# Patient Record
Sex: MALE | Race: WHITE | Employment: OTHER | URBAN - NONMETROPOLITAN AREA
[De-identification: names, ages, dates, MRNs, and addresses within clinical notes are randomized per-mention and may not be internally consistent; named-entity substitution may affect disease eponyms.]

---

## 2018-03-08 ENCOUNTER — HOSPITAL ENCOUNTER (OUTPATIENT)
Age: 63
Setting detail: SPECIMEN
Discharge: HOME | End: 2018-03-08
Payer: MEDICARE

## 2018-03-08 LAB
ABSOLUTE EOS #: 0.21 K/UL (ref 0–0.44)
ABSOLUTE IMMATURE GRANULOCYTE: <0.03 K/UL (ref 0–0.3)
ABSOLUTE LYMPH #: 1.99 K/UL (ref 1.1–3.7)
ABSOLUTE MONO #: 0.35 K/UL (ref 0.1–1.2)
ALBUMIN SERPL-MCNC: 4.2 G/DL (ref 3.5–5.2)
ALBUMIN/GLOBULIN RATIO: 1.6 (ref 1–2.5)
ALP BLD-CCNC: 132 U/L (ref 40–129)
ALT SERPL-CCNC: 12 U/L (ref 5–41)
ANION GAP SERPL CALCULATED.3IONS-SCNC: 12 MMOL/L (ref 9–17)
AST SERPL-CCNC: 14 U/L
BASOPHILS # BLD: 1 % (ref 0–2)
BASOPHILS ABSOLUTE: 0.04 K/UL (ref 0–0.2)
BILIRUB SERPL-MCNC: 0.21 MG/DL (ref 0.3–1.2)
BUN BLDV-MCNC: 10 MG/DL (ref 8–23)
BUN/CREAT BLD: 11 (ref 9–20)
CALCIUM SERPL-MCNC: 9.1 MG/DL (ref 8.6–10.4)
CHLORIDE BLD-SCNC: 102 MMOL/L (ref 98–107)
CHOLESTEROL/HDL RATIO: 4
CHOLESTEROL: 157 MG/DL
CO2: 29 MMOL/L (ref 20–31)
CREAT SERPL-MCNC: 0.87 MG/DL (ref 0.7–1.2)
DIFFERENTIAL TYPE: ABNORMAL
EOSINOPHILS RELATIVE PERCENT: 4 % (ref 1–4)
FOLATE: 16.6 NG/ML
GFR AFRICAN AMERICAN: >60 ML/MIN
GFR NON-AFRICAN AMERICAN: >60 ML/MIN
GFR SERPL CREATININE-BSD FRML MDRD: ABNORMAL ML/MIN/{1.73_M2}
GFR SERPL CREATININE-BSD FRML MDRD: ABNORMAL ML/MIN/{1.73_M2}
GLUCOSE BLD-MCNC: 82 MG/DL (ref 70–99)
HCT VFR BLD CALC: 40.2 % (ref 40.7–50.3)
HDLC SERPL-MCNC: 39 MG/DL
HEMOGLOBIN: 12.8 G/DL (ref 13–17)
IMMATURE GRANULOCYTES: 0 %
LDL CHOLESTEROL: 99 MG/DL (ref 0–130)
LYMPHOCYTES # BLD: 36 % (ref 24–43)
MCH RBC QN AUTO: 31 PG (ref 25.2–33.5)
MCHC RBC AUTO-ENTMCNC: 31.8 G/DL (ref 28.4–34.8)
MCV RBC AUTO: 97.3 FL (ref 82.6–102.9)
MONOCYTES # BLD: 6 % (ref 3–12)
NRBC AUTOMATED: 0 PER 100 WBC
PDW BLD-RTO: 12.1 % (ref 11.8–14.4)
PLATELET # BLD: 250 K/UL (ref 138–453)
PLATELET ESTIMATE: ABNORMAL
PMV BLD AUTO: 10.1 FL (ref 8.1–13.5)
POTASSIUM SERPL-SCNC: 4.2 MMOL/L (ref 3.7–5.3)
PROSTATE SPECIFIC ANTIGEN: 0.43 UG/L
RBC # BLD: 4.13 M/UL (ref 4.21–5.77)
RBC # BLD: ABNORMAL 10*6/UL
SEG NEUTROPHILS: 53 % (ref 36–65)
SEGMENTED NEUTROPHILS ABSOLUTE COUNT: 2.92 K/UL (ref 1.5–8.1)
SODIUM BLD-SCNC: 143 MMOL/L (ref 135–144)
TOTAL PROTEIN: 6.9 G/DL (ref 6.4–8.3)
TRIGL SERPL-MCNC: 97 MG/DL
VITAMIN B-12: 334 PG/ML (ref 211–946)
VLDLC SERPL CALC-MCNC: ABNORMAL MG/DL (ref 1–30)
WBC # BLD: 5.5 K/UL (ref 3.5–11.3)
WBC # BLD: ABNORMAL 10*3/UL

## 2018-03-08 PROCEDURE — 82746 ASSAY OF FOLIC ACID SERUM: CPT

## 2018-03-08 PROCEDURE — 82652 VIT D 1 25-DIHYDROXY: CPT

## 2018-03-08 PROCEDURE — 80053 COMPREHEN METABOLIC PANEL: CPT

## 2018-03-08 PROCEDURE — 84153 ASSAY OF PSA TOTAL: CPT

## 2018-03-08 PROCEDURE — P9603 ONE-WAY ALLOW PRORATED MILES: HCPCS

## 2018-03-08 PROCEDURE — 85025 COMPLETE CBC W/AUTO DIFF WBC: CPT

## 2018-03-08 PROCEDURE — 80061 LIPID PANEL: CPT

## 2018-03-08 PROCEDURE — 82607 VITAMIN B-12: CPT

## 2018-03-08 PROCEDURE — 36415 COLL VENOUS BLD VENIPUNCTURE: CPT

## 2018-03-10 LAB — VITAMIN D 1,25-DIHYDROXY: 40.6 PG/ML (ref 19.9–79.3)

## 2018-03-29 ENCOUNTER — HOSPITAL ENCOUNTER (OUTPATIENT)
Age: 63
Setting detail: SPECIMEN
Discharge: HOME | End: 2018-03-29
Payer: MEDICARE

## 2018-03-29 LAB
IRON SATURATION: 35 % (ref 20–55)
IRON: 56 UG/DL (ref 59–158)
TOTAL IRON BINDING CAPACITY: 160 UG/DL (ref 250–450)
UNSATURATED IRON BINDING CAPACITY: 104.4 UG/DL (ref 112–347)

## 2018-03-29 PROCEDURE — 36415 COLL VENOUS BLD VENIPUNCTURE: CPT

## 2018-03-29 PROCEDURE — 83540 ASSAY OF IRON: CPT

## 2018-03-29 PROCEDURE — 83550 IRON BINDING TEST: CPT

## 2018-03-29 PROCEDURE — P9603 ONE-WAY ALLOW PRORATED MILES: HCPCS

## 2018-11-01 ENCOUNTER — HOSPITAL ENCOUNTER (OUTPATIENT)
Age: 63
Setting detail: SPECIMEN
Discharge: HOME | End: 2018-11-01
Payer: MEDICARE

## 2018-11-01 LAB
-: ABNORMAL
AMORPHOUS: ABNORMAL
BACTERIA: ABNORMAL
BILIRUBIN URINE: NEGATIVE
CASTS UA: ABNORMAL /LPF
COLOR: YELLOW
COMMENT UA: ABNORMAL
CRYSTALS, UA: ABNORMAL /HPF
EPITHELIAL CELLS UA: ABNORMAL /HPF (ref 0–5)
GLUCOSE URINE: NEGATIVE
KETONES, URINE: NEGATIVE
LEUKOCYTE ESTERASE, URINE: ABNORMAL
MUCUS: ABNORMAL
NITRITE, URINE: NEGATIVE
OTHER OBSERVATIONS UA: ABNORMAL
PH UA: 8 (ref 5–9)
PROTEIN UA: ABNORMAL
RBC UA: ABNORMAL /HPF (ref 0–2)
RENAL EPITHELIAL, UA: ABNORMAL /HPF
SPECIFIC GRAVITY UA: 1.01 (ref 1.01–1.02)
TRICHOMONAS: ABNORMAL
TURBIDITY: ABNORMAL
URINE HGB: ABNORMAL
UROBILINOGEN, URINE: NORMAL
WBC UA: ABNORMAL /HPF (ref 0–5)
YEAST: ABNORMAL

## 2018-11-01 PROCEDURE — 87086 URINE CULTURE/COLONY COUNT: CPT

## 2018-11-01 PROCEDURE — 86403 PARTICLE AGGLUT ANTBDY SCRN: CPT

## 2018-11-01 PROCEDURE — 81001 URINALYSIS AUTO W/SCOPE: CPT

## 2018-11-03 LAB
CULTURE: ABNORMAL
Lab: ABNORMAL
SPECIMEN DESCRIPTION: ABNORMAL
STATUS: ABNORMAL

## 2018-12-12 ENCOUNTER — HOSPITAL ENCOUNTER (OUTPATIENT)
Age: 63
Setting detail: SPECIMEN
Discharge: HOME | End: 2018-12-12
Payer: MEDICARE

## 2018-12-12 PROCEDURE — 81003 URINALYSIS AUTO W/O SCOPE: CPT

## 2018-12-13 LAB
BILIRUBIN URINE: NEGATIVE
COLOR: YELLOW
COMMENT UA: NORMAL
GLUCOSE URINE: NEGATIVE
KETONES, URINE: NEGATIVE
LEUKOCYTE ESTERASE, URINE: NEGATIVE
NITRITE, URINE: NEGATIVE
PH UA: 7 (ref 5–9)
PROTEIN UA: NEGATIVE
SPECIFIC GRAVITY UA: 1.01 (ref 1.01–1.02)
TURBIDITY: CLEAR
URINE HGB: NEGATIVE
UROBILINOGEN, URINE: NORMAL

## 2019-06-12 ENCOUNTER — HOSPITAL ENCOUNTER (OUTPATIENT)
Age: 64
Setting detail: SPECIMEN
Discharge: HOME | End: 2019-06-12
Payer: MEDICARE

## 2019-06-12 LAB
ABSOLUTE EOS #: 0.38 K/UL (ref 0–0.44)
ABSOLUTE IMMATURE GRANULOCYTE: <0.03 K/UL (ref 0–0.3)
ABSOLUTE LYMPH #: 1.83 K/UL (ref 1.1–3.7)
ABSOLUTE MONO #: 0.45 K/UL (ref 0.1–1.2)
ALBUMIN SERPL-MCNC: 3.9 G/DL (ref 3.5–5.2)
ALBUMIN/GLOBULIN RATIO: 1.3 (ref 1–2.5)
ALP BLD-CCNC: 122 U/L (ref 40–129)
ALT SERPL-CCNC: 11 U/L (ref 5–41)
ANION GAP SERPL CALCULATED.3IONS-SCNC: 9 MMOL/L (ref 9–17)
AST SERPL-CCNC: 12 U/L
BASOPHILS # BLD: 1 % (ref 0–2)
BASOPHILS ABSOLUTE: 0.05 K/UL (ref 0–0.2)
BILIRUB SERPL-MCNC: 0.18 MG/DL (ref 0.3–1.2)
BUN BLDV-MCNC: 19 MG/DL (ref 8–23)
BUN/CREAT BLD: 16 (ref 9–20)
CALCIUM SERPL-MCNC: 9.5 MG/DL (ref 8.6–10.4)
CHLORIDE BLD-SCNC: 105 MMOL/L (ref 98–107)
CHOLESTEROL/HDL RATIO: 3.4
CHOLESTEROL: 149 MG/DL
CO2: 29 MMOL/L (ref 20–31)
CREAT SERPL-MCNC: 1.22 MG/DL (ref 0.7–1.2)
DIFFERENTIAL TYPE: ABNORMAL
EOSINOPHILS RELATIVE PERCENT: 7 % (ref 1–4)
GFR AFRICAN AMERICAN: >60 ML/MIN
GFR NON-AFRICAN AMERICAN: >60 ML/MIN
GFR SERPL CREATININE-BSD FRML MDRD: ABNORMAL ML/MIN/{1.73_M2}
GFR SERPL CREATININE-BSD FRML MDRD: ABNORMAL ML/MIN/{1.73_M2}
GLUCOSE BLD-MCNC: 86 MG/DL (ref 70–99)
HCT VFR BLD CALC: 37.5 % (ref 40.7–50.3)
HDLC SERPL-MCNC: 44 MG/DL
HEMOGLOBIN: 12 G/DL (ref 13–17)
IMMATURE GRANULOCYTES: 0 %
LDL CHOLESTEROL: 89 MG/DL (ref 0–130)
LYMPHOCYTES # BLD: 32 % (ref 24–43)
MCH RBC QN AUTO: 32.1 PG (ref 25.2–33.5)
MCHC RBC AUTO-ENTMCNC: 32 G/DL (ref 28.4–34.8)
MCV RBC AUTO: 100.3 FL (ref 82.6–102.9)
MONOCYTES # BLD: 8 % (ref 3–12)
NRBC AUTOMATED: 0 PER 100 WBC
PDW BLD-RTO: 11.9 % (ref 11.8–14.4)
PLATELET # BLD: 218 K/UL (ref 138–453)
PLATELET ESTIMATE: ABNORMAL
PMV BLD AUTO: 9.4 FL (ref 8.1–13.5)
POTASSIUM SERPL-SCNC: 4.4 MMOL/L (ref 3.7–5.3)
PROSTATE SPECIFIC ANTIGEN: 0.61 UG/L
RBC # BLD: 3.74 M/UL (ref 4.21–5.77)
RBC # BLD: ABNORMAL 10*6/UL
SEG NEUTROPHILS: 52 % (ref 36–65)
SEGMENTED NEUTROPHILS ABSOLUTE COUNT: 3.08 K/UL (ref 1.5–8.1)
SODIUM BLD-SCNC: 143 MMOL/L (ref 135–144)
TOTAL PROTEIN: 7 G/DL (ref 6.4–8.3)
TRIGL SERPL-MCNC: 79 MG/DL
VLDLC SERPL CALC-MCNC: NORMAL MG/DL (ref 1–30)
WBC # BLD: 5.8 K/UL (ref 3.5–11.3)
WBC # BLD: ABNORMAL 10*3/UL

## 2019-06-12 PROCEDURE — P9603 ONE-WAY ALLOW PRORATED MILES: HCPCS

## 2019-06-12 PROCEDURE — G0103 PSA SCREENING: HCPCS

## 2019-06-12 PROCEDURE — 82306 VITAMIN D 25 HYDROXY: CPT

## 2019-06-12 PROCEDURE — 85025 COMPLETE CBC W/AUTO DIFF WBC: CPT

## 2019-06-12 PROCEDURE — 80061 LIPID PANEL: CPT

## 2019-06-12 PROCEDURE — 36415 COLL VENOUS BLD VENIPUNCTURE: CPT

## 2019-06-12 PROCEDURE — 80053 COMPREHEN METABOLIC PANEL: CPT

## 2019-06-13 LAB — VITAMIN D 25-HYDROXY: 25.8 NG/ML (ref 30–100)

## 2019-08-29 ENCOUNTER — HOSPITAL ENCOUNTER (OUTPATIENT)
Age: 64
Discharge: HOME | End: 2019-08-29
Payer: MEDICARE

## 2019-08-29 LAB
KEPPRA: 42 UG/ML
LAMOTRIGINE LEVEL: 14.2 UG/ML (ref 3–15)
PHENOBARBITAL DATE LAST DOSE: NORMAL
PHENOBARBITAL DOSE AMOUNT: NORMAL
PHENOBARBITAL TIME LAST DOSE: NORMAL
PHENOBARBITAL: 20.6 UG/ML (ref 15–40)
VITAMIN D 25-HYDROXY: 28.6 NG/ML (ref 30–100)

## 2019-08-29 PROCEDURE — 80184 ASSAY OF PHENOBARBITAL: CPT

## 2019-08-29 PROCEDURE — G0480 DRUG TEST DEF 1-7 CLASSES: HCPCS

## 2019-08-29 PROCEDURE — 80177 DRUG SCRN QUAN LEVETIRACETAM: CPT

## 2019-08-29 PROCEDURE — 36415 COLL VENOUS BLD VENIPUNCTURE: CPT

## 2019-08-29 PROCEDURE — 82306 VITAMIN D 25 HYDROXY: CPT

## 2019-08-29 PROCEDURE — 80188 ASSAY OF PRIMIDONE: CPT

## 2019-08-29 PROCEDURE — 80175 DRUG SCREEN QUAN LAMOTRIGINE: CPT

## 2019-08-31 LAB
LACOSAMIDE: 4.8 UG/ML (ref 5–10)
PHENOBARBITAL: 21.7 UG/ML (ref 15–40)
PRIMIDONE LEVEL: 13.4 UG/ML (ref 5–12)

## 2019-10-03 ENCOUNTER — HOSPITAL ENCOUNTER (OUTPATIENT)
Age: 64
Setting detail: SPECIMEN
Discharge: HOME | End: 2019-10-03
Payer: MEDICARE

## 2019-10-03 LAB
-: ABNORMAL
AMORPHOUS: ABNORMAL
BACTERIA: ABNORMAL
BILIRUBIN URINE: NEGATIVE
CASTS UA: ABNORMAL /LPF
COLOR: YELLOW
COMMENT UA: ABNORMAL
CRYSTALS, UA: ABNORMAL /HPF
EPITHELIAL CELLS UA: ABNORMAL /HPF (ref 0–5)
GLUCOSE URINE: NEGATIVE
KETONES, URINE: NEGATIVE
LEUKOCYTE ESTERASE, URINE: ABNORMAL
MUCUS: ABNORMAL
NITRITE, URINE: NEGATIVE
OTHER OBSERVATIONS UA: ABNORMAL
PH UA: 6 (ref 5–9)
PROTEIN UA: NEGATIVE
RBC UA: ABNORMAL /HPF (ref 0–2)
RENAL EPITHELIAL, UA: ABNORMAL /HPF
SPECIFIC GRAVITY UA: 1.01 (ref 1.01–1.02)
TRICHOMONAS: ABNORMAL
TURBIDITY: ABNORMAL
URINE HGB: ABNORMAL
UROBILINOGEN, URINE: NORMAL
WBC UA: ABNORMAL /HPF (ref 0–5)
YEAST: ABNORMAL

## 2019-10-03 PROCEDURE — 87186 SC STD MICRODIL/AGAR DIL: CPT

## 2019-10-03 PROCEDURE — 87077 CULTURE AEROBIC IDENTIFY: CPT

## 2019-10-03 PROCEDURE — 81001 URINALYSIS AUTO W/SCOPE: CPT

## 2019-10-03 PROCEDURE — 87086 URINE CULTURE/COLONY COUNT: CPT

## 2019-10-06 LAB
CULTURE: ABNORMAL
Lab: ABNORMAL
SPECIMEN DESCRIPTION: ABNORMAL

## 2020-10-28 ENCOUNTER — HOSPITAL ENCOUNTER (OUTPATIENT)
Age: 65
Setting detail: SPECIMEN
Discharge: HOME | End: 2020-10-28
Payer: MEDICARE

## 2020-10-28 LAB
ABSOLUTE EOS #: 0.52 K/UL (ref 0–0.44)
ABSOLUTE IMMATURE GRANULOCYTE: <0.03 K/UL (ref 0–0.3)
ABSOLUTE LYMPH #: 2.08 K/UL (ref 1.1–3.7)
ABSOLUTE MONO #: 0.55 K/UL (ref 0.1–1.2)
ALBUMIN SERPL-MCNC: 4.3 G/DL (ref 3.5–5.2)
ALBUMIN/GLOBULIN RATIO: 1.3 (ref 1–2.5)
ALP BLD-CCNC: 119 U/L (ref 40–129)
ALT SERPL-CCNC: 11 U/L (ref 5–41)
ANION GAP SERPL CALCULATED.3IONS-SCNC: 8 MMOL/L (ref 9–17)
AST SERPL-CCNC: 11 U/L
BASOPHILS # BLD: 1 % (ref 0–2)
BASOPHILS ABSOLUTE: 0.06 K/UL (ref 0–0.2)
BILIRUB SERPL-MCNC: 0.26 MG/DL (ref 0.3–1.2)
BILIRUBIN DIRECT: <0.08 MG/DL
BILIRUBIN, INDIRECT: ABNORMAL MG/DL (ref 0–1)
BUN BLDV-MCNC: 17 MG/DL (ref 8–23)
BUN/CREAT BLD: 17 (ref 9–20)
CALCIUM SERPL-MCNC: 9.9 MG/DL (ref 8.6–10.4)
CHLORIDE BLD-SCNC: 101 MMOL/L (ref 98–107)
CO2: 30 MMOL/L (ref 20–31)
CREAT SERPL-MCNC: 1 MG/DL (ref 0.7–1.2)
DIFFERENTIAL TYPE: ABNORMAL
EOSINOPHILS RELATIVE PERCENT: 6 % (ref 1–4)
GFR AFRICAN AMERICAN: >60 ML/MIN
GFR NON-AFRICAN AMERICAN: >60 ML/MIN
GFR SERPL CREATININE-BSD FRML MDRD: ABNORMAL ML/MIN/{1.73_M2}
GFR SERPL CREATININE-BSD FRML MDRD: ABNORMAL ML/MIN/{1.73_M2}
GLOBULIN: ABNORMAL G/DL (ref 1.5–3.8)
GLUCOSE BLD-MCNC: 88 MG/DL (ref 70–99)
HCT VFR BLD CALC: 41 % (ref 40.7–50.3)
HEMOGLOBIN: 12.7 G/DL (ref 13–17)
IMMATURE GRANULOCYTES: 0 %
KEPPRA: 72 UG/ML
LAMOTRIGINE LEVEL: 15.9 UG/ML (ref 3–15)
LYMPHOCYTES # BLD: 25 % (ref 24–43)
MCH RBC QN AUTO: 31.6 PG (ref 25.2–33.5)
MCHC RBC AUTO-ENTMCNC: 31 G/DL (ref 28.4–34.8)
MCV RBC AUTO: 102 FL (ref 82.6–102.9)
MONOCYTES # BLD: 7 % (ref 3–12)
NRBC AUTOMATED: 0 PER 100 WBC
PDW BLD-RTO: 12.7 % (ref 11.8–14.4)
PLATELET # BLD: 230 K/UL (ref 138–453)
PLATELET ESTIMATE: ABNORMAL
PMV BLD AUTO: 9 FL (ref 8.1–13.5)
POTASSIUM SERPL-SCNC: 4.4 MMOL/L (ref 3.7–5.3)
RBC # BLD: 4.02 M/UL (ref 4.21–5.77)
RBC # BLD: ABNORMAL 10*6/UL
SEG NEUTROPHILS: 61 % (ref 36–65)
SEGMENTED NEUTROPHILS ABSOLUTE COUNT: 5.16 K/UL (ref 1.5–8.1)
SODIUM BLD-SCNC: 139 MMOL/L (ref 135–144)
TOTAL PROTEIN: 7.6 G/DL (ref 6.4–8.3)
WBC # BLD: 8.4 K/UL (ref 3.5–11.3)
WBC # BLD: ABNORMAL 10*3/UL

## 2020-10-28 PROCEDURE — P9603 ONE-WAY ALLOW PRORATED MILES: HCPCS

## 2020-10-28 PROCEDURE — 80048 BASIC METABOLIC PNL TOTAL CA: CPT

## 2020-10-28 PROCEDURE — 85025 COMPLETE CBC W/AUTO DIFF WBC: CPT

## 2020-10-28 PROCEDURE — 80235 DRUG ASSAY LACOSAMIDE: CPT

## 2020-10-28 PROCEDURE — 80175 DRUG SCREEN QUAN LAMOTRIGINE: CPT

## 2020-10-28 PROCEDURE — 80177 DRUG SCRN QUAN LEVETIRACETAM: CPT

## 2020-10-28 PROCEDURE — 36415 COLL VENOUS BLD VENIPUNCTURE: CPT

## 2020-10-28 PROCEDURE — 80076 HEPATIC FUNCTION PANEL: CPT

## 2020-10-30 LAB — LACOSAMIDE: 6.5 UG/ML (ref 5–10)

## 2021-06-16 ENCOUNTER — HOSPITAL ENCOUNTER (OUTPATIENT)
Age: 66
Setting detail: SPECIMEN
Discharge: HOME | End: 2021-06-16
Payer: MEDICARE

## 2021-06-16 LAB
ABSOLUTE EOS #: 0.23 K/UL (ref 0–0.44)
ABSOLUTE IMMATURE GRANULOCYTE: 0.03 K/UL (ref 0–0.3)
ABSOLUTE LYMPH #: 1.63 K/UL (ref 1.1–3.7)
ABSOLUTE MONO #: 0.52 K/UL (ref 0.1–1.2)
ALBUMIN SERPL-MCNC: 3.9 G/DL (ref 3.5–5.2)
ALBUMIN/GLOBULIN RATIO: 1.2 (ref 1–2.5)
ALP BLD-CCNC: 141 U/L (ref 40–129)
ALT SERPL-CCNC: 18 U/L (ref 5–41)
ANION GAP SERPL CALCULATED.3IONS-SCNC: 7 MMOL/L (ref 9–17)
AST SERPL-CCNC: 16 U/L
BASOPHILS # BLD: 1 % (ref 0–2)
BASOPHILS ABSOLUTE: 0.05 K/UL (ref 0–0.2)
BILIRUB SERPL-MCNC: 0.24 MG/DL (ref 0.3–1.2)
BILIRUBIN DIRECT: <0.08 MG/DL
BILIRUBIN, INDIRECT: ABNORMAL MG/DL (ref 0–1)
BUN BLDV-MCNC: 21 MG/DL (ref 8–23)
BUN/CREAT BLD: 18 (ref 9–20)
CALCIUM SERPL-MCNC: 9.5 MG/DL (ref 8.6–10.4)
CHLORIDE BLD-SCNC: 103 MMOL/L (ref 98–107)
CHOLESTEROL/HDL RATIO: 2.8
CHOLESTEROL: 155 MG/DL
CO2: 28 MMOL/L (ref 20–31)
CREAT SERPL-MCNC: 1.17 MG/DL (ref 0.7–1.2)
DIFFERENTIAL TYPE: ABNORMAL
EOSINOPHILS RELATIVE PERCENT: 3 % (ref 1–4)
GFR AFRICAN AMERICAN: >60 ML/MIN
GFR NON-AFRICAN AMERICAN: >60 ML/MIN
GFR SERPL CREATININE-BSD FRML MDRD: ABNORMAL ML/MIN/{1.73_M2}
GFR SERPL CREATININE-BSD FRML MDRD: ABNORMAL ML/MIN/{1.73_M2}
GLOBULIN: ABNORMAL G/DL (ref 1.5–3.8)
GLUCOSE BLD-MCNC: 80 MG/DL (ref 70–99)
HCT VFR BLD CALC: 40.7 % (ref 40.7–50.3)
HDLC SERPL-MCNC: 55 MG/DL
HEMOGLOBIN: 12.9 G/DL (ref 13–17)
IMMATURE GRANULOCYTES: 0 %
LDL CHOLESTEROL: 91 MG/DL (ref 0–130)
LYMPHOCYTES # BLD: 21 % (ref 24–43)
MCH RBC QN AUTO: 32 PG (ref 25.2–33.5)
MCHC RBC AUTO-ENTMCNC: 31.7 G/DL (ref 28.4–34.8)
MCV RBC AUTO: 101 FL (ref 82.6–102.9)
MONOCYTES # BLD: 7 % (ref 3–12)
NRBC AUTOMATED: 0 PER 100 WBC
PDW BLD-RTO: 12.4 % (ref 11.8–14.4)
PLATELET # BLD: 225 K/UL (ref 138–453)
PLATELET ESTIMATE: ABNORMAL
PMV BLD AUTO: 9.1 FL (ref 8.1–13.5)
POTASSIUM SERPL-SCNC: 4.9 MMOL/L (ref 3.7–5.3)
PROSTATE SPECIFIC ANTIGEN: 0.48 UG/L
RBC # BLD: 4.03 M/UL (ref 4.21–5.77)
RBC # BLD: ABNORMAL 10*6/UL
SEG NEUTROPHILS: 68 % (ref 36–65)
SEGMENTED NEUTROPHILS ABSOLUTE COUNT: 5.33 K/UL (ref 1.5–8.1)
SODIUM BLD-SCNC: 138 MMOL/L (ref 135–144)
TOTAL PROTEIN: 7.2 G/DL (ref 6.4–8.3)
TRIGL SERPL-MCNC: 46 MG/DL
VLDLC SERPL CALC-MCNC: NORMAL MG/DL (ref 1–30)
WBC # BLD: 7.8 K/UL (ref 3.5–11.3)
WBC # BLD: ABNORMAL 10*3/UL

## 2021-06-16 PROCEDURE — 80061 LIPID PANEL: CPT

## 2021-06-16 PROCEDURE — P9603 ONE-WAY ALLOW PRORATED MILES: HCPCS

## 2021-06-16 PROCEDURE — G0103 PSA SCREENING: HCPCS

## 2021-06-16 PROCEDURE — 85025 COMPLETE CBC W/AUTO DIFF WBC: CPT

## 2021-06-16 PROCEDURE — 80076 HEPATIC FUNCTION PANEL: CPT

## 2021-06-16 PROCEDURE — 36415 COLL VENOUS BLD VENIPUNCTURE: CPT

## 2021-06-16 PROCEDURE — 80048 BASIC METABOLIC PNL TOTAL CA: CPT

## 2022-01-07 ENCOUNTER — HOSPITAL ENCOUNTER (OUTPATIENT)
Age: 67
Setting detail: SPECIMEN
Discharge: HOME | End: 2022-01-07

## 2022-01-12 ENCOUNTER — HOSPITAL ENCOUNTER (OUTPATIENT)
Age: 67
Setting detail: SPECIMEN
Discharge: HOME | End: 2022-01-12

## 2022-01-26 ENCOUNTER — HOSPITAL ENCOUNTER (OUTPATIENT)
Age: 67
Setting detail: SPECIMEN
Discharge: HOME | End: 2022-01-26

## 2022-02-09 ENCOUNTER — HOSPITAL ENCOUNTER (OUTPATIENT)
Age: 67
Setting detail: SPECIMEN
Discharge: HOME | End: 2022-02-09
Payer: MEDICARE

## 2022-02-09 LAB
ABSOLUTE EOS #: 0.26 K/UL (ref 0–0.44)
ABSOLUTE IMMATURE GRANULOCYTE: <0.03 K/UL (ref 0–0.3)
ABSOLUTE LYMPH #: 1.87 K/UL (ref 1.1–3.7)
ABSOLUTE MONO #: 0.48 K/UL (ref 0.1–1.2)
ALBUMIN SERPL-MCNC: 4.4 G/DL (ref 3.5–5.2)
ALBUMIN/GLOBULIN RATIO: 1.6 (ref 1–2.5)
ALP BLD-CCNC: 147 U/L (ref 40–129)
ALT SERPL-CCNC: 16 U/L (ref 5–41)
ANION GAP SERPL CALCULATED.3IONS-SCNC: 10 MMOL/L (ref 9–17)
AST SERPL-CCNC: 15 U/L
BASOPHILS # BLD: 1 % (ref 0–2)
BASOPHILS ABSOLUTE: 0.04 K/UL (ref 0–0.2)
BILIRUB SERPL-MCNC: 0.32 MG/DL (ref 0.3–1.2)
BILIRUBIN DIRECT: <0.08 MG/DL
BILIRUBIN, INDIRECT: ABNORMAL MG/DL (ref 0–1)
BUN BLDV-MCNC: 17 MG/DL (ref 8–23)
BUN/CREAT BLD: 15 (ref 9–20)
CALCIUM SERPL-MCNC: 9.7 MG/DL (ref 8.6–10.4)
CHLORIDE BLD-SCNC: 103 MMOL/L (ref 98–107)
CHOLESTEROL/HDL RATIO: 3.1
CHOLESTEROL: 155 MG/DL
CO2: 27 MMOL/L (ref 20–31)
CREAT SERPL-MCNC: 1.11 MG/DL (ref 0.7–1.2)
DIFFERENTIAL TYPE: ABNORMAL
EOSINOPHILS RELATIVE PERCENT: 4 % (ref 1–4)
GFR AFRICAN AMERICAN: >60 ML/MIN
GFR NON-AFRICAN AMERICAN: >60 ML/MIN
GFR SERPL CREATININE-BSD FRML MDRD: NORMAL ML/MIN/{1.73_M2}
GFR SERPL CREATININE-BSD FRML MDRD: NORMAL ML/MIN/{1.73_M2}
GLOBULIN: ABNORMAL G/DL (ref 1.5–3.8)
GLUCOSE BLD-MCNC: 82 MG/DL (ref 70–99)
HCT VFR BLD CALC: 40.6 % (ref 40.7–50.3)
HDLC SERPL-MCNC: 50 MG/DL
HEMOGLOBIN: 12.8 G/DL (ref 13–17)
IMMATURE GRANULOCYTES: 0 %
KEPPRA: 40 UG/ML
LAMOTRIGINE LEVEL: 13.4 UG/ML (ref 3–15)
LDL CHOLESTEROL: 94 MG/DL (ref 0–130)
LYMPHOCYTES # BLD: 26 % (ref 24–43)
MCH RBC QN AUTO: 31.8 PG (ref 25.2–33.5)
MCHC RBC AUTO-ENTMCNC: 31.5 G/DL (ref 28.4–34.8)
MCV RBC AUTO: 101 FL (ref 82.6–102.9)
MONOCYTES # BLD: 7 % (ref 3–12)
NRBC AUTOMATED: 0 PER 100 WBC
PDW BLD-RTO: 12.6 % (ref 11.8–14.4)
PLATELET # BLD: 231 K/UL (ref 138–453)
PLATELET ESTIMATE: ABNORMAL
PMV BLD AUTO: 9.7 FL (ref 8.1–13.5)
POTASSIUM SERPL-SCNC: 4.4 MMOL/L (ref 3.7–5.3)
PROSTATE SPECIFIC ANTIGEN: 0.6 UG/L
RBC # BLD: 4.02 M/UL (ref 4.21–5.77)
RBC # BLD: ABNORMAL 10*6/UL
SEG NEUTROPHILS: 62 % (ref 36–65)
SEGMENTED NEUTROPHILS ABSOLUTE COUNT: 4.45 K/UL (ref 1.5–8.1)
SODIUM BLD-SCNC: 140 MMOL/L (ref 135–144)
TOTAL PROTEIN: 7.2 G/DL (ref 6.4–8.3)
TRIGL SERPL-MCNC: 53 MG/DL
VLDLC SERPL CALC-MCNC: NORMAL MG/DL (ref 1–30)
WBC # BLD: 7.1 K/UL (ref 3.5–11.3)
WBC # BLD: ABNORMAL 10*3/UL

## 2022-02-09 PROCEDURE — 80076 HEPATIC FUNCTION PANEL: CPT

## 2022-02-09 PROCEDURE — 85025 COMPLETE CBC W/AUTO DIFF WBC: CPT

## 2022-02-09 PROCEDURE — 80188 ASSAY OF PRIMIDONE: CPT

## 2022-02-09 PROCEDURE — P9603 ONE-WAY ALLOW PRORATED MILES: HCPCS

## 2022-02-09 PROCEDURE — 80048 BASIC METABOLIC PNL TOTAL CA: CPT

## 2022-02-09 PROCEDURE — 80184 ASSAY OF PHENOBARBITAL: CPT

## 2022-02-09 PROCEDURE — 80177 DRUG SCRN QUAN LEVETIRACETAM: CPT

## 2022-02-09 PROCEDURE — 80061 LIPID PANEL: CPT

## 2022-02-09 PROCEDURE — 80175 DRUG SCREEN QUAN LAMOTRIGINE: CPT

## 2022-02-09 PROCEDURE — 36415 COLL VENOUS BLD VENIPUNCTURE: CPT

## 2022-02-09 PROCEDURE — G0103 PSA SCREENING: HCPCS

## 2022-02-11 LAB
PHENOBARBITAL: 25 UG/ML (ref 15–40)
PRIMIDONE LEVEL: 11.8 UG/ML (ref 5–12)

## 2022-07-09 ENCOUNTER — APPOINTMENT (OUTPATIENT)
Dept: CT IMAGING | Age: 67
End: 2022-07-09
Payer: MEDICARE

## 2022-07-09 ENCOUNTER — HOSPITAL ENCOUNTER (EMERGENCY)
Age: 67
Discharge: HOME | End: 2022-07-09
Attending: STUDENT IN AN ORGANIZED HEALTH CARE EDUCATION/TRAINING PROGRAM
Payer: MEDICARE

## 2022-07-09 VITALS — HEART RATE: 96 BPM | SYSTOLIC BLOOD PRESSURE: 128 MMHG | DIASTOLIC BLOOD PRESSURE: 68 MMHG | TEMPERATURE: 97.7 F

## 2022-07-09 DIAGNOSIS — R56.9 SEIZURE (HCC): Primary | ICD-10-CM

## 2022-07-09 LAB
ABSOLUTE EOS #: 0.24 K/UL (ref 0–0.44)
ABSOLUTE IMMATURE GRANULOCYTE: <0.03 K/UL (ref 0–0.3)
ABSOLUTE LYMPH #: 1.49 K/UL (ref 1.1–3.7)
ABSOLUTE MONO #: 0.81 K/UL (ref 0.1–1.2)
ANION GAP SERPL CALCULATED.3IONS-SCNC: 12 MMOL/L (ref 9–17)
BASOPHILS # BLD: 1 % (ref 0–2)
BASOPHILS ABSOLUTE: 0.06 K/UL (ref 0–0.2)
BUN BLDV-MCNC: 28 MG/DL (ref 8–23)
BUN/CREAT BLD: 29 (ref 9–20)
CALCIUM SERPL-MCNC: 9.3 MG/DL (ref 8.6–10.4)
CHLORIDE BLD-SCNC: 103 MMOL/L (ref 98–107)
CO2: 25 MMOL/L (ref 20–31)
CREAT SERPL-MCNC: 0.96 MG/DL (ref 0.7–1.2)
EOSINOPHILS RELATIVE PERCENT: 3 % (ref 1–4)
GFR AFRICAN AMERICAN: >60 ML/MIN
GFR NON-AFRICAN AMERICAN: >60 ML/MIN
GFR SERPL CREATININE-BSD FRML MDRD: ABNORMAL ML/MIN/{1.73_M2}
GFR SERPL CREATININE-BSD FRML MDRD: ABNORMAL ML/MIN/{1.73_M2}
GLUCOSE BLD-MCNC: 97 MG/DL (ref 70–99)
HCT VFR BLD CALC: 36.2 % (ref 40.7–50.3)
HEMOGLOBIN: 11.5 G/DL (ref 13–17)
IMMATURE GRANULOCYTES: 0 %
LYMPHOCYTES # BLD: 19 % (ref 24–43)
MCH RBC QN AUTO: 31.8 PG (ref 25.2–33.5)
MCHC RBC AUTO-ENTMCNC: 31.8 G/DL (ref 28.4–34.8)
MCV RBC AUTO: 100 FL (ref 82.6–102.9)
MONOCYTES # BLD: 10 % (ref 3–12)
NRBC AUTOMATED: 0 PER 100 WBC
PDW BLD-RTO: 13.3 % (ref 11.8–14.4)
PLATELET # BLD: 213 K/UL (ref 138–453)
PMV BLD AUTO: 8.8 FL (ref 8.1–13.5)
POTASSIUM SERPL-SCNC: 4.3 MMOL/L (ref 3.7–5.3)
RBC # BLD: 3.62 M/UL (ref 4.21–5.77)
SEG NEUTROPHILS: 67 % (ref 36–65)
SEGMENTED NEUTROPHILS ABSOLUTE COUNT: 5.18 K/UL (ref 1.5–8.1)
SODIUM BLD-SCNC: 140 MMOL/L (ref 135–144)
WBC # BLD: 7.8 K/UL (ref 3.5–11.3)

## 2022-07-09 PROCEDURE — 93005 ELECTROCARDIOGRAM TRACING: CPT | Performed by: STUDENT IN AN ORGANIZED HEALTH CARE EDUCATION/TRAINING PROGRAM

## 2022-07-09 PROCEDURE — 85025 COMPLETE CBC W/AUTO DIFF WBC: CPT

## 2022-07-09 PROCEDURE — 36415 COLL VENOUS BLD VENIPUNCTURE: CPT

## 2022-07-09 PROCEDURE — 99284 EMERGENCY DEPT VISIT MOD MDM: CPT

## 2022-07-09 PROCEDURE — 70450 CT HEAD/BRAIN W/O DYE: CPT

## 2022-07-09 PROCEDURE — 80048 BASIC METABOLIC PNL TOTAL CA: CPT

## 2022-07-10 LAB
EKG ATRIAL RATE: 88 BPM
EKG P AXIS: 41 DEGREES
EKG P-R INTERVAL: 194 MS
EKG Q-T INTERVAL: 380 MS
EKG QRS DURATION: 90 MS
EKG QTC CALCULATION (BAZETT): 459 MS
EKG R AXIS: -5 DEGREES
EKG T AXIS: 56 DEGREES
EKG VENTRICULAR RATE: 88 BPM

## 2022-07-10 PROCEDURE — 93010 ELECTROCARDIOGRAM REPORT: CPT | Performed by: INTERNAL MEDICINE

## 2022-07-10 NOTE — ED PROVIDER NOTES
EMERGENCY DEPARTMENT  -  VISIT NOTE    Date:     7/9/2022  Patient: Celina Camarillo  MRN:    [id number]    Triage:    Chief Complaint   Patient presents with    Seizures     per EMS seizure lasted about 5 minutes, patient does not remember what occurred     ------------------------------ HISTORY OF PRESENT ILLNESS --------------------------    The history is provided by the EMS and patient's care facility nurse. Celina Camarillo is a 73 y.o. male who presents to the ED for seizure activity. Patient has MR as well as seizure disorder with a vagal nerve stimulator in place. Earlier in the day he had a seizure-like episode typical of his prior seizures and the nursing staff utilize the magnet to activate his vagal nerve stimulator which resolved the symptoms. He subsequently went on a field trip to watch a movie where he had a recurrent episode of staring off typical of his seizure. He had urinary incontinence associated with this. There was no report of tonic-clonic seizure activity or fall. He is wearing a bicycle helmet as a safety precaution chronically. EMS was called to the scene and transported the patient to the emergency department for evaluation. The blood glucose was normal.    After period of time the patient's nursing staff arrived stating that he was at his normal level of consciousness. She states he is more than 6 weeks post abdominal hernia repair and that the wound is healing well and the patient is having no difficulty eating or drinking. She states these are the first seizure episodes he has had since his surgery. She reports that he has the vagal stimulator and they are able to utilize the magnet and terminate the seizures up to 2 times in a 24-hour period currently he has only terminated 1 seizure with the magnet. The second seizure at the movie theater the traveled without the magnet available.     The patient is without complaint and has returned to his mental status baseline.    ----------------------------------- REVIEW OF SYSTEMS -------------------------------------  The following systems were reviewed:  Skin - normal                                      Eyes - normal  Ears/Nose/Throat - normal  Respiratory -no cough or shortness of breath  Cardiovascular -no chest pain  Gastrointestinal -no nausea vomiting or diarrhea  Genitourinary - normal  Musculoskeletal -no gross extremity deformity  Neurologic -no headache, has chronic lower extremity weakness unchanged  Constitutional -no fever or chills    All other ROS negative except as noted above  --------------------------------------- PAST HISTORY ------------------------------------------  Past Medical History:  Past Medical History:   Diagnosis Date    MR (mental retardation)     Seizures ([facility] [address]     Ventral hernia      Past Surgical History:  Past Surgical History:   Procedure Laterality Date    ABDOMEN SURGERY  2013, 2014    Bowel Obstruction X2    CAST APPLICATION Left     Lt.  Ankle , X3    HYDROCELE EXCISION  08/2016    LAPAROSCOPY  8/17/14    with BERNARD    LAPAROTOMY  8/17/14    with partial cecectomy    VAGAL NERVE STIMULATION      placed, then replaced    VAGAL NERVE STIMULATION  10/11/2016    replaced generator battery     Social History:  Social History     Socioeconomic History    Marital status: Single     Spouse name: Not on file    Number of children: 0    Years of education: Not on file    Highest education level: Not on file   Occupational History    Not on file   Tobacco Use    Smoking status: Never Smoker    Smokeless tobacco: Not on file   Substance and Sexual Activity    Alcohol use: No    Drug use: No    Sexual activity: Never   Other Topics Concern    Not on file   Social History Narrative    Not on file     Social Determinants of Health     Financial Resource Strain:     Difficulty of Paying Living Expenses: Not on file   Food Insecurity:     Worried About Running Out of Food in the Last Year: Not on file    Ran Out of Food in the Last Year: Not on file   Transportation Needs:     Lack of Transportation (Medical): Not on file    Lack of Transportation (Non-Medical): Not on file   Physical Activity:     Days of Exercise per Week: Not on file    Minutes of Exercise per Session: Not on file   Stress:     Feeling of Stress : Not on file   Social Connections:     Frequency of Communication with Friends and Family: Not on file    Frequency of Social Gatherings with Friends and Family: Not on file    Attends Sikhism Services: Not on file    Active Member of [address] [place] [address] [employer] or Organizations: Not on file    Attends Club or Organization Meetings: Not on file    Marital Status: Not on file   Intimate Partner Violence:     Fear of Current or Ex-Partner: Not on file    Emotionally Abused: Not on file    Physically Abused: Not on file    Sexually Abused: Not on file   Housing Stability:     Unable to Pay for Housing in the Last Year: Not on file    Number of [place] in the Last Year: Not on file    Unstable Housing in the Last Year: Not on file     Medications: The patient's home medications have been reviewed. Allergies:   Pcn [penicillins] and Clindamycin   ------------------------------------- PHYSICAL EXAM -----------------------------------------  /68   Pulse 96   Temp 97.7 °F (36.5 °C) (Tympanic)     Constitutional: Alert, awake  HENT: mucous membranes moist  Eyes: no discharge, nonicteric  Neck:  neck supple, trachea midline  Lungs: no respiratory distress  Heart: RRR , no peripheral edema  Abdomen: Soft, nontender, ND, no masses, no rebound/guarding, no peritoneal signs  He is noted to have been incontinent of urine  Extremities: normal peripheral perfusion  Neuro: Alert and oriented at his mental status baseline  Patient does not ambulate alone and he is a turn and pivot assist level of nursing care.   Patient's sensation is intact to his bilateral upper and lower extremities. His head is protected in a bicycle helmet and is without out evidence of trauma  He has some chronic contractures atrophy of his bilateral lower extremities  Skin: warm and dry  Psych: cooperative, at his mental status baseline    --------------------------------- MEDICAL DECISION MAKING -------------------------------  Vital Signs: Reviewed the patients vital signs. Nursing Notes: Reviewed and utilized the nursing notes. Nursing triage and assessment notes reviewed and incorporated. Nature of the Problem: Acute on chronic    ED Physician Core Measures: None indicated     Old Medical Records: The patient's available past medical records and past encounters were reviewed. Summary of pertinent elements include:  MR  Seizure disorder  Status post abdominal hernia repair    Additional Medical Decision Making:    Evaluation w/ HPI, physical exam, labs and studies as above. HD stable. Airway intact. Presents with typical seizure-like activity twice in the last 24 hours. The first episode was aborted with his vagal nerve stimulator. The second episode was while out on a field trip and the nursing staff did not have the magnet with him. The patient was incontinent of urine but is now back to his neurological baseline. Nursing staff is present and report he is able to utilize the vagus nerve stimulator up to 2 time in 24. Since these episodes are the first times he has seen since he had his surgery we elected to obtain CBC BMP and a CT of his head. Fortunately the CBC without leukocytosis or anemia. BMP only noted a minimal elevation in his urine. No indication of acute kidney failure. The nursing staff was educated to increase his oral fluid intake. CT head was without acute process and showed stable unchanged atrophy.     Considering the patient's history and his current baseline mental status he will be discharged with his nurse care provider back to his care facility.    ------------------------------ IMPRESSION AND DISPOSITION -----------------------------    IMPRESSION  1. Breakthrough seizure    Medical Screening Exam: The patient has received a medical screening examination and within reasonable clinical confidence an emergency medical condition was identified and has been stabilized    DISPOSITION  Discharge back to skilled nursing facility with nursing staff    -------------------------------------- COUNSELING ------------------------------------------------     Counseling: Spoke with the patient and the nursing staff to care for him. we discussed todays findings, in addition to providing specific details for the plan of care and counseling regarding the diagnosis and prognosis. They were given the opportunity to ask questions. Discussed the return indications and importance of follow-up. Advised to follow-up with PCP. Advised to return to the ED for changing/worsening symptoms, new symptoms, complaint specific precautions, and precautions listed on the pts discharge paperwork. Educated on the common potential side effects of medication to be prescribed. Kermit Hill D.O.     Attending Physician  Department of Emergency Medicine       Kermit Hill,   07/10/22 [id number]

## 2022-08-17 ENCOUNTER — HOSPITAL ENCOUNTER (OUTPATIENT)
Age: 67
Setting detail: SPECIMEN
Discharge: HOME | End: 2022-08-17

## 2022-09-07 ENCOUNTER — HOSPITAL ENCOUNTER (OUTPATIENT)
Age: 67
Setting detail: SPECIMEN
Discharge: HOME | End: 2022-09-07
Payer: MEDICARE

## 2022-09-07 PROCEDURE — P9603 ONE-WAY ALLOW PRORATED MILES: HCPCS

## 2022-09-07 PROCEDURE — 80235 DRUG ASSAY LACOSAMIDE: CPT

## 2022-09-07 PROCEDURE — 36415 COLL VENOUS BLD VENIPUNCTURE: CPT

## 2022-09-09 LAB — LACOSAMIDE: 6 UG/ML (ref 1–10)

## 2022-12-07 ENCOUNTER — HOSPITAL ENCOUNTER (OUTPATIENT)
Age: 67
Setting detail: SPECIMEN
Discharge: HOME | End: 2022-12-07
Payer: MEDICARE

## 2022-12-07 LAB
ABSOLUTE EOS #: 0.19 K/UL (ref 0–0.44)
ABSOLUTE IMMATURE GRANULOCYTE: <0.03 K/UL (ref 0–0.3)
ABSOLUTE LYMPH #: 1.74 K/UL (ref 1.1–3.7)
ABSOLUTE MONO #: 0.49 K/UL (ref 0.1–1.2)
ALBUMIN SERPL-MCNC: 4 G/DL (ref 3.5–5.2)
ALBUMIN/GLOBULIN RATIO: 1.3 (ref 1–2.5)
ALP BLD-CCNC: 140 U/L (ref 40–129)
ALT SERPL-CCNC: 13 U/L (ref 5–41)
AMMONIA: 27 UMOL/L (ref 16–60)
ANION GAP SERPL CALCULATED.3IONS-SCNC: 8 MMOL/L (ref 9–17)
AST SERPL-CCNC: 13 U/L
BASOPHILS # BLD: 1 % (ref 0–2)
BASOPHILS ABSOLUTE: 0.03 K/UL (ref 0–0.2)
BILIRUB SERPL-MCNC: 0.2 MG/DL (ref 0.3–1.2)
BILIRUBIN DIRECT: <0.1 MG/DL
BILIRUBIN, INDIRECT: ABNORMAL MG/DL (ref 0–1)
BUN BLDV-MCNC: 15 MG/DL (ref 8–23)
BUN/CREAT BLD: 15 (ref 9–20)
CALCIUM SERPL-MCNC: 9.3 MG/DL (ref 8.6–10.4)
CHLORIDE BLD-SCNC: 107 MMOL/L (ref 98–107)
CO2: 28 MMOL/L (ref 20–31)
CREAT SERPL-MCNC: 1 MG/DL (ref 0.7–1.2)
EOSINOPHILS RELATIVE PERCENT: 4 % (ref 1–4)
GFR SERPL CREATININE-BSD FRML MDRD: >60 ML/MIN/1.73M2
GLUCOSE BLD-MCNC: 88 MG/DL (ref 70–99)
HCT VFR BLD CALC: 37.3 % (ref 40.7–50.3)
HEMOGLOBIN: 12.1 G/DL (ref 13–17)
IMMATURE GRANULOCYTES: 0 %
KEPPRA: 42 UG/ML
LAMOTRIGINE LEVEL: 14 UG/ML (ref 3–15)
LYMPHOCYTES # BLD: 32 % (ref 24–43)
MCH RBC QN AUTO: 33.4 PG (ref 25.2–33.5)
MCHC RBC AUTO-ENTMCNC: 32.4 G/DL (ref 28.4–34.8)
MCV RBC AUTO: 103 FL (ref 82.6–102.9)
MONOCYTES # BLD: 9 % (ref 3–12)
NRBC AUTOMATED: 0 PER 100 WBC
PDW BLD-RTO: 12.9 % (ref 11.8–14.4)
PHENOBARBITAL: 13.9 UG/ML (ref 15–40)
PLATELET # BLD: 189 K/UL (ref 138–453)
PMV BLD AUTO: 9.1 FL (ref 8.1–13.5)
POTASSIUM SERPL-SCNC: 4.9 MMOL/L (ref 3.7–5.3)
RBC # BLD: 3.62 M/UL (ref 4.21–5.77)
SEG NEUTROPHILS: 54 % (ref 36–65)
SEGMENTED NEUTROPHILS ABSOLUTE COUNT: 2.91 K/UL (ref 1.5–8.1)
SODIUM BLD-SCNC: 143 MMOL/L (ref 135–144)
TOTAL PROTEIN: 7.1 G/DL (ref 6.4–8.3)
WBC # BLD: 5.4 K/UL (ref 3.5–11.3)

## 2022-12-07 PROCEDURE — 85025 COMPLETE CBC W/AUTO DIFF WBC: CPT

## 2022-12-07 PROCEDURE — 80175 DRUG SCREEN QUAN LAMOTRIGINE: CPT

## 2022-12-07 PROCEDURE — 80048 BASIC METABOLIC PNL TOTAL CA: CPT

## 2022-12-07 PROCEDURE — 82140 ASSAY OF AMMONIA: CPT

## 2022-12-07 PROCEDURE — P9604 ONE-WAY ALLOW PRORATED TRIP: HCPCS

## 2022-12-07 PROCEDURE — 36415 COLL VENOUS BLD VENIPUNCTURE: CPT

## 2022-12-07 PROCEDURE — 80177 DRUG SCRN QUAN LEVETIRACETAM: CPT

## 2022-12-07 PROCEDURE — 80076 HEPATIC FUNCTION PANEL: CPT

## 2022-12-07 PROCEDURE — 80184 ASSAY OF PHENOBARBITAL: CPT

## 2023-01-18 ENCOUNTER — HOSPITAL ENCOUNTER (OUTPATIENT)
Age: 68
Setting detail: SPECIMEN
Discharge: HOME | End: 2023-01-18
Payer: MEDICARE

## 2023-01-18 LAB
ABSOLUTE EOS #: 0.16 K/UL (ref 0–0.44)
ABSOLUTE IMMATURE GRANULOCYTE: <0.03 K/UL (ref 0–0.3)
ABSOLUTE LYMPH #: 1.71 K/UL (ref 1.1–3.7)
ABSOLUTE MONO #: 0.29 K/UL (ref 0.1–1.2)
BASOPHILS # BLD: 1 % (ref 0–2)
BASOPHILS ABSOLUTE: 0.05 K/UL (ref 0–0.2)
EOSINOPHILS RELATIVE PERCENT: 2 % (ref 1–4)
FOLATE: 14.2 NG/ML
HCT VFR BLD CALC: 39.3 % (ref 40.7–50.3)
HEMOGLOBIN: 12.6 G/DL (ref 13–17)
IMMATURE GRANULOCYTES: 0 %
LYMPHOCYTES # BLD: 24 % (ref 24–43)
MCH RBC QN AUTO: 32.5 PG (ref 25.2–33.5)
MCHC RBC AUTO-ENTMCNC: 32.1 G/DL (ref 28.4–34.8)
MCV RBC AUTO: 101.3 FL (ref 82.6–102.9)
MONOCYTES # BLD: 4 % (ref 3–12)
NRBC AUTOMATED: 0 PER 100 WBC
PDW BLD-RTO: 12.2 % (ref 11.8–14.4)
PLATELET # BLD: 206 K/UL (ref 138–453)
PMV BLD AUTO: 9.2 FL (ref 8.1–13.5)
RBC # BLD: 3.88 M/UL (ref 4.21–5.77)
SEG NEUTROPHILS: 69 % (ref 36–65)
SEGMENTED NEUTROPHILS ABSOLUTE COUNT: 4.99 K/UL (ref 1.5–8.1)
VITAMIN B-12: 295 PG/ML (ref 232–1245)
WBC # BLD: 7.2 K/UL (ref 3.5–11.3)

## 2023-01-18 PROCEDURE — 36415 COLL VENOUS BLD VENIPUNCTURE: CPT

## 2023-01-18 PROCEDURE — 85025 COMPLETE CBC W/AUTO DIFF WBC: CPT

## 2023-01-18 PROCEDURE — 82746 ASSAY OF FOLIC ACID SERUM: CPT

## 2023-01-18 PROCEDURE — P9603 ONE-WAY ALLOW PRORATED MILES: HCPCS

## 2023-01-18 PROCEDURE — 82607 VITAMIN B-12: CPT

## 2023-03-03 ENCOUNTER — HOSPITAL ENCOUNTER (OUTPATIENT)
Age: 68
Setting detail: SPECIMEN
Discharge: HOME | End: 2023-03-03
Payer: MEDICARE

## 2023-03-03 LAB
AMORPHOUS: ABNORMAL
BACTERIA: ABNORMAL
BILIRUBIN URINE: NEGATIVE
COLOR: YELLOW
EPITHELIAL CELLS UA: ABNORMAL /HPF (ref 0–5)
GLUCOSE UR STRIP.AUTO-MCNC: NEGATIVE MG/DL
KETONES UR STRIP.AUTO-MCNC: NEGATIVE MG/DL
LEUKOCYTE ESTERASE UR QL STRIP.AUTO: NEGATIVE
NITRITE UR QL STRIP.AUTO: NEGATIVE
PROT UR STRIP.AUTO-MCNC: 6.5 MG/DL (ref 5–9)
PROT UR STRIP.AUTO-MCNC: NEGATIVE MG/DL
RBC CLUMPS #/AREA URNS AUTO: ABNORMAL /HPF (ref 0–2)
SPECIFIC GRAVITY UA: 1.02 (ref 1.01–1.02)
TURBIDITY: CLEAR
URINE HGB: NEGATIVE
UROBILINOGEN, URINE: NORMAL
WBC UA: ABNORMAL /HPF (ref 0–5)

## 2023-03-03 PROCEDURE — 87086 URINE CULTURE/COLONY COUNT: CPT

## 2023-03-03 PROCEDURE — 81001 URINALYSIS AUTO W/SCOPE: CPT

## 2023-03-04 LAB
MICROORGANISM SPEC CULT: NO GROWTH
SPECIMEN DESCRIPTION: NORMAL

## 2023-03-06 ENCOUNTER — APPOINTMENT (OUTPATIENT)
Dept: CT IMAGING | Age: 68
End: 2023-03-06
Payer: MEDICARE

## 2023-03-06 ENCOUNTER — HOSPITAL ENCOUNTER (INPATIENT)
Age: 68
LOS: 3 days | Discharge: OTHER INSTITUTION | End: 2023-03-10
Attending: EMERGENCY MEDICINE | Admitting: FAMILY MEDICINE
Payer: MEDICARE

## 2023-03-06 DIAGNOSIS — K56.609 SMALL BOWEL OBSTRUCTION (HCC): Primary | ICD-10-CM

## 2023-03-06 DIAGNOSIS — Z86.69 HISTORY OF SEIZURE DISORDER: ICD-10-CM

## 2023-03-06 DIAGNOSIS — N17.9 ACUTE KIDNEY INJURY (HCC): ICD-10-CM

## 2023-03-06 LAB
ABSOLUTE EOS #: <0.03 K/UL (ref 0–0.44)
ABSOLUTE IMMATURE GRANULOCYTE: 0.06 K/UL (ref 0–0.3)
ABSOLUTE LYMPH #: 1.3 K/UL (ref 1.1–3.7)
ABSOLUTE MONO #: 0.58 K/UL (ref 0.1–1.2)
BASOPHILS # BLD: 0 % (ref 0–2)
BASOPHILS ABSOLUTE: 0.05 K/UL (ref 0–0.2)
EOSINOPHILS RELATIVE PERCENT: 0 % (ref 1–4)
HCT VFR BLD AUTO: 43.1 % (ref 40.7–50.3)
HGB BLD-MCNC: 14.9 G/DL (ref 13–17)
IMMATURE GRANULOCYTES: 0 %
LYMPHOCYTES # BLD: 9 % (ref 24–43)
MCH RBC QN AUTO: 33.9 PG (ref 25.2–33.5)
MCHC RBC AUTO-ENTMCNC: 34.6 G/DL (ref 28.4–34.8)
MCV RBC AUTO: 98 FL (ref 82.6–102.9)
MONOCYTES # BLD: 4 % (ref 3–12)
NRBC AUTOMATED: 0 PER 100 WBC
PDW BLD-RTO: 12.9 % (ref 11.8–14.4)
PLATELET # BLD AUTO: 305 K/UL (ref 138–453)
PMV BLD AUTO: 9.2 FL (ref 8.1–13.5)
RBC # BLD: 4.4 M/UL (ref 4.21–5.77)
SEG NEUTROPHILS: 87 % (ref 36–65)
SEGMENTED NEUTROPHILS ABSOLUTE COUNT: 12.89 K/UL (ref 1.5–8.1)
WBC # BLD AUTO: 14.9 K/UL (ref 3.5–11.3)

## 2023-03-06 PROCEDURE — 2580000003 HC RX 258: Performed by: EMERGENCY MEDICINE

## 2023-03-06 PROCEDURE — 36415 COLL VENOUS BLD VENIPUNCTURE: CPT

## 2023-03-06 PROCEDURE — 83605 ASSAY OF LACTIC ACID: CPT

## 2023-03-06 PROCEDURE — 83735 ASSAY OF MAGNESIUM: CPT

## 2023-03-06 PROCEDURE — 96374 THER/PROPH/DIAG INJ IV PUSH: CPT

## 2023-03-06 PROCEDURE — 6360000002 HC RX W HCPCS: Performed by: EMERGENCY MEDICINE

## 2023-03-06 PROCEDURE — 83690 ASSAY OF LIPASE: CPT

## 2023-03-06 PROCEDURE — 80076 HEPATIC FUNCTION PANEL: CPT

## 2023-03-06 PROCEDURE — 80048 BASIC METABOLIC PNL TOTAL CA: CPT

## 2023-03-06 PROCEDURE — 99285 EMERGENCY DEPT VISIT HI MDM: CPT

## 2023-03-06 PROCEDURE — 85025 COMPLETE CBC W/AUTO DIFF WBC: CPT

## 2023-03-06 RX ORDER — 0.9 % SODIUM CHLORIDE 0.9 %
1000 INTRAVENOUS SOLUTION INTRAVENOUS ONCE
Status: COMPLETED | OUTPATIENT
Start: 2023-03-06 | End: 2023-03-07

## 2023-03-06 RX ORDER — ONDANSETRON 2 MG/ML
4 INJECTION INTRAMUSCULAR; INTRAVENOUS ONCE
Status: COMPLETED | OUTPATIENT
Start: 2023-03-06 | End: 2023-03-06

## 2023-03-06 RX ADMIN — ONDANSETRON 4 MG: 2 INJECTION INTRAMUSCULAR; INTRAVENOUS at 23:32

## 2023-03-06 RX ADMIN — SODIUM CHLORIDE 1000 ML: 9 INJECTION, SOLUTION INTRAVENOUS at 23:32

## 2023-03-06 ASSESSMENT — PAIN - FUNCTIONAL ASSESSMENT: PAIN_FUNCTIONAL_ASSESSMENT: NONE - DENIES PAIN

## 2023-03-07 ENCOUNTER — APPOINTMENT (OUTPATIENT)
Dept: GENERAL RADIOLOGY | Age: 68
End: 2023-03-07
Payer: MEDICARE

## 2023-03-07 ENCOUNTER — APPOINTMENT (OUTPATIENT)
Dept: CT IMAGING | Age: 68
End: 2023-03-07
Payer: MEDICARE

## 2023-03-07 LAB
ABSOLUTE EOS #: <0.03 K/UL (ref 0–0.44)
ABSOLUTE IMMATURE GRANULOCYTE: 0.05 K/UL (ref 0–0.3)
ABSOLUTE LYMPH #: 1.88 K/UL (ref 1.1–3.7)
ABSOLUTE MONO #: 1 K/UL (ref 0.1–1.2)
ALBUMIN SERPL-MCNC: 4.6 G/DL (ref 3.5–5.2)
ALBUMIN SERPL-MCNC: 4.7 G/DL (ref 3.5–5.2)
ALBUMIN/GLOBULIN RATIO: 1.1 (ref 1–2.5)
ALBUMIN/GLOBULIN RATIO: 1.2 (ref 1–2.5)
ALP SERPL-CCNC: 172 U/L (ref 40–129)
ALP SERPL-CCNC: 176 U/L (ref 40–129)
ALT SERPL-CCNC: 14 U/L (ref 5–41)
ALT SERPL-CCNC: 15 U/L (ref 5–41)
ANION GAP SERPL CALCULATED.3IONS-SCNC: 12 MMOL/L (ref 9–17)
ANION GAP SERPL CALCULATED.3IONS-SCNC: 16 MMOL/L (ref 9–17)
AST SERPL-CCNC: 15 U/L
AST SERPL-CCNC: 16 U/L
BASOPHILS # BLD: 0 % (ref 0–2)
BASOPHILS ABSOLUTE: 0.03 K/UL (ref 0–0.2)
BILIRUB DIRECT SERPL-MCNC: <0.1 MG/DL
BILIRUB INDIRECT SERPL-MCNC: ABNORMAL MG/DL (ref 0–1)
BILIRUB SERPL-MCNC: 0.4 MG/DL (ref 0.3–1.2)
BILIRUB SERPL-MCNC: 0.6 MG/DL (ref 0.3–1.2)
BUN SERPL-MCNC: 34 MG/DL (ref 8–23)
BUN SERPL-MCNC: 39 MG/DL (ref 8–23)
BUN/CREAT BLD: 20 (ref 9–20)
BUN/CREAT BLD: 23 (ref 9–20)
CALCIUM SERPL-MCNC: 10.4 MG/DL (ref 8.6–10.4)
CALCIUM SERPL-MCNC: 10.7 MG/DL (ref 8.6–10.4)
CHLORIDE SERPL-SCNC: 99 MMOL/L (ref 98–107)
CHLORIDE SERPL-SCNC: 99 MMOL/L (ref 98–107)
CO2 SERPL-SCNC: 24 MMOL/L (ref 20–31)
CO2 SERPL-SCNC: 32 MMOL/L (ref 20–31)
CREAT SERPL-MCNC: 1.71 MG/DL (ref 0.7–1.2)
CREAT SERPL-MCNC: 1.72 MG/DL (ref 0.7–1.2)
EOSINOPHILS RELATIVE PERCENT: 0 % (ref 1–4)
GFR SERPL CREATININE-BSD FRML MDRD: 43 ML/MIN/1.73M2
GFR SERPL CREATININE-BSD FRML MDRD: 44 ML/MIN/1.73M2
GLUCOSE SERPL-MCNC: 120 MG/DL (ref 70–99)
GLUCOSE SERPL-MCNC: 125 MG/DL (ref 70–99)
HCT VFR BLD AUTO: 43.7 % (ref 40.7–50.3)
HGB BLD-MCNC: 14.8 G/DL (ref 13–17)
IMMATURE GRANULOCYTES: 0 %
LACTATE PLASV-SCNC: 1.5 MMOL/L (ref 0.5–2.2)
LIPASE SERPL-CCNC: 46 U/L (ref 13–60)
LYMPHOCYTES # BLD: 15 % (ref 24–43)
MAGNESIUM SERPL-MCNC: 1.9 MG/DL (ref 1.6–2.6)
MCH RBC QN AUTO: 33.6 PG (ref 25.2–33.5)
MCHC RBC AUTO-ENTMCNC: 33.9 G/DL (ref 28.4–34.8)
MCV RBC AUTO: 99.1 FL (ref 82.6–102.9)
MONOCYTES # BLD: 8 % (ref 3–12)
NRBC AUTOMATED: 0 PER 100 WBC
PDW BLD-RTO: 12.9 % (ref 11.8–14.4)
PLATELET # BLD AUTO: 285 K/UL (ref 138–453)
PMV BLD AUTO: 9.3 FL (ref 8.1–13.5)
POTASSIUM SERPL-SCNC: 4 MMOL/L (ref 3.7–5.3)
POTASSIUM SERPL-SCNC: 4.3 MMOL/L (ref 3.7–5.3)
PROT SERPL-MCNC: 8.6 G/DL (ref 6.4–8.3)
PROT SERPL-MCNC: 8.8 G/DL (ref 6.4–8.3)
RBC # BLD: 4.41 M/UL (ref 4.21–5.77)
SEG NEUTROPHILS: 77 % (ref 36–65)
SEGMENTED NEUTROPHILS ABSOLUTE COUNT: 9.73 K/UL (ref 1.5–8.1)
SODIUM SERPL-SCNC: 139 MMOL/L (ref 135–144)
SODIUM SERPL-SCNC: 143 MMOL/L (ref 135–144)
WBC # BLD AUTO: 12.7 K/UL (ref 3.5–11.3)

## 2023-03-07 PROCEDURE — 36415 COLL VENOUS BLD VENIPUNCTURE: CPT

## 2023-03-07 PROCEDURE — 74018 RADEX ABDOMEN 1 VIEW: CPT

## 2023-03-07 PROCEDURE — C9254 INJECTION, LACOSAMIDE: HCPCS | Performed by: NURSE PRACTITIONER

## 2023-03-07 PROCEDURE — 85025 COMPLETE CBC W/AUTO DIFF WBC: CPT

## 2023-03-07 PROCEDURE — 6360000002 HC RX W HCPCS: Performed by: NURSE PRACTITIONER

## 2023-03-07 PROCEDURE — C9113 INJ PANTOPRAZOLE SODIUM, VIA: HCPCS | Performed by: NURSE PRACTITIONER

## 2023-03-07 PROCEDURE — A4216 STERILE WATER/SALINE, 10 ML: HCPCS | Performed by: NURSE PRACTITIONER

## 2023-03-07 PROCEDURE — 99222 1ST HOSP IP/OBS MODERATE 55: CPT | Performed by: NURSE PRACTITIONER

## 2023-03-07 PROCEDURE — 2580000003 HC RX 258: Performed by: FAMILY MEDICINE

## 2023-03-07 PROCEDURE — 1200000000 HC SEMI PRIVATE

## 2023-03-07 PROCEDURE — 94761 N-INVAS EAR/PLS OXIMETRY MLT: CPT

## 2023-03-07 PROCEDURE — 2580000003 HC RX 258: Performed by: NURSE PRACTITIONER

## 2023-03-07 PROCEDURE — 80053 COMPREHEN METABOLIC PANEL: CPT

## 2023-03-07 PROCEDURE — 0DH67UZ INSERTION OF FEEDING DEVICE INTO STOMACH, VIA NATURAL OR ARTIFICIAL OPENING: ICD-10-PCS | Performed by: EMERGENCY MEDICINE

## 2023-03-07 PROCEDURE — 6360000002 HC RX W HCPCS: Performed by: FAMILY MEDICINE

## 2023-03-07 RX ORDER — VITAMIN B COMPLEX
1000 TABLET ORAL DAILY
COMMUNITY

## 2023-03-07 RX ORDER — ONDANSETRON 4 MG/1
4 TABLET, ORALLY DISINTEGRATING ORAL EVERY 8 HOURS PRN
Status: DISCONTINUED | OUTPATIENT
Start: 2023-03-07 | End: 2023-03-10 | Stop reason: HOSPADM

## 2023-03-07 RX ORDER — KETOROLAC TROMETHAMINE 15 MG/ML
15 INJECTION, SOLUTION INTRAMUSCULAR; INTRAVENOUS EVERY 6 HOURS PRN
Status: DISCONTINUED | OUTPATIENT
Start: 2023-03-07 | End: 2023-03-10 | Stop reason: HOSPADM

## 2023-03-07 RX ORDER — LATANOPROST 50 UG/ML
1 SOLUTION/ DROPS OPHTHALMIC NIGHTLY
COMMUNITY

## 2023-03-07 RX ORDER — SENNA PLUS 8.6 MG/1
1 TABLET ORAL 2 TIMES DAILY
COMMUNITY

## 2023-03-07 RX ORDER — SODIUM CHLORIDE, SODIUM LACTATE, POTASSIUM CHLORIDE, CALCIUM CHLORIDE 600; 310; 30; 20 MG/100ML; MG/100ML; MG/100ML; MG/100ML
INJECTION, SOLUTION INTRAVENOUS CONTINUOUS
Status: DISCONTINUED | OUTPATIENT
Start: 2023-03-07 | End: 2023-03-10

## 2023-03-07 RX ORDER — POTASSIUM CHLORIDE 7.45 MG/ML
10 INJECTION INTRAVENOUS PRN
Status: DISCONTINUED | OUTPATIENT
Start: 2023-03-07 | End: 2023-03-10 | Stop reason: HOSPADM

## 2023-03-07 RX ORDER — POTASSIUM CHLORIDE 20 MEQ/1
40 TABLET, EXTENDED RELEASE ORAL PRN
Status: DISCONTINUED | OUTPATIENT
Start: 2023-03-07 | End: 2023-03-10 | Stop reason: HOSPADM

## 2023-03-07 RX ORDER — ONDANSETRON 2 MG/ML
4 INJECTION INTRAMUSCULAR; INTRAVENOUS EVERY 6 HOURS PRN
Status: DISCONTINUED | OUTPATIENT
Start: 2023-03-07 | End: 2023-03-10 | Stop reason: HOSPADM

## 2023-03-07 RX ORDER — TRAZODONE HYDROCHLORIDE 50 MG/1
50 TABLET ORAL NIGHTLY
COMMUNITY

## 2023-03-07 RX ORDER — IBUPROFEN 200 MG
1 CAPSULE ORAL 2 TIMES DAILY
COMMUNITY

## 2023-03-07 RX ORDER — LACOSAMIDE 10 MG/ML
150 INJECTION, SOLUTION INTRAVENOUS 2 TIMES DAILY
Status: DISCONTINUED | OUTPATIENT
Start: 2023-03-07 | End: 2023-03-07

## 2023-03-07 RX ORDER — SODIUM CHLORIDE 0.9 % (FLUSH) 0.9 %
5-40 SYRINGE (ML) INJECTION EVERY 12 HOURS SCHEDULED
Status: DISCONTINUED | OUTPATIENT
Start: 2023-03-07 | End: 2023-03-10 | Stop reason: HOSPADM

## 2023-03-07 RX ORDER — SODIUM CHLORIDE 0.9 % (FLUSH) 0.9 %
5-40 SYRINGE (ML) INJECTION PRN
Status: DISCONTINUED | OUTPATIENT
Start: 2023-03-07 | End: 2023-03-10 | Stop reason: HOSPADM

## 2023-03-07 RX ORDER — SODIUM CHLORIDE 9 MG/ML
INJECTION, SOLUTION INTRAVENOUS PRN
Status: DISCONTINUED | OUTPATIENT
Start: 2023-03-07 | End: 2023-03-10 | Stop reason: HOSPADM

## 2023-03-07 RX ORDER — ENOXAPARIN SODIUM 100 MG/ML
40 INJECTION SUBCUTANEOUS DAILY
Status: DISCONTINUED | OUTPATIENT
Start: 2023-03-07 | End: 2023-03-10 | Stop reason: HOSPADM

## 2023-03-07 RX ADMIN — SODIUM CHLORIDE 40 MG: 9 INJECTION, SOLUTION INTRAMUSCULAR; INTRAVENOUS; SUBCUTANEOUS at 11:55

## 2023-03-07 RX ADMIN — LEVETIRACETAM 1500 MG: 100 INJECTION, SOLUTION INTRAVENOUS at 21:31

## 2023-03-07 RX ADMIN — LEVETIRACETAM 1500 MG: 100 INJECTION, SOLUTION INTRAVENOUS at 09:54

## 2023-03-07 RX ADMIN — SODIUM CHLORIDE, POTASSIUM CHLORIDE, SODIUM LACTATE AND CALCIUM CHLORIDE: 600; 310; 30; 20 INJECTION, SOLUTION INTRAVENOUS at 03:31

## 2023-03-07 RX ADMIN — SODIUM CHLORIDE 150 MG: 9 INJECTION, SOLUTION INTRAVENOUS at 14:09

## 2023-03-07 RX ADMIN — SODIUM CHLORIDE, POTASSIUM CHLORIDE, SODIUM LACTATE AND CALCIUM CHLORIDE: 600; 310; 30; 20 INJECTION, SOLUTION INTRAVENOUS at 20:19

## 2023-03-07 RX ADMIN — SODIUM CHLORIDE 150 MG: 9 INJECTION, SOLUTION INTRAVENOUS at 22:15

## 2023-03-07 RX ADMIN — ENOXAPARIN SODIUM 40 MG: 100 INJECTION SUBCUTANEOUS at 08:47

## 2023-03-07 ASSESSMENT — ENCOUNTER SYMPTOMS
DIARRHEA: 1
ABDOMINAL PAIN: 0
SHORTNESS OF BREATH: 0
SORE THROAT: 0
COUGH: 0
VOMITING: 1
NAUSEA: 1

## 2023-03-07 ASSESSMENT — PAIN - FUNCTIONAL ASSESSMENT: PAIN_FUNCTIONAL_ASSESSMENT: NONE - DENIES PAIN

## 2023-03-07 NOTE — PLAN OF CARE
Problem: Discharge Planning  Goal: Discharge to home or other facility with appropriate resources  Outcome: Progressing  Flowsheets (Taken 3/7/2023 0700)  Discharge to home or other facility with appropriate resources: Identify barriers to discharge with patient and caregiver     Problem: Safety - Adult  Goal: Free from fall injury  Outcome: Progressing  Flowsheets (Taken 3/7/2023 0919)  Free From Fall Injury: Instruct family/caregiver on patient safety     Problem: Skin/Tissue Integrity  Goal: Absence of new skin breakdown  Description: 1. Monitor for areas of redness and/or skin breakdown  2. Assess vascular access sites hourly  3. Every 4-6 hours minimum:  Change oxygen saturation probe site  4. Every 4-6 hours:  If on nasal continuous positive airway pressure, respiratory therapy assess nares and determine need for appliance change or resting period.   Outcome: Progressing     Problem: Nutrition Deficit:  Goal: Optimize nutritional status  3/7/2023 0920 by Gay Carter RN  Outcome: Progressing  3/7/2023 0846 by Chaya Monteiro RD, LD  Outcome: Progressing  Flowsheets (Taken 3/7/2023 0846)  Nutrient intake appropriate for improving, restoring, or maintaining nutritional needs:   Monitor oral intake, labs, and treatment plans   Assess nutritional status and recommend course of action  Note: Nutrition Problem #1: Inadequate protein-energy intake  Intervention: Food and/or Nutrient Delivery: Continue NPO, Start Oral Diet  Monitor NPO duration

## 2023-03-07 NOTE — PROGRESS NOTES
Pt resting in bed, no distress noted, re-assessment and vitals complete, see flow sheet for documentation, all needs met, call light within reach.

## 2023-03-07 NOTE — PROGRESS NOTES
Patient admitted to room 301 at this time. Dr. Dre Barillas was contacted by ER about patients case. Patient is alert and oriented x4 with a history of MRDD and seizures. Patient denies pain at this time. Patient admission assessment and vitals completed along with navigator and medication list. Patient has an NG in place that is set at low intermittent suction. Patient is from [address] and he states that he uses a wheelchair to get around. Patient assisted with getting comfortable in bed. Patient states no further needs, call light is in reach, will continue to monitor.

## 2023-03-07 NOTE — ED PROVIDER NOTES
[facility] ED  eMERGENCY dEPARTMENT eNCOUnter      Pt Name: Jefferson Gage  MRN: [id number]  [place] 1955  Date of evaluation: 3/6/2023  Provider: Cornell Wayne, [address] [place] [address]       Chief Complaint   Patient presents with    Emesis     From [facility] group home, c/o vomiting x4 denies abdominal pain          HISTORY OF PRESENT ILLNESS   (Location/Symptom, Timing/Onset, Context/Setting, Quality, Duration, Modifying Factors, Severity) Note limiting factors. HPI    Jefferson Gage is a 78 y.o. male who presents to the emergency department with complaint of vomiting and diarrhea. Patient is a 81-year-old male with history of MRDD and seizure disorder from a group home with previous small bowel obstruction that required surgical fixation in 2014. Group home states that a few hours prior to arrival he began with multiple bouts of vomiting and loose stool. They state he is at his baseline mental status and has not complained of pain but this is normal for him. They report that they have concern for repeat obstruction based on his symptoms and history and therefore he was sent in for evaluation    Nursing Notes were reviewed. REVIEW OF SYSTEMS    (2+ for level 4; 10+ for level 5)   Review of Systems   Constitutional:  Negative for fever. HENT:  Negative for congestion and sore throat. Respiratory:  Negative for cough and shortness of breath. Cardiovascular:  Negative for chest pain. Gastrointestinal:  Positive for diarrhea, nausea and vomiting. Negative for abdominal pain. Musculoskeletal:  Negative for myalgias. Neurological:  Negative for headaches. All other systems reviewed and are negative.     Please note review of systems may be unreliable based on patient's history of MRDD    PAST MEDICAL HISTORY     Past Medical History:   Diagnosis Date    MR (mental retardation)     Seizures ([facility] [address]     Ventral hernia        SURGICAL HISTORY       Past Surgical History:   Procedure Laterality Date    ABDOMEN SURGERY  2013, 2014    Bowel Obstruction X2    CAST APPLICATION Left     Lt. Ankle , X3    HYDROCELE EXCISION  08/2016    LAPAROSCOPY  8/17/14    with BERNARD    LAPAROTOMY  8/17/14    with partial cecectomy    VAGAL NERVE STIMULATION      placed, then replaced    VAGAL NERVE STIMULATION  10/11/2016    replaced generator battery       CURRENT MEDICATIONS       Previous Medications    ACETAMINOPHEN (TYLENOL) 325 MG TABLET    Take 2 tablets by mouth every 4 hours as needed for Pain    FINASTERIDE (PROSCAR) 5 MG TABLET    Take 5 mg by mouth daily    LACOSAMIDE (VIMPAT) 150 MG TABS TABLET    Take 150 mg by mouth daily    LAMOTRIGINE (LAMICTAL) 200 MG TABLET    Take 200 mg by mouth 2 times daily    LEVETIRACETAM (KEPPRA) 750 MG TABLET    Take 750 mg by mouth 2 times daily Indications: dissolvable tablet    LEVOFLOXACIN (LEVAQUIN) 250 MG TABLET    Take 500 mg by mouth daily    NONFORMULARY    Place rectally as needed Indications: Seizure, Valium 20 mg suppository    PRIMIDONE (MYSOLINE) 250 MG TABLET    Take 250 mg by mouth 3 times daily    TAMSULOSIN (FLOMAX) 0.4 MG CAPSULE    Take 0.4 mg by mouth nightly       ALLERGIES     Pcn [penicillins] and Clindamycin    FAMILY HISTORY       Family History   Problem Relation Age of Onset    High Blood Pressure Mother     Thyroid Cancer Mother     Other Mother         Myeloma    Heart Disease Mother         2 Stents    Arthritis Mother         2 Back surgeries    Lung Cancer Father     Heart Disease Brother         Defibrilator    Heart Attack Maternal Grandfather     Diabetes Paternal Grandmother         SOCIAL HISTORY       Social History     Socioeconomic History    Marital status: Single     Spouse name: None    Number of children: 0    Years of education: None    Highest education level: None   Tobacco Use    Smoking status: Never   Substance and Sexual Activity    Alcohol use: No    Drug use: No    Sexual activity: Never       SCREENINGS    Scott Coma  Scale  Eye Opening: Spontaneous  Best Verbal Response: Oriented  Best Motor Response: Obeys commands  Scott Coma Scale Score: 15      PHYSICAL EXAM    (up to 7 for level 4, 8 or more for level 5)   [facility]    Physical Exam  Vitals and nursing note reviewed. Constitutional:       General: He is not in acute distress. Appearance: Normal appearance. He is not ill-appearing, toxic-appearing or diaphoretic. HENT:      Head: Normocephalic and atraumatic. Mouth/Throat:      Mouth: Mucous membranes are moist.      Pharynx: Oropharynx is clear. No oropharyngeal exudate or posterior oropharyngeal erythema. Comments: No tongue or lip swelling no oral lesions no airway edema or compromise  Eyes:      General: No scleral icterus. Right eye: No discharge. Left eye: No discharge. Extraocular Movements: Extraocular movements intact. Conjunctiva/sclera: Conjunctivae normal.      Pupils: Pupils are equal, round, and reactive to light. Cardiovascular:      Rate and Rhythm: Normal rate and regular rhythm. Pulses: Normal pulses. Heart sounds: Normal heart sounds. No murmur heard. No friction rub. No gallop. Comments: Radial pulses are plus 2 out of 4 bilaterally are equal and symmetric  Pulmonary:      Effort: Pulmonary effort is normal. No respiratory distress. Breath sounds: Normal breath sounds. No wheezing, rhonchi or rales. Abdominal:      General: There is distension. Palpations: There is no mass. Tenderness: There is no abdominal tenderness. Comments: Abdomen is slightly firm in the upper abdomen and distended. Bowel sounds are hypoactive. No pain with palpation. No voluntary guarding or rigidity. Increased tympany is noted in the upper abdomen. No pulsatile mass   Musculoskeletal:         General: Swelling present. No tenderness or signs of injury. Normal range of motion. Cervical back: Normal range of motion and neck supple.  No rigidity. Comments: Trace to +1 pitting edema to the bilateral lower extremities that is equal and symmetric   Skin:     General: Skin is warm and dry. Capillary Refill: Capillary refill takes less than 2 seconds. Coloration: Skin is not jaundiced. Findings: No erythema or rash. Neurological:      Mental Status: He is alert. Mental status is at baseline. Psychiatric:      Comments: Patient has a flat affect       DIAGNOSTIC RESULTS     EKG (Per Emergency Physician):       RADIOLOGY (Per Emergency Physician): Interpretation per the Radiologist below, if available at the time of this note:  XR ABDOMEN (KUB) (SINGLE AP VIEW)    Result Date: 3/7/2023  EXAMINATION: ONE SUPINE XRAY VIEW(S) OF THE ABDOMEN 3/7/2023 12:48 am COMPARISON: Abdominal x-ray 08/26/2014. HISTORY: ORDERING SYSTEM PROVIDED HISTORY: SBO TECHNOLOGIST PROVIDED HISTORY: SBO FINDINGS: Multiple dilated small bowel loops containing intraluminal air, ileus versus small-bowel obstruction. The stomach is distended with intraluminal air. Lower lungs are clear. No free air. Bony structures are unremarkable. Multiple dilated small bowel loops, ileus versus small-bowel obstruction. The stomach is distended with intraluminal air. XR ABDOMEN FOR NG/OG/NE TUBE PLACEMENT    Result Date: 3/7/2023  EXAMINATION: ONE SUPINE XRAY VIEW(S) OF THE ABDOMEN 3/7/2023 1:11 am COMPARISON: Abdominal x-ray 03/07/2023 HISTORY: ORDERING SYSTEM PROVIDED HISTORY: Confirmation of course of NG/OG/NE tube and location of tip of tube TECHNOLOGIST PROVIDED HISTORY: Confirmation of course of NG/OG/NE tube and location of tip of tube Portable? ->Yes FINDINGS: Tip of the enteric tube projects over the gastric body, side port projects over the gastric body. Partially imaged bowel loops are dilated, similar to prior. Lower lung fields are clear. Bony structures are unremarkable.      Tip of the enteric tube projects over the gastric body, side port projects over the gastric body. Partially imaged bowel loops are dilated, similar to prior. ED BEDSIDE ULTRASOUND:   Performed by ED Physician - none    LABS:  Labs Reviewed   CBC WITH AUTO DIFFERENTIAL - Abnormal; Notable for the following components:       Result Value    WBC 14.9 (*)     MCH 33.9 (*)     Seg Neutrophils 87 (*)     Lymphocytes 9 (*)     Eosinophils % 0 (*)     Segs Absolute 12.89 (*)     All other components within normal limits   HEPATIC FUNCTION PANEL - Abnormal; Notable for the following components:    Alkaline Phosphatase 176 (*)     Total Protein 8.8 (*)     All other components within normal limits   BASIC METABOLIC PANEL - Abnormal; Notable for the following components:    Glucose 125 (*)     BUN 34 (*)     Creatinine 1.71 (*)     Est, Glom Filt Rate 44 (*)     All other components within normal limits   LIPASE   MAGNESIUM   LACTIC ACID        All other labs were within normal range or not returned as of this dictation. EMERGENCY DEPARTMENT COURSE and DIFFERENTIAL DIAGNOSIS/MDM:   Vitals:    Vitals:    03/06/23 2252 03/07/23 0058   BP: 131/72 118/78   Pulse: 96 96   Resp: 16 20   Temp: 98.2 °F (36.8 °C)    TempSrc: Oral    SpO2: 96% 94%   Weight: 199 lb (90.3 kg)        Medications   0.9 % sodium chloride bolus (1,000 mLs IntraVENous New Bag 3/6/23 2332)   ondansetron (ZOFRAN) injection 4 mg (4 mg IntraVENous Given 3/6/23 2332)       MDM  . Patient presented to the ER with stable vitals but his history of multiple bouts of vomiting this evening with history of small bowel obstruction and now having distention and tympany on exam is most consistent with repeat blockage. Basic blood work was obtained and shows a white blood cell count of 14.9 but normal lactic acid value going against acute infection or ischemia. We attempted perform a CT with IV contrast but cannot obtain a appropriate IV for this despite multiple attempts.   Therefore KUB was obtained which did show diffuse distention consistent with a small bowel obstruction and therefore an NG tube was placed. Once the NG tube was placed there was 2 L of fluid that resulted consistent with his SBO. The patient does have acute kidney injury associated with his symptoms with creatinine elevated from baseline of 1-1.71 however there are no signs of significant electrolyte derangement. The case was discussed with general surgery on-call. They agree that as his last obstruction was treated symptomatically with decompression and he has not had a need for surgery since 2014 he can be kept here. They also agree that as he is afebrile with normal lactic acid value that the white count is most likely stress response and does not require antibiotic. However based on the patient's advanced age and complex medical history they requested admission to medicine. Medicine was contacted and does agree to accept the patient at this time    REVAL:         CRITICAL CARE TIME   Total Critical Care time was 0 minutes, excluding separately reportable procedures. There was a high probability of clinically significant/life threatening deterioration in the patient's condition which required my urgent intervention. CONSULTS:  None    PROCEDURES:  Unless otherwise noted below, none     Procedures    FINAL IMPRESSION      1. Small bowel obstruction ([address]    2. Acute kidney injury ([address]    3. History of seizure disorder          DISPOSITION/PLAN   DISPOSITION Decision To Admit 03/07/2023 01:27:17 AM      PATIENT REFERRED TO:  No follow-up provider specified. DISCHARGE MEDICATIONS:  New Prescriptions    No medications on file          (Please note:  Portions of this note were completed with a voice recognition program.  Efforts were made to edit the dictations but occasionally words and phrases are mis-transcribed.)  Form v2016. [id number].[id number]    Kellen Jackson DO (electronically signed)  Emergency Medicine Provider        DO [place]  03/07/23 0128

## 2023-03-07 NOTE — CARE COORDINATION
Case Management Assessment  Initial Evaluation    Date/Time of Evaluation: 3/7/2023 2:34 PM  Assessment Completed by: PITER Walters    If patient is discharged prior to next notation, then this note serves as note for discharge by case management. Patient Name: Ludivina Whelan                   YOB: 1955  Diagnosis: Small bowel obstruction ([facility] [address] [K56.609]  History of seizure disorder [Z86.69]  Acute kidney injury [facility]) [N17.9]                   Date / Time: 3/6/2023 10:46 PM    Patient Admission Status: Inpatient   Readmission Risk (Low < 19, Mod (19-27), High > 27): Readmission Risk Score: 10.7    Current PCP: Pina Matson DO (Inactive)  PCP verified by CM? Yes    Chart Reviewed: Yes      History Provided by: Child/Family  Patient Orientation: Alert and Oriented, Person, Place, Situation, Self    Patient Cognition: Long Term Memory Deficit    Hospitalization in the last 30 days (Readmission):  No    If yes, Readmission Assessment in  Navigator will be completed. Advance Directives:      Code Status: Full Code   Patient's Primary Decision Maker is:      Primary Decision Maker: [address], Legal Guardian - [phone]    Secondary Decision Maker: Marisabel Duong - Brother/Sister - [phone]    Discharge Planning:    Patient lives with: Other (Comment) (7 other residents) Type of Home: Group Home  Primary Care Giver: Other (Comment) (group home)  Patient Support Systems include: Family Members, Other (Comment) (group home)   Current Financial resources: Medicaid, Medicare  Current community resources:  Other (Comment) (group home)  Current services prior to admission: [address], Transportation            Current DME: Wheelchair            Type of Home Care services:  [employer], Nursing Services    ADLS  Prior functional level: Assistance with the following:, Shopping, Housework, Cooking, Mobility, Dressing, Bathing  Current functional level: Assistance with the following:, Cooking, Housework, Shopping, Mobility, Bathing, Dressing    PT AM-PAC:   /24  OT AM-PAC:   /24    Family can provide assistance at DC: Yes  Would you like Case Management to discuss the discharge plan with any other family members/significant others, and if so, who? Yes  Plans to Return to Present Housing: Yes  Other Identified Issues/Barriers to RETURNING to current housing: none  Potential Assistance needed at discharge: [address], Transportation            Potential DME: Wheelchair  Patient expects to discharge to: correction  Plan for transportation at discharge: [address]: Alex Mcneil / Plan: Ernst Cueto / Product Type: *No Product type* /     Does insurance require precert for SNF: Yes    Potential assistance Purchasing Medications: No  Meds-to-Beds request:        [id number] [address] [facility] [phone] [employer] [phone]  [address] [place] [address]  Phone: [phone] Fax: [phone]      Notes:    Factors facilitating achievement of predicted outcomes: Family support, Caregiver support, Cooperative, Has needed Durable Medical Equipment at home, and Home is wheelchair accessible    Barriers to discharge: Cognitive deficit and Impaired vision    Additional Case Management Notes: Patient is single and lives at the [facility]. He is uses a wheelchair for ambulation. Patient requires assistance with his ADl's. The group home manages his medications and transportation. The sister ask if he needs surgery she would like him transfer to [facility] where he had his past surgery. The discharge plan at this time is to return to the [address] [place] [address] in [facility].     The Plan for Transition of Care is related to the following treatment goals of Small bowel obstruction ([facility] [address] [E65.022]  History of seizure disorder [Z86.69]  Acute kidney injury ([facility] [address] [N17.9]    Transportation/Food Security/Housekeeping Addressed: No issues identified or concerns. The Patient and/or Patient Representative Agree with the Discharge Plan? Yes    The discharge plan at this time is to return to the [address] [place] [address] in [facility].     Danni Atkins [place]  Case Management Department  Ph: [phone]

## 2023-03-07 NOTE — PROGRESS NOTES
Comprehensive Nutrition Assessment    Type and Reason for Visit:  Initial    Nutrition Recommendations/Plan:   Monitor NPO duration and GI status     Malnutrition Assessment:  Malnutrition Status: At risk for malnutrition (Comment) (03/07/23 3190)    Context:  Acute Illness     Findings of the 6 clinical characteristics of malnutrition:  Energy Intake:  Mild decrease in energy intake (Comment) (at least acutely)  Weight Loss:  Unable to assess     Body Fat Loss:  No significant body fat loss     Muscle Mass Loss:  No significant muscle mass loss    Fluid Accumulation:  No significant fluid accumulation     Strength:  Not Performed    Nutrition Assessment:    Inadequate nutrient intakes r/t altered GI status, AEB NPO and NGT suction. 2950 ml out initially, but only 50 ml out since canister replaced overnight. Uncertain weight history, with losses from historical values noted. Likely has had acute losses with vomiting and loose bm pta (question the 199# admission weight vs 180# now). Is without visual malnutrition indices which may accompany singnificant weight losses. Ca++ mildly elevated and would ? volume deficit vs. over-supplmentation (on Ca++ supps pta). History or low vit D, on supplement pta. Asleep with NGT in place. Nutrition Related Findings:    lower lean body mass. hypoactive b/s. Wound Type: None       Current Nutrition Intake & Therapies:    Average Meal Intake: NPO  Average Supplements Intake: NPO  Diet NPO    Anthropometric Measures:  Height: 5' 6\" (167.6 cm)  Ideal Body Weight (IBW): 142 lbs (65 kg)    Admission Body Weight: 199 lb (90.3 kg)  Current Body Weight: 180 lb (81.6 kg), 126.8 % IBW. Weight Source: Bed Scale  Current BMI (kg/m2): 29.1  Usual Body Weight: 209 lb (94.8 kg) (2016)  % Weight Change (Calculated): -13.9  Weight Adjustment For: No Adjustment                 BMI Categories: Overweight (BMI 25.0-29. 9)    Estimated Daily Nutrient Needs:  Energy Requirements Based On: Kcal/kg  Weight Used for Energy Requirements: Current  Energy (kcal/day): 3259-6281 (18-22)  Weight Used for Protein Requirements: Ideal  Protein (g/day): 77-90 (1.2-1.4)  Method Used for Fluid Requirements: 1 ml/kcal  Fluid (ml/day): 1800    Nutrition Diagnosis:   Inadequate protein-energy intake related to altered GI function as evidenced by NPO or clear liquid status due to medical condition    Lab Results   Component Value Date     03/07/2023    K 4.3 03/07/2023    CL 99 03/07/2023    CO2 32 (H) 03/07/2023    BUN 39 (H) 03/07/2023    CREATININE 1.72 (H) 03/07/2023    GLUCOSE 120 (H) 03/07/2023    CALCIUM 10.7 (H) 03/07/2023    PROT 8.6 (H) 03/07/2023    LABALBU 4.7 03/07/2023    BILITOT 0.6 03/07/2023    ALKPHOS 172 (H) 03/07/2023    AST 16 03/07/2023    ALT 14 03/07/2023    LABGLOM 43 (L) 03/07/2023    GFRAA >60 07/09/2022    GLOB NOT REPORTED 02/09/2022     No results found for: LABA1C  Lab Results   Component Value Date    VITD25 28.6 (L) 08/29/2019     Nutrition Interventions:   Food and/or Nutrient Delivery: Continue NPO, Start Oral Diet  Nutrition Education/Counseling: No recommendation at this time  Coordination of Nutrition Care: Continue to monitor while inpatient  Plan of Care discussed with: no one    Goals:     Goals: Meet at least 75% of estimated needs       Nutrition Monitoring and Evaluation:   Behavioral-Environmental Outcomes: Other (Comment) (MRDD)  Food/Nutrient Intake Outcomes: Diet Advancement/Tolerance  Physical Signs/Symptoms Outcomes: Biochemical Data, Fluid Status or Edema, GI Status, Weight    Discharge Planning:     Too soon to determine     Heather Gray, [address], [place]  Contact: [phone]

## 2023-03-07 NOTE — H&P
History and Physical    Patient:  Paula Crooks  MRN: [id number]    Chief Complaint: Vomiting    History Obtained From:  electronic medical record, reason patient could not give history: MRDD    PCP: Mary Alice Augustin DO (Inactive)    History of Present Illness: The patient is a 78 y.o. male who presented to the emergency room from the group home due to vomiting. Patient has history of small bowel obstructions and in 2014 required surgery to resolve. He is also been having loose stool. At his baseline he did not complain of pain which was normal for him. In his initial evaluation his abdomen was firm and distended and bowel sounds were hypoactive. He had no guarding or rigidity. Patient had flat affect. Radiology studies showed bowel obstruction. NG was placed while in ER and 2 L of fluid were removed immediately. Patient did have mild ESTHER and was given IV fluids. Upon admission this morning patient does not complain of pain and is resting comfortably. Past Medical History:        Diagnosis Date    MR (mental retardation)     Seizures ([address]     Ventral hernia        Past Surgical History:        Procedure Laterality Date    ABDOMEN SURGERY  2013, 2014    Bowel Obstruction X2    CAST APPLICATION Left     Lt. Ankle , X3    HYDROCELE EXCISION  08/2016    LAPAROSCOPY  8/17/14    with BERNARD    LAPAROTOMY  8/17/14    with partial cecectomy    VAGAL NERVE STIMULATION      placed, then replaced    VAGAL NERVE STIMULATION  10/11/2016    replaced generator battery       Medications Prior to Admission:    Prior to Admission medications    Medication Sig Start Date End Date Taking?  Authorizing Provider   calcium carbonate (OYSTER SHELL CALCIUM 500 MG) 1250 (500 Ca) MG tablet Take 1 tablet by mouth 2 times daily   Yes Historical Provider, MD   latanoprost (XALATAN) 0.005 % ophthalmic solution Place 1 drop into both eyes nightly   Yes Historical Provider, MD   senna (SENOKOT) 8.6 MG tablet Take 1 tablet by mouth 2 times daily   Yes Historical Provider, MD   traZODone (DESYREL) 50 MG tablet Take 50 mg by mouth nightly   Yes Historical Provider, MD   Vitamin D (CHOLECALCIFEROL) 25 MCG (1000 UT) TABS tablet Take 1,000 Units by mouth daily   Yes Historical Provider, MD   acetaminophen (TYLENOL) 325 MG tablet Take 2 tablets by mouth every 4 hours as needed for Pain 10/12/16   Wilder Dennis MD   LEVETIRACETAM PO Take 1,500 mg by mouth 2 times daily Indications: dissolvable tablet    Historical Provider, MD   lamoTRIgine (LAMICTAL) 200 MG tablet Take 200 mg by mouth 2 times daily Two tabs in am  Three tabs in pm    Historical Provider, MD   primidone (MYSOLINE) 250 MG tablet Take 250 mg by mouth in the morning and at bedtime 250mg in AM  375mg in PM    Historical Provider, MD   lacosamide (VIMPAT) 150 MG TABS tablet Take 150 mg by mouth 2 times daily. Historical Provider, MD   finasteride (PROSCAR) 5 MG tablet Take 5 mg by mouth daily    Historical Provider, MD   tamsulosin (FLOMAX) 0.4 MG capsule Take 0.4 mg by mouth nightly    Historical Provider, MD   levofloxacin (LEVAQUIN) 250 MG tablet Take 500 mg by mouth daily  Patient not taking: Reported on 3/7/2023    Historical Provider, MD   NONFORMULARY Place rectally as needed Indications: Seizure, Valium 20 mg suppository  Patient not taking: Reported on 3/7/2023    Historical Provider, MD       Allergies:  Pcn [penicillins] and Clindamycin    Social History:   TOBACCO:   reports that he has never smoked. He does not have any smokeless tobacco history on file. ETOH:   reports no history of alcohol use.     Family History:       Problem Relation Age of Onset    High Blood Pressure Mother     Thyroid Cancer Mother     Other Mother         Myeloma    Heart Disease Mother         2 Stents    Arthritis Mother         2 Back surgeries    Lung Cancer Father     Heart Disease Brother         Defibrilator    Heart Attack Maternal Grandfather     Diabetes Paternal Grandmother        Allergies: Pcn [penicillins] and Clindamycin    Medications Prior to Admission:    Prior to Admission medications    Medication Sig Start Date End Date Taking? Authorizing Provider   calcium carbonate (OYSTER SHELL CALCIUM 500 MG) 1250 (500 Ca) MG tablet Take 1 tablet by mouth 2 times daily   Yes Historical Provider, MD   latanoprost (XALATAN) 0.005 % ophthalmic solution Place 1 drop into both eyes nightly   Yes Historical Provider, MD   senna (SENOKOT) 8.6 MG tablet Take 1 tablet by mouth 2 times daily   Yes Historical Provider, MD   traZODone (DESYREL) 50 MG tablet Take 50 mg by mouth nightly   Yes Historical Provider, MD   Vitamin D (CHOLECALCIFEROL) 25 MCG (1000 UT) TABS tablet Take 1,000 Units by mouth daily   Yes Historical Provider, MD   acetaminophen (TYLENOL) 325 MG tablet Take 2 tablets by mouth every 4 hours as needed for Pain 10/12/16   Cruz Ware MD   LEVETIRACETAM PO Take 1,500 mg by mouth 2 times daily Indications: dissolvable tablet    Historical Provider, MD   lamoTRIgine (LAMICTAL) 200 MG tablet Take 200 mg by mouth 2 times daily Two tabs in am  Three tabs in pm    Historical Provider, MD   primidone (MYSOLINE) 250 MG tablet Take 250 mg by mouth in the morning and at bedtime 250mg in AM  375mg in PM    Historical Provider, MD   lacosamide (VIMPAT) 150 MG TABS tablet Take 150 mg by mouth 2 times daily. Historical Provider, MD   finasteride (PROSCAR) 5 MG tablet Take 5 mg by mouth daily    Historical Provider, MD   tamsulosin (FLOMAX) 0.4 MG capsule Take 0.4 mg by mouth nightly    Historical Provider, MD   levofloxacin (LEVAQUIN) 250 MG tablet Take 500 mg by mouth daily  Patient not taking: Reported on 3/7/2023    Historical Provider, MD   NONFORMULARY Place rectally as needed Indications: Seizure, Valium 20 mg suppository  Patient not taking: Reported on 3/7/2023    Historical Provider, MD       Review of Systems:  Constitutional:negative  for fevers, and negative for chills.   Eyes: negative for visual disturbance   ENT: negative for sore throat, negative nasal congestion, and negative for earache  Respiratory: negative for shortness of breath, negative for cough, and negative for wheezing  Cardiovascular: negative for chest pain, negative for palpitations, and negative for syncope  Gastrointestinal: negative for abdominal pain, negative for nausea,negative for vomiting, negative for diarrhea, negative for constipation, and negative for hematochezia or melena  Genitourinary: negative for dysuria, negative for urinary urgency, negative for urinary frequency, and negative for hematuria  Skin: negative for skin rash, and negative for skin lesions  Neurological: negative for unilateral weakness, numbness or tingling. Physical Exam:    Vitals:   Temp: (!) 96.6 °F (35.9 °C)  BP: 128/73  Resp: 18  Heart Rate: 95  SpO2: 96 %  24HR INTAKE/OUTPUT:    Intake/Output Summary (Last 24 hours) at 3/7/2023 1110  Last data filed at 3/7/2023 0524  Gross per 24 hour   Intake 232 ml   Output 2950 ml   Net -2718 ml       Weight    Body mass index is 29.05 kg/m². Exam:  GEN:    Awake, alert and oriented to person and place only. EYES:  EOMI, pupils equal   NECK: Supple. No lymphadenopathy. No carotid bruit  CVS:    regular rate and rhythm, no audible murmur  PULM:  CTA, no wheezes, rales or rhonchi, no acute respiratory distress  ABD:    Bowels sounds high-pitched hypoactive. Abdomen is soft. No distention. no tenderness to palpation. EXT:   no edema bilaterally . No calf tenderness. NEURO: Moves all extremities. Motor and sensory are grossly intact  SKIN:  No rashes.   No skin lesions.    -----------------------------------------------------------------  Diagnostic Data:     DATA:    CBC:   Lab Results   Component Value Date    WBC 12.7 (H) 03/07/2023    RBC 4.41 03/07/2023    HGB 14.8 03/07/2023    HCT 43.7 03/07/2023    MCV 99.1 03/07/2023     03/07/2023        CMP:   Lab Results   Component Value Date GLUCOSE 120 (H) 03/07/2023    BUN 39 (H) 03/07/2023    CREATININE 1.72 (H) 03/07/2023     03/07/2023    K 4.3 03/07/2023    CALCIUM 10.7 (H) 03/07/2023    CL 99 03/07/2023    CO2 32 (H) 03/07/2023    PROT 8.6 (H) 03/07/2023    LABALBU 4.7 03/07/2023    BILITOT 0.6 03/07/2023    ALKPHOS 172 (H) 03/07/2023    ALT 14 03/07/2023    AST 16 03/07/2023       UA:   Lab Results   Component Value Date    COLORU Yellow 03/03/2023    SPECGRAV 1.020 03/03/2023    WBCUA None 03/03/2023    RBCUA None 03/03/2023    EPITHUA None 03/03/2023    LEUKOCYTESUR NEGATIVE 03/03/2023    GLUCOSEU NEGATIVE 03/03/2023    KETUA NEGATIVE 03/03/2023    PROTEINU NEGATIVE 03/03/2023    HGBUR NEGATIVE 03/03/2023    CASTUA NOT REPORTED 10/03/2019    CRYSTUA NOT REPORTED 10/03/2019    BACTERIA 1+ (A) 03/03/2023    YEAST NOT REPORTED 10/03/2019       Lactic Acid:   Lab Results   Component Value Date    LACTA 1.5 03/06/2023       D-Dimer:  No results found for: DDIMER    PT/INR:  Lab Results   Component Value Date/Time    PROTIME 12.3 08/17/2014 10:20 AM    INR 1.1 08/17/2014 10:20 AM       High Sensitivity Troponin:  No results for input(s): TROPHS in the last 72 hours. ABGs:   No results found for: PHART, PH, MHS5ELB, PCO2, PO2ART, PO2, FNU6THT, HCO3, BEART, BE, THGBART, THB, OVK6AWC, W1UJHVPC, O2SAT, FIO2        XR ABDOMEN (KUB) (SINGLE AP VIEW)   Final Result   Dilated air-filled loops of bowel concerning for degree of obstruction. Gastric tube with the tip in the gastric fundus. XR ABDOMEN FOR NG/OG/NE TUBE PLACEMENT   Final Result   Tip of the enteric tube projects over the gastric body, side port projects   over the gastric body. Partially imaged bowel loops are dilated, similar to   prior. XR ABDOMEN (KUB) (SINGLE AP VIEW)   Final Result   Multiple dilated small bowel loops, ileus versus small-bowel obstruction. The   stomach is distended with intraluminal air.                Assessment:    Principal Problem: Small bowel obstruction ([address] / Operation 8-  Active Problems:    Seizures ([address] Chronic    MR (mental retardation), severe  Resolved Problems:    * No resolved hospital problems. *      Patient Active Problem List    Diagnosis Date Noted    Epilepsy ([address] 10/11/2016    MR (mental retardation), severe 08/18/2014    Small bowel obstruction ([address] / Operation 8- 08/16/2014    Seizures ([address] Chronic 08/16/2014       Plan:     MEDICAL DECISION MAKING:    Primary Problem(s): Small bowel obstruction ([address]  Differential diagnoses: Ileus  Condition is a chronic illness with exacerbation, progression or side effects of treatment  Condition is stable  Treatment plan: Consultation: General surgery  Imaging:  KUB this morning  Medications:   IV fluids  IV Protonix  Medication Monitoring / High Risk Medications: none   NG to low intermittent wall suction     Seizure disorder  Condition is a chronic stable condition  Treatment plan: Continue current treatment  Imaging: no further imaging studies ordered today  Medications:   IV Keppra      Nutrition status: Well developed, well nourished with no malnutrition  Dietician consult initiated    Hospital Prophylaxis:   DVT: Lovenox   Stress Ulcer: PPI     MDM Data:   Test interpretation:  My independent EKG interpretation: N/A  My independent X-ray interpretation:   Reviewed radiology studies  Management and/or test interpretation discussed with ER MD at time of admission  Consults and Nursing notes were personally reviewed, all current labs and imaging were personally reviewed, tests ordered: CBC, BMP, and history obtained by independent historian       Disposition:  Shared decision making:  All test results, treatment options and disposition options were discussed with the patient today  Social determinants of health that may impact management:  MRDD resides in group home  Code status: Full Code   Disposition: Discharge plan is back to group home        Critical Care Time:  Total critical care time caring for this patient with life threatening, unstable organ failure, including direct patient contact, management of life support systems, review of data including imaging and labs, discussions with other team members and physicians at least 0 minutes so far today, excluding separately billable procedures. [facility] Medication Reconciliation documentation:    [x] I have utilized all available immediate resources to obtain, update, or review the patient's current medications (including all prescriptions, over-the-counter products, herbals, cannabinoid products and bitamin/mineral/dietary/nutritional supplements. Argi.Hearing 'yes\", Lona Johnson     []  The patient is not eligible for medication reconciliation; the patient is in an emergent medical situation where delaying treatment would jeopardize the patient's health    []  I did NOT confirm, update or review the patient's current list of medications today. [DOES NOT SATISFY [facility] PERFORMANCE]        [facility] Advanced Care Planning documentation:  [x] I have confirmed that the patient's Advance Care Plan is present, Code Status is documented, or surrogate decision maker is listed in the patient's medical record  [If \"yes\", STOP HERE]     [] The patient's Advance Care Plan is NOT present because:    []  I confirmed today that the patient does not wish or was not able to name a   surrogate decision maker or provide and advance care plan.    [] Hospice care is currently being provided or has been provided within the   calendar year. []  I did NOT confirm today the presence of an [address] or surrogate   decision maker documented within the patient's medical record.    [DOES NOT SATISFY [facility] PERFORMANCE]    CORE MEASURES  DVT prophylaxis: Lovenox  Decubitus ulcer present on admission: No  CODE STATUS: FULL CODE  Nutrition Status: fair   Physical therapy: No   Old Charts reviewed: Yes  EKG Reviewed: No  Advance Directive Addressed: Yes    Anne Quevedo, TIFFANIE - CNP, APRN, NP-C  3/7/2023, 11:10 AM

## 2023-03-07 NOTE — CONSULTS
General Surgery Consult        PATIENT NAME: Enrike King     YOB: 1955    MEDICAL RECORD NUMBER: [id number]    ADMISSION DATE: 3/6/2023 10:46 PM     Karleemoaric Day: 2     TODAY'S DATE: 03/07/23     PRIMARY CARE PROVIDER: Dinah Lutz DO (Inactive)    Reason for Consult:  Small bowel obstruction vs ileus    Requesting Physician:  Marian Perez NP    HISTORY OF PRESENT ILLNESS:              Savanna Perez is a mentally handicapped  78 y.o. male who admitted through the ED yesterday morning for emesis with history of small bowel obstruction. Sanchez Johnson has history of partial cecectomy in 2014. Today Sanchez Johnson is examined at bedside in semi fowlers position. NG tube is in place with light green drainage noted in tubing. He denies nausea, vomiting, or abdominal pain at this time. His abdomen is soft and not distended. I do appreciate bowel sounds to the right lower abdominal quadrant without tympany. Blood pressure, heart rate, and lactic acid are within normal range. Sanchez Johnson is afebrile with a WBC trending downward at 12.7. Past Medical History:        Diagnosis Date    MR (mental retardation)     Seizures ([address]     Ventral hernia      Past Surgical History:        Procedure Laterality Date    ABDOMEN SURGERY  2013, 2014    Bowel Obstruction X2    CAST APPLICATION Left     Lt.  Ankle , X3    HYDROCELE EXCISION  08/2016    LAPAROSCOPY  8/17/14    with BERNARD    LAPAROTOMY  8/17/14    with partial cecectomy    VAGAL NERVE STIMULATION      placed, then replaced    VAGAL NERVE STIMULATION  10/11/2016    replaced generator battery     Current Medications:   Current Facility-Administered Medications: lactated ringers IV soln infusion, , IntraVENous, Continuous  sodium chloride flush 0.9 % injection 5-40 mL, 5-40 mL, IntraVENous, 2 times per day  sodium chloride flush 0.9 % injection 5-40 mL, 5-40 mL, IntraVENous, PRN  0.9 % sodium chloride infusion, , IntraVENous, PRN  potassium chloride (KLOR-CON M) extended release tablet 40 mEq, 40 mEq, Oral, PRN **OR** potassium bicarb-citric acid (EFFER-K) effervescent tablet 40 mEq, 40 mEq, Oral, PRN **OR** potassium chloride 10 mEq/100 mL IVPB (Peripheral Line), 10 mEq, IntraVENous, PRN  ondansetron (ZOFRAN-ODT) disintegrating tablet 4 mg, 4 mg, Oral, Q8H PRN **OR** ondansetron (ZOFRAN) injection 4 mg, 4 mg, IntraVENous, Q6H PRN  enoxaparin (LOVENOX) injection 40 mg, 40 mg, SubCUTAneous, Daily  ketorolac (TORADOL) injection 15 mg, 15 mg, IntraVENous, Q6H PRN  levETIRAcetam (KEPPRA) 1,500 mg in sodium chloride 0.9 % 100 mL IVPB, 1,500 mg, IntraVENous, Q12H  pantoprazole (PROTONIX) 40 mg in sodium chloride (PF) 0.9 % 10 mL injection, 40 mg, IntraVENous, Daily  lacosamide (VIMPAT) 150 mg in sodium chloride 0.9 % 65 mL IVPB, 150 mg, IntraVENous, BID  Prior to Admission medications    Medication Sig Start Date End Date Taking?  Authorizing Provider   calcium carbonate (OYSTER SHELL CALCIUM 500 MG) 1250 (500 Ca) MG tablet Take 1 tablet by mouth 2 times daily   Yes Historical Provider, MD   latanoprost (XALATAN) 0.005 % ophthalmic solution Place 1 drop into both eyes nightly   Yes Historical Provider, MD   senna (SENOKOT) 8.6 MG tablet Take 1 tablet by mouth 2 times daily   Yes Historical Provider, MD   traZODone (DESYREL) 50 MG tablet Take 50 mg by mouth nightly   Yes Historical Provider, MD   Vitamin D (CHOLECALCIFEROL) 25 MCG (1000 UT) TABS tablet Take 1,000 Units by mouth daily   Yes Historical Provider, MD   acetaminophen (TYLENOL) 325 MG tablet Take 2 tablets by mouth every 4 hours as needed for Pain 10/12/16   Raj Bauman MD   LEVETIRACETAM PO Take 1,500 mg by mouth 2 times daily Indications: dissolvable tablet    Historical Provider, MD   lamoTRIgine (LAMICTAL) 200 MG tablet Take 200 mg by mouth 2 times daily Two tabs in am  Three tabs in pm    Historical Provider, MD   primidone (MYSOLINE) 250 MG tablet Take 250 mg by mouth in the morning and at bedtime 250mg in AM  375mg in PM    Historical Provider, MD   lacosamide (VIMPAT) 150 MG TABS tablet Take 150 mg by mouth 2 times daily. Historical Provider, MD   finasteride (PROSCAR) 5 MG tablet Take 5 mg by mouth daily    Historical Provider, MD   tamsulosin (FLOMAX) 0.4 MG capsule Take 0.4 mg by mouth nightly    Historical Provider, MD   levofloxacin (LEVAQUIN) 250 MG tablet Take 500 mg by mouth daily  Patient not taking: Reported on 3/7/2023    Historical Provider, MD   NONFORMULARY Place rectally as needed Indications: Seizure, Valium 20 mg suppository  Patient not taking: Reported on 3/7/2023    Historical Provider, MD      Allergies:  Pcn [penicillins] and Clindamycin    Social History:   Social History     Tobacco Use    Smoking status: Never   Substance Use Topics    Alcohol use: No    Drug use: No      Family History:        Problem Relation Age of Onset    High Blood Pressure Mother     Thyroid Cancer Mother     Other Mother         Myeloma    Heart Disease Mother         2 Stents    Arthritis Mother         2 Back surgeries    Lung Cancer Father     Heart Disease Brother         Defibrilator    Heart Attack Maternal Grandfather     Diabetes Paternal Grandmother      REVIEW OF SYSTEMS:  Review of Systems   Unable to perform ROS: Other (mentally handicapped)      PHYSICAL EXAM:  VITALS:  /73   Pulse 95   Temp (!) 96.6 °F (35.9 °C) (Temporal)   Resp 18   Ht 5' 6\" (1.676 m)   Wt 180 lb (81.6 kg)   SpO2 96%   BMI 29.05 kg/m²   24HR INTAKE/OUTPUT:    I/O last 3 completed shifts: In: 232 [I.V.:232]  Out: 2950 [Emesis/NG output:2950]  No intake/output data recorded. Physical Exam  Constitutional:       General: He is not in acute distress. Appearance: He is obese. He is ill-appearing. He is not toxic-appearing or diaphoretic. Cardiovascular:      Rate and Rhythm: Normal rate. Heart sounds: Normal heart sounds. Pulmonary:      Effort: Pulmonary effort is normal. No respiratory distress. Abdominal:      General: There is no distension. Palpations: Abdomen is soft. There is no mass. Tenderness: There is no abdominal tenderness. There is no guarding. Skin:     General: Skin is warm and dry. Neurological:      Mental Status: He is alert. Psychiatric:         Mood and Affect: Mood normal.     DATA:    CBC:   Recent Labs     03/06/23 2330 03/07/23  0525   WBC 14.9* 12.7*   HGB 14.9 14.8   HCT 43.1 43.7    285     BMP:    Recent Labs     03/06/23 2330 03/07/23  0525    143   K 4.0 4.3   CL 99 99   CO2 24 32*   BUN 34* 39*   CREATININE 1.71* 1.72*   GLUCOSE 125* 120*     Hepatic:   Recent Labs     03/06/23 2330 03/07/23  0525   AST 15 16   ALT 15 14   BILITOT 0.4 0.6   ALKPHOS 176* 172*       INR: No results for input(s): INR in the last 72 hours. Radiology Review:    XR ABDOMEN (KUB) (SINGLE AP VIEW)   Final Result   Dilated air-filled loops of bowel concerning for degree of obstruction. Gastric tube with the tip in the gastric fundus. XR ABDOMEN FOR NG/OG/NE TUBE PLACEMENT   Final Result   Tip of the enteric tube projects over the gastric body, side port projects   over the gastric body. Partially imaged bowel loops are dilated, similar to   prior. XR ABDOMEN (KUB) (SINGLE AP VIEW)   Final Result   Multiple dilated small bowel loops, ileus versus small-bowel obstruction. The   stomach is distended with intraluminal air. Assessment:  Ileus vs small bowel obstruction    Plan:  I have reviewed the pertinent notes from the hospitalist as well as labs and imaging studies. I have collaborated with Dr. Chung Archer. Surgical intervention is not necessary at this time. NG will stay in place. We will continue with daily acute abdominal series x ray. Continue to monitor for vomiting, abdominal distention, and abdominal tenderness.      Electronically signed by TIFFANIE Bowen CNP on 3/7/2023 at 3:30 PM

## 2023-03-07 NOTE — PROGRESS NOTES
Pt resting in bed, assessment and vitals complete, patient alert and orient x4, denies pain at this time, NG in place at 65 cm, patent draining yellow fluid, all needs met at this time, call light within reach.

## 2023-03-08 ENCOUNTER — APPOINTMENT (OUTPATIENT)
Dept: GENERAL RADIOLOGY | Age: 68
End: 2023-03-08
Payer: MEDICARE

## 2023-03-08 LAB
ABSOLUTE EOS #: <0.03 K/UL (ref 0–0.44)
ABSOLUTE IMMATURE GRANULOCYTE: 0.06 K/UL (ref 0–0.3)
ABSOLUTE LYMPH #: 1.45 K/UL (ref 1.1–3.7)
ABSOLUTE MONO #: 0.87 K/UL (ref 0.1–1.2)
ALBUMIN SERPL-MCNC: 3.5 G/DL (ref 3.5–5.2)
ALBUMIN/GLOBULIN RATIO: 1.1 (ref 1–2.5)
ALP SERPL-CCNC: 118 U/L (ref 40–129)
ALT SERPL-CCNC: 10 U/L (ref 5–41)
ANION GAP SERPL CALCULATED.3IONS-SCNC: 10 MMOL/L (ref 9–17)
AST SERPL-CCNC: 13 U/L
BASOPHILS # BLD: 0 % (ref 0–2)
BASOPHILS ABSOLUTE: 0.06 K/UL (ref 0–0.2)
BILIRUB SERPL-MCNC: 0.6 MG/DL (ref 0.3–1.2)
BUN SERPL-MCNC: 29 MG/DL (ref 8–23)
BUN/CREAT BLD: 28 (ref 9–20)
CALCIUM SERPL-MCNC: 9 MG/DL (ref 8.6–10.4)
CHLORIDE SERPL-SCNC: 107 MMOL/L (ref 98–107)
CO2 SERPL-SCNC: 28 MMOL/L (ref 20–31)
CREAT SERPL-MCNC: 1.02 MG/DL (ref 0.7–1.2)
EOSINOPHILS RELATIVE PERCENT: 0 % (ref 1–4)
GFR SERPL CREATININE-BSD FRML MDRD: >60 ML/MIN/1.73M2
GLUCOSE BLD-MCNC: 88 MG/DL (ref 74–100)
GLUCOSE SERPL-MCNC: 108 MG/DL (ref 70–99)
HCT VFR BLD AUTO: 35.9 % (ref 40.7–50.3)
HGB BLD-MCNC: 12 G/DL (ref 13–17)
IMMATURE GRANULOCYTES: 0 %
LYMPHOCYTES # BLD: 11 % (ref 24–43)
MCH RBC QN AUTO: 33.8 PG (ref 25.2–33.5)
MCHC RBC AUTO-ENTMCNC: 33.4 G/DL (ref 28.4–34.8)
MCV RBC AUTO: 101.1 FL (ref 82.6–102.9)
MONOCYTES # BLD: 7 % (ref 3–12)
NRBC AUTOMATED: 0 PER 100 WBC
PDW BLD-RTO: 13.1 % (ref 11.8–14.4)
PLATELET # BLD AUTO: 170 K/UL (ref 138–453)
PMV BLD AUTO: 9.2 FL (ref 8.1–13.5)
POTASSIUM SERPL-SCNC: 3.8 MMOL/L (ref 3.7–5.3)
PROT SERPL-MCNC: 6.8 G/DL (ref 6.4–8.3)
RBC # BLD: 3.55 M/UL (ref 4.21–5.77)
SEG NEUTROPHILS: 82 % (ref 36–65)
SEGMENTED NEUTROPHILS ABSOLUTE COUNT: 10.97 K/UL (ref 1.5–8.1)
SODIUM SERPL-SCNC: 145 MMOL/L (ref 135–144)
WBC # BLD AUTO: 13.4 K/UL (ref 3.5–11.3)

## 2023-03-08 PROCEDURE — 6360000002 HC RX W HCPCS: Performed by: NURSE PRACTITIONER

## 2023-03-08 PROCEDURE — 74022 RADEX COMPL AQT ABD SERIES: CPT

## 2023-03-08 PROCEDURE — 36415 COLL VENOUS BLD VENIPUNCTURE: CPT

## 2023-03-08 PROCEDURE — 6360000002 HC RX W HCPCS: Performed by: FAMILY MEDICINE

## 2023-03-08 PROCEDURE — 2580000003 HC RX 258: Performed by: NURSE PRACTITIONER

## 2023-03-08 PROCEDURE — C9113 INJ PANTOPRAZOLE SODIUM, VIA: HCPCS | Performed by: NURSE PRACTITIONER

## 2023-03-08 PROCEDURE — 94761 N-INVAS EAR/PLS OXIMETRY MLT: CPT

## 2023-03-08 PROCEDURE — 82947 ASSAY GLUCOSE BLOOD QUANT: CPT

## 2023-03-08 PROCEDURE — 2580000003 HC RX 258: Performed by: FAMILY MEDICINE

## 2023-03-08 PROCEDURE — A4216 STERILE WATER/SALINE, 10 ML: HCPCS | Performed by: NURSE PRACTITIONER

## 2023-03-08 PROCEDURE — 1200000000 HC SEMI PRIVATE

## 2023-03-08 PROCEDURE — C9254 INJECTION, LACOSAMIDE: HCPCS | Performed by: NURSE PRACTITIONER

## 2023-03-08 PROCEDURE — 85025 COMPLETE CBC W/AUTO DIFF WBC: CPT

## 2023-03-08 PROCEDURE — 80053 COMPREHEN METABOLIC PANEL: CPT

## 2023-03-08 RX ADMIN — SODIUM CHLORIDE 150 MG: 9 INJECTION, SOLUTION INTRAVENOUS at 09:41

## 2023-03-08 RX ADMIN — SODIUM CHLORIDE, POTASSIUM CHLORIDE, SODIUM LACTATE AND CALCIUM CHLORIDE: 600; 310; 30; 20 INJECTION, SOLUTION INTRAVENOUS at 14:54

## 2023-03-08 RX ADMIN — SODIUM CHLORIDE, POTASSIUM CHLORIDE, SODIUM LACTATE AND CALCIUM CHLORIDE: 600; 310; 30; 20 INJECTION, SOLUTION INTRAVENOUS at 04:34

## 2023-03-08 RX ADMIN — ENOXAPARIN SODIUM 40 MG: 100 INJECTION SUBCUTANEOUS at 09:16

## 2023-03-08 RX ADMIN — SODIUM CHLORIDE 40 MG: 9 INJECTION, SOLUTION INTRAMUSCULAR; INTRAVENOUS; SUBCUTANEOUS at 09:16

## 2023-03-08 RX ADMIN — LEVETIRACETAM 1500 MG: 100 INJECTION, SOLUTION INTRAVENOUS at 10:47

## 2023-03-08 RX ADMIN — SODIUM CHLORIDE 150 MG: 9 INJECTION, SOLUTION INTRAVENOUS at 21:38

## 2023-03-08 RX ADMIN — LEVETIRACETAM 1500 MG: 100 INJECTION, SOLUTION INTRAVENOUS at 22:33

## 2023-03-08 NOTE — PROGRESS NOTES
Patient resting comfortably at this time. Patient denies any pain and any other needs at this time. Patient alert & oriented to self and that he is in the hospital. Able to follow commands. Assessment and vitals as charted. Bed alarm on, bed locked, gripper socks on, call light within reach and able to use appropriately. Will continue to monitor this shift.

## 2023-03-08 NOTE — PROGRESS NOTES
Patient shift assessment and vitals completed at this time as charted. Patient is resting in bed and denies pain. Patient continues to have NG in place at 65cm that is draining. Patient states no further needs, call light in reach, will continue to monitor.

## 2023-03-08 NOTE — PLAN OF CARE
Problem: Discharge Planning  Goal: Discharge to home or other facility with appropriate resources  3/8/2023 1126 by LaureLightUp End  Outcome: Progressing     Problem: Safety - Adult  Goal: Free from fall injury  3/8/2023 1126 by Laureita End  Outcome: Progressing     Problem: Skin/Tissue Integrity  Goal: Absence of new skin breakdown  Description: 1. Monitor for areas of redness and/or skin breakdown  2. Assess vascular access sites hourly  3. Every 4-6 hours minimum:  Change oxygen saturation probe site  4. Every 4-6 hours:  If on nasal continuous positive airway pressure, respiratory therapy assess nares and determine need for appliance change or resting period.   3/8/2023 1126 by LaureLightUp End  Outcome: Jazmyn Lindsey

## 2023-03-08 NOTE — PROGRESS NOTES
Patient reassessment and vitals completed at this time as charted. Patient is resting in bed and denies pain at this time. Patients NG continues to be in place and is draining yellow/brown fluid. Patient states no needs, call light is in reach, will continue to monitor.

## 2023-03-08 NOTE — PROGRESS NOTES
Progress Note    SUBJECTIVE:    Patient seen for f/u of Small bowel obstruction ([address] He resting in bed with eyes closed easy but audible moist respirations. NG to LIWS.      ROS:   Constitutional: negative  for fevers, and negative for chills. Respiratory: negative for shortness of breath, negative for cough, and negative for wheezing  Cardiovascular: negative for chest pain, and negative for palpitations  Gastrointestinal: negative for abdominal pain, negative for nausea,negative for vomiting, negative for diarrhea, and negative for constipation     All other systems were reviewed with the patient and are negative unless otherwise stated in HPI      OBJECTIVE:      Vitals:   Vitals:    03/08/23 0730   BP: 122/64   Pulse: 94   Resp: 18   Temp: 99.8 °F (37.7 °C)   SpO2: 93%     Weight: 181 lb (82.1 kg)   Height: 5' 6\" (167.6 cm)     Weight  Wt Readings from Last 3 Encounters:   03/08/23 181 lb (82.1 kg)   10/11/16 209 lb (94.8 kg)   10/05/16 209 lb 3.5 oz (94.9 kg)     Body mass index is 29.21 kg/m². 24HR INTAKE/OUTPUT:      Intake/Output Summary (Last 24 hours) at 3/8/2023 0751  Last data filed at 3/8/2023 0736  Gross per 24 hour   Intake 2501.4 ml   Output 650 ml   Net 1851.4 ml     -----------------------------------------------------------------  Exam:    GEN:    Awake, alert and oriented to person and place only. EYES:  EOMI, pupils equal   NECK: Supple. No lymphadenopathy. No carotid bruit  CVS:    regular rate and rhythm, no audible murmur  PULM:   upper airway rhonchi but otherwise clear , no acute respiratory distress  ABD:    Bowels sounds hypoactive tympanic. Abdomen is soft. No distention. no tenderness to palpation. EXT:   no edema bilaterally . No calf tenderness. NEURO: Moves all extremities. Motor and sensory are grossly intact  SKIN:  No rashes.   No skin lesions.    -----------------------------------------------------------------    Diagnostic Data:      Complete Blood Count: Recent Labs     03/06/23  2330 03/07/23  0525 03/08/23  0535   WBC 14.9* 12.7* 13.4*   RBC 4.40 4.41 3.55*   HGB 14.9 14.8 12.0*   HCT 43.1 43.7 35.9*   MCV 98.0 99.1 101.1   MCH 33.9* 33.6* 33.8*   MCHC 34.6 33.9 33.4   RDW 12.9 12.9 13.1    285 170   MPV 9.2 9.3 9.2        Last 3 Blood Glucose:   Recent Labs     03/06/23 2330 03/07/23  0525 03/08/23  0535   GLUCOSE 125* 120* 108*        Comprehensive Metabolic Profile:   Recent Labs     03/06/23 2330 03/07/23  0525 03/08/23  0535    143 145*   K 4.0 4.3 3.8   CL 99 99 107   CO2 24 32* 28   BUN 34* 39* 29*   CREATININE 1.71* 1.72* 1.02   GLUCOSE 125* 120* 108*   CALCIUM 10.4 10.7* 9.0   PROT 8.8* 8.6* 6.8   LABALBU 4.6 4.7 3.5   BILITOT 0.4 0.6 0.6   ALKPHOS 176* 172* 118   AST 15 16 13   ALT 15 14 10        Urinalysis:   Lab Results   Component Value Date/Time    NITRU NEGATIVE 03/03/2023 04:43 PM    COLORU Yellow 03/03/2023 04:43 PM    PHUR 6.5 03/03/2023 04:43 PM    WBCUA None 03/03/2023 04:43 PM    RBCUA None 03/03/2023 04:43 PM    MUCUS NOT REPORTED 10/03/2019 06:15 AM    TRICHOMONAS NOT REPORTED 10/03/2019 06:15 AM    YEAST NOT REPORTED 10/03/2019 06:15 AM    BACTERIA 1+ 03/03/2023 04:43 PM    SPECGRAV 1.020 03/03/2023 04:43 PM    LEUKOCYTESUR NEGATIVE 03/03/2023 04:43 PM    UROBILINOGEN Normal 03/03/2023 04:43 PM    BILIRUBINUR NEGATIVE 03/03/2023 04:43 PM    BILIRUBINUR NEGATIVE 08/29/2011 12:33 PM    GLUCOSEU NEGATIVE 03/03/2023 04:43 PM    GLUCOSEU NEGATIVE 08/29/2011 12:33 PM    KETUA NEGATIVE 03/03/2023 04:43 PM    AMORPHOUS TRACE 03/03/2023 04:43 PM       HgBA1c:  No results found for: LABA1C    Lactic Acid:   Lab Results   Component Value Date/Time    LACTA 1.5 03/06/2023 11:30 PM    LACTA 1.3 08/15/2014 06:20 PM        Troponin: No results for input(s): TROPONINI in the last 72 hours. CRP:  No results for input(s): CRP in the last 72 hours.       Radiology/Imaging:  XR ABDOMEN (KUB) (SINGLE AP VIEW)   Final Result   Dilated air-filled loops of bowel concerning for degree of obstruction. Gastric tube with the tip in the gastric fundus. XR ABDOMEN FOR NG/OG/NE TUBE PLACEMENT   Final Result   Tip of the enteric tube projects over the gastric body, side port projects   over the gastric body. Partially imaged bowel loops are dilated, similar to   prior. XR ABDOMEN (KUB) (SINGLE AP VIEW)   Final Result   Multiple dilated small bowel loops, ileus versus small-bowel obstruction. The   stomach is distended with intraluminal air. XR ACUTE ABD SERIES CHEST 1 VW    (Results Pending)         ASSESSMENT / PLAN:    MEDICAL DECISION MAKING:    Primary Problem(s): Small bowel obstruction (HCC)  Differential diagnoses: Ileus  Condition is a chronic illness with exacerbation, progression or side effects of treatment  Condition is stable  Treatment plan: Consultation: General surgery  Imaging:  Dominique soliz  Medications:   IV fluids  IV Protonix  Medication Monitoring / High Risk Medications: none   NG to low intermittent wall suction  Up to chair     Seizure disorder  Condition is a chronic stable condition  Treatment plan: Continue current treatment  Imaging: no further imaging studies ordered today  Medications:   IV Keppra  IV Vimpat        Nutrition status: Well developed, well nourished with no malnutrition  Dietician consult initiated     Hospital Prophylaxis:   DVT: Lovenox   Stress Ulcer:     Disposition:  Shared decision making:  All test results, treatment options and disposition options were discussed with the patient today  Social determinants of health that may impact management:  Resides in Group Home  Code status: Full Code   Disposition: Discharge plan is pending    [facility] Advanced Care Planning documentation:  [x] I have confirmed that the patient's Advance Care Plan is present, Code Status is documented, or surrogate decision maker is listed in the patient's medical record  [If \"yes\", STOP HERE]     [] The patient's Advance Care Plan is NOT present because:    []  I confirmed today that the patient does not wish or was not able to name a   surrogate decision maker or provide and advance care plan.    [] Hospice care is currently being provided or has been provided within the   calendar year. []  I did NOT confirm today the presence of an [address] or surrogate   decision maker documented within the patient's medical record.    [DOES NOT SATISFY TIFFANIE Knutson - CNP , TIFFANIE, NP-C  [facility]        3/8/2023, 7:57 AM

## 2023-03-09 ENCOUNTER — APPOINTMENT (OUTPATIENT)
Dept: GENERAL RADIOLOGY | Age: 68
End: 2023-03-09
Payer: MEDICARE

## 2023-03-09 LAB
ABSOLUTE EOS #: <0.03 K/UL (ref 0–0.44)
ABSOLUTE IMMATURE GRANULOCYTE: 0.05 K/UL (ref 0–0.3)
ABSOLUTE LYMPH #: 1.27 K/UL (ref 1.1–3.7)
ABSOLUTE MONO #: 0.98 K/UL (ref 0.1–1.2)
ALBUMIN SERPL-MCNC: 3.3 G/DL (ref 3.5–5.2)
ALBUMIN/GLOBULIN RATIO: 0.9 (ref 1–2.5)
ALP SERPL-CCNC: 108 U/L (ref 40–129)
ALT SERPL-CCNC: 12 U/L (ref 5–41)
ANION GAP SERPL CALCULATED.3IONS-SCNC: 14 MMOL/L (ref 9–17)
AST SERPL-CCNC: 17 U/L
BASOPHILS # BLD: 0 % (ref 0–2)
BASOPHILS ABSOLUTE: 0.04 K/UL (ref 0–0.2)
BILIRUB SERPL-MCNC: 0.9 MG/DL (ref 0.3–1.2)
BUN SERPL-MCNC: 25 MG/DL (ref 8–23)
BUN/CREAT BLD: 25 (ref 9–20)
CALCIUM SERPL-MCNC: 9 MG/DL (ref 8.6–10.4)
CHLORIDE SERPL-SCNC: 109 MMOL/L (ref 98–107)
CO2 SERPL-SCNC: 23 MMOL/L (ref 20–31)
CREAT SERPL-MCNC: 1 MG/DL (ref 0.7–1.2)
EOSINOPHILS RELATIVE PERCENT: 0 % (ref 1–4)
GFR SERPL CREATININE-BSD FRML MDRD: >60 ML/MIN/1.73M2
GLUCOSE SERPL-MCNC: 82 MG/DL (ref 70–99)
HCT VFR BLD AUTO: 34.8 % (ref 40.7–50.3)
HGB BLD-MCNC: 11.4 G/DL (ref 13–17)
IMMATURE GRANULOCYTES: 0 %
LYMPHOCYTES # BLD: 11 % (ref 24–43)
MCH RBC QN AUTO: 34 PG (ref 25.2–33.5)
MCHC RBC AUTO-ENTMCNC: 32.8 G/DL (ref 28.4–34.8)
MCV RBC AUTO: 103.9 FL (ref 82.6–102.9)
MONOCYTES # BLD: 9 % (ref 3–12)
NRBC AUTOMATED: 0 PER 100 WBC
PDW BLD-RTO: 13 % (ref 11.8–14.4)
PLATELET # BLD AUTO: 147 K/UL (ref 138–453)
PMV BLD AUTO: 10.2 FL (ref 8.1–13.5)
POTASSIUM SERPL-SCNC: 3.9 MMOL/L (ref 3.7–5.3)
PROT SERPL-MCNC: 6.9 G/DL (ref 6.4–8.3)
RBC # BLD: 3.35 M/UL (ref 4.21–5.77)
SEG NEUTROPHILS: 80 % (ref 36–65)
SEGMENTED NEUTROPHILS ABSOLUTE COUNT: 9.05 K/UL (ref 1.5–8.1)
SODIUM SERPL-SCNC: 146 MMOL/L (ref 135–144)
WBC # BLD AUTO: 11.4 K/UL (ref 3.5–11.3)

## 2023-03-09 PROCEDURE — 51798 US URINE CAPACITY MEASURE: CPT

## 2023-03-09 PROCEDURE — 85025 COMPLETE CBC W/AUTO DIFF WBC: CPT

## 2023-03-09 PROCEDURE — 6360000004 HC RX CONTRAST MEDICATION: Performed by: NURSE PRACTITIONER

## 2023-03-09 PROCEDURE — 2580000003 HC RX 258: Performed by: FAMILY MEDICINE

## 2023-03-09 PROCEDURE — 1200000000 HC SEMI PRIVATE

## 2023-03-09 PROCEDURE — 74250 X-RAY XM SM INT 1CNTRST STD: CPT

## 2023-03-09 PROCEDURE — 80053 COMPREHEN METABOLIC PANEL: CPT

## 2023-03-09 PROCEDURE — 94761 N-INVAS EAR/PLS OXIMETRY MLT: CPT

## 2023-03-09 PROCEDURE — C9113 INJ PANTOPRAZOLE SODIUM, VIA: HCPCS | Performed by: NURSE PRACTITIONER

## 2023-03-09 PROCEDURE — A4216 STERILE WATER/SALINE, 10 ML: HCPCS | Performed by: NURSE PRACTITIONER

## 2023-03-09 PROCEDURE — 36416 COLLJ CAPILLARY BLOOD SPEC: CPT

## 2023-03-09 PROCEDURE — 6360000002 HC RX W HCPCS: Performed by: FAMILY MEDICINE

## 2023-03-09 PROCEDURE — C9254 INJECTION, LACOSAMIDE: HCPCS | Performed by: NURSE PRACTITIONER

## 2023-03-09 PROCEDURE — 2580000003 HC RX 258: Performed by: NURSE PRACTITIONER

## 2023-03-09 PROCEDURE — 6360000002 HC RX W HCPCS: Performed by: NURSE PRACTITIONER

## 2023-03-09 RX ADMIN — SODIUM CHLORIDE, PRESERVATIVE FREE 10 ML: 5 INJECTION INTRAVENOUS at 09:46

## 2023-03-09 RX ADMIN — LEVETIRACETAM 1500 MG: 100 INJECTION, SOLUTION INTRAVENOUS at 10:49

## 2023-03-09 RX ADMIN — ENOXAPARIN SODIUM 40 MG: 100 INJECTION SUBCUTANEOUS at 09:41

## 2023-03-09 RX ADMIN — SODIUM CHLORIDE 150 MG: 9 INJECTION, SOLUTION INTRAVENOUS at 09:52

## 2023-03-09 RX ADMIN — SODIUM CHLORIDE 40 MG: 9 INJECTION, SOLUTION INTRAMUSCULAR; INTRAVENOUS; SUBCUTANEOUS at 09:41

## 2023-03-09 RX ADMIN — DIATRIZOATE MEGLUMINE AND DIATRIZOATE SODIUM 90 ML: 600; 100 SOLUTION ORAL; RECTAL at 12:16

## 2023-03-09 RX ADMIN — SODIUM CHLORIDE, POTASSIUM CHLORIDE, SODIUM LACTATE AND CALCIUM CHLORIDE: 600; 310; 30; 20 INJECTION, SOLUTION INTRAVENOUS at 17:17

## 2023-03-09 RX ADMIN — LEVETIRACETAM 1500 MG: 100 INJECTION, SOLUTION INTRAVENOUS at 22:22

## 2023-03-09 RX ADMIN — SODIUM CHLORIDE, POTASSIUM CHLORIDE, SODIUM LACTATE AND CALCIUM CHLORIDE: 600; 310; 30; 20 INJECTION, SOLUTION INTRAVENOUS at 05:13

## 2023-03-09 RX ADMIN — SODIUM CHLORIDE 150 MG: 9 INJECTION, SOLUTION INTRAVENOUS at 23:40

## 2023-03-09 NOTE — PROGRESS NOTES
Writer spoke with Dr. Maribeth Harris at this time for order to pull pt's NG tube and place on clear liquid diet.

## 2023-03-09 NOTE — PROGRESS NOTES
Writer at bedside for shift assessment. Patient is alert and oriented to self and place only. Calm and cooperative with assessment. Rhonchi auscultated in upper airways and diminished in bases- tachyphemic when woken for assessment. Bowel sounds active x4- no reports of pain or discomfort. Patient free of incontinence at this time and denies needs for restroom. Denying any additional needs, call light placed within reach, bed in lowest position and alarm engaged to promote patient safety. Writer encourages patient to use call light for assistance.

## 2023-03-09 NOTE — PROGRESS NOTES
Writer got report from another RN on floor that pt has a seizure implant device on his right side by his armpit. Pt has device/ magnet in locked cabinet.

## 2023-03-09 NOTE — PROGRESS NOTES
Progress Note    SUBJECTIVE:    Patient seen for f/u of Small bowel obstruction ([address] He resting in bed with eyes closed easy but audible moist respirations. NG to LIWS. No complaints    ROS:   Constitutional: negative  for fevers, and negative for chills. Respiratory: negative for shortness of breath, negative for cough, and negative for wheezing  Cardiovascular: negative for chest pain, and negative for palpitations  Gastrointestinal: negative for abdominal pain, negative for nausea,negative for vomiting, negative for diarrhea, and negative for constipation     All other systems were reviewed with the patient and are negative unless otherwise stated in HPI      OBJECTIVE:      Vitals:   Vitals:    03/09/23 0115   BP: 121/78   Pulse: 94   Resp: 20   Temp: 97.9 °F (36.6 °C)   SpO2: 92%     Weight: 180 lb 8 oz (81.9 kg)   Height: 5' 6\" (167.6 cm)     Weight  Wt Readings from Last 3 Encounters:   03/09/23 180 lb 8 oz (81.9 kg)   10/11/16 209 lb (94.8 kg)   10/05/16 209 lb 3.5 oz (94.9 kg)     Body mass index is 29.13 kg/m². 24HR INTAKE/OUTPUT:      Intake/Output Summary (Last 24 hours) at 3/9/2023 0719  Last data filed at 3/9/2023 0545  Gross per 24 hour   Intake 1048. 4 ml   Output 1050 ml   Net -1.6 ml     -----------------------------------------------------------------  Exam:    GEN:    Awake, alert and oriented to person and place only. EYES:  EOMI, pupils equal   NECK: Supple. No lymphadenopathy. No carotid bruit  CVS:    regular rate and rhythm, no audible murmur  PULM:   upper airway rhonchi but otherwise clear , no acute respiratory distress  ABD:    Bowels sounds hypoactive tympanic. Abdomen is soft. No distention. no tenderness to palpation. EXT:  1+ edema bilaterally . No calf tenderness. NEURO: Moves all extremities. Motor and sensory are grossly intact  SKIN:  No rashes.   No skin lesions.    -----------------------------------------------------------------    Diagnostic Data:      Complete Blood Count:   Recent Labs     03/07/23  0525 03/08/23  0535 03/09/23  0550   WBC 12.7* 13.4* 11.4*   RBC 4.41 3.55* 3.35*   HGB 14.8 12.0* 11.4*   HCT 43.7 35.9* 34.8*   MCV 99.1 101.1 103.9*   MCH 33.6* 33.8* 34.0*   MCHC 33.9 33.4 32.8   RDW 12.9 13.1 13.0    170 147   MPV 9.3 9.2 10.2        Last 3 Blood Glucose:   Recent Labs     03/06/23  2330 03/07/23  0525 03/08/23  0535 03/09/23  0550   GLUCOSE 125* 120* 108* 82        Comprehensive Metabolic Profile:   Recent Labs     03/07/23  0525 03/08/23  0535 03/09/23  0550    145* 146*   K 4.3 3.8 3.9   CL 99 107 109*   CO2 32* 28 23   BUN 39* 29* 25*   CREATININE 1.72* 1.02 1.00   GLUCOSE 120* 108* 82   CALCIUM 10.7* 9.0 9.0   PROT 8.6* 6.8 6.9   LABALBU 4.7 3.5 3.3*   BILITOT 0.6 0.6 0.9   ALKPHOS 172* 118 108   AST 16 13 17   ALT 14 10 12        Urinalysis:   Lab Results   Component Value Date/Time    NITRU NEGATIVE 03/03/2023 04:43 PM    COLORU Yellow 03/03/2023 04:43 PM    PHUR 6.5 03/03/2023 04:43 PM    WBCUA None 03/03/2023 04:43 PM    RBCUA None 03/03/2023 04:43 PM    MUCUS NOT REPORTED 10/03/2019 06:15 AM    TRICHOMONAS NOT REPORTED 10/03/2019 06:15 AM    YEAST NOT REPORTED 10/03/2019 06:15 AM    BACTERIA 1+ 03/03/2023 04:43 PM    SPECGRAV 1.020 03/03/2023 04:43 PM    LEUKOCYTESUR NEGATIVE 03/03/2023 04:43 PM    UROBILINOGEN Normal 03/03/2023 04:43 PM    BILIRUBINUR NEGATIVE 03/03/2023 04:43 PM    BILIRUBINUR NEGATIVE 08/29/2011 12:33 PM    GLUCOSEU NEGATIVE 03/03/2023 04:43 PM    GLUCOSEU NEGATIVE 08/29/2011 12:33 PM    KETUA NEGATIVE 03/03/2023 04:43 PM    AMORPHOUS TRACE 03/03/2023 04:43 PM       HgBA1c:  No results found for: LABA1C    Lactic Acid:   Lab Results   Component Value Date/Time    LACTA 1.5 03/06/2023 11:30 PM    LACTA 1.3 08/15/2014 06:20 PM        Troponin: No results for input(s): TROPONINI in the last 72 hours. CRP:  No results for input(s): CRP in the last 72 hours.       Radiology/Imaging:  XR ACUTE ABD SERIES CHEST 1 VW   Final Result   No acute cardiopulmonary process. Nonspecific bowel gas pattern without evidence for obstruction. Gastric tube with the tip in the gastric fundus. XR ABDOMEN (KUB) (SINGLE AP VIEW)   Final Result   Dilated air-filled loops of bowel concerning for degree of obstruction. Gastric tube with the tip in the gastric fundus. XR ABDOMEN FOR NG/OG/NE TUBE PLACEMENT   Final Result   Tip of the enteric tube projects over the gastric body, side port projects   over the gastric body. Partially imaged bowel loops are dilated, similar to   prior. XR ABDOMEN (KUB) (SINGLE AP VIEW)   Final Result   Multiple dilated small bowel loops, ileus versus small-bowel obstruction. The   stomach is distended with intraluminal air. ASSESSMENT / PLAN:    MEDICAL DECISION MAKING:    Primary Problem(s): Small bowel obstruction (HCC)  Differential diagnoses: Ileus  Condition is a chronic illness with exacerbation, progression or side effects of treatment  Condition is stable  Treatment plan: Consultation: General surgery  Imaging:  Dominique EMANUEL-No obstruction  Medications:   IV fluids  IV Protonix  Medication Monitoring / High Risk Medications: none   NG  All bowel follow-through with Gastrografin today  Up to chair     Seizure disorder  Condition is a chronic stable condition  Treatment plan: Continue current treatment  Imaging: no further imaging studies ordered today  Medications:   IV Keppra  IV Vimpat        Nutrition status: Well developed, well nourished with no malnutrition  Dietician consult initiated     Hospital Prophylaxis:   DVT: Lovenox   Stress Ulcer:     Disposition:  Shared decision making:  All test results, treatment options and disposition options were discussed with the patient today  Social determinants of health that may impact management:  Resides in Group Home  Code status: Full Code   Disposition: Discharge plan is pending    [facility] Advanced Care Planning documentation:  [x] I have confirmed that the patient's Advance Care Plan is present, Code Status is documented, or surrogate decision maker is listed in the patient's medical record  [If \"yes\", STOP HERE]     [] The patient's Advance Care Plan is NOT present because:    []  I confirmed today that the patient does not wish or was not able to name a   surrogate decision maker or provide and advance care plan.    [] Hospice care is currently being provided or has been provided within the   calendar year. []  I did NOT confirm today the presence of an [address] or surrogate   decision maker documented within the patient's medical record.    [DOES NOT SATISFY TIFFANIE Knutson - CNP , TIFFANIE, NP-C  [facility] Medicine        3/9/2023, 7:19 AM

## 2023-03-09 NOTE — PROGRESS NOTES
Writer to bedside to complete morning assessment. Upon entry to room, pt in bed eyes closed, respirations even and unlabored while on room air. Vitals obtained and assessment completed, see flow sheet for details. Pt oriented to self but disoriented to place, time and situation. Pt denies any pain. Rhonchi heard throughout lungs. Pt has occasional wet, strong cough. Right lower extremities has trace, non-pitting edema and left lower extremity has +1 pitting edema. Pt repositioned for comfort and checked for incontinence and pt is dry at this time. Pt denies needs from writer at this time. Call light in reach. Care ongoing.

## 2023-03-09 NOTE — PROGRESS NOTES
Writer spoke with Pharmacy at this time about pt's ATB not scanning in the [facility] with partial dose and Pharmacy said to override the medication in the system.

## 2023-03-09 NOTE — PROGRESS NOTES
Writer completed straight cath at this time. Pt tolerated well. Output was 400ml. Pt also was incontinent of bowel at this time and cleaned with Ozzy Beebe RN at this time.

## 2023-03-09 NOTE — PROGRESS NOTES
Writer to bedside to complete afternoon assessment. Upon entry to room, pt awake in bed, respirations even and unlabored while on room air. Vitals obtained and assessment completed, see flow sheet for details. Pt oriented to self and place but discontented to time and situation. Lung sounds remain rhonchi throughout. Pt denies any pain. Pt repositioned for comfort at this time. Pt denies further needs from writer at this time. Call light in reach. Care ongoing.

## 2023-03-09 NOTE — PLAN OF CARE
Problem: Discharge Planning  Goal: Discharge to home or other facility with appropriate resources  Outcome: Progressing  Flowsheets (Taken 3/9/2023 1406)  Discharge to home or other facility with appropriate resources: Identify barriers to discharge with patient and caregiver  Note: Pt from SNF discharge plan is back to SNF. Problem: Safety - Adult  Goal: Free from fall injury  3/9/2023 1406 by Barb Hamilton RN  Outcome: Progressing  Flowsheets (Taken 3/9/2023 1406)  Free From Fall Injury: Instruct family/caregiver on patient safety  Note: Call light in reach at all times, frequent checks, bed in lowest position, wheels of bed and chair locked, non skid footwear on, appropriate transfer techniques, personal items within reach, walkways free of obstructions, fall risk armband and sign displayed, Villegas fall risk score per protocol. No falls this shift, care ongoing. Problem: Skin/Tissue Integrity  Goal: Absence of new skin breakdown  Description: 1. Monitor for areas of redness and/or skin breakdown  2. Assess vascular access sites hourly  3. Every 4-6 hours minimum:  Change oxygen saturation probe site  4. Every 4-6 hours:  If on nasal continuous positive airway pressure, respiratory therapy assess nares and determine need for appliance change or resting period. 3/9/2023 1406 by Barb Hamilton RN  Outcome: Progressing  Note: Nikunj scale monitoring per protocol. Inspect skin for breakdown frequently. Encourage pt to make frequent large adjustments in position or assist patient with turning. Document all areas of breakdown.

## 2023-03-09 NOTE — PLAN OF CARE
Problem: Safety - Adult  Goal: Free from fall injury  Outcome: Progressing  Note: Bed alarm on, bed locked, side rails up, gripper socks on, call light within reach and able to use appropriately. Will continue to monitor this shift. Problem: Skin/Tissue Integrity  Goal: Absence of new skin breakdown  Description: 1. Monitor for areas of redness and/or skin breakdown  2. Assess vascular access sites hourly  3. Every 4-6 hours minimum:  Change oxygen saturation probe site  4. Every 4-6 hours:  If on nasal continuous positive airway pressure, respiratory therapy assess nares and determine need for appliance change or resting period. Outcome: Progressing  Note: Patient repositioned with assistance. Will continue to monitor this shift      Problem: Nutrition Deficit:  Goal: Optimize nutritional status  Outcome: Progressing  Flowsheets (Taken 3/9/2023 0142)  Nutrient intake appropriate for improving, restoring, or maintaining nutritional needs: Assess nutritional status and recommend course of action  Note: Encouraging foods and liquids as tolerated. Will continue to monitor this shift.

## 2023-03-10 VITALS
OXYGEN SATURATION: 94 % | TEMPERATURE: 97.5 F | WEIGHT: 183 LBS | SYSTOLIC BLOOD PRESSURE: 99 MMHG | DIASTOLIC BLOOD PRESSURE: 87 MMHG | HEIGHT: 66 IN | RESPIRATION RATE: 18 BRPM | HEART RATE: 67 BPM | BODY MASS INDEX: 29.41 KG/M2

## 2023-03-10 LAB
SARS-COV-2 RDRP RESP QL NAA+PROBE: NOT DETECTED
SPECIMEN DESCRIPTION: NORMAL

## 2023-03-10 PROCEDURE — 2580000003 HC RX 258: Performed by: NURSE PRACTITIONER

## 2023-03-10 PROCEDURE — C9803 HOPD COVID-19 SPEC COLLECT: HCPCS

## 2023-03-10 PROCEDURE — 6370000000 HC RX 637 (ALT 250 FOR IP): Performed by: NURSE PRACTITIONER

## 2023-03-10 PROCEDURE — 94761 N-INVAS EAR/PLS OXIMETRY MLT: CPT

## 2023-03-10 PROCEDURE — C9113 INJ PANTOPRAZOLE SODIUM, VIA: HCPCS | Performed by: NURSE PRACTITIONER

## 2023-03-10 PROCEDURE — 6360000002 HC RX W HCPCS: Performed by: FAMILY MEDICINE

## 2023-03-10 PROCEDURE — 6360000002 HC RX W HCPCS: Performed by: NURSE PRACTITIONER

## 2023-03-10 PROCEDURE — 2580000003 HC RX 258: Performed by: FAMILY MEDICINE

## 2023-03-10 PROCEDURE — A4216 STERILE WATER/SALINE, 10 ML: HCPCS | Performed by: NURSE PRACTITIONER

## 2023-03-10 PROCEDURE — 87635 SARS-COV-2 COVID-19 AMP PRB: CPT

## 2023-03-10 RX ORDER — LACOSAMIDE 50 MG/1
150 TABLET ORAL 2 TIMES DAILY
Status: DISCONTINUED | OUTPATIENT
Start: 2023-03-10 | End: 2023-03-10 | Stop reason: HOSPADM

## 2023-03-10 RX ORDER — POLYETHYLENE GLYCOL 3350 17 G/17G
17 POWDER, FOR SOLUTION ORAL DAILY
Qty: 527 G | Refills: 1 | Status: SHIPPED | OUTPATIENT
Start: 2023-03-11 | End: 2023-04-10

## 2023-03-10 RX ORDER — SENNA PLUS 8.6 MG/1
1 TABLET ORAL 2 TIMES DAILY
Status: DISCONTINUED | OUTPATIENT
Start: 2023-03-10 | End: 2023-03-10 | Stop reason: HOSPADM

## 2023-03-10 RX ORDER — POLYETHYLENE GLYCOL 3350 17 G/17G
17 POWDER, FOR SOLUTION ORAL DAILY
Status: DISCONTINUED | OUTPATIENT
Start: 2023-03-10 | End: 2023-03-10 | Stop reason: HOSPADM

## 2023-03-10 RX ORDER — LEVETIRACETAM 100 MG/ML
1500 SOLUTION ORAL 2 TIMES DAILY
Status: DISCONTINUED | OUTPATIENT
Start: 2023-03-10 | End: 2023-03-10 | Stop reason: HOSPADM

## 2023-03-10 RX ADMIN — SODIUM CHLORIDE 40 MG: 9 INJECTION, SOLUTION INTRAMUSCULAR; INTRAVENOUS; SUBCUTANEOUS at 09:54

## 2023-03-10 RX ADMIN — Medication 1500 MG: at 11:39

## 2023-03-10 RX ADMIN — ENOXAPARIN SODIUM 40 MG: 100 INJECTION SUBCUTANEOUS at 09:54

## 2023-03-10 RX ADMIN — LEVETIRACETAM 1500 MG: 100 INJECTION, SOLUTION INTRAVENOUS at 09:59

## 2023-03-10 RX ADMIN — SENNOSIDES 8.6 MG: 8.6 TABLET, FILM COATED ORAL at 09:54

## 2023-03-10 RX ADMIN — POLYETHYLENE GLYCOL 3350 17 G: 17 POWDER, FOR SOLUTION ORAL at 09:54

## 2023-03-10 RX ADMIN — LACOSAMIDE 150 MG: 50 TABLET, FILM COATED ORAL at 11:40

## 2023-03-10 RX ADMIN — SODIUM CHLORIDE, PRESERVATIVE FREE 10 ML: 5 INJECTION INTRAVENOUS at 09:57

## 2023-03-10 NOTE — PROCEDURES
Patient reassessment and vitals completed at this time. Patient denies pain and was assisted with repositioning in bed. Patient states no needs, call light is in reach, will continue to monitor.

## 2023-03-10 NOTE — PROGRESS NOTES
Progress Note    SUBJECTIVE:    Patient seen for f/u of Small bowel obstruction ([address] He resting in bed with eyes closed . No complaints    ROS:   Constitutional: negative  for fevers, and negative for chills. Respiratory: negative for shortness of breath, negative for cough, and negative for wheezing  Cardiovascular: negative for chest pain, and negative for palpitations  Gastrointestinal: negative for abdominal pain, negative for nausea,negative for vomiting, negative for diarrhea, and negative for constipation     All other systems were reviewed with the patient and are negative unless otherwise stated in HPI      OBJECTIVE:      Vitals:   Vitals:    03/09/23 2340   BP: 100/82   Pulse: 88   Resp: 20   Temp: 97.9 °F (36.6 °C)   SpO2: 94%     Weight: 183 lb (83 kg)   Height: 5' 6\" (167.6 cm)     Weight  Wt Readings from Last 3 Encounters:   03/10/23 183 lb (83 kg)   10/11/16 209 lb (94.8 kg)   10/05/16 209 lb 3.5 oz (94.9 kg)     Body mass index is 29.54 kg/m². 24HR INTAKE/OUTPUT:      Intake/Output Summary (Last 24 hours) at 3/10/2023 0710  Last data filed at 3/10/2023 0504  Gross per 24 hour   Intake 3747.23 ml   Output 750 ml   Net 2997.23 ml     -----------------------------------------------------------------  Exam:    GEN:    Awake, alert and oriented to person and place only. EYES:  EOMI, pupils equal   NECK: Supple. No lymphadenopathy. No carotid bruit  CVS:    regular rate and rhythm, no audible murmur  PULM:   upper airway rhonchi but otherwise clear , no acute respiratory distress  ABD:    Bowels sounds active  Abdomen is soft. No distention. no tenderness to palpation. EXT:  1+ edema bilaterally . No calf tenderness. NEURO: Moves all extremities. Motor and sensory are grossly intact  SKIN:  No rashes.   No skin lesions.    -----------------------------------------------------------------    Diagnostic Data:      Complete Blood Count:   Recent Labs     03/08/23  0535 03/09/23  0550   WBC 13.4* 11.4*   RBC 3.55* 3.35*   HGB 12.0* 11.4*   HCT 35.9* 34.8*   .1 103.9*   MCH 33.8* 34.0*   MCHC 33.4 32.8   RDW 13.1 13.0    147   MPV 9.2 10.2        Last 3 Blood Glucose:   Recent Labs     03/08/23  0535 03/09/23  0550   GLUCOSE 108* 82        Comprehensive Metabolic Profile:   Recent Labs     03/08/23  0535 03/09/23  0550   * 146*   K 3.8 3.9    109*   CO2 28 23   BUN 29* 25*   CREATININE 1.02 1.00   GLUCOSE 108* 82   CALCIUM 9.0 9.0   PROT 6.8 6.9   LABALBU 3.5 3.3*   BILITOT 0.6 0.9   ALKPHOS 118 108   AST 13 17   ALT 10 12        Urinalysis:   Lab Results   Component Value Date/Time    NITRU NEGATIVE 03/03/2023 04:43 PM    COLORU Yellow 03/03/2023 04:43 PM    PHUR 6.5 03/03/2023 04:43 PM    WBCUA None 03/03/2023 04:43 PM    RBCUA None 03/03/2023 04:43 PM    MUCUS NOT REPORTED 10/03/2019 06:15 AM    TRICHOMONAS NOT REPORTED 10/03/2019 06:15 AM    YEAST NOT REPORTED 10/03/2019 06:15 AM    BACTERIA 1+ 03/03/2023 04:43 PM    SPECGRAV 1.020 03/03/2023 04:43 PM    LEUKOCYTESUR NEGATIVE 03/03/2023 04:43 PM    UROBILINOGEN Normal 03/03/2023 04:43 PM    BILIRUBINUR NEGATIVE 03/03/2023 04:43 PM    BILIRUBINUR NEGATIVE 08/29/2011 12:33 PM    GLUCOSEU NEGATIVE 03/03/2023 04:43 PM    GLUCOSEU NEGATIVE 08/29/2011 12:33 PM    KETUA NEGATIVE 03/03/2023 04:43 PM    AMORPHOUS TRACE 03/03/2023 04:43 PM       HgBA1c:  No results found for: LABA1C    Lactic Acid:   Lab Results   Component Value Date/Time    LACTA 1.5 03/06/2023 11:30 PM    LACTA 1.3 08/15/2014 06:20 PM        Troponin: No results for input(s): TROPONINI in the last 72 hours. CRP:  No results for input(s): CRP in the last 72 hours. Radiology/Imaging:  FL SMALL BOWEL FOLLOW THROUGH ONLY   Final Result   Mildly distended loops of small bowel in the left abdomen with normal small   bowel transit time. XR ACUTE ABD SERIES CHEST 1 VW   Final Result   No acute cardiopulmonary process.       Nonspecific bowel gas pattern without evidence for obstruction. Gastric tube with the tip in the gastric fundus. XR ABDOMEN (KUB) (SINGLE AP VIEW)   Final Result   Dilated air-filled loops of bowel concerning for degree of obstruction. Gastric tube with the tip in the gastric fundus. XR ABDOMEN FOR NG/OG/NE TUBE PLACEMENT   Final Result   Tip of the enteric tube projects over the gastric body, side port projects   over the gastric body. Partially imaged bowel loops are dilated, similar to   prior. XR ABDOMEN (KUB) (SINGLE AP VIEW)   Final Result   Multiple dilated small bowel loops, ileus versus small-bowel obstruction. The   stomach is distended with intraluminal air. ASSESSMENT / PLAN:    MEDICAL DECISION MAKING:    Primary Problem(s): Small bowel obstruction (HCC)  Differential diagnoses: Ileus  Condition is a chronic illness with exacerbation, progression or side effects of treatment  Condition is stable  Treatment plan: Consultation: General surgery  Imaging:  Dominique CASTELLANOS-No obstruction  Medications:   Stop IV fluids  IV Protonix  Medication Monitoring / High Risk Medications: none   NG  All bowel follow-through with Gastrografin today  Up to chair     Seizure disorder  Condition is a chronic stable condition  Treatment plan: Continue current treatment  Imaging: no further imaging studies ordered today  Medications:   IV Keppra  IV Vimpat        Nutrition status: Well developed, well nourished with no malnutrition  Dietician consult initiated     Hospital Prophylaxis:   DVT: Lovenox   Stress Ulcer:     Disposition:  Shared decision making:  All test results, treatment options and disposition options were discussed with the patient today  Social determinants of health that may impact management:  Resides in Group Home  Code status: Full Code   Disposition: Discharge plan is pending    [facility] Advanced Care Planning documentation:  [x] I have confirmed that the patient's Advance Care Plan is present, Code Status is documented, or surrogate decision maker is listed in the patient's medical record  [If \"yes\", STOP HERE]     [] The patient's [address] is NOT present because:    []  I confirmed today that the patient does not wish or was not able to name a   surrogate decision maker or provide and advance care plan.    [] Hospice care is currently being provided or has been provided within the   calendar year. []  I did NOT confirm today the presence of an [address] or surrogate   decision maker documented within the patient's medical record.    [DOES NOT SATISFY TIFFANIE Knutson - CNP , TIFFANIE, NP-C  Hospitalist Medicine        3/10/2023, 7:10 AM

## 2023-03-10 NOTE — PROGRESS NOTES
Transport arrived to get patient. Belongings and paperwork were sent with the transport team. Writer checked the locked cupboard and cabinet for personal belongings. Report was given to transport and patient is headed out at this time.

## 2023-03-10 NOTE — PROGRESS NOTES
Received call from kSARIA Mays [address]. ETA is now 2200. Will update group home and nursing supervisor.

## 2023-03-10 NOTE — PROGRESS NOTES
Received call from Lindaann Barthel will be here to pick pt up at 1815. Bre Crandall with group home and updated her.

## 2023-03-10 NOTE — DISCHARGE SUMMARY
Discharge Summary    Clint Pelletier  :  1955  MRN:  [id number]    Admit date:  3/6/2023      Discharge date: 3/10/2023     Admitting Physician:  Cindy Carrillo MD    Discharge Diagnoses:    Principal Problem:    Small bowel obstruction [facility]) / Operation 2014  Active Problems:    Seizures ([address] Chronic    MR (mental retardation), severe  Resolved Problems:    * No resolved hospital problems. *      Hospital Course:   Clint Pelletier is a 78 y.o. male admitted with small bowel obstruction. He presented to the emergency room from the group home due to vomiting. Patient has history of small bowel obstructions and in  required surgery to resolve. He is also been having loose stool. At his baseline he did not complain of pain which was normal for him. In his initial evaluation his abdomen was firm and distended and bowel sounds were hypoactive. He had no guarding or rigidity. Patient had flat affect. Radiology studies showed bowel obstruction. NG was placed while in ER and 2 L of fluid were removed immediately. Patient did have mild ESTHER and was given IV fluids. Upon admission this morning patient does not complain of pain and is resting comfortably. During patient's hospitalization he continued on NG to low intermittent wall suction and radiology studies were monitored. General surgery was consulted. Patient was given Gastrografin with small bowel series and obstruction resolved. He had 2 bowel movements. NG was removed diet was advanced and he is tolerated this well. Hemodynamically patient is stable. I did place him on MiraLAX daily and will continue this on discharge. Plan will be to discharge patient today back to his group home. Consultants:   Dr. Jose Berumen, general surgery    Procedures: none    Complications: none    Discharge Condition: fair    Exam:  GEN:    Awake, alert and oriented to person and place only. EYES:   EOMI, pupils equal   NECK: Supple. No lymphadenopathy.   No carotid bruit  CVS:     regular rate and rhythm, no audible murmur  PULM:   upper airway rhonchi but otherwise clear , no acute respiratory distress  ABD:     Bowels sounds active  Abdomen is soft. No distention. no tenderness to palpation. EXT:    1+ edema bilaterally . No calf tenderness. NEURO: Moves all extremities. Motor and sensory are grossly intact  SKIN:    No rashes. No skin lesions. Significant Diagnostic Studies:   Lab Results   Component Value Date    WBC 11.4 (H) 03/09/2023    HGB 11.4 (L) 03/09/2023     03/09/2023       Lab Results   Component Value Date    BUN 25 (H) 03/09/2023    CREATININE 1.00 03/09/2023     (H) 03/09/2023    K 3.9 03/09/2023    CALCIUM 9.0 03/09/2023     (H) 03/09/2023    CO2 23 03/09/2023    LABGLOM >60 03/09/2023       Lab Results   Component Value Date    WBCUA None 03/03/2023    RBCUA None 03/03/2023    EPITHUA None 03/03/2023    LEUKOCYTESUR NEGATIVE 03/03/2023    SPECGRAV 1.020 03/03/2023    GLUCOSEU NEGATIVE 03/03/2023    KETUA NEGATIVE 03/03/2023    PROTEINU NEGATIVE 03/03/2023    HGBUR NEGATIVE 03/03/2023    CASTUA NOT REPORTED 10/03/2019    CRYSTUA NOT REPORTED 10/03/2019    BACTERIA 1+ (A) 03/03/2023    YEAST NOT REPORTED 10/03/2019       XR ABDOMEN (KUB) (SINGLE AP VIEW)    Result Date: 3/7/2023  EXAMINATION: ONE SUPINE XRAY VIEW(S) OF THE ABDOMEN 3/7/2023 7:44 am COMPARISON: Abdominal radiograph performed 03/07/2023. HISTORY: ORDERING SYSTEM PROVIDED HISTORY: bowel obst TECHNOLOGIST PROVIDED HISTORY: bowel obst FINDINGS: There are dilated air-filled loops of bowel within the mid abdomen. There is no free air. There are no suspicious calcifications. There is a gastric tube with the tip in the gastric fundus. There is no acute osseous abnormality. The surrounding soft tissues are unremarkable. Dilated air-filled loops of bowel concerning for degree of obstruction. Gastric tube with the tip in the gastric fundus.      XR ABDOMEN (KUB) (SINGLE AP VIEW)    Result Date: 3/7/2023  EXAMINATION: ONE SUPINE XRAY VIEW(S) OF THE ABDOMEN 3/7/2023 12:48 am COMPARISON: Abdominal x-ray 08/26/2014. HISTORY: ORDERING SYSTEM PROVIDED HISTORY: SBO TECHNOLOGIST PROVIDED HISTORY: SBO FINDINGS: Multiple dilated small bowel loops containing intraluminal air, ileus versus small-bowel obstruction. The stomach is distended with intraluminal air. Lower lungs are clear. No free air. Bony structures are unremarkable. Multiple dilated small bowel loops, ileus versus small-bowel obstruction. The stomach is distended with intraluminal air. XR ABDOMEN FOR NG/OG/NE TUBE PLACEMENT    Result Date: 3/7/2023  EXAMINATION: ONE SUPINE XRAY VIEW(S) OF THE ABDOMEN 3/7/2023 1:11 am COMPARISON: Abdominal x-ray 03/07/2023 HISTORY: ORDERING SYSTEM PROVIDED HISTORY: Confirmation of course of NG/OG/NE tube and location of tip of tube TECHNOLOGIST PROVIDED HISTORY: Confirmation of course of NG/OG/NE tube and location of tip of tube Portable? ->Yes FINDINGS: Tip of the enteric tube projects over the gastric body, side port projects over the gastric body. Partially imaged bowel loops are dilated, similar to prior. Lower lung fields are clear. Bony structures are unremarkable. Tip of the enteric tube projects over the gastric body, side port projects over the gastric body. Partially imaged bowel loops are dilated, similar to prior.        Assessment and Plan:  Patient Active Problem List    Diagnosis Date Noted    Epilepsy ([address] 10/11/2016    MR (mental retardation), severe 08/18/2014    Small bowel obstruction ([address] / Operation 8- 08/16/2014    Seizures ([address] Chronic 08/16/2014        Discharge Medications:         Medication List        START taking these medications      polyethylene glycol 17 g packet  Commonly known as: GLYCOLAX  Take 17 g by mouth daily  Start taking on: March 11, 2023            CONTINUE taking these medications      acetaminophen 325 MG tablet  Commonly known as: TYLENOL  Take 2 tablets by mouth every 4 hours as needed for Pain     calcium carbonate 1250 (500 Ca) MG tablet  Commonly known as: OYSTER SHELL CALCIUM 500 mg     finasteride 5 MG tablet  Commonly known as: PROSCAR     lacosamide 150 MG Tabs tablet  Commonly known as: VIMPAT     lamoTRIgine 200 MG tablet  Commonly known as: LAMICTAL     latanoprost 0.005 % ophthalmic solution  Commonly known as: XALATAN     LEVETIRACETAM PO     primidone 250 MG tablet  Commonly known as: MYSOLINE     senna 8.6 MG tablet  Commonly known as: SENOKOT     tamsulosin 0.4 MG capsule  Commonly known as: FLOMAX     traZODone 50 MG tablet  Commonly known as: DESYREL     Vitamin D 25 MCG (1000 UT) Tabs tablet  Commonly known as: CHOLECALCIFEROL            STOP taking these medications      levoFLOXacin 250 MG tablet  Commonly known as: LEVAQUIN     NONFORMULARY               Where to Get Your Medications        These medications were sent to [address], [address] [address]      Phone: [phone]   polyethylene glycol 17 g packet         Patient Instructions:    Activity: activity as tolerated  Diet: regular diet  Wound Care: none needed  Other: None    Disposition:   Discharge to Home    Follow up:  Patient will be followed by Louis Wallace MD in 1-2 weeks    CORE MEASURES on Discharge (if applicable)  ACE/ARB in CHF: NA  Statin in [place]: NA  ASA in [place]: NA  Statin in CVA: NA  Antiplatelet in CVA: NA    Total time spent on discharge services: 40 minutes    Including the following activities:  Evaluation and Management of patient  Discussion with patient and/or surrogate about current care plan  Coordination with Case Management and/or   Coordination of care with Consultants (if applicable)   Coordination of care with Receiving Facility Physician (if applicable)  Completion of DME forms (if applicable)  Preparation of Discharge Summary  Preparation of Medication Reconciliation  Preparation of Discharge Prescriptions    Signed:  TIFFANIE Elizabeth - CNP, APRN, NP-C  3/10/2023, 11:08 AM

## 2023-03-10 NOTE — PROGRESS NOTES
Writer to bedside to complete morning assessment. Upon entry to room, pt sitting up in bed, respirations even while on room air. Vitals obtained and assessment completed by Felicity Omalley precepting with this nurse, see flow sheet for details. Pt denies needs from writer at this time. Call light in reach. Bed alarm active. Care ongoing.

## 2023-03-10 NOTE — PROGRESS NOTES
Assisted pt with eating breakfast. Pt started to feed self.  Will assess pts level of assistance needed with each meal.

## 2023-03-10 NOTE — PROGRESS NOTES
Patient is discharge back to the group home today. The sister is aware of the discharge and good with the discharge today. The group home is also aware of the discharge. He will go by ambulance back to group [place].     PITER Rodriguez

## 2023-03-10 NOTE — PROGRESS NOTES
Called pharmacy at this time regarding IV Vimpat. Attempted to scan medication, MAR alerts no orders for medication. Called pharmacy to see if a barcode could be printed for medication, Nimo states that medication has to be overrode as barcode unreadable. Medication was reconstituted and wasted witnessed by SYLVAIN Sanchez RN. Rest of morning medications were scanned in to computer. Went to flush IV to give protonix and start Keppra. IV would not flush. Dressing noted to have blood under it and IV appears kinked. Dressing taken off and IV found to be out. Attempted IV restart times 1 attempt without success. Will speak to [facility] to see if pt is going to be discharged today and can be started back on oral medications. If pt needs IV will have nursing supervisor attempt IV start.

## 2023-03-10 NOTE — DISCHARGE INSTR - COC
Continuity of Care Form    Patient Name: Dayday Ball   :  1955  MRN:  [id number]    Admit date:  3/6/2023  Discharge date:  3/10/23    Code Status Order: Full Code   Advance Directives:     Admitting Physician:  Marlys Goldberg MD  PCP: Cholo Kelley DO (Inactive)    Discharging Nurse: Tasha Unit/Room#: 0301/0301-01  Discharging Unit Phone Number: [phone]    Emergency Contact:   Extended Emergency Contact Information  Primary Emergency Contact: Lillian Thomson [address] Phone: [phone]  Relation: Legal Guardian  Secondary Emergency Contact: [address] Phone: [phone]  Relation: Brother/Sister    Past Surgical History:  Past Surgical History:   Procedure Laterality Date    ABDOMEN SURGERY  ,     Bowel Obstruction X2    CAST APPLICATION Left     Lt. Ankle , X3    HYDROCELE EXCISION  2016    LAPAROSCOPY  14    with BERNARD    LAPAROTOMY  14    with partial cecectomy    VAGAL NERVE STIMULATION      placed, then replaced    VAGAL NERVE STIMULATION  10/11/2016    replaced generator battery       Immunization History:   Immunization History   Administered Date(s) Administered    COVID-19, PFIZER PURPLE top, DILUTE for use, (age 15 y+), 30mcg/0.3mL 2021, 2021, 2022    Tdap (Boostrix, Adacel) 2013       Active Problems:  Patient Active Problem List   Diagnosis Code    Small bowel obstruction ([facility] [address] / Operation 2014 K56.609    Seizures ([facility] [address] Chronic R56.9    MR (mental retardation), severe F72    Epilepsy ([facility] [address] G40.909       Isolation/Infection:   Isolation            No Isolation          Patient Infection Status       None to display            Nurse Assessment:  Last Vital Signs: BP (!) 119/57   Pulse 52   Temp 97.1 °F (36.2 °C) (Temporal)   Resp 18   Ht 5' 6\" (1.676 m)   Wt 183 lb (83 kg)   SpO2 97%   BMI 29.54 kg/m²     Last documented pain score (0-10 scale):    Last Weight:    Wt Readings from Last 1 Encounters:   03/10/23 183 lb (83 kg)     Mental Status:  disoriented and alert    IV Access:  - None    Nursing Mobility/ADLs:  Walking   Dependent  Transfer  Dependent  Bathing  Dependent  Dressing  Dependent  Toileting  Dependent  Feeding  Assisted at times  Med Admin  Dependent  Med Delivery   whole we were mixing in applesauce      Wound Care Documentation and Therapy:  Incision 10/11/16 Chest Left (Active)   Number of days: 2340        Elimination:  Continence: Bowel: No  Bladder: No  Urinary Catheter: None   Colostomy/Ileostomy/Ileal Conduit: No       Date of Last BM: 3/9/23    Intake/Output Summary (Last 24 hours) at 3/10/2023 0945  Last data filed at 3/10/2023 0738  Gross per 24 hour   Intake 2495.97 ml   Output 750 ml   Net 1745.97 ml     I/O last 3 completed shifts: In: 3747.2 [P.O.:600; I.V.:3147.2]  Out: 1150 [Urine:600; Emesis/NG output:550]    Safety Concerns: At Risk for Falls    Impairments/Disabilities:      Speech    Nutrition Therapy:  Current Nutrition Therapy:   - Oral Diet:  General    Routes of Feeding: Oral  Liquids: Thin Liquids  Daily Fluid Restriction: no  Last Modified Barium Swallow with Video (Video Swallowing Test): not done    Treatments at the Time of Hospital Discharge:   Respiratory Treatments: none  Oxygen Therapy:  is not on home oxygen therapy.   Ventilator:    - No ventilator support    Rehab Therapies: none  Weight Bearing Status/Restrictions: No weight bearing restrictions  Other Medical Equipment (for information only, NOT a DME order):  wheelchair  Other Treatments: none    Patient's personal belongings (please select all that are sent with patient):  Helmet and seizure bracelet    RN SIGNATURE:  Electronically signed by Dayanna Lopez RN on 3/10/23 at 1:11 PM EST    CASE MANAGEMENT/SOCIAL WORK SECTION    Inpatient Status Date: 3/7/23    Readmission Risk Assessment Score:  Readmission Risk              Risk of Unplanned Readmission:  8 Discharging to Facility/ Agency   Name: Group home  Address:[address] [facility]  Phone:  Fax:    Dialysis Facility (if applicable)   Name:  Address:  Dialysis Schedule:  Phone:  Fax:    / signature: Electronically signed by PITER Justin on 3/10/23 at 9:46 AM EST    PHYSICIAN SECTION    Prognosis: {Prognosis:4347703394}    Condition at Discharge: [address] Patient Condition:[id number]}    Rehab Potential (if transferring to Rehab): {Prognosis:9046936558}    Recommended Labs or Other Treatments After Discharge: ***    Physician Certification: I certify the above information and transfer of Jeremy King  is necessary for the continuing treatment of the diagnosis listed and that he requires Intermediate/Mental Retardation/Developmental Disabilities Care for greater 30 days.      Update Admission H&P: {CHP DME Changes in KOIPH:211554627}    PHYSICIAN SIGNATURE:  {Esignature:[id number]}

## 2023-03-10 NOTE — PLAN OF CARE
Problem: Discharge Planning  Goal: Discharge to home or other facility with appropriate resources  Outcome: Completed     Problem: Safety - Adult  Goal: Free from fall injury  Outcome: Completed     Problem: Skin/Tissue Integrity  Goal: Absence of new skin breakdown  Description: 1. Monitor for areas of redness and/or skin breakdown  2. Assess vascular access sites hourly  3. Every 4-6 hours minimum:  Change oxygen saturation probe site  4. Every 4-6 hours:  If on nasal continuous positive airway pressure, respiratory therapy assess nares and determine need for appliance change or resting period.   Outcome: Completed     Problem: Nutrition Deficit:  Goal: Optimize nutritional status  Outcome: Completed

## 2023-03-10 NOTE — PROGRESS NOTES
Patient shift assessment and vitals completed at this time as charted. Patient is resting in bed and denies pain. Patient is alert and oriented to self and place but not to time and situation. Patient repositioned in bed for comfort. Patient denies further needs, call light is in reach, will continue to monitor.

## 2023-03-10 NOTE — PROGRESS NOTES
Spoke to Jane Dowling CNP, pt is going to be discharged today. Okay to restart pts oral Vimpat and Keppra.

## 2023-04-10 ENCOUNTER — APPOINTMENT (OUTPATIENT)
Dept: CT IMAGING | Age: 68
DRG: 389 | End: 2023-04-10
Payer: MEDICARE

## 2023-04-10 ENCOUNTER — APPOINTMENT (OUTPATIENT)
Dept: GENERAL RADIOLOGY | Age: 68
DRG: 389 | End: 2023-04-10
Payer: MEDICARE

## 2023-04-10 ENCOUNTER — HOSPITAL ENCOUNTER (INPATIENT)
Age: 68
LOS: 3 days | Discharge: HOME | DRG: 389 | End: 2023-04-13
Attending: EMERGENCY MEDICINE | Admitting: INTERNAL MEDICINE
Payer: MEDICARE

## 2023-04-10 DIAGNOSIS — K56.609 SBO (SMALL BOWEL OBSTRUCTION) (HCC): Primary | ICD-10-CM

## 2023-04-10 PROBLEM — N13.9 OBSTRUCTIVE UROPATHY: Status: ACTIVE | Noted: 2023-04-10

## 2023-04-10 LAB
ABSOLUTE EOS #: 0.06 K/UL (ref 0–0.44)
ABSOLUTE IMMATURE GRANULOCYTE: 0.04 K/UL (ref 0–0.3)
ABSOLUTE LYMPH #: 0.84 K/UL (ref 1.1–3.7)
ABSOLUTE MONO #: 0.53 K/UL (ref 0.1–1.2)
ALBUMIN SERPL-MCNC: 4.3 G/DL (ref 3.5–5.2)
ALBUMIN/GLOBULIN RATIO: 1.2 (ref 1–2.5)
ALP SERPL-CCNC: 149 U/L (ref 40–129)
ALT SERPL-CCNC: 16 U/L (ref 5–41)
AMORPHOUS: ABNORMAL
ANION GAP SERPL CALCULATED.3IONS-SCNC: 9 MMOL/L (ref 9–17)
AST SERPL-CCNC: 16 U/L
BACTERIA: ABNORMAL
BASOPHILS # BLD: 0 % (ref 0–2)
BASOPHILS ABSOLUTE: 0.04 K/UL (ref 0–0.2)
BILIRUB SERPL-MCNC: 0.3 MG/DL (ref 0.3–1.2)
BILIRUBIN URINE: NEGATIVE
BUN SERPL-MCNC: 15 MG/DL (ref 8–23)
BUN/CREAT BLD: 13 (ref 9–20)
CALCIUM SERPL-MCNC: 9.9 MG/DL (ref 8.6–10.4)
CHLORIDE SERPL-SCNC: 101 MMOL/L (ref 98–107)
CO2 SERPL-SCNC: 32 MMOL/L (ref 20–31)
COLOR: YELLOW
CREAT SERPL-MCNC: 1.18 MG/DL (ref 0.7–1.2)
EOSINOPHILS RELATIVE PERCENT: 1 % (ref 1–4)
EPITHELIAL CELLS UA: ABNORMAL /HPF (ref 0–5)
GFR SERPL CREATININE-BSD FRML MDRD: >60 ML/MIN/1.73M2
GLUCOSE SERPL-MCNC: 125 MG/DL (ref 70–99)
GLUCOSE UR STRIP.AUTO-MCNC: NEGATIVE MG/DL
HCO3 VENOUS: 28.5 MMOL/L (ref 24–30)
HCT VFR BLD AUTO: 38.8 % (ref 40.7–50.3)
HGB BLD-MCNC: 12.5 G/DL (ref 13–17)
IMMATURE GRANULOCYTES: 0 %
KETONES UR STRIP.AUTO-MCNC: NEGATIVE MG/DL
LACTIC ACID, SEPSIS: 1.5 MMOL/L (ref 0.5–1.9)
LEUKOCYTE ESTERASE UR QL STRIP.AUTO: ABNORMAL
LYMPHOCYTES # BLD: 8 % (ref 24–43)
MAGNESIUM SERPL-MCNC: 1.8 MG/DL (ref 1.6–2.6)
MCH RBC QN AUTO: 32.9 PG (ref 25.2–33.5)
MCHC RBC AUTO-ENTMCNC: 32.2 G/DL (ref 28.4–34.8)
MCV RBC AUTO: 102.1 FL (ref 82.6–102.9)
MONOCYTES # BLD: 5 % (ref 3–12)
NITRITE UR QL STRIP.AUTO: NEGATIVE
NRBC AUTOMATED: 0 PER 100 WBC
O2 SAT, VEN: 73.6 % (ref 60–85)
PATIENT TEMP: 37
PCO2, VEN: 44.8 MM HG (ref 39–55)
PDW BLD-RTO: 13.1 % (ref 11.8–14.4)
PH VENOUS: 7.42 (ref 7.32–7.42)
PLATELET # BLD AUTO: 261 K/UL (ref 138–453)
PMV BLD AUTO: 9 FL (ref 8.1–13.5)
PO2, VEN: 38.4 MM HG (ref 30–50)
POSITIVE BASE EXCESS, VEN: 3.5 MMOL/L (ref 0–2)
POTASSIUM SERPL-SCNC: 3.6 MMOL/L (ref 3.7–5.3)
PROT SERPL-MCNC: 7.8 G/DL (ref 6.4–8.3)
PROT UR STRIP.AUTO-MCNC: 8 MG/DL (ref 5–9)
PROT UR STRIP.AUTO-MCNC: NEGATIVE MG/DL
RBC # BLD: 3.8 M/UL (ref 4.21–5.77)
RBC CLUMPS #/AREA URNS AUTO: ABNORMAL /HPF (ref 0–2)
SEG NEUTROPHILS: 86 % (ref 36–65)
SEGMENTED NEUTROPHILS ABSOLUTE COUNT: 9.37 K/UL (ref 1.5–8.1)
SODIUM SERPL-SCNC: 142 MMOL/L (ref 135–144)
SPECIFIC GRAVITY UA: 1.01 (ref 1.01–1.02)
TURBIDITY: ABNORMAL
URINE HGB: NEGATIVE
UROBILINOGEN, URINE: NORMAL
WBC # BLD AUTO: 10.9 K/UL (ref 3.5–11.3)
WBC UA: ABNORMAL /HPF (ref 0–5)

## 2023-04-10 PROCEDURE — 71045 X-RAY EXAM CHEST 1 VIEW: CPT

## 2023-04-10 PROCEDURE — 6360000002 HC RX W HCPCS: Performed by: EMERGENCY MEDICINE

## 2023-04-10 PROCEDURE — 6360000002 HC RX W HCPCS: Performed by: INTERNAL MEDICINE

## 2023-04-10 PROCEDURE — 93005 ELECTROCARDIOGRAM TRACING: CPT | Performed by: EMERGENCY MEDICINE

## 2023-04-10 PROCEDURE — 83605 ASSAY OF LACTIC ACID: CPT

## 2023-04-10 PROCEDURE — 87086 URINE CULTURE/COLONY COUNT: CPT

## 2023-04-10 PROCEDURE — 86403 PARTICLE AGGLUT ANTBDY SCRN: CPT

## 2023-04-10 PROCEDURE — 1200000000 HC SEMI PRIVATE

## 2023-04-10 PROCEDURE — 99223 1ST HOSP IP/OBS HIGH 75: CPT | Performed by: SPECIALIST

## 2023-04-10 PROCEDURE — 81001 URINALYSIS AUTO W/SCOPE: CPT

## 2023-04-10 PROCEDURE — 2500000003 HC RX 250 WO HCPCS: Performed by: SPECIALIST

## 2023-04-10 PROCEDURE — 85025 COMPLETE CBC W/AUTO DIFF WBC: CPT

## 2023-04-10 PROCEDURE — 80053 COMPREHEN METABOLIC PANEL: CPT

## 2023-04-10 PROCEDURE — 99285 EMERGENCY DEPT VISIT HI MDM: CPT

## 2023-04-10 PROCEDURE — 82805 BLOOD GASES W/O2 SATURATION: CPT

## 2023-04-10 PROCEDURE — 74176 CT ABD & PELVIS W/O CONTRAST: CPT

## 2023-04-10 PROCEDURE — 36415 COLL VENOUS BLD VENIPUNCTURE: CPT

## 2023-04-10 PROCEDURE — 83735 ASSAY OF MAGNESIUM: CPT

## 2023-04-10 PROCEDURE — 70450 CT HEAD/BRAIN W/O DYE: CPT

## 2023-04-10 PROCEDURE — 6370000000 HC RX 637 (ALT 250 FOR IP): Performed by: INTERNAL MEDICINE

## 2023-04-10 RX ORDER — SODIUM CHLORIDE 9 MG/ML
INJECTION, SOLUTION INTRAVENOUS PRN
Status: DISCONTINUED | OUTPATIENT
Start: 2023-04-10 | End: 2023-04-13 | Stop reason: HOSPADM

## 2023-04-10 RX ORDER — POLYETHYLENE GLYCOL 3350 17 G/17G
17 POWDER, FOR SOLUTION ORAL DAILY PRN
Status: CANCELLED | OUTPATIENT
Start: 2023-04-10

## 2023-04-10 RX ORDER — DIPHENHYDRAMINE HCL 25 MG
25 TABLET ORAL EVERY 4 HOURS PRN
COMMUNITY

## 2023-04-10 RX ORDER — ACETAMINOPHEN 325 MG/1
650 TABLET ORAL EVERY 6 HOURS PRN
Status: CANCELLED | OUTPATIENT
Start: 2023-04-10

## 2023-04-10 RX ORDER — ONDANSETRON 4 MG/1
4 TABLET, FILM COATED ORAL EVERY 6 HOURS PRN
COMMUNITY

## 2023-04-10 RX ORDER — PRIMIDONE 250 MG/1
375 TABLET ORAL NIGHTLY
COMMUNITY

## 2023-04-10 RX ORDER — FAMOTIDINE 20 MG/1
20 TABLET, FILM COATED ORAL 2 TIMES DAILY
Status: CANCELLED | OUTPATIENT
Start: 2023-04-10

## 2023-04-10 RX ORDER — DIAZEPAM 20 MG/4ML
20 GEL RECTAL PRN
COMMUNITY

## 2023-04-10 RX ORDER — ONDANSETRON 2 MG/ML
4 INJECTION INTRAMUSCULAR; INTRAVENOUS ONCE
Status: COMPLETED | OUTPATIENT
Start: 2023-04-10 | End: 2023-04-10

## 2023-04-10 RX ORDER — CALCIUM CARBONATE 200(500)MG
1 TABLET,CHEWABLE ORAL 2 TIMES DAILY
COMMUNITY

## 2023-04-10 RX ORDER — HYDROXYZINE HYDROCHLORIDE 25 MG/1
25 TABLET, FILM COATED ORAL EVERY 12 HOURS PRN
COMMUNITY

## 2023-04-10 RX ORDER — LAMOTRIGINE 200 MG/1
400 TABLET ORAL EVERY MORNING
COMMUNITY

## 2023-04-10 RX ORDER — LAMOTRIGINE 200 MG/1
600 TABLET ORAL NIGHTLY
COMMUNITY

## 2023-04-10 RX ORDER — ASPIRIN 81 MG
4 TABLET, DELAYED RELEASE (ENTERIC COATED) ORAL
COMMUNITY

## 2023-04-10 RX ORDER — DEXTROSE, SODIUM CHLORIDE, AND POTASSIUM CHLORIDE 5; .45; .15 G/100ML; G/100ML; G/100ML
INJECTION INTRAVENOUS CONTINUOUS
Status: DISCONTINUED | OUTPATIENT
Start: 2023-04-10 | End: 2023-04-13

## 2023-04-10 RX ORDER — LANOLIN ALCOHOL/MO/W.PET/CERES
3 CREAM (GRAM) TOPICAL NIGHTLY PRN
COMMUNITY

## 2023-04-10 RX ORDER — ACETAMINOPHEN 650 MG/1
650 SUPPOSITORY RECTAL EVERY 6 HOURS PRN
Status: CANCELLED | OUTPATIENT
Start: 2023-04-10

## 2023-04-10 RX ORDER — ENOXAPARIN SODIUM 100 MG/ML
40 INJECTION SUBCUTANEOUS DAILY
Status: DISCONTINUED | OUTPATIENT
Start: 2023-04-10 | End: 2023-04-13 | Stop reason: HOSPADM

## 2023-04-10 RX ORDER — ONDANSETRON 4 MG/1
4 TABLET, ORALLY DISINTEGRATING ORAL EVERY 8 HOURS PRN
Status: DISCONTINUED | OUTPATIENT
Start: 2023-04-10 | End: 2023-04-13 | Stop reason: HOSPADM

## 2023-04-10 RX ORDER — LORAZEPAM 0.5 MG/1
0.5 TABLET ORAL EVERY 8 HOURS PRN
COMMUNITY

## 2023-04-10 RX ORDER — LOPERAMIDE HYDROCHLORIDE 2 MG/1
2 CAPSULE ORAL 4 TIMES DAILY PRN
COMMUNITY

## 2023-04-10 RX ORDER — LEVETIRACETAM 100 MG/ML
1500 SOLUTION ORAL 2 TIMES DAILY
Status: DISCONTINUED | OUTPATIENT
Start: 2023-04-10 | End: 2023-04-13 | Stop reason: HOSPADM

## 2023-04-10 RX ORDER — ONDANSETRON 2 MG/ML
4 INJECTION INTRAMUSCULAR; INTRAVENOUS EVERY 6 HOURS PRN
Status: DISCONTINUED | OUTPATIENT
Start: 2023-04-10 | End: 2023-04-13 | Stop reason: HOSPADM

## 2023-04-10 RX ORDER — LEVETIRACETAM 1000 MG/1
2000 TABLET ORAL EVERY EVENING
COMMUNITY

## 2023-04-10 RX ORDER — SODIUM CHLORIDE 0.9 % (FLUSH) 0.9 %
10 SYRINGE (ML) INJECTION EVERY 12 HOURS SCHEDULED
Status: DISCONTINUED | OUTPATIENT
Start: 2023-04-10 | End: 2023-04-13 | Stop reason: HOSPADM

## 2023-04-10 RX ORDER — OXYBUTYNIN CHLORIDE 10 MG/1
10 TABLET, EXTENDED RELEASE ORAL DAILY
COMMUNITY

## 2023-04-10 RX ORDER — SODIUM CHLORIDE 0.9 % (FLUSH) 0.9 %
10 SYRINGE (ML) INJECTION PRN
Status: DISCONTINUED | OUTPATIENT
Start: 2023-04-10 | End: 2023-04-13 | Stop reason: HOSPADM

## 2023-04-10 RX ORDER — MECLIZINE HYDROCHLORIDE 25 MG/1
25 TABLET ORAL EVERY 8 HOURS PRN
COMMUNITY

## 2023-04-10 RX ORDER — SODIUM CHLORIDE 9 MG/ML
INJECTION, SOLUTION INTRAVENOUS CONTINUOUS
Status: DISCONTINUED | OUTPATIENT
Start: 2023-04-10 | End: 2023-04-10

## 2023-04-10 RX ADMIN — Medication 1500 MG: at 20:47

## 2023-04-10 RX ADMIN — POTASSIUM CHLORIDE, DEXTROSE MONOHYDRATE AND SODIUM CHLORIDE: 150; 5; 450 INJECTION, SOLUTION INTRAVENOUS at 22:53

## 2023-04-10 RX ADMIN — ENOXAPARIN SODIUM 40 MG: 100 INJECTION SUBCUTANEOUS at 17:12

## 2023-04-10 RX ADMIN — ONDANSETRON 4 MG: 2 INJECTION INTRAMUSCULAR; INTRAVENOUS at 14:22

## 2023-04-10 ASSESSMENT — PAIN - FUNCTIONAL ASSESSMENT: PAIN_FUNCTIONAL_ASSESSMENT: NONE - DENIES PAIN

## 2023-04-10 ASSESSMENT — PAIN SCALES - GENERAL: PAINLEVEL_OUTOF10: 0

## 2023-04-10 NOTE — ED NOTES
Unable to complete med list at this time d/t no med list available.      Ernestine Canas, RN  04/10/23 8041

## 2023-04-10 NOTE — CONSULTS
hydrocele  4.   Vagal nerve stimulator    Social history patient does not smoke or drink    Family history noncontributory    Review of systems unobtainable due to the patient's level of mental development    Physical examination  Awake and alert able to answer questions or I am not certain of the reliability of those answers  Vital signs are stable afebrile  HEENT normocephalic atraumatic extraocular muscles intact pupils equal round reactive to light oropharynx is clear left nares has a nasogastric tube with icteric contents  Chest clear to auscultation percussion  Cardiovascular regular rate and rhythm  Abdomen obese soft without guarding or rebound has a well-healed midline incision feel no palpable incisional hernia bowel sounds are present there is no guarding or rebound no groin hernias has a Parks catheter in place rectal examination was not performed  Neurologically cranial nerves II through XII are intact no focal findings    Laboratories potassium of 3.6 CO2 of 32 glucose of 125 white count of 10.9 H&H of 12 and 38 urinalysis shows 2+ bacteria trace leukocyte Estrace  CT of the head no acute intracranial abnormality there is encephalomalacia of the right MCA distribution and dilation of the right lateral ventricle which are unchanged and chronic microvascular white matter ischemic disease  CT scan of the abdomen and pelvis shows a grossly distended stomach and small bowel loops with a possible transition point associated with an area of anastomosis there is no evidence of perforation or free air bilateral lower lobe atelectasis    Assessment and plan this is a patient who is on multiple medications that decrease GI motility his history would suggest a gastroenteritis given nausea vomiting and diarrhea as opposed to a bowel obstruction which with the lack bowel movements nasogastric tube was given been placed and will put decompress the stomach we will obtain a flat and upright of the abdomen tomorrow as

## 2023-04-10 NOTE — ED TRIAGE NOTES
CG reports concerns regarding possible bowel obstruction. States that Facility started zofran recently.

## 2023-04-10 NOTE — ED NOTES
Pt cleaned up d/t vomiting large amount of foul smelling fluid. Bed changed.      Margaret Worrell RN  04/10/23 9855

## 2023-04-10 NOTE — ED PROVIDER NOTES
[address]      Pt Name: Ramila Noguera  MRN: [id number]  [place] 1955  Date of evaluation: 4/10/2023  Provider: Billie Oquendo MD    CHIEF COMPLAINT       Chief Complaint   Patient presents with    Altered Mental Status     PT. Brought in from [facility]. Per staff pt. has been disoriented this morning but worsening on bus ride here. Per staff pt. Has hx of seizures. Diarrhea     Ongoing since Saturday         HISTORY OF PRESENT ILLNESS      Aniceto King is a 78 y.o. male who presents to the emergency department from [facility] for evaluation of decreased mental status, vomiting and diarrhea. He has reportedly had some diarrhea over the past 2 days. Had 2 episodes of emesis earlier today. During the bus ride over here his caregiver is concerned that he may have had a seizure, though she did not witness any seizure activity. She reports that he was less responsive than typical when they arrived here but he is now back to his baseline. PAST MEDICAL HISTORY     Past Medical History:   Diagnosis Date    MR (mental retardation)     Seizures ([facility] [address]     Ventral hernia          SURGICAL HISTORY       Past Surgical History:   Procedure Laterality Date    ABDOMEN SURGERY  2013, 2014    Bowel Obstruction X2    CAST APPLICATION Left     Lt.  Ankle , X3    HYDROCELE EXCISION  08/2016    LAPAROSCOPY  8/17/14    with BERNARD    LAPAROTOMY  8/17/14    with partial cecectomy    VAGAL NERVE STIMULATION      placed, then replaced    VAGAL NERVE STIMULATION  10/11/2016    replaced generator battery         CURRENT MEDICATIONS       Previous Medications    ACETAMINOPHEN (TYLENOL) 325 MG TABLET    Take 2 tablets by mouth every 4 hours as needed for Pain    CALCIUM CARBONATE (OYSTER SHELL CALCIUM 500 MG) 1250 (500 CA) MG TABLET    Take 1 tablet by mouth 2 times daily    FINASTERIDE (PROSCAR) 5 MG TABLET    Take 1 tablet by mouth daily    LACOSAMIDE (VIMPAT) 150 MG TABS TABLET    Take 1 tablet

## 2023-04-10 NOTE — ED NOTES
Called Dr Atilio Smith at office and left message with staff to call the ER     Marilu Brood  04/10/23 7524

## 2023-04-10 NOTE — ED TRIAGE NOTES
Pt brought in by staff at facility report diarrhea last few days.  During transport to ER CG worried patient had possible seizure and now not acting normal.

## 2023-04-10 NOTE — ED NOTES
Pt changed d/t large emesis noted. Per Dr. Carlos Pina ok to hook up to suction.  Noted 400ml brown fluid in suction canister at this time     Margaret Worrell RN  04/10/23 5751

## 2023-04-11 ENCOUNTER — APPOINTMENT (OUTPATIENT)
Dept: GENERAL RADIOLOGY | Age: 68
DRG: 389 | End: 2023-04-11
Payer: MEDICARE

## 2023-04-11 LAB
ABSOLUTE EOS #: 0.44 K/UL (ref 0–0.44)
ABSOLUTE IMMATURE GRANULOCYTE: <0.03 K/UL (ref 0–0.3)
ABSOLUTE LYMPH #: 1.64 K/UL (ref 1.1–3.7)
ABSOLUTE MONO #: 0.69 K/UL (ref 0.1–1.2)
ANION GAP SERPL CALCULATED.3IONS-SCNC: 8 MMOL/L (ref 9–17)
BASOPHILS # BLD: 0 % (ref 0–2)
BASOPHILS ABSOLUTE: 0.03 K/UL (ref 0–0.2)
BUN SERPL-MCNC: 17 MG/DL (ref 8–23)
BUN/CREAT BLD: 16 (ref 9–20)
CALCIUM SERPL-MCNC: 8.9 MG/DL (ref 8.6–10.4)
CHLORIDE SERPL-SCNC: 102 MMOL/L (ref 98–107)
CO2 SERPL-SCNC: 31 MMOL/L (ref 20–31)
CREAT SERPL-MCNC: 1.04 MG/DL (ref 0.7–1.2)
EKG ATRIAL RATE: 111 BPM
EKG P AXIS: 41 DEGREES
EKG P-R INTERVAL: 174 MS
EKG Q-T INTERVAL: 338 MS
EKG QRS DURATION: 88 MS
EKG QTC CALCULATION (BAZETT): 459 MS
EKG R AXIS: 12 DEGREES
EKG T AXIS: 94 DEGREES
EKG VENTRICULAR RATE: 111 BPM
EOSINOPHILS RELATIVE PERCENT: 5 % (ref 1–4)
GFR SERPL CREATININE-BSD FRML MDRD: >60 ML/MIN/1.73M2
GLUCOSE SERPL-MCNC: 92 MG/DL (ref 70–99)
HCT VFR BLD AUTO: 32.8 % (ref 40.7–50.3)
HGB BLD-MCNC: 10.6 G/DL (ref 13–17)
IMMATURE GRANULOCYTES: 0 %
LYMPHOCYTES # BLD: 20 % (ref 24–43)
MCH RBC QN AUTO: 32.6 PG (ref 25.2–33.5)
MCHC RBC AUTO-ENTMCNC: 32.3 G/DL (ref 28.4–34.8)
MCV RBC AUTO: 100.9 FL (ref 82.6–102.9)
MICROORGANISM SPEC CULT: ABNORMAL
MONOCYTES # BLD: 8 % (ref 3–12)
NRBC AUTOMATED: 0 PER 100 WBC
PDW BLD-RTO: 13.2 % (ref 11.8–14.4)
PLATELET # BLD AUTO: 217 K/UL (ref 138–453)
PMV BLD AUTO: 8.9 FL (ref 8.1–13.5)
POTASSIUM SERPL-SCNC: 3.9 MMOL/L (ref 3.7–5.3)
RBC # BLD: 3.25 M/UL (ref 4.21–5.77)
SEG NEUTROPHILS: 67 % (ref 36–65)
SEGMENTED NEUTROPHILS ABSOLUTE COUNT: 5.42 K/UL (ref 1.5–8.1)
SODIUM SERPL-SCNC: 141 MMOL/L (ref 135–144)
SPECIMEN DESCRIPTION: ABNORMAL
WBC # BLD AUTO: 8.2 K/UL (ref 3.5–11.3)

## 2023-04-11 PROCEDURE — 36415 COLL VENOUS BLD VENIPUNCTURE: CPT

## 2023-04-11 PROCEDURE — 99232 SBSQ HOSP IP/OBS MODERATE 35: CPT | Performed by: SPECIALIST

## 2023-04-11 PROCEDURE — A4216 STERILE WATER/SALINE, 10 ML: HCPCS | Performed by: NURSE PRACTITIONER

## 2023-04-11 PROCEDURE — 97166 OT EVAL MOD COMPLEX 45 MIN: CPT

## 2023-04-11 PROCEDURE — 6360000002 HC RX W HCPCS: Performed by: INTERNAL MEDICINE

## 2023-04-11 PROCEDURE — 85025 COMPLETE CBC W/AUTO DIFF WBC: CPT

## 2023-04-11 PROCEDURE — 2580000003 HC RX 258: Performed by: NURSE PRACTITIONER

## 2023-04-11 PROCEDURE — 2500000003 HC RX 250 WO HCPCS: Performed by: SPECIALIST

## 2023-04-11 PROCEDURE — 93010 ELECTROCARDIOGRAM REPORT: CPT | Performed by: INTERNAL MEDICINE

## 2023-04-11 PROCEDURE — 80048 BASIC METABOLIC PNL TOTAL CA: CPT

## 2023-04-11 PROCEDURE — 2500000003 HC RX 250 WO HCPCS: Performed by: NURSE PRACTITIONER

## 2023-04-11 PROCEDURE — 2580000003 HC RX 258: Performed by: INTERNAL MEDICINE

## 2023-04-11 PROCEDURE — 1200000000 HC SEMI PRIVATE

## 2023-04-11 PROCEDURE — 74019 RADEX ABDOMEN 2 VIEWS: CPT

## 2023-04-11 RX ORDER — DIAZEPAM 10 MG/2ML
20 GEL RECTAL PRN
Status: DISCONTINUED | OUTPATIENT
Start: 2023-04-11 | End: 2023-04-13 | Stop reason: HOSPADM

## 2023-04-11 RX ORDER — LORAZEPAM 2 MG/ML
0.5 INJECTION INTRAMUSCULAR EVERY 6 HOURS PRN
Status: DISCONTINUED | OUTPATIENT
Start: 2023-04-11 | End: 2023-04-13 | Stop reason: HOSPADM

## 2023-04-11 RX ADMIN — LEVETIRACETAM 2000 MG: 100 INJECTION, SOLUTION INTRAVENOUS at 17:23

## 2023-04-11 RX ADMIN — SODIUM CHLORIDE, PRESERVATIVE FREE 10 ML: 5 INJECTION INTRAVENOUS at 11:33

## 2023-04-11 RX ADMIN — POTASSIUM CHLORIDE, DEXTROSE MONOHYDRATE AND SODIUM CHLORIDE: 150; 5; 450 INJECTION, SOLUTION INTRAVENOUS at 09:11

## 2023-04-11 RX ADMIN — SODIUM CHLORIDE, PRESERVATIVE FREE 10 ML: 5 INJECTION INTRAVENOUS at 07:29

## 2023-04-11 RX ADMIN — SODIUM CHLORIDE, PRESERVATIVE FREE 10 ML: 5 INJECTION INTRAVENOUS at 17:22

## 2023-04-11 RX ADMIN — POTASSIUM CHLORIDE, DEXTROSE MONOHYDRATE AND SODIUM CHLORIDE: 150; 5; 450 INJECTION, SOLUTION INTRAVENOUS at 18:22

## 2023-04-11 RX ADMIN — ENOXAPARIN SODIUM 40 MG: 100 INJECTION SUBCUTANEOUS at 09:12

## 2023-04-11 RX ADMIN — FAMOTIDINE 20 MG: 10 INJECTION, SOLUTION INTRAVENOUS at 20:25

## 2023-04-11 RX ADMIN — SODIUM CHLORIDE, PRESERVATIVE FREE 10 ML: 5 INJECTION INTRAVENOUS at 17:40

## 2023-04-11 RX ADMIN — LEVETIRACETAM 1500 MG: 100 INJECTION, SOLUTION INTRAVENOUS at 07:33

## 2023-04-11 RX ADMIN — FAMOTIDINE 20 MG: 10 INJECTION, SOLUTION INTRAVENOUS at 11:33

## 2023-04-11 ASSESSMENT — PAIN SCALES - GENERAL
PAINLEVEL_OUTOF10: 0
PAINLEVEL_OUTOF10: 0

## 2023-04-11 NOTE — H&P
History and Physical    Patient:  Evan Washington  MRN: [id number]    Chief Complaint: Diarrhea    History Obtained From:  electronic medical record, reason patient could not give history: MRDD    PCP: Purvi Bradshaw MD    History of Present Illness: The patient is a 78 y.o. male who presented to the emergency room from [facility]. Staff reported patient was disoriented. Staff reported patient's had diarrhea ongoing since Saturday. They reported 2 episodes of vomiting. He does have history of seizures and staff was worried about seizure activity that would occur. Patient was back to his baseline upon arrival.  He was afebrile but tachycardic. He showed distention in his abdomen but was nontender. CT head was negative for any acute process. CT abdomen and pelvis was concerning for bowel obstruction. Labs were negative. NG was placed to low intermittent wall suction and general surgery was consulted. Past Medical History:        Diagnosis Date    MR (mental retardation)     Seizures ([address]     Ventral hernia        Past Surgical History:        Procedure Laterality Date    ABDOMEN SURGERY  2013, 2014    Bowel Obstruction X2    CAST APPLICATION Left     Lt. Ankle , X3    HYDROCELE EXCISION  08/2016    LAPAROSCOPY  8/17/14    with BERNARD    LAPAROTOMY  8/17/14    with partial cecectomy    VAGAL NERVE STIMULATION      placed, then replaced    VAGAL NERVE STIMULATION  10/11/2016    replaced generator battery       Medications Prior to Admission:    Prior to Admission medications    Medication Sig Start Date End Date Taking?  Authorizing Provider   lamoTRIgine (LAMICTAL) 200 MG tablet Take 2 tablets by mouth every morning   Yes Historical Provider, MD   lamoTRIgine (LAMICTAL) 200 MG tablet Take 3 tablets by mouth at bedtime   Yes Historical Provider, MD   levETIRAcetam (KEPPRA) 1000 MG tablet Take 2 tablets by mouth every evening   Yes Historical Provider, MD   primidone (MYSOLINE) 250 MG tablet Take 1.5 tablets by

## 2023-04-11 NOTE — CARE COORDINATION
assistance at DC: Yes  Would you like Case Management to discuss the discharge plan with any other family members/significant others, and if so, who? Plans to Return to Present Housing: Yes  Other Identified Issues/Barriers to RETURNING to current housing: none  Potential Assistance needed at discharge: [address], Transportation            Potential DME: Wheelchair  Patient expects to discharge to: skilled nursing  Plan for transportation at discharge: [address]: [address] / Plan: Nish Carrizales / Product Type: *No Product type* /     Does insurance require precert for SNF: Yes    Potential assistance Purchasing Medications: No  Meds-to-Beds request:  CAROL      [id number] - [address], [address] [facility] [phone] [facility] [phone]  [address] [place] [address]  Phone: [phone] Fax: [phone]    [facility], [address] [place] [address]  Phone: [phone] Fax: [phone]      Notes:    Factors facilitating achievement of predicted outcomes: Family support, Caregiver support, Cooperative, Has needed Durable Medical Equipment at home, and Home is wheelchair accessible    Barriers to discharge: Cognitive deficit    Additional Case Management Notes: Discussed discharge plans with the patient. Patient is single and lives at the [facility]. He is uses a wheelchair for ambulation. Patient requires assistance with his ADL's. The group home manages his medications and transportation. The discharge plan at this time is to return to the [address] [place] [address] in [facility]. The Plan for Transition of Care is related to the following treatment goals of SBO (small bowel obstruction) ([facility] [address] [K56.609]  Obstructive uropathy [N13.9]    Transportation/Food Security/Housekeeping Addressed: No issues identified or concerns.     The Patient

## 2023-04-12 ENCOUNTER — APPOINTMENT (OUTPATIENT)
Dept: GENERAL RADIOLOGY | Age: 68
DRG: 389 | End: 2023-04-12
Payer: MEDICARE

## 2023-04-12 LAB
ABSOLUTE EOS #: 0.44 K/UL (ref 0–0.44)
ABSOLUTE IMMATURE GRANULOCYTE: <0.03 K/UL (ref 0–0.3)
ABSOLUTE LYMPH #: 1.62 K/UL (ref 1.1–3.7)
ABSOLUTE MONO #: 0.54 K/UL (ref 0.1–1.2)
ANION GAP SERPL CALCULATED.3IONS-SCNC: 7 MMOL/L (ref 9–17)
BASOPHILS # BLD: 0 % (ref 0–2)
BASOPHILS ABSOLUTE: 0.03 K/UL (ref 0–0.2)
BUN SERPL-MCNC: 10 MG/DL (ref 8–23)
BUN/CREAT BLD: 11 (ref 9–20)
CALCIUM SERPL-MCNC: 8.5 MG/DL (ref 8.6–10.4)
CHLORIDE SERPL-SCNC: 106 MMOL/L (ref 98–107)
CO2 SERPL-SCNC: 25 MMOL/L (ref 20–31)
CREAT SERPL-MCNC: 0.91 MG/DL (ref 0.7–1.2)
EOSINOPHILS RELATIVE PERCENT: 6 % (ref 1–4)
GFR SERPL CREATININE-BSD FRML MDRD: >60 ML/MIN/1.73M2
GLUCOSE SERPL-MCNC: 85 MG/DL (ref 70–99)
HCT VFR BLD AUTO: 31.3 % (ref 40.7–50.3)
HGB BLD-MCNC: 10.2 G/DL (ref 13–17)
IMMATURE GRANULOCYTES: 0 %
LYMPHOCYTES # BLD: 23 % (ref 24–43)
MCH RBC QN AUTO: 33 PG (ref 25.2–33.5)
MCHC RBC AUTO-ENTMCNC: 32.6 G/DL (ref 28.4–34.8)
MCV RBC AUTO: 101.3 FL (ref 82.6–102.9)
MONOCYTES # BLD: 8 % (ref 3–12)
NRBC AUTOMATED: 0 PER 100 WBC
PDW BLD-RTO: 12.6 % (ref 11.8–14.4)
PLATELET # BLD AUTO: 200 K/UL (ref 138–453)
PMV BLD AUTO: 9 FL (ref 8.1–13.5)
POTASSIUM SERPL-SCNC: 4.4 MMOL/L (ref 3.7–5.3)
RBC # BLD: 3.09 M/UL (ref 4.21–5.77)
SEG NEUTROPHILS: 63 % (ref 36–65)
SEGMENTED NEUTROPHILS ABSOLUTE COUNT: 4.28 K/UL (ref 1.5–8.1)
SODIUM SERPL-SCNC: 138 MMOL/L (ref 135–144)
WBC # BLD AUTO: 6.9 K/UL (ref 3.5–11.3)

## 2023-04-12 PROCEDURE — 97110 THERAPEUTIC EXERCISES: CPT

## 2023-04-12 PROCEDURE — 74019 RADEX ABDOMEN 2 VIEWS: CPT

## 2023-04-12 PROCEDURE — 97162 PT EVAL MOD COMPLEX 30 MIN: CPT

## 2023-04-12 PROCEDURE — 2500000003 HC RX 250 WO HCPCS: Performed by: NURSE PRACTITIONER

## 2023-04-12 PROCEDURE — 6360000002 HC RX W HCPCS: Performed by: INTERNAL MEDICINE

## 2023-04-12 PROCEDURE — 2580000003 HC RX 258: Performed by: INTERNAL MEDICINE

## 2023-04-12 PROCEDURE — 85025 COMPLETE CBC W/AUTO DIFF WBC: CPT

## 2023-04-12 PROCEDURE — 1200000000 HC SEMI PRIVATE

## 2023-04-12 PROCEDURE — 80048 BASIC METABOLIC PNL TOTAL CA: CPT

## 2023-04-12 PROCEDURE — 2580000003 HC RX 258: Performed by: NURSE PRACTITIONER

## 2023-04-12 PROCEDURE — A4216 STERILE WATER/SALINE, 10 ML: HCPCS | Performed by: NURSE PRACTITIONER

## 2023-04-12 PROCEDURE — 2500000003 HC RX 250 WO HCPCS: Performed by: SPECIALIST

## 2023-04-12 PROCEDURE — 36415 COLL VENOUS BLD VENIPUNCTURE: CPT

## 2023-04-12 RX ADMIN — LEVETIRACETAM 2000 MG: 100 INJECTION, SOLUTION INTRAVENOUS at 16:54

## 2023-04-12 RX ADMIN — POTASSIUM CHLORIDE, DEXTROSE MONOHYDRATE AND SODIUM CHLORIDE: 150; 5; 450 INJECTION, SOLUTION INTRAVENOUS at 23:31

## 2023-04-12 RX ADMIN — LORAZEPAM 0.5 MG: 2 INJECTION, SOLUTION INTRAMUSCULAR; INTRAVENOUS at 23:28

## 2023-04-12 RX ADMIN — POTASSIUM CHLORIDE, DEXTROSE MONOHYDRATE AND SODIUM CHLORIDE: 150; 5; 450 INJECTION, SOLUTION INTRAVENOUS at 05:05

## 2023-04-12 RX ADMIN — FAMOTIDINE 20 MG: 10 INJECTION, SOLUTION INTRAVENOUS at 20:34

## 2023-04-12 RX ADMIN — LEVETIRACETAM 1500 MG: 100 INJECTION, SOLUTION INTRAVENOUS at 07:36

## 2023-04-12 RX ADMIN — ENOXAPARIN SODIUM 40 MG: 100 INJECTION SUBCUTANEOUS at 08:33

## 2023-04-12 RX ADMIN — FAMOTIDINE 20 MG: 10 INJECTION, SOLUTION INTRAVENOUS at 08:34

## 2023-04-12 RX ADMIN — POTASSIUM CHLORIDE, DEXTROSE MONOHYDRATE AND SODIUM CHLORIDE: 150; 5; 450 INJECTION, SOLUTION INTRAVENOUS at 14:31

## 2023-04-12 RX ADMIN — SODIUM CHLORIDE, PRESERVATIVE FREE 10 ML: 5 INJECTION INTRAVENOUS at 07:39

## 2023-04-12 ASSESSMENT — PAIN SCALES - GENERAL
PAINLEVEL_OUTOF10: 0
PAINLEVEL_OUTOF10: 0

## 2023-04-13 VITALS
DIASTOLIC BLOOD PRESSURE: 96 MMHG | BODY MASS INDEX: 29.41 KG/M2 | WEIGHT: 182.98 LBS | OXYGEN SATURATION: 97 % | TEMPERATURE: 97.5 F | HEIGHT: 66 IN | SYSTOLIC BLOOD PRESSURE: 124 MMHG | RESPIRATION RATE: 20 BRPM | HEART RATE: 74 BPM

## 2023-04-13 LAB
ABSOLUTE EOS #: 0.19 K/UL (ref 0–0.44)
ABSOLUTE IMMATURE GRANULOCYTE: <0.03 K/UL (ref 0–0.3)
ABSOLUTE LYMPH #: 1.61 K/UL (ref 1.1–3.7)
ABSOLUTE MONO #: 0.69 K/UL (ref 0.1–1.2)
ANION GAP SERPL CALCULATED.3IONS-SCNC: 8 MMOL/L (ref 9–17)
BASOPHILS # BLD: 1 % (ref 0–2)
BASOPHILS ABSOLUTE: 0.04 K/UL (ref 0–0.2)
BUN SERPL-MCNC: 8 MG/DL (ref 8–23)
BUN/CREAT BLD: 9 (ref 9–20)
CALCIUM SERPL-MCNC: 8.9 MG/DL (ref 8.6–10.4)
CHLORIDE SERPL-SCNC: 110 MMOL/L (ref 98–107)
CO2 SERPL-SCNC: 22 MMOL/L (ref 20–31)
CREAT SERPL-MCNC: 0.93 MG/DL (ref 0.7–1.2)
EOSINOPHILS RELATIVE PERCENT: 2 % (ref 1–4)
GFR SERPL CREATININE-BSD FRML MDRD: >60 ML/MIN/1.73M2
GLUCOSE SERPL-MCNC: 83 MG/DL (ref 70–99)
HCT VFR BLD AUTO: 32.3 % (ref 40.7–50.3)
HGB BLD-MCNC: 10.7 G/DL (ref 13–17)
IMMATURE GRANULOCYTES: 0 %
LYMPHOCYTES # BLD: 19 % (ref 24–43)
MCH RBC QN AUTO: 32.9 PG (ref 25.2–33.5)
MCHC RBC AUTO-ENTMCNC: 33.1 G/DL (ref 28.4–34.8)
MCV RBC AUTO: 99.4 FL (ref 82.6–102.9)
MONOCYTES # BLD: 8 % (ref 3–12)
NRBC AUTOMATED: 0 PER 100 WBC
PDW BLD-RTO: 12.6 % (ref 11.8–14.4)
PLATELET # BLD AUTO: 236 K/UL (ref 138–453)
PMV BLD AUTO: 9 FL (ref 8.1–13.5)
POTASSIUM SERPL-SCNC: 4.2 MMOL/L (ref 3.7–5.3)
RBC # BLD: 3.25 M/UL (ref 4.21–5.77)
SEG NEUTROPHILS: 70 % (ref 36–65)
SEGMENTED NEUTROPHILS ABSOLUTE COUNT: 5.76 K/UL (ref 1.5–8.1)
SODIUM SERPL-SCNC: 140 MMOL/L (ref 135–144)
WBC # BLD AUTO: 8.3 K/UL (ref 3.5–11.3)

## 2023-04-13 PROCEDURE — 97110 THERAPEUTIC EXERCISES: CPT

## 2023-04-13 PROCEDURE — 6360000002 HC RX W HCPCS: Performed by: INTERNAL MEDICINE

## 2023-04-13 PROCEDURE — 97530 THERAPEUTIC ACTIVITIES: CPT

## 2023-04-13 PROCEDURE — 97535 SELF CARE MNGMENT TRAINING: CPT

## 2023-04-13 PROCEDURE — A4216 STERILE WATER/SALINE, 10 ML: HCPCS | Performed by: NURSE PRACTITIONER

## 2023-04-13 PROCEDURE — 36415 COLL VENOUS BLD VENIPUNCTURE: CPT

## 2023-04-13 PROCEDURE — 80048 BASIC METABOLIC PNL TOTAL CA: CPT

## 2023-04-13 PROCEDURE — 6370000000 HC RX 637 (ALT 250 FOR IP): Performed by: NURSE PRACTITIONER

## 2023-04-13 PROCEDURE — 2500000003 HC RX 250 WO HCPCS: Performed by: SPECIALIST

## 2023-04-13 PROCEDURE — 85025 COMPLETE CBC W/AUTO DIFF WBC: CPT

## 2023-04-13 PROCEDURE — 2580000003 HC RX 258: Performed by: INTERNAL MEDICINE

## 2023-04-13 PROCEDURE — 2580000003 HC RX 258: Performed by: NURSE PRACTITIONER

## 2023-04-13 PROCEDURE — 2500000003 HC RX 250 WO HCPCS: Performed by: NURSE PRACTITIONER

## 2023-04-13 RX ORDER — OXYBUTYNIN CHLORIDE 5 MG/1
10 TABLET, EXTENDED RELEASE ORAL DAILY
Status: DISCONTINUED | OUTPATIENT
Start: 2023-04-13 | End: 2023-04-13 | Stop reason: HOSPADM

## 2023-04-13 RX ORDER — LAMOTRIGINE 100 MG/1
600 TABLET ORAL NIGHTLY
Status: DISCONTINUED | OUTPATIENT
Start: 2023-04-13 | End: 2023-04-13 | Stop reason: HOSPADM

## 2023-04-13 RX ORDER — PRIMIDONE 250 MG/1
250 TABLET ORAL EVERY MORNING
Status: DISCONTINUED | OUTPATIENT
Start: 2023-04-13 | End: 2023-04-13 | Stop reason: HOSPADM

## 2023-04-13 RX ORDER — LAMOTRIGINE 100 MG/1
400 TABLET ORAL EVERY MORNING
Status: DISCONTINUED | OUTPATIENT
Start: 2023-04-13 | End: 2023-04-13 | Stop reason: HOSPADM

## 2023-04-13 RX ORDER — LATANOPROST 50 UG/ML
1 SOLUTION/ DROPS OPHTHALMIC NIGHTLY
Status: DISCONTINUED | OUTPATIENT
Start: 2023-04-13 | End: 2023-04-13 | Stop reason: HOSPADM

## 2023-04-13 RX ORDER — FINASTERIDE 5 MG/1
5 TABLET, FILM COATED ORAL NIGHTLY
Status: DISCONTINUED | OUTPATIENT
Start: 2023-04-13 | End: 2023-04-13 | Stop reason: HOSPADM

## 2023-04-13 RX ORDER — SENNA PLUS 8.6 MG/1
1 TABLET ORAL 2 TIMES DAILY
Status: DISCONTINUED | OUTPATIENT
Start: 2023-04-13 | End: 2023-04-13 | Stop reason: HOSPADM

## 2023-04-13 RX ORDER — VITAMIN B COMPLEX
1000 TABLET ORAL DAILY
Status: DISCONTINUED | OUTPATIENT
Start: 2023-04-13 | End: 2023-04-13 | Stop reason: HOSPADM

## 2023-04-13 RX ORDER — TAMSULOSIN HYDROCHLORIDE 0.4 MG/1
0.4 CAPSULE ORAL 2 TIMES DAILY
Status: DISCONTINUED | OUTPATIENT
Start: 2023-04-13 | End: 2023-04-13 | Stop reason: HOSPADM

## 2023-04-13 RX ORDER — TRAZODONE HYDROCHLORIDE 50 MG/1
50 TABLET ORAL NIGHTLY
Status: DISCONTINUED | OUTPATIENT
Start: 2023-04-13 | End: 2023-04-13 | Stop reason: HOSPADM

## 2023-04-13 RX ORDER — LACOSAMIDE 50 MG/1
150 TABLET ORAL 2 TIMES DAILY
Status: DISCONTINUED | OUTPATIENT
Start: 2023-04-13 | End: 2023-04-13 | Stop reason: HOSPADM

## 2023-04-13 RX ORDER — POLYETHYLENE GLYCOL 3350 17 G/17G
17 POWDER, FOR SOLUTION ORAL DAILY
Qty: 527 G | Refills: 1 | Status: SHIPPED | OUTPATIENT
Start: 2023-04-13 | End: 2023-05-13

## 2023-04-13 RX ORDER — PRIMIDONE 250 MG/1
375 TABLET ORAL NIGHTLY
Status: DISCONTINUED | OUTPATIENT
Start: 2023-04-13 | End: 2023-04-13 | Stop reason: HOSPADM

## 2023-04-13 RX ADMIN — ENOXAPARIN SODIUM 40 MG: 100 INJECTION SUBCUTANEOUS at 09:35

## 2023-04-13 RX ADMIN — Medication 1000 UNITS: at 09:36

## 2023-04-13 RX ADMIN — LEVETIRACETAM 1500 MG: 100 INJECTION, SOLUTION INTRAVENOUS at 07:39

## 2023-04-13 RX ADMIN — PRIMIDONE 250 MG: 250 TABLET ORAL at 09:36

## 2023-04-13 RX ADMIN — OXYBUTYNIN CHLORIDE 10 MG: 5 TABLET, EXTENDED RELEASE ORAL at 09:36

## 2023-04-13 RX ADMIN — POTASSIUM CHLORIDE, DEXTROSE MONOHYDRATE AND SODIUM CHLORIDE: 150; 5; 450 INJECTION, SOLUTION INTRAVENOUS at 09:14

## 2023-04-13 RX ADMIN — TAMSULOSIN HYDROCHLORIDE 0.4 MG: 0.4 CAPSULE ORAL at 09:37

## 2023-04-13 RX ADMIN — LAMOTRIGINE 400 MG: 100 TABLET ORAL at 09:34

## 2023-04-13 RX ADMIN — LACOSAMIDE 150 MG: 50 TABLET, FILM COATED ORAL at 09:35

## 2023-04-13 RX ADMIN — FAMOTIDINE 20 MG: 10 INJECTION, SOLUTION INTRAVENOUS at 09:45

## 2023-04-13 ASSESSMENT — PAIN SCALES - GENERAL
PAINLEVEL_OUTOF10: 0
PAINLEVEL_OUTOF10: 0

## 2023-04-13 NOTE — DISCHARGE SUMMARY
g by mouth daily     * primidone 250 MG tablet  Commonly known as: MYSOLINE     * primidone 250 MG tablet  Commonly known as: MYSOLINE     senna 8.6 MG tablet  Commonly known as: SENOKOT     tamsulosin 0.4 MG capsule  Commonly known as: FLOMAX     traZODone 50 MG tablet  Commonly known as: DESYREL     Vitamin D 25 MCG (1000 UT) Tabs tablet  Commonly known as: CHOLECALCIFEROL           * This list has 6 medication(s) that are the same as other medications prescribed for you. Read the directions carefully, and ask your doctor or other care provider to review them with you. ASK your doctor about these medications      acetaminophen 325 MG tablet  Commonly known as: TYLENOL  Take 2 tablets by mouth every 4 hours as needed for Pain               Where to Get Your Medications        These medications were sent to [address]      Phone: [phone]   polyethylene glycol 17 g packet         Patient Instructions:    Activity: activity as tolerated  Diet: regular diet  Wound Care: none needed  Other: None    Disposition:   Discharge to Home    Follow up:  Patient will be followed by Collette Mercy, MD in 1-2 weeks    CORE MEASURES on Discharge (if applicable)  ACE/ARB in CHF: NA  Statin in MI: NA  ASA in MI: NA  Statin in CVA: NA  Antiplatelet in CVA: NA    Total time spent on discharge services: 40 minutes    Including the following activities:  Evaluation and Management of patient  Discussion with patient and/or surrogate about current care plan  Coordination with Case Management and/or   Coordination of care with Consultants (if applicable)   Coordination of care with Receiving Facility Physician (if applicable)  Completion of DME forms (if applicable)  Preparation of Discharge Summary  Preparation of Medication Reconciliation  Preparation of Discharge Prescriptions    Signed:  Isaac Yousif

## 2023-04-13 NOTE — PROGRESS NOTES
Called the sister but was unable to leave a message do to mail box being full. Patient is from the [address] [facility] [address] home in [place].     PITER Solorzano
Continuous fluids started on patient. Midline clean dry and intact. Patient denies needing to go to the bathroom at this time. Brief is dry. Patient expresses comfort. Patient denies any other needs at this time. Call light, bedside table and personal belongings within reach.
HOANG Salcido & Kayele RN called stating she will be coming to hospital soon. Requested writer call patient's legal guardian for consent.
Hospital day #2    Patient had a large amount of drainage from the NG tube overnight but he had multiple loose stools as well    Patient is awake and alert  Vital signs are stable he is afebrile  Chest clear to auscultation percussion  Cardiovascular regular rate and rhythm  Abdomen is soft without evidence of tenderness bowel sounds are present the nasogastric tube has been discontinued  Neurologically without focal findings    Laboratories CBC and basic metabolic panel are within normal limits  2 views of the abdomen show a decrease in bowel dilation but still has a x-ray finding suggestive of an ileus versus partial small bowel obstruction    Assessment plan I would continue to observe the patient for further further resolution of his ileus will obtain another set of x-rays in the a.m.
IV infiltrated and removed. Writer called Dr. Les Magallanes' office to inform and request a midline d/t difficult access. Raymond Baumann RN supervisor stated \"we had to use two ultrasounds to find the IV and they both blew. \" Awaiting orders. Will continue to monitor.
Midline inserted into right upper arm at this time by Sudha Dickinson (RN Access).
Occupational Therapy  Facility/Department: [facility] AT THE [facility] MED SURG  Daily Treatment Note  NAME: Elizabeth Fay  : 1955  MRN: [id number]    Date of Service: 2023    Discharge Recommendations:  Continue to assess pending progress         Patient Diagnosis(es): The encounter diagnosis was SBO (small bowel obstruction) ([address] Assessment    Activity Tolerance: Treatment limited secondary to decreased cognition  Discharge Recommendations: Continue to assess pending progress      Plan   Occupational Therapy Plan  Times Per Day: Once a day  Days Per Week: 7 Days  Current Treatment Recommendations: Strengthening;Balance training;Functional mobility training; Endurance training; Safety education & training;Patient/Caregiver education & training;Equipment evaluation, education, & procurement;Self-Care / ADL     Restrictions   Fall risk    Subjective   Subjective  Subjective: Pt in bed, nursing requesting pt up in chair. Pt hard to understand d/t garbled speech. Pain: denied, no indication           Objective    Vitals     Bed Mobility Training  Bed Mobility Training: Yes  Overall Level of Assistance: Maximum assistance;Assist X2 (Pt did not repond to cueing (verbal and manual) and fought against clinicians to get supine <> EOB. One clinician managing UB and one LB.)  Balance  Sitting: Impaired (pt demo'd instability and several retrograde leaning episodes, able to correct (I). Unsure if deliberate or LOB.)  Sitting - Static: Fair (occasional)  Sitting - Dynamic: Fair (occasional)  Standing: Impaired (Pt attempted to sit with no surface behind, unsure if retrograde episodes were deliberate or LOB)  Standing - Static: Fair  Standing - Dynamic: Occasional;Fair  Transfer Training  Transfer Training: Yes  Overall Level of Assistance: Maximum assistance;Assist X2 (P return on cueing (verbal and manual). Sit<>stand x 3 from EOB using RW with P hand placment. Pt limited by decreased cognition.  Stand step transfer Max A x 2 with
Occupational Therapy  Facility/Department: [facility] AT THE [facility] MED SURG  Occupational Therapy Initial Assessment    Name: Ever De La Fuente  : 1955  MRN: [id number]  Date of Service: 2023    Discharge Recommendations:  Continue to assess pending progress          Patient Diagnosis(es): The encounter diagnosis was SBO (small bowel obstruction) ([facility] [address] Past Medical History:  has a past medical history of MR (mental retardation), Seizures ([address] and Ventral hernia. Past Surgical History:  has a past surgical history that includes Abdomen surgery (, ); laparoscopy (15); laparotomy (59); cast application (Left); Hydrocele surgery (2016); Vagus nerve stimulator insertion; and Vagus nerve stimulator insertion (10/11/2016). Treatment Diagnosis: Weakness      Assessment   Performance deficits / Impairments: Decreased functional mobility ; Decreased endurance;Decreased ADL status; Decreased balance;Decreased strength;Decreased safe awareness  Assessment: 79 y/o M admitted to T for SBO. Patient with hx MRDD and seizures. Patient presents with increased need for assist during ADL, transfers and mobility. Patient would benefit from OT services to address to ensure safe return to group home. Treatment Diagnosis: Weakness  Prognosis: Good  Decision Making: Medium Complexity  REQUIRES OT FOLLOW-UP: Yes        Plan   Occupational Therapy Plan  Times Per Day: Once a day  Days Per Week: 7 Days  Current Treatment Recommendations: Strengthening, Balance training, Functional mobility training, Endurance training, Safety education & training, Patient/Caregiver education & training, Equipment evaluation, education, & procurement, Self-Care / ADL     Restrictions  Restrictions/Precautions  Restrictions/Precautions: General Precautions, NPO, Fall Risk, Other (comment), Seizure (Helmet for seizures)    Subjective   Subjective  Subjective: Patient denies pain, agreeable to OT evaluation.      Social/Functional
Patient alert, oriented to self and place, disoriented to situation and time, calm and cooperative with flat affect and intermittent sleeping throughout assessment. Assessment and vital signs completed, see flowsheet. RR 26, intermittently shallow with snoring when sleeping, unlabored, lungs diminished in bilateral bases. Bowel sounds active in upper quadrants and hypoactive in lower quadrants of abdomen. Patient denies any pain at this time. Diarrhea remains present at this time. Abdomen is soft with no guarding, pt denies tenderness. Incontinence present in bowel and bladder. Trace, nonpitting edema in BLE. Contractures noted in bilateral feet and left hand, pt able to move all extremities when asked to. Patient resting in bed with pillow support, HOB > 30, call light within reach, denies further needs, will continue to monitor.
Patient awake at this time. Patient's brief saturated. Alisa-care completed and brief changed. Patient repositioned and moved up in bed and got more comfortable. Patient denies any other needs at this time. Call light, bedside table and personal belongings within reach. Bed alarm on.   
Patient awoke. Brief changed. Urine and scant amount of loose bowel movement. Alisa-care completed and clean brief applied. Patient repositioned and moved up in bed. Blood work obtained from midline and sent down to lab. New bag of continuous fluids hung. Patient denies any other needs at this time. Patient expresses comfort. Call light, bedside table and personal belongings within reach. Bed alarm on.
Patient awoke. Brief changed. Urine and small loose bowel movement. Alisa-care completed and clean brief applied. Patient repositioned and moved up in bed. NG continues to suction well with no noted issues. NG canister marked for output. Blood work obtained from midline and sent down to lab. Weight obtained. Patient denies any other needs at this time. Call light, bedside table and personal belongings within reach. Bed alarm on.
Patient has been resting in bed in and out of sleep. Vital signs and assessment completed. Upper bowl sounds are active and lower bowel sounds hypoactive. NG canister changed out at this time. Drainage output recorded. None has been recorded previous to writer emptying canister at this time. NG in and suctioning with no issues. Green drainage noted. Patient denies needing to use the urinal but did have a saturated brief. Brief changed, x1 unmeasured urine occurrence and x1 small loose bowel movement. Alisa-care completed, new brief applied. Patient moved up in bed and got comfortable. Patient denies any other needs at this time. Call light, bedside table and personal belongings within reach. Awaiting RN Access nurse at this time.
Patient in bed, watching television. Patient educated on medication to be given tonight and physician's orders to be completed. Patient stated understanding. Patient encouraged to ask questions. Patient denies of any questions at this time. Patient noted to be incontinent of urine and stool. Brief changed. Linens changed. Vitals taken and documented. See flow sheet for details. Assessment completed and documented. Patient alert, oriented to person, place, and situation. Disoriented to time. Calm, pleasant. Flat affect noted. Speech clear. Lung sounds clear in upper bilateral lobes of lungs and right middle lobe of lung. Diminished at bilateral bases of lungs. No cough noted. Abdomen round, soft, non tender to palpation. Bowel sounds active in RLQ, LLQ, and LUQ. Hypoactive in RUQ. Non-pitting edema noted in bilateral lower extremities. Scattered ecchymosis noted. Redness noted to coccyx. Patient denies of pain, chest pain, numbness, tingling, or shortness of breath. Patient denies needs or concerns at this time. Call light and over bed table in reach. Side rails up times three. Seizure precautions remain in place at this time.
Patient is discharge back to the group home today. The group home will provide the transportation.   PITER Manzo
Patient noted to have dry brief at this time.
Patient noted to have dry brief at this time. Patient covered up. Patient denies needs or concerns. Call light and over bed table in reach.
Patient resting comfortably with eyes closed at this time.
Patient resting in bed, denies pain. Assessment completed see flow sheet for documentation and vitals. Call light given and in reach able to use appropriately, bed on lowest position and locked and alarm on. Side rails up. Gripper socks on. Patient denies any concerns or requests at this time.
Patient sleeping up in the chair. Writer awoke patient and asked if he would like to get back into bed which patient agreed. Vital signs and assessment completed. Vital signs WDLs. Patient assisted to stand with walker and ambulate few feet to bed. Patient was unsteady on feet and writer had to hold onto patient. Once back into bed, brief was changed. Alisa-care completed and new brief applied. Patient moved up in bed and got comfortable. Patient denies any other needs at this time. Call light, bedside table and personal belongings within reach. Bed alarm on.
Patient's sister, legal guardian, called Rose Marie Diamond at this time for an update. Writer gave update on patient. All questions answered.
Physical Therapy  Facility/Department: [facility] AT THE [facility] MED SURG  Physical Therapy Initial Assessment    Name: Leena Albright  : 1955  MRN: [id number]  Date of Service: 2023    Discharge Recommendations:  Continue to assess pending progress, 24 hour supervision or assist, Home with assist PRN          Patient Diagnosis(es): The encounter diagnosis was SBO (small bowel obstruction) ([facility] [address] Past Medical History:  has a past medical history of MR (mental retardation), Seizures ([address] and Ventral hernia. Past Surgical History:  has a past surgical history that includes Abdomen surgery (, ); laparoscopy (15); laparotomy (); cast application (Left); Hydrocele surgery (2016); Vagus nerve stimulator insertion; and Vagus nerve stimulator insertion (10/11/2016). Assessment   Assessment: Patient is 78year old male with dx of small bowel obstruction who presents with decreased B LE strength, decreased functional mobility and endurance and decreased safety and balance during transfers; unsafe to attempt ambulation at this time d/t cognition/unpredictability. Patient to benefit from physical therapy to address all concerns and safely return to [facility].   Treatment Diagnosis: General weakness  Therapy Prognosis: Good  Decision Making: Medium Complexity  Requires PT Follow-Up: Yes  Activity Tolerance  Activity Tolerance: Treatment limited secondary to decreased cognition     Plan   Physcial Therapy Plan  General Plan: 2 times a day 7 days a week (daily on weekends)  Current Treatment Recommendations: Strengthening, ROM, Balance training, Functional mobility training, Transfer training, Gait training, Neuromuscular re-education, Patient/Caregiver education & training, Safety education & training, Home exercise program, Therapeutic activities, Endurance training  Safety Devices  Type of Devices: Nurse notified, Call light within reach, All fall risk precautions in place, Left in bed, Bed alarm in place, All
Physical Therapy  Facility/Department: [facility] AT THE [facility] MED SURG  Daily Treatment Note  NAME: Daniel Sauceda  : 1955  MRN: [id number]    Date of Service: 2023    Discharge Recommendations:  Continue to assess pending progress, 24 hour supervision or assist, Home with assist PRN     Patient Diagnosis(es): The encounter diagnosis was SBO (small bowel obstruction) ([address] Assessment   Assessment: Pt. able to follow 1 step commands with increased time. Pt. required Max v/c and maual cues with transfers with poor carryover. Bed mobility: Max x2. Transfers: STS: CGA/Min A x2 SPT: Max x2 with use of [employer], required Max A o navigate walker for safety. Pt. has helmet on with transfers and while left in chair, RN aware. Activity Tolerance: Treatment limited secondary to decreased cognition;Patient tolerated treatment well     Plan    Physcial Therapy Plan  General Plan: 2 times a day 7 days a week  Current Treatment Recommendations: Strengthening;ROM;Balance training;Functional mobility training;Transfer training;Gait training;Neuromuscular re-education;Patient/Caregiver education & training; Safety education & training;Home exercise program;Therapeutic activities; Endurance training     Restrictions  Restrictions/Precautions  Restrictions/Precautions: General Precautions, NPO, Fall Risk, Other (comment), Seizure     Subjective    Subjective  Subjective: Pt. in bed upon arrival, agreeable to therapy at this time and to get up to chair. Pain: denies  Orientation  Overall Orientation Status: Impaired  Orientation Level: Oriented to person  Cognition  Following Commands:  Follows one step commands with increased time     Objective     Bed Mobility Training  Bed Mobility Training: Yes  Overall Level of Assistance: Maximum assistance;Assist X2  Supine to Sit: Maximum assistance;Assist X2  Sit to Supine: Maximum assistance;Assist X2  Scooting: Maximum assistance;Assist X2  Balance  Sitting: Impaired (pt demo'd instability and
Pt sat up in bed, encouraged to eat dinner. Pt refused.
Received call from sister. Updated on patient. Sister states she will be in to fill out paperwork and to visit tomorrow morning. Will notify oncoming shift. Evening medication given at this time. Patient noted to be incontinent of urine and stool at this time. Brief changed. Linens changed. Writer offered patient a popsicle. Patient refused. Call light and over bed in reach. Side rails up times three. Denies needs or concerns at this time.
Right upper arm midline removed. Pressure held, dressing applied. Catheter intact. Pt dressed and assisted to wheelchair. Reviewed discharge paperwork with attendant. Phone number for writer given in case of questions.
Spoke with Era Rodriguez, patient's legal guardian at this time. Obtained verbal consent for Midline. While speaking with Era Rodriguez, she also stated if it ends up where patient going to need surgery, Era Rodriguez would like patient to be transferred to [facility] in [place] as that is where patient had previous abdominal surgery, last year, with Dr. Namrata Zabala.
Spoke with RN Access at this time for midline placement. Will call writer back with an ETA.
[facility]  Inpatient/Observation/Outpatient Rehabilitation    Date: 2023  Patient Name: Joanna Yee       [x] Inpatient Acute/Observation       []  Outpatient  : 1955       [] Pt no showed for scheduled appointment    [x] Pt refused/declined therapy at this time due to:      Pt initially agreeable to PT evaluation, however, when asked to sit up he refused despite multiple attempts of education and assistance by therapist.      [] Pt cancelled due to:  [] No Reason Given   [] Sick/ill   [] Other:    Therapist/Assistant will attempt to see this patient, at our earliest opportunity.        Maria Bates, PT, DPT Date: 2023
To bedside to change pt. Pt was dry. Pt does state that he feels like he needs to urinate. Pt assisted to edge of bed. Pt unable to void. Encouraged pt to get up to chair for lunch. Pt refused. Pt repositioned back  in bed. Pt was able to void in urinal after laying back down. Pt positioned for comfort. Bed alarm active. Call light in reach. Care ongoing.
Writer called and updated Gay Gee RN from [employer] that verbal consent was obtained. Should be here roughly around [phone].
Writer in to reassess patient at this time. During reassessment, patient was noted to be incontinent of urine and stool. During brief change, patient noted to go rigid in bilateral upper and lower extremities and not interactive to writer. Writer also noted patient to be incontinent of urine. Writer rolled patient onto side. Airway remained patent. Writer then noted patient to be shaking. Additional staff in to assist writer. PRN lorazepam given. Once symptoms resolved, patient responsive to writer voice and was oriented to person, place, and situation. Disoriented to time. Patient denies of pain at this time. Seizure precautions remain in place. Call light in reach.
Writer telephoned Dr. Mp Arias' office at this time, spoke with Hema Denise, to inform MD about medication reconciliation being updated and corrected per Juanita Hallman RN. Will continue to monitor.
Writer to bedside to complete morning assessment. Upon entry to room, pt sitting up in bed, respirations even and unlabored while on room air. Vitals obtained and assessment completed by Alvaro Montenegro precepting with this nurse, see flow sheet for details. Pt denies needs from writer at this time. Call light in reach. Care ongoing.
Current  Energy (kcal/day): 1639-2732 (18-22k/kg)  Weight Used for Protein Requirements: Ideal  Protein (g/day): 84-97 (1.3-1.5g/kg)  Method Used for Fluid Requirements: 1 ml/kcal  Fluid (ml/day): 1900mL    Nutrition Diagnosis:   Inadequate oral intake related to altered GI function as evidenced by diarrhea, NPO or clear liquid status due to medical condition. Recent Labs     04/10/23  1112 04/11/23  0450 04/12/23  0514    141 138   K 3.6* 3.9 4.4    102 106   CO2 32* 31 25   BUN 15 17 10   CREATININE 1.18 1.04 0.91   GLUCOSE 125* 92 85   ALT 16  --   --    ALKPHOS 149*  --   --       Lab Results   Component Value Date/Time    LABALBU 4.3 04/10/2023 11:12 AM       Lab Results   Component Value Date/Time    VITD25 28.6 08/29/2019 12:05 PM       Nutrition Interventions:   Food and/or Nutrient Delivery: Continue Current Diet  Nutrition Education/Counseling: Education not appropriate  Coordination of Nutrition Care: Continue to monitor while inpatient       Goals:     Goals: Meet at least 75% of estimated needs       Nutrition Monitoring and Evaluation:   Behavioral-Environmental Outcomes: Knowledge or Skill  Food/Nutrient Intake Outcomes: Food and Nutrient Intake  Physical Signs/Symptoms Outcomes: Biochemical Data, Weight    Discharge Planning:     Too soon to determine     [address] [facility]: [address]
Source: Bed Scale  Current BMI (kg/m2): 30  Usual Body Weight: 180 lb (81.6 kg)  % Weight Change (Calculated): 3.3  Weight Adjustment For: No Adjustment                 BMI Categories: Obese Class 1 (BMI 30.0-34. 9)    Estimated Daily Nutrient Needs:  Energy Requirements Based On: Kcal/kg  Weight Used for Energy Requirements: Current  Energy (kcal/day): 2040-5907 (18-22k/kg)  Weight Used for Protein Requirements: Ideal  Protein (g/day): 84-97 (1.3-1.5g/kg)  Method Used for Fluid Requirements: 1 ml/kcal  Fluid (ml/day): 1900mL    Nutrition Diagnosis:   Inadequate oral intake related to altered GI function as evidenced by diarrhea, NPO or clear liquid status due to medical condition. Recent Labs     04/10/23  1112 04/11/23  0450    141   K 3.6* 3.9    102   CO2 32* 31   BUN 15 17   CREATININE 1.18 1.04   GLUCOSE 125* 92   ALT 16  --    ALKPHOS 149*  --       Lab Results   Component Value Date/Time    LABALBU 4.3 04/10/2023 11:12 AM       Lab Results   Component Value Date/Time    VITD25 28.6 08/29/2019 12:05 PM         Nutrition Interventions:   Food and/or Nutrient Delivery: Start Oral Diet  Nutrition Education/Counseling: Education not appropriate  Coordination of Nutrition Care: Continue to monitor while inpatient       Goals:     Goals: Initiate PO diet       Nutrition Monitoring and Evaluation:   Behavioral-Environmental Outcomes: Knowledge or Skill  Food/Nutrient Intake Outcomes: Food and Nutrient Intake  Physical Signs/Symptoms Outcomes: Biochemical Data, Weight    Discharge Planning:     Too soon to determine     [address] [place] [address]: [id number]
to Learning: Cognition  Education Outcome: Verbalized understanding;Demonstrated understanding;Continued education needed    Goals  Short Term Goals  Time Frame for Short Term Goals: 21 visits  Short Term Goal 1: Patient to complete ADL transfers with supervision with improved safety to ensure safe return to [facility]. Short Term Goal 2: Patient to engage in 15 minutes of ther ex/ther act to improve strength and activity tolerance for I/ADL upon return home. Short Term Goal 3: Patient to engage daily in self care tasks with min A to improve ADL engagement, safety and independence.        Therapy Time   Individual Concurrent Group Co-treatment   Time In [address] [place] [address]. [place] [address], [place]         Time Out 0854         Minutes [address] HALL/L 4/12/23
were deliberate or LOB)  Standing - Static: Fair  Standing - Dynamic: Occasional;Fair  Transfer Training  Transfer Training: Yes  Overall Level of Assistance: Maximum assistance;Assist X2;Other (comment)  Interventions: Safety awareness training;Verbal cues;Manual cues  Sit to Stand: Minimum assistance;Assist X2;Moderate assistance  Stand to Sit: Minimum assistance; Moderate assistance;Assist X2  Stand Pivot Transfers: Maximum assistance;Assist X2  Gait Training  Gait Training: No        Other Specialty Interventions  Other Treatments/Modalities: standing tolerance to complete brief change and donning pants. Pt. has fair- standing balance and requires assistance to maintain. Safety Devices  Type of Devices:  (pt. in w/c with RN, leaving room for transport)       Goals  Short Term Goals  Time Frame for Short Term Goals: 20 days  Short Term Goal 1: Patient to complete all transfers with SUP and no LOB to decrease fall risk. Short Term Goal 2: Patient to ambulate 25ftx2 with FWW and CGAx1 with no LOB or fatigue to improve mobility. Short Term Goal 3: Patient to tolerate 20-30 min of ther ex/act to improve functional strength. Short Term Goal 4: Patient to have Fair static standing balance to decrease fall risk.     Education  Patient Education  Education Given To: Patient  Education Provided: Role of Therapy;Plan of Care;Transfer Training  Education Method: Verbal;Demonstration  Barriers to Learning: Cognition  Education Outcome: Continued education needed    Therapy Time   Individual Concurrent Group Co-treatment   Time In 1318         Time Out 1334         Minutes [address], [place]
input(s): CRP in the last 72 hours. Radiology/Imaging:  XR ABDOMEN (2 VIEWS)   Final Result   Few air-filled loops of bowel that may relate to an ileus. No convincing   evidence for obstruction. XR ABDOMEN (2 VIEWS)   Final Result   Findings suggesting a partial bowel obstruction versus ileus. Bowel   distension has improved since prior CT. No free air. XR CHEST PORTABLE   Final Result   Nasogastric tube appears to be in adequate position with tip in the stomach,   although the lower end is not fully visualized. Redemonstration of   cardiomegaly and lower lobe atelectatic changes. CT Head WO Contrast   Final Result   1. No acute intracranial abnormality. 2. Encephalomalacia in the right MCA distribution with associated ex vacuo   dilatation of the right lateral ventricle, unchanged. 3. Chronic microvascular white matter ischemic disease. CT ABDOMEN PELVIS WO CONTRAST Additional Contrast? None   Final Result   1. Suggestion of small-bowel obstruction with distended stomach and small   bowel loops with a transition point likely in the left lower quadrant and an   area of anastomosis. 2. No evidence of bowel perforation or free air. 3. Calcified left renal subcapsular collection which may represent an old   hematoma. No evidence of renal stones or obstructive uropathy. 4. Bilateral lower lobe atelectatic changes. RECOMMENDATIONS:   Surgery consultation for small bowel obstruction.                ASSESSMENT / PLAN:    MEDICAL DECISION MAKING:    Primary Problem(s): Small bowel obstruction (HCC)  Differential diagnoses: Ileus, constipation  Condition is a chronic illness with exacerbation, progression or side effects of treatment  Condition is improving  Treatment plan: Consultation: General surgery  Imaging: Xray abdomen ordered-improving, ileus  Medications:   Continue IV fluids  IV Pepcid  Medication Monitoring / High Risk Medications: none   Ambulate in beltrán
Continue current treatment  Imaging: no further imaging studies ordered today  Medications: Continue IV Keppra        Nutrition status:   obesity, non-morbid  Dietician consult initiated     Hospital Prophylaxis:   DVT: Lovenox   Stress Ulcer: H2 Blocker     Disposition:  Shared decision making: All test results, treatment options and disposition options were discussed with the patient today  Social determinants of health that may impact management:  MRDD  Code status: Full Code   Disposition: Discharge plan is home a pending tolerating diet    MIPS Advanced Care Planning documentation:  [x] I have confirmed that the patient's Advance Care Plan is present, Code Status is documented, or surrogate decision maker is listed in the patient's medical record  [If \"yes\", STOP HERE]     [] The patient's [address] is NOT present because:    []  I confirmed today that the patient does not wish or was not able to name a   surrogate decision maker or provide and advance care plan.    [] Hospice care is currently being provided or has been provided within the   calendar year. []  I did NOT confirm today the presence of an [address] or surrogate   decision maker documented within the patient's medical record.    [DOES NOT SATISFY TIFFANIE Knutson - CNP , TIFFANIE, NP-C  Hospitalist Medicine        4/12/2023, 8:21 AM

## 2023-04-13 NOTE — PLAN OF CARE
Problem: Discharge Planning  Goal: Discharge to home or other facility with appropriate resources  Outcome: Completed     Problem: Safety - Adult  Goal: Free from fall injury  Outcome: Completed     Problem: Skin/Tissue Integrity  Goal: Absence of new skin breakdown  Description: 1. Monitor for areas of redness and/or skin breakdown  2. Assess vascular access sites hourly  3. Every 4-6 hours minimum:  Change oxygen saturation probe site  4. Every 4-6 hours:  If on nasal continuous positive airway pressure, respiratory therapy assess nares and determine need for appliance change or resting period.   Outcome: Completed     Problem: ABCDS Injury Assessment  Goal: Absence of physical injury  Outcome: Completed     Problem: Nutrition Deficit:  Goal: Optimize nutritional status  Outcome: Completed     Problem: Pain  Goal: Verbalizes/displays adequate comfort level or baseline comfort level  Outcome: Completed
Problem: Discharge Planning  Goal: Discharge to home or other facility with appropriate resources  Outcome: Progressing    [facility]charge plan is back to [facility] pending patient condition. LSW assisting with d/c needs. Will continue to monitor. Problem: Safety - Adult  Goal: Free from fall injury  Outcome: Progressing  Patient educated on safety. Pt instructed to use call light, told that bed alarm is on, and not to get up without help. Seizure pads on bed and suction at bedside d/t hx of seizures and for seizure precautions in place. Pt responds with \"OK\" at this time. Will continue to monitor. Problem: Skin/Tissue Integrity  Goal: Absence of new skin breakdown  Description: 1. Monitor for areas of redness and/or skin breakdown  2. Assess vascular access sites hourly  3. Every 4-6 hours minimum:  Change oxygen saturation probe site  4. Every 4-6 hours:  If on nasal continuous positive airway pressure, respiratory therapy assess nares and determine need for appliance change or resting period. Outcome: Progressing  Skin assessment completed as ordered. Skin concerns as charted in flow sheet. Treatment continues to bottom using barrier cream wipes and baby powder and turning per orders. Will continue to monitor. Problem: ABCDS Injury Assessment  Goal: Absence of physical injury  Outcome: Progressing   Pt continues to remain free from injury at this time. Pt educated on safety and verbalizes understanding by saying \"OK. \" Safety measures in place as appropriate. Will continue to monitor.
Problem: Discharge Planning  Goal: Discharge to home or other facility with appropriate resources  Outcome: Progressing  Flowsheets (Taken 4/12/2023 1051)  Discharge to home or other facility with appropriate resources:   Arrange for needed discharge resources and transportation as appropriate   Identify barriers to discharge with patient and caregiver   Identify discharge learning needs (meds, wound care, etc)  Note: Pt from group [place]. Plan to return to group home on discharge. Problem: Safety - Adult  Goal: Free from fall injury  Outcome: Progressing  Flowsheets (Taken 4/12/2023 1051)  Free From Fall Injury: Instruct family/caregiver on patient safety  Note: Call light in reach at all times, frequent checks, bed in lowest position, wheels of bed and chair locked, non skid footwear on, appropriate transfer techniques, personal items within reach, walkways free of obstructions, fall risk armband and sign displayed, Villegas fall risk score per protocol. No falls this shift, care ongoing. Problem: Skin/Tissue Integrity  Goal: Absence of new skin breakdown  Description: 1. Monitor for areas of redness and/or skin breakdown  2. Assess vascular access sites hourly  3. Every 4-6 hours minimum:  Change oxygen saturation probe site  4. Every 4-6 hours:  If on nasal continuous positive airway pressure, respiratory therapy assess nares and determine need for appliance change or resting period. Outcome: Progressing  Note: Nikunj scale monitoring per protocol. Inspect skin for breakdown frequently. Encourage pt to make frequent large adjustments in position or assist patient with turning. Document all areas of breakdown.         Problem: ABCDS Injury Assessment  Goal: Absence of physical injury  Outcome: Progressing  Flowsheets (Taken 4/12/2023 1051)  Absence of Physical Injury: Implement safety measures based on patient assessment
of pain and evaluate response   Implement non-pharmacological measures as appropriate and evaluate response   Consider cultural and social influences on pain and pain management   Notify Licensed Independent Practitioner if interventions unsuccessful or patient reports new pain

## 2023-04-13 NOTE — DISCHARGE INSTR - COC
Continuity of Care Form    Patient Name: Suresh Smith   :  1955  MRN:  [id number]    Admit date:  4/10/2023  Discharge date:  23    Code Status Order: Full Code   Advance Directives:     Admitting Physician:  Vasquez Desai MD  PCP: Treasure Gr MD    Discharging Nurse: Tasha Unit/Room#: 0303/0303-01  Discharging Unit Phone Number: [phone]    Emergency Contact:   Extended Emergency Contact Information  Primary Emergency Contact: Estela Cavanaugh [address] Phone: [phone]  Relation: Legal Guardian  Secondary Emergency Contact: [address] Phone: [phone]  Relation: Brother/Sister    Past Surgical History:  Past Surgical History:   Procedure Laterality Date    ABDOMEN SURGERY  ,     Bowel Obstruction X2    CAST APPLICATION Left     Lt.  Ankle , X3    HYDROCELE EXCISION  2016    LAPAROSCOPY  14    with BERNARD    LAPAROTOMY  14    with partial cecectomy    VAGAL NERVE STIMULATION      placed, then replaced    VAGAL NERVE STIMULATION  10/11/2016    replaced generator battery       Immunization History:   Immunization History   Administered Date(s) Administered    COVID-19, PFIZER PURPLE top, DILUTE for use, (age 15 y+), 30mcg/0.3mL 2021, 2021, 2022    TDaP, ADACEL (age 10y-63y), BOOSTRIX (age 10y+), IM, 0.5mL 2013       Active Problems:  Patient Active Problem List   Diagnosis Code    Small bowel obstruction ([facility] [address]  K56.609    Seizures ([facility] [address] Chronic R56.9    MR (mental retardation), severe F72    Epilepsy ([facility] [address] G40.909       Isolation/Infection:   Isolation            No Isolation          Patient Infection Status       None to display            Nurse Assessment:  Last Vital Signs: BP (!) 124/96   Pulse 74   Temp 97.5 °F (36.4 °C) (Temporal)   Resp 20   Ht 5' 6\" (1.676 m)   Wt 182 lb 15.7 oz (83 kg)   SpO2 97%   BMI 29.53 kg/m²     Last documented pain score (0-10 scale): Pain

## 2023-04-19 ENCOUNTER — APPOINTMENT (OUTPATIENT)
Dept: GENERAL RADIOLOGY | Age: 68
End: 2023-04-19
Payer: MEDICARE

## 2023-04-19 ENCOUNTER — HOSPITAL ENCOUNTER (INPATIENT)
Age: 68
LOS: 3 days | Discharge: ANOTHER HOSPITAL | End: 2023-04-22
Attending: INTERNAL MEDICINE | Admitting: INTERNAL MEDICINE
Payer: MEDICARE

## 2023-04-19 ENCOUNTER — APPOINTMENT (OUTPATIENT)
Dept: CT IMAGING | Age: 68
End: 2023-04-19
Payer: MEDICARE

## 2023-04-19 DIAGNOSIS — K56.609 SBO (SMALL BOWEL OBSTRUCTION) (HCC): Primary | ICD-10-CM

## 2023-04-19 LAB
ABSOLUTE EOS #: 0.48 K/UL (ref 0–0.44)
ABSOLUTE IMMATURE GRANULOCYTE: 0 K/UL (ref 0–0.3)
ABSOLUTE LYMPH #: 1.68 K/UL (ref 1.1–3.7)
ABSOLUTE MONO #: 0.84 K/UL (ref 0.1–1.2)
ALBUMIN SERPL-MCNC: 4.3 G/DL (ref 3.5–5.2)
ALBUMIN/GLOBULIN RATIO: 1.2 (ref 1–2.5)
ALP SERPL-CCNC: 131 U/L (ref 40–129)
ALT SERPL-CCNC: 19 U/L (ref 5–41)
ANION GAP SERPL CALCULATED.3IONS-SCNC: 11 MMOL/L (ref 9–17)
AST SERPL-CCNC: 19 U/L
BASOPHILS # BLD: 0 % (ref 0–2)
BASOPHILS ABSOLUTE: 0 K/UL (ref 0–0.2)
BILIRUB SERPL-MCNC: 0.3 MG/DL (ref 0.3–1.2)
BUN SERPL-MCNC: 19 MG/DL (ref 8–23)
BUN/CREAT BLD: 18 (ref 9–20)
CALCIUM SERPL-MCNC: 10 MG/DL (ref 8.6–10.4)
CHLORIDE SERPL-SCNC: 99 MMOL/L (ref 98–107)
CO2 SERPL-SCNC: 28 MMOL/L (ref 20–31)
CREAT SERPL-MCNC: 1.04 MG/DL (ref 0.7–1.2)
EOSINOPHILS RELATIVE PERCENT: 4 % (ref 1–4)
GFR SERPL CREATININE-BSD FRML MDRD: >60 ML/MIN/1.73M2
GLUCOSE SERPL-MCNC: 105 MG/DL (ref 70–99)
HCT VFR BLD AUTO: 38.8 % (ref 40.7–50.3)
HGB BLD-MCNC: 12.9 G/DL (ref 13–17)
IMMATURE GRANULOCYTES: 0 %
LACTATE PLASV-SCNC: 1.3 MMOL/L (ref 0.5–2.2)
LIPASE SERPL-CCNC: 27 U/L (ref 13–60)
LYMPHOCYTES # BLD: 14 % (ref 24–43)
MCH RBC QN AUTO: 33.2 PG (ref 25.2–33.5)
MCHC RBC AUTO-ENTMCNC: 33.2 G/DL (ref 28.4–34.8)
MCV RBC AUTO: 99.7 FL (ref 82.6–102.9)
MONOCYTES # BLD: 7 % (ref 3–12)
MORPHOLOGY: ABNORMAL
NRBC AUTOMATED: 0 PER 100 WBC
PDW BLD-RTO: 13.1 % (ref 11.8–14.4)
PLATELET # BLD AUTO: ABNORMAL K/UL (ref 138–453)
PLATELET, FLUORESCENCE: 400 K/UL (ref 138–453)
PLATELET, IMMATURE FRACTION: 1.2 % (ref 1.1–10.3)
POTASSIUM SERPL-SCNC: 3.8 MMOL/L (ref 3.7–5.3)
PROT SERPL-MCNC: 7.8 G/DL (ref 6.4–8.3)
RBC # BLD: 3.89 M/UL (ref 4.21–5.77)
REASON FOR REJECTION: NORMAL
SEG NEUTROPHILS: 75 % (ref 36–65)
SEGMENTED NEUTROPHILS ABSOLUTE COUNT: 9 K/UL (ref 1.5–8.1)
SODIUM SERPL-SCNC: 138 MMOL/L (ref 135–144)
WBC # BLD AUTO: 12 K/UL (ref 3.5–11.3)
ZZ NTE CLEAN UP: ORDERED TEST: NORMAL
ZZ NTE WITH NAME CLEAN UP: SPECIMEN SOURCE: NORMAL

## 2023-04-19 PROCEDURE — 85055 RETICULATED PLATELET ASSAY: CPT

## 2023-04-19 PROCEDURE — 74177 CT ABD & PELVIS W/CONTRAST: CPT

## 2023-04-19 PROCEDURE — 83690 ASSAY OF LIPASE: CPT

## 2023-04-19 PROCEDURE — 99285 EMERGENCY DEPT VISIT HI MDM: CPT

## 2023-04-19 PROCEDURE — 2580000003 HC RX 258: Performed by: INTERNAL MEDICINE

## 2023-04-19 PROCEDURE — 96374 THER/PROPH/DIAG INJ IV PUSH: CPT

## 2023-04-19 PROCEDURE — 6360000002 HC RX W HCPCS: Performed by: PHYSICIAN ASSISTANT

## 2023-04-19 PROCEDURE — 87506 IADNA-DNA/RNA PROBE TQ 6-11: CPT

## 2023-04-19 PROCEDURE — 6360000004 HC RX CONTRAST MEDICATION: Performed by: PHYSICIAN ASSISTANT

## 2023-04-19 PROCEDURE — 94761 N-INVAS EAR/PLS OXIMETRY MLT: CPT

## 2023-04-19 PROCEDURE — 99222 1ST HOSP IP/OBS MODERATE 55: CPT | Performed by: SURGERY

## 2023-04-19 PROCEDURE — 6360000002 HC RX W HCPCS: Performed by: INTERNAL MEDICINE

## 2023-04-19 PROCEDURE — 74018 RADEX ABDOMEN 1 VIEW: CPT

## 2023-04-19 PROCEDURE — 36415 COLL VENOUS BLD VENIPUNCTURE: CPT

## 2023-04-19 PROCEDURE — 1200000000 HC SEMI PRIVATE

## 2023-04-19 PROCEDURE — 80053 COMPREHEN METABOLIC PANEL: CPT

## 2023-04-19 PROCEDURE — 85025 COMPLETE CBC W/AUTO DIFF WBC: CPT

## 2023-04-19 PROCEDURE — 83605 ASSAY OF LACTIC ACID: CPT

## 2023-04-19 RX ORDER — ONDANSETRON 2 MG/ML
4 INJECTION INTRAMUSCULAR; INTRAVENOUS ONCE
Status: COMPLETED | OUTPATIENT
Start: 2023-04-19 | End: 2023-04-19

## 2023-04-19 RX ORDER — ENOXAPARIN SODIUM 100 MG/ML
40 INJECTION SUBCUTANEOUS DAILY
Status: DISCONTINUED | OUTPATIENT
Start: 2023-04-19 | End: 2023-04-22 | Stop reason: HOSPADM

## 2023-04-19 RX ORDER — ACETAMINOPHEN 650 MG/1
650 SUPPOSITORY RECTAL EVERY 6 HOURS PRN
Status: DISCONTINUED | OUTPATIENT
Start: 2023-04-19 | End: 2023-04-22 | Stop reason: HOSPADM

## 2023-04-19 RX ORDER — SODIUM CHLORIDE 0.9 % (FLUSH) 0.9 %
10 SYRINGE (ML) INJECTION PRN
Status: DISCONTINUED | OUTPATIENT
Start: 2023-04-19 | End: 2023-04-22 | Stop reason: HOSPADM

## 2023-04-19 RX ORDER — 0.9 % SODIUM CHLORIDE 0.9 %
1000 INTRAVENOUS SOLUTION INTRAVENOUS ONCE
Status: DISCONTINUED | OUTPATIENT
Start: 2023-04-19 | End: 2023-04-22 | Stop reason: HOSPADM

## 2023-04-19 RX ORDER — DIAZEPAM 10 MG/2ML
20 GEL RECTAL PRN
Status: DISCONTINUED | OUTPATIENT
Start: 2023-04-19 | End: 2023-04-22 | Stop reason: HOSPADM

## 2023-04-19 RX ORDER — ONDANSETRON 4 MG/1
4 TABLET, ORALLY DISINTEGRATING ORAL EVERY 8 HOURS PRN
Status: DISCONTINUED | OUTPATIENT
Start: 2023-04-19 | End: 2023-04-22 | Stop reason: HOSPADM

## 2023-04-19 RX ORDER — ACETAMINOPHEN 325 MG/1
650 TABLET ORAL EVERY 6 HOURS PRN
Status: DISCONTINUED | OUTPATIENT
Start: 2023-04-19 | End: 2023-04-22 | Stop reason: HOSPADM

## 2023-04-19 RX ORDER — SODIUM CHLORIDE 9 MG/ML
INJECTION, SOLUTION INTRAVENOUS PRN
Status: DISCONTINUED | OUTPATIENT
Start: 2023-04-19 | End: 2023-04-22 | Stop reason: HOSPADM

## 2023-04-19 RX ORDER — ONDANSETRON 2 MG/ML
4 INJECTION INTRAMUSCULAR; INTRAVENOUS EVERY 6 HOURS PRN
Status: DISCONTINUED | OUTPATIENT
Start: 2023-04-19 | End: 2023-04-22 | Stop reason: HOSPADM

## 2023-04-19 RX ORDER — SODIUM CHLORIDE 9 MG/ML
INJECTION, SOLUTION INTRAVENOUS CONTINUOUS
Status: DISCONTINUED | OUTPATIENT
Start: 2023-04-19 | End: 2023-04-22 | Stop reason: HOSPADM

## 2023-04-19 RX ORDER — POLYETHYLENE GLYCOL 3350 17 G/17G
17 POWDER, FOR SOLUTION ORAL DAILY PRN
Status: DISCONTINUED | OUTPATIENT
Start: 2023-04-19 | End: 2023-04-22 | Stop reason: HOSPADM

## 2023-04-19 RX ORDER — SODIUM CHLORIDE 0.9 % (FLUSH) 0.9 %
5-40 SYRINGE (ML) INJECTION EVERY 12 HOURS SCHEDULED
Status: DISCONTINUED | OUTPATIENT
Start: 2023-04-19 | End: 2023-04-22 | Stop reason: HOSPADM

## 2023-04-19 RX ADMIN — IOPAMIDOL 75 ML: 755 INJECTION, SOLUTION INTRAVENOUS at 13:44

## 2023-04-19 RX ADMIN — SODIUM CHLORIDE: 9 INJECTION, SOLUTION INTRAVENOUS at 17:23

## 2023-04-19 RX ADMIN — ONDANSETRON 4 MG: 2 INJECTION INTRAMUSCULAR; INTRAVENOUS at 12:41

## 2023-04-19 RX ADMIN — ENOXAPARIN SODIUM 40 MG: 100 INJECTION SUBCUTANEOUS at 18:25

## 2023-04-19 ASSESSMENT — PAIN - FUNCTIONAL ASSESSMENT: PAIN_FUNCTIONAL_ASSESSMENT: NONE - DENIES PAIN

## 2023-04-19 ASSESSMENT — ENCOUNTER SYMPTOMS: RESPIRATORY NEGATIVE: 1

## 2023-04-19 NOTE — CONSULTS
08/15/2014 06:20 PM     Lab Results   Component Value Date/Time    LIPASE 27 04/19/2023 12:30 PM     Lab Results   Component Value Date/Time    INR 1.1 08/17/2014 10:20 AM       Radiologic Studies:      Impressions/Recommendations:     ***      Thank you Dee Pang MD for allowing me to participate in the care of your patients.      Sreedhar Girard DO, MPH, [address] [place] [address] office [phone]  [place] office [phone]

## 2023-04-19 NOTE — ED PROVIDER NOTES
Amount: 1.5 mg    MECLIZINE (ANTIVERT) 25 MG TABLET    Take 1 tablet by mouth every 8 hours as needed for Dizziness    MELATONIN 3 MG TABS TABLET    Take 1 tablet by mouth nightly as needed (insomnia)    ONDANSETRON (ZOFRAN) 4 MG TABLET    Take 1 tablet by mouth every 6 hours as needed for Nausea or Vomiting Zofran ODT    OXYBUTYNIN (DITROPAN-XL) 10 MG EXTENDED RELEASE TABLET    Take 1 tablet by mouth daily    POLYETHYLENE GLYCOL (GLYCOLAX) 17 G PACKET    Take 17 g by mouth daily    PRIMIDONE (MYSOLINE) 250 MG TABLET    Take 1 tablet by mouth every morning    PRIMIDONE (MYSOLINE) 250 MG TABLET    Take 1.5 tablets by mouth nightly    SENNA (SENOKOT) 8.6 MG TABLET    Take 1 tablet by mouth 2 times daily    TAMSULOSIN (FLOMAX) 0.4 MG CAPSULE    Take 1 capsule by mouth 2 times daily    TRAZODONE (DESYREL) 50 MG TABLET    Take 1 tablet by mouth nightly    VITAMIN D (CHOLECALCIFEROL) 25 MCG (1000 UT) TABS TABLET    Take 1 tablet by mouth daily       ALLERGIES     Pcn [penicillins] and Clindamycin    FAMILY HISTORY       Family History   Problem Relation Age of Onset    High Blood Pressure Mother     Thyroid Cancer Mother     Other Mother         Myeloma    Heart Disease Mother         2 Stents    Arthritis Mother         2 Back surgeries    Lung Cancer Father     Heart Disease Brother         Defibrilator    Heart Attack Maternal Grandfather     Diabetes Paternal Grandmother           SOCIAL HISTORY       Social History     Socioeconomic History    Marital status: Single    Number of children: 0   Tobacco Use    Smoking status: Never   Substance and Sexual Activity    Alcohol use: No    Drug use: No    Sexual activity: Never       SCREENINGS                               CIWA Assessment  BP: 113/63  Heart Rate: 87                 PHYSICAL EXAM    (up to 7 for level 4, 8 or more for level 5)     ED Triage Vitals   BP Temp Temp src Pulse Resp SpO2 Height Weight   -- -- -- -- -- -- -- --       Physical Exam  Vitals and

## 2023-04-20 ENCOUNTER — APPOINTMENT (OUTPATIENT)
Dept: GENERAL RADIOLOGY | Age: 68
End: 2023-04-20
Payer: MEDICARE

## 2023-04-20 PROBLEM — E44.0 MODERATE MALNUTRITION (HCC): Status: ACTIVE | Noted: 2023-04-20

## 2023-04-20 LAB
ABSOLUTE EOS #: 0.46 K/UL (ref 0–0.44)
ABSOLUTE IMMATURE GRANULOCYTE: <0.03 K/UL (ref 0–0.3)
ABSOLUTE LYMPH #: 1.65 K/UL (ref 1.1–3.7)
ABSOLUTE MONO #: 0.58 K/UL (ref 0.1–1.2)
BASOPHILS # BLD: 1 % (ref 0–2)
BASOPHILS ABSOLUTE: 0.05 K/UL (ref 0–0.2)
EOSINOPHILS RELATIVE PERCENT: 6 % (ref 1–4)
HCT VFR BLD AUTO: 33.8 % (ref 40.7–50.3)
HGB BLD-MCNC: 11.1 G/DL (ref 13–17)
IMMATURE GRANULOCYTES: 0 %
LYMPHOCYTES # BLD: 22 % (ref 24–43)
MCH RBC QN AUTO: 33 PG (ref 25.2–33.5)
MCHC RBC AUTO-ENTMCNC: 32.8 G/DL (ref 28.4–34.8)
MCV RBC AUTO: 100.6 FL (ref 82.6–102.9)
MONOCYTES # BLD: 8 % (ref 3–12)
NRBC AUTOMATED: 0 PER 100 WBC
PDW BLD-RTO: 13.1 % (ref 11.8–14.4)
PLATELET # BLD AUTO: 248 K/UL (ref 138–453)
PMV BLD AUTO: 8.9 FL (ref 8.1–13.5)
RBC # BLD: 3.36 M/UL (ref 4.21–5.77)
SEG NEUTROPHILS: 63 % (ref 36–65)
SEGMENTED NEUTROPHILS ABSOLUTE COUNT: 4.77 K/UL (ref 1.5–8.1)
WBC # BLD AUTO: 7.5 K/UL (ref 3.5–11.3)

## 2023-04-20 PROCEDURE — 74250 X-RAY XM SM INT 1CNTRST STD: CPT

## 2023-04-20 PROCEDURE — 36415 COLL VENOUS BLD VENIPUNCTURE: CPT

## 2023-04-20 PROCEDURE — A4216 STERILE WATER/SALINE, 10 ML: HCPCS | Performed by: NURSE PRACTITIONER

## 2023-04-20 PROCEDURE — 74018 RADEX ABDOMEN 1 VIEW: CPT

## 2023-04-20 PROCEDURE — 99231 SBSQ HOSP IP/OBS SF/LOW 25: CPT | Performed by: SURGERY

## 2023-04-20 PROCEDURE — C9254 INJECTION, LACOSAMIDE: HCPCS | Performed by: NURSE PRACTITIONER

## 2023-04-20 PROCEDURE — 1200000000 HC SEMI PRIVATE

## 2023-04-20 PROCEDURE — 6360000004 HC RX CONTRAST MEDICATION: Performed by: SURGERY

## 2023-04-20 PROCEDURE — 6360000002 HC RX W HCPCS: Performed by: NURSE PRACTITIONER

## 2023-04-20 PROCEDURE — 94761 N-INVAS EAR/PLS OXIMETRY MLT: CPT

## 2023-04-20 PROCEDURE — 2500000003 HC RX 250 WO HCPCS: Performed by: NURSE PRACTITIONER

## 2023-04-20 PROCEDURE — 2580000003 HC RX 258: Performed by: INTERNAL MEDICINE

## 2023-04-20 PROCEDURE — 85025 COMPLETE CBC W/AUTO DIFF WBC: CPT

## 2023-04-20 PROCEDURE — 6360000002 HC RX W HCPCS: Performed by: INTERNAL MEDICINE

## 2023-04-20 PROCEDURE — 2580000003 HC RX 258: Performed by: NURSE PRACTITIONER

## 2023-04-20 RX ORDER — LACOSAMIDE 10 MG/ML
INJECTION, SOLUTION INTRAVENOUS
Status: DISCONTINUED
Start: 2023-04-20 | End: 2023-04-20 | Stop reason: SDUPTHER

## 2023-04-20 RX ORDER — LACOSAMIDE 10 MG/ML
150 INJECTION, SOLUTION INTRAVENOUS 2 TIMES DAILY
Status: DISCONTINUED | OUTPATIENT
Start: 2023-04-20 | End: 2023-04-20

## 2023-04-20 RX ADMIN — LACOSAMIDE 150 MG: 10 INJECTION INTRAVENOUS at 09:37

## 2023-04-20 RX ADMIN — LEVETIRACETAM 1500 MG: 100 INJECTION, SOLUTION INTRAVENOUS at 09:16

## 2023-04-20 RX ADMIN — LACOSAMIDE 150 MG: 10 INJECTION INTRAVENOUS at 15:19

## 2023-04-20 RX ADMIN — LEVETIRACETAM 2000 MG: 100 INJECTION, SOLUTION INTRAVENOUS at 21:48

## 2023-04-20 RX ADMIN — SODIUM CHLORIDE: 9 INJECTION, SOLUTION INTRAVENOUS at 08:59

## 2023-04-20 RX ADMIN — ENOXAPARIN SODIUM 40 MG: 100 INJECTION SUBCUTANEOUS at 09:16

## 2023-04-20 RX ADMIN — FAMOTIDINE 20 MG: 10 INJECTION, SOLUTION INTRAVENOUS at 21:36

## 2023-04-20 RX ADMIN — SODIUM CHLORIDE: 9 INJECTION, SOLUTION INTRAVENOUS at 19:01

## 2023-04-20 RX ADMIN — DIATRIZOATE MEGLUMINE AND DIATRIZOATE SODIUM 75 ML: 660; 100 LIQUID ORAL; RECTAL at 13:22

## 2023-04-20 RX ADMIN — FAMOTIDINE 20 MG: 10 INJECTION, SOLUTION INTRAVENOUS at 13:25

## 2023-04-20 NOTE — PLAN OF CARE
Problem: Discharge Planning  Goal: Discharge to home or other facility with appropriate resources  Outcome: Progressing  Flowsheets (Taken 4/20/2023 0213)  Discharge to home or other facility with appropriate resources:   Identify barriers to discharge with patient and caregiver   Arrange for needed discharge resources and transportation as appropriate     Problem: Skin/Tissue Integrity  Goal: Absence of new skin breakdown  Description: 1. Monitor for areas of redness and/or skin breakdown  2. Assess vascular access sites hourly  3. Every 4-6 hours minimum:  Change oxygen saturation probe site  4. Every 4-6 hours:  If on nasal continuous positive airway pressure, respiratory therapy assess nares and determine need for appliance change or resting period.   Outcome: Progressing  Note: Reposition patient     Problem: Safety - Adult  Goal: Free from fall injury  Outcome: Progressing  Flowsheets (Taken 4/20/2023 0213)  Free From Fall Injury: Instruct family/caregiver on patient safety

## 2023-04-20 NOTE — CARE COORDINATION
Case Management Assessment  Initial Evaluation    Date/Time of Evaluation: 4/20/2023 1:32 PM  Assessment Completed by: PITER Arreola    If patient is discharged prior to next notation, then this note serves as note for discharge by case management. Patient Name: Eleazar Ramos                   YOB: 1955  Diagnosis: SBO (small bowel obstruction) ([facility] [address] [K56.609]                   Date / Time: 4/19/2023 11:47 AM    Patient Admission Status: Inpatient   Readmission Risk (Low < 19, Mod (19-27), High > 27): Readmission Risk Score: 21.1    Current PCP: Eugenia Castillo MD  PCP verified by CM? Yes    Chart Reviewed: Yes      History Provided by: Medical Record, Other (see comment) (guardian)  Patient Orientation: Alert and Oriented    Patient Cognition: Long Term Memory Deficit    Hospitalization in the last 30 days (Readmission):  Yes    If yes, Readmission Assessment in CM Navigator will be completed. Advance Directives:      Code Status: Full Code   Patient's Primary Decision Maker is:      Primary Decision Maker: [address], Legal Guardian - [phone]    Discharge Planning:    Patient lives with: Other (Comment) (group home) Type of Home: Group Home  Primary Care Giver: Other (Comment) (group home)  Patient Support Systems include: Family Members, Other (Comment) (group home)   Current Financial resources: Medicaid, Medicare  Current community resources: Other (Comment) (group home / MRDD services)  [facility] [address] [facility]            Current DME: Waucoma Yong, Wheelchair            Type of Home Care services:  Nursing Services    ADLS  Prior functional level: Assistance with the following:  Current functional level: Assistance with the following:    PT AM-PAC:   /24  OT AM-PAC:   /24    Family can provide assistance at DC:  Yes  Would you like Case Management to discuss the discharge

## 2023-04-20 NOTE — PLAN OF CARE
Problem: Discharge Planning  Goal: Discharge to home or other facility with appropriate resources  4/20/2023 1027 by Elin Lowe RN  Outcome: Progressing  Flowsheets (Taken 4/20/2023 0700)  Discharge to home or other facility with appropriate resources: Identify barriers to discharge with patient and caregiver  4/20/2023 0213 by Frank Muhammad RN  Outcome: Progressing  Flowsheets (Taken 4/20/2023 0213)  Discharge to home or other facility with appropriate resources:   Identify barriers to discharge with patient and caregiver   Arrange for needed discharge resources and transportation as appropriate     Problem: Skin/Tissue Integrity  Goal: Absence of new skin breakdown  Description: 1. Monitor for areas of redness and/or skin breakdown  2. Assess vascular access sites hourly  3. Every 4-6 hours minimum:  Change oxygen saturation probe site  4. Every 4-6 hours:  If on nasal continuous positive airway pressure, respiratory therapy assess nares and determine need for appliance change or resting period.   4/20/2023 1027 by Elin Lowe RN  Outcome: Progressing  4/20/2023 0416 by Frank Muhammad RN  Note:     4/20/2023 0213 by Frank Muhammad RN  Outcome: Progressing  Note: Reposition patient     Problem: Safety - Adult  Goal: Free from fall injury  4/20/2023 1027 by Elin Lowe RN  Outcome: Progressing  Flowsheets (Taken 4/20/2023 1026)  Free From Fall Injury: Alannah Ferraro family/caregiver on patient safety  4/20/2023 0213 by Frank Muhammad RN  Outcome: Progressing  Flowsheets (Taken 4/20/2023 0213)  Free From Fall Injury: Instruct family/caregiver on patient safety

## 2023-04-20 NOTE — H&P
History and Physical    Patient:  Germán Scott  MRN: [id number]    Chief Complaint:  Vomiting per ER Notes    History Obtained From:  electronic medical record, reason patient could not give history: MRDD    PCP: Kendall Dunham MD    History of Present Illness: The patient is a 78 y.o. male who presented to the ER from group home due to vomiting and diarrhea. No melena or hematochezia  Patient has MRDD and unable to provide details. During his evaluation in ER he was found to have SBO and admitted with permission of General Surgery. NG was placed to LIWS and large amount of output was seen. This admission will make 3 hospitalizations since March for the same thing. 4/10/2023-4/13/2023-NG and series of xrays and resolved but had an ileus. 3/6/2023-3/10/2023-Gastrografin with SBS showed obstruction resolved. Past Medical History:        Diagnosis Date    MR (mental retardation)     Seizures ([address]     Ventral hernia        Past Surgical History:        Procedure Laterality Date    ABDOMEN SURGERY  2013, 2014    Bowel Obstruction X2    CAST APPLICATION Left     Lt. Ankle , X3    HYDROCELE EXCISION  08/2016    LAPAROSCOPY  8/17/14    with BERNARD    LAPAROTOMY  8/17/14    with partial cecectomy    VAGAL NERVE STIMULATION      placed, then replaced    VAGAL NERVE STIMULATION  10/11/2016    replaced generator battery       Medications Prior to Admission:    Prior to Admission medications    Medication Sig Start Date End Date Taking?  Authorizing Provider   polyethylene glycol (GLYCOLAX) 17 g packet Take 17 g by mouth daily 4/13/23 5/13/23  Felicita Quevedo, APRN - CNP   lamoTRIgine (LAMICTAL) 200 MG tablet Take 2 tablets by mouth every morning    Historical Provider, MD   lamoTRIgine (LAMICTAL) 200 MG tablet Take 3 tablets by mouth at bedtime    Historical Provider, MD   levETIRAcetam (KEPPRA) 1000 MG tablet Take 2 tablets by mouth every evening    Historical Provider, MD   primidone (MYSOLINE)

## 2023-04-21 ENCOUNTER — APPOINTMENT (OUTPATIENT)
Dept: GENERAL RADIOLOGY | Age: 68
End: 2023-04-21
Payer: MEDICARE

## 2023-04-21 VITALS
OXYGEN SATURATION: 96 % | TEMPERATURE: 97.3 F | BODY MASS INDEX: 29.25 KG/M2 | RESPIRATION RATE: 18 BRPM | DIASTOLIC BLOOD PRESSURE: 71 MMHG | SYSTOLIC BLOOD PRESSURE: 114 MMHG | HEART RATE: 57 BPM | WEIGHT: 182 LBS | HEIGHT: 66 IN

## 2023-04-21 LAB
ABSOLUTE EOS #: 0.25 K/UL (ref 0–0.44)
ABSOLUTE IMMATURE GRANULOCYTE: <0.03 K/UL (ref 0–0.3)
ABSOLUTE LYMPH #: 1.16 K/UL (ref 1.1–3.7)
ABSOLUTE MONO #: 0.48 K/UL (ref 0.1–1.2)
BASOPHILS # BLD: 1 % (ref 0–2)
BASOPHILS ABSOLUTE: 0.04 K/UL (ref 0–0.2)
CAMPYLOBACTER DNA SPEC NAA+PROBE: NORMAL
EOSINOPHILS RELATIVE PERCENT: 4 % (ref 1–4)
ETEC ELTA+ESTB GENES STL QL NAA+PROBE: NORMAL
HCT VFR BLD AUTO: 29.9 % (ref 40.7–50.3)
HGB BLD-MCNC: 9.8 G/DL (ref 13–17)
IMMATURE GRANULOCYTES: 0 %
LYMPHOCYTES # BLD: 16 % (ref 24–43)
MCH RBC QN AUTO: 33.2 PG (ref 25.2–33.5)
MCHC RBC AUTO-ENTMCNC: 32.8 G/DL (ref 28.4–34.8)
MCV RBC AUTO: 101.4 FL (ref 82.6–102.9)
MONOCYTES # BLD: 7 % (ref 3–12)
NRBC AUTOMATED: 0 PER 100 WBC
P SHIGELLOIDES DNA STL QL NAA+PROBE: NORMAL
PDW BLD-RTO: 12.7 % (ref 11.8–14.4)
PLATELET # BLD AUTO: 191 K/UL (ref 138–453)
PMV BLD AUTO: 8.9 FL (ref 8.1–13.5)
RBC # BLD: 2.95 M/UL (ref 4.21–5.77)
SALMONELLA DNA SPEC QL NAA+PROBE: NORMAL
SEG NEUTROPHILS: 72 % (ref 36–65)
SEGMENTED NEUTROPHILS ABSOLUTE COUNT: 5.18 K/UL (ref 1.5–8.1)
SHIGA TOXIN STX GENE SPEC NAA+PROBE: NORMAL
SHIGELLA DNA SPEC QL NAA+PROBE: NORMAL
SPECIMEN DESCRIPTION: NORMAL
V CHOL+PARA RFBL+TRKH+TNAA STL QL NAA+PR: NORMAL
WBC # BLD AUTO: 7.1 K/UL (ref 3.5–11.3)
Y ENTERO RECN STL QL NAA+PROBE: NORMAL

## 2023-04-21 PROCEDURE — 2580000003 HC RX 258: Performed by: NURSE PRACTITIONER

## 2023-04-21 PROCEDURE — 1200000000 HC SEMI PRIVATE

## 2023-04-21 PROCEDURE — 36416 COLLJ CAPILLARY BLOOD SPEC: CPT

## 2023-04-21 PROCEDURE — 99231 SBSQ HOSP IP/OBS SF/LOW 25: CPT | Performed by: SURGERY

## 2023-04-21 PROCEDURE — 2500000003 HC RX 250 WO HCPCS: Performed by: NURSE PRACTITIONER

## 2023-04-21 PROCEDURE — 74018 RADEX ABDOMEN 1 VIEW: CPT

## 2023-04-21 PROCEDURE — A4216 STERILE WATER/SALINE, 10 ML: HCPCS | Performed by: NURSE PRACTITIONER

## 2023-04-21 PROCEDURE — 97110 THERAPEUTIC EXERCISES: CPT

## 2023-04-21 PROCEDURE — 97162 PT EVAL MOD COMPLEX 30 MIN: CPT

## 2023-04-21 PROCEDURE — 97166 OT EVAL MOD COMPLEX 45 MIN: CPT

## 2023-04-21 PROCEDURE — 85025 COMPLETE CBC W/AUTO DIFF WBC: CPT

## 2023-04-21 PROCEDURE — 6360000002 HC RX W HCPCS: Performed by: NURSE PRACTITIONER

## 2023-04-21 PROCEDURE — 6360000002 HC RX W HCPCS: Performed by: INTERNAL MEDICINE

## 2023-04-21 PROCEDURE — 2580000003 HC RX 258: Performed by: INTERNAL MEDICINE

## 2023-04-21 PROCEDURE — 94761 N-INVAS EAR/PLS OXIMETRY MLT: CPT

## 2023-04-21 PROCEDURE — C9254 INJECTION, LACOSAMIDE: HCPCS | Performed by: NURSE PRACTITIONER

## 2023-04-21 RX ORDER — LEVETIRACETAM 15 MG/ML
1500 INJECTION INTRAVASCULAR EVERY MORNING
Status: DISCONTINUED | OUTPATIENT
Start: 2023-04-22 | End: 2023-04-22 | Stop reason: HOSPADM

## 2023-04-21 RX ORDER — LACOSAMIDE 10 MG/ML
INJECTION, SOLUTION INTRAVENOUS
Status: DISPENSED
Start: 2023-04-21 | End: 2023-04-21

## 2023-04-21 RX ADMIN — FAMOTIDINE 20 MG: 10 INJECTION, SOLUTION INTRAVENOUS at 08:37

## 2023-04-21 RX ADMIN — LEVETIRACETAM 2000 MG: 100 INJECTION, SOLUTION INTRAVENOUS at 20:37

## 2023-04-21 RX ADMIN — LEVETIRACETAM 1500 MG: 100 INJECTION, SOLUTION INTRAVENOUS at 10:13

## 2023-04-21 RX ADMIN — SODIUM CHLORIDE: 9 INJECTION, SOLUTION INTRAVENOUS at 12:38

## 2023-04-21 RX ADMIN — FAMOTIDINE 20 MG: 10 INJECTION, SOLUTION INTRAVENOUS at 20:56

## 2023-04-21 RX ADMIN — LACOSAMIDE 150 MG: 10 INJECTION INTRAVENOUS at 15:39

## 2023-04-21 RX ADMIN — ENOXAPARIN SODIUM 40 MG: 100 INJECTION SUBCUTANEOUS at 08:37

## 2023-04-21 RX ADMIN — SODIUM CHLORIDE: 9 INJECTION, SOLUTION INTRAVENOUS at 03:38

## 2023-04-21 RX ADMIN — LACOSAMIDE 150 MG: 10 INJECTION INTRAVENOUS at 07:22

## 2023-04-21 NOTE — PLAN OF CARE
Problem: Discharge Planning  Goal: Discharge to home or other facility with appropriate resources  Outcome: Progressing  Flowsheets (Taken 4/21/2023 0300)  Discharge to home or other facility with appropriate resources:   Identify barriers to discharge with patient and caregiver   Identify discharge learning needs (meds, wound care, etc)     Problem: Skin/Tissue Integrity  Goal: Absence of new skin breakdown  Description: 1. Monitor for areas of redness and/or skin breakdown  2. Assess vascular access sites hourly  3. Every 4-6 hours minimum:  Change oxygen saturation probe site  4. Every 4-6 hours:  If on nasal continuous positive airway pressure, respiratory therapy assess nares and determine need for appliance change or resting period.   Outcome: Progressing     Problem: Safety - Adult  Goal: Free from fall injury  Flowsheets (Taken 4/21/2023 0300)  Free From Fall Injury: Instruct family/caregiver on patient safety     Problem: Nutrition Deficit:  Goal: Optimize nutritional status  Outcome: Progressing  Flowsheets (Taken 4/21/2023 0300)  Nutrient intake appropriate for improving, restoring, or maintaining nutritional needs:   Monitor oral intake, labs, and treatment plans   Provide specific nutrition education to patient or family as appropriate

## 2023-04-21 NOTE — DISCHARGE SUMMARY
reconstruction, and/or weight based adjustment of the mA/kV was utilized to reduce the radiation dose to as low as reasonably achievable. COMPARISON: None. HISTORY: ORDERING SYSTEM PROVIDED HISTORY: Abdominal distention rule out SBO TECHNOLOGIST PROVIDED HISTORY: Abdominal distention rule out SBO Decision Support Exception - unselect if not a suspected or confirmed emergency medical condition->Emergency Medical Condition (MA) FINDINGS: Lower Chest: Bibasilar atelectasis. Coronary artery disease. Organs: Liver is normal in size and density. No focal masses identified. No evidence of intrahepatic ductal dilatation. Spleen is normal size. The gallbladder is unremarkable. Both adrenal glands are normal.  Pancreas is normal in appearance. Evidence of a chronic subcapsular hematoma with calcific rim along the posterior aspect of the left kidney. .  The kidneys are otherwise normal in size and attenuation without evidence of hydronephrosis or renal calculi. GI/Bowel: Fluid-filled dilated loops of small bowel and stomach. Colon is somewhat decompressed. The small hiatal hernia Pelvis: No intrapelvic mass is identified. Bladder and rectum are intact. Peritoneum/Retroperitoneum: No free fluid. No lymphadenopathy. No evidence of pneumoperitoneum. Bones/Soft Tissues: . The abdominal and pelvic walls are unremarkable. Degenerative changes seen in the visualized spine . No acute bony abnormalities. Vascular calcifications are seen compatible with atherosclerotic disease.      Small-bowel obstruction with transition point possibly in the ileum RECOMMENDATIONS: Surgical consultation     XR ABDOMEN FOR NG/OG/NE TUBE PLACEMENT    Result Date: 4/19/2023  EXAMINATION: ONE SUPINE XRAY VIEW(S) OF THE ABDOMEN 4/19/2023 3:46 pm COMPARISON: 04/12/2023 HISTORY: ORDERING SYSTEM PROVIDED HISTORY: Confirmation of course of NG/OG/NE tube and location of tip of tube TECHNOLOGIST PROVIDED HISTORY: Confirmation of course of NG/OG/NE tube

## 2023-04-22 NOTE — PROGRESS NOTES
.GENERAL SURGERY PROGRESS NOTE- inpatient      PATIENT NAME: Jeremy King     DATE: 4/22/2023    SUBJECTIVE:    Patient ***     OBJECTIVE:   VITALS:  /71   Pulse 57   Temp 97.3 °F (36.3 °C) (Temporal)   Resp 18   Ht 5' 6\" (1.676 m)   Wt 182 lb (82.6 kg)   SpO2 96%   BMI 29.38 kg/m²    height is 5' 6\" (1.676 m) and weight is 182 lb (82.6 kg). His temporal temperature is 97.3 °F (36.3 °C). His blood pressure is 114/71 and his pulse is 57. His respiration is 18 and oxygen saturation is 96%. INTAKE/OUTPUT:    I/O last 3 completed shifts: In: 7430 [P.O.:100; I.V.:4649]  Out: 2623 [CPINH:7217; Emesis/NG output:225]  No intake/output data recorded.               CONSTITUTIONAL:  {AWAKE/FATIGUED:010206397} and {ALERTNESS:859896529}  LUNGS:  {AUSCULTATION:980294696}  HEART:regular rate  ABDOMEN:   {BOWEL SOUNDS:481435766}, {CONSISTENCY:990000137}, {DISTENTION:631692359}, {TENDERNESS:355917778}   INCISION: {Exam; wound:28001}  EXTREMITIES: no c/c/e      Data:  CBC:   Recent Labs     04/19/23  1210 04/20/23  0610 04/21/23  0615   WBC 12.0* 7.5 7.1   HGB 12.9* 11.1* 9.8*   HCT 38.8* 33.8* 29.9*   PLT See Reflexed IPF Result 248 191     BMP:    Recent Labs     04/19/23  1230      K 3.8   CL 99   CO2 28   BUN 19   CREATININE 1.04   GLUCOSE 105*     Hepatic:   Recent Labs     04/19/23  1230   AST 19   ALT 19   BILITOT 0.3   ALKPHOS 131*         ASSESSMENT & PLAN:        Dr. Lisa Wilkerson          ***pste studies imaging, plan:Positive sbo studies, spoke with primary, patient's guardian/POA want transferred to [facility]
Assessment and vitals obtained at this time as charted. Pt A&O x4 and denies pain. Lungs are clear/diminished and pt is 98% on room air. Pt denies any further needs at this time. Call light within reach, care ongoing.
Called [facility] and gave report to nurse who will be taking patient at this time. Encouraged to call back if have any questions.
Dr Demetra Hope notified of patient large BM last night. Wants to confirm that NG is in correct placement, repeat xray. If placement is correct, Dr Krysta Thomas would like patients NG clammed and to try clear liquids.
I attempted to call back and speak to [facility], Legal Guardian to update her on the acceptance to [facility] and awaiting Transport as he has a bed assignment there, however she did not answer and her Voicemail box was full. I did speak with her in the morning and her request was to go to [facility] to the surgeons that have treated him before.       Hanh Owens, APRN - CNP
Patient left with transport at this time. Report and envelope with paperwork given to transport staff. Patient left with IV in right arm and NG in that was clamped for transport. Personal wheelchair, helmet, seizure magnet, coat and other personal belongings sent with patient. Writer to call report to [facility].
Patient removed NG. Writer notified Gage Benito CNP and okay to leave out.  Will try clear liquids
Patient resting comfortable in bed awake and watching television at this time. Brief is dry. Patient declines any needs at this time. Call light, bedside table and personal belongings within reach. Bed alarm on.
Patient resting in bed with eyes closed, easily awoken. Denies pain. NG connected to low intermittent suction. VS obtained by student RN and reviewed by Yessy So. Patients brief dry at this time. Will continue to monitor.
Patient's cleaned and washed up at this time. Brief and gown changed. Deoderant and baby powder applied. Flat sheet placed underneath patient for transport. Patient got comfortable in bed. Fluids and telemetry removed. Personal belongings gathered. Patient expresses comfort. Patient denies any other needs at this time. Call light, bedside table and personal belongings within reach.
Physician Progress Note      Kalen Mendosa  [facility] #:                  [id number]  :                       1955  ADMIT DATE:       2023 11:47 AM  DISCH DATE:  RESPONDING  PROVIDER #:        Minoo Delgadillo MD          QUERY TEXT:    Patient admitted with small bowel obstruction. Noted documentation in H&P:  \" has MRDD and unable to provide details\"  If possible, please document in the progress notes and discharge summary if   you are evaluating and/or treating any of the following: The medical record reflects the following:  Risk Factors: In H&P there is problem list entry dated : MR (mental   retardation), severe  Clinical Indicators:  from group home,  Per H&P: \" has MRDD and unable to   provide details. .. Awake, alert and pleasant\"  Treatment: standard safety measures, fall risk interventions, assistance with   mobility    Nathaly Baig, MSN, RN, CCDS, [facility]  Clinical   [phone]  Options provided:  -- Borderline intellectual disabilities  -- Mild intellectual disabilities  -- Moderate intellectual disabilities  -- Profound intellectual disabilities  -- Severe intellectual disabilities  -- Intellectual disabilities with autistic features  -- Other - I will add my own diagnosis  -- Disagree - Not applicable / Not valid  -- Disagree - Clinically unable to determine / Unknown  -- Refer to Clinical Documentation Reviewer    PROVIDER RESPONSE TEXT:    This patient has profound intellectual disabilities.     Query created by: Jose L Avendano on 2023 2:36 PM      Electronically signed by:  Minoo Delgadillo MD 2023 4:14 PM
Progress Note    SUBJECTIVE:    Patient seen for f/u of SBO (small bowel obstruction) ([address] He resting in bed no distress. No complaints or concerns per nursing. Had large BM last evening and soft seedy stool today    ROS:   Constitutional: negative  for fevers, and negative for chills. Respiratory: negative for shortness of breath, negative for cough, and negative for wheezing  Cardiovascular: negative for chest pain, and negative for palpitations  Gastrointestinal: negative for abdominal pain, negative for nausea,negative for vomiting, negative for diarrhea, and negative for constipation     All other systems were reviewed with the patient and are negative unless otherwise stated in HPI      OBJECTIVE:      Vitals:   Vitals:    04/21/23 0630   BP: (!) 102/48   Pulse: (!) 47   Resp: 20   Temp: 97.8 °F (36.6 °C)   SpO2: 97%     Weight: 182 lb (82.6 kg)   Height: 5' 6\" (167.6 cm)     Weight  Wt Readings from Last 3 Encounters:   04/21/23 182 lb (82.6 kg)   04/13/23 182 lb 15.7 oz (83 kg)   03/10/23 183 lb (83 kg)     Body mass index is 29.38 kg/m². 24HR INTAKE/OUTPUT:      Intake/Output Summary (Last 24 hours) at 4/21/2023 0846  Last data filed at 4/21/2023 0539  Gross per 24 hour   Intake 4649 ml   Output 1200 ml   Net 3449 ml     -----------------------------------------------------------------  Exam:    GEN:    Awake, alert and  pleasant   EYES:  EOMI, pupils equal   NECK: Supple. No lymphadenopathy. No carotid bruit  CVS:    regular rate and rhythm, no audible murmur  PULM:  CTA, no wheezes, rales or rhonchi, no acute respiratory distress  ABD:    Bowels sounds normal.  Abdomen is soft. No distention. no tenderness to palpation. EXT:   no edema bilaterally . No calf tenderness. NEURO: Moves all extremities. Motor and sensory are grossly intact  SKIN:  No rashes.   No skin lesions.    -----------------------------------------------------------------    Diagnostic Data:      Complete Blood Count:
Pt brought up from ER at this time. Report received at bedside at this time.
Updated patients sister with Eta of midnight
Vitals and Assessment completed at this time. See Flowsheet for details. Pt is currently resting in bed quietly with no signs of distress. Pt alert and oriented x4. Pt denies any pain or shortness of breath. Pt states he just wants to sleep a while. Pt denies any further needs at this time. Call light and personal belongings in reach, care ongoing.
Vitals and assessment done at this time. See flow sheet for more details. Pt resting in bed at this time. Pt denied any pain. Pt oriented x4. Pt is having diarrhea tan/brown watery. Pt denied any pain at this time. Call light within reach. Will continue to monitor.
Writer attempted to call patients guardian, no answer and no voicemail available.
Writer spoke with patients sister. Updated her about transfer.
assess
127.5 % IBW. Weight Source: Bed Scale  Current BMI (kg/m2): 29.2  Usual Body Weight: 188 lb (85.3 kg)  % Weight Change (Calculated): -3.7  Weight Adjustment For: No Adjustment                 BMI Categories: Overweight (BMI 25.0-29. 9)    Estimated Daily Nutrient Needs:  Energy Requirements Based On: Kcal/kg  Weight Used for Energy Requirements: Current  Energy (kcal/day): 1977-7836 (20-25 k/kcal)  Weight Used for Protein Requirements: Ideal  Protein (g/day): 84-96 (1.3-1.5g/kg)  Method Used for Fluid Requirements: 1 ml/kcal  Fluid (ml/day): 2050mL    Nutrition Diagnosis:   Moderate malnutrition related to altered GI function as evidenced by NPO or clear liquid status due to medical condition, weight loss greater than or equal to 2% in 1 week, nausea, vomiting, diarrhea    Nutrition Interventions:   Food and/or Nutrient Delivery: Start Oral Diet  Nutrition Education/Counseling: Education not appropriate  Coordination of Nutrition Care: Continue to monitor while inpatient       Goals:     Goals: Initiate PO diet       Nutrition Monitoring and Evaluation:   Behavioral-Environmental Outcomes: None Identified  Food/Nutrient Intake Outcomes: Food and Nutrient Intake, Diet Advancement/Tolerance  Physical Signs/Symptoms Outcomes: Biochemical Data, Weight, GI Status    Discharge Planning:    Continue current diet     [address] [place]: [id number]
History  Social/Functional History  Lives With: Home care staff  Type of Home:  (Group home)  Home Layout: One level  Additional Comments: Per chart reveiw, patient lives in group home in [place]. Objective   Heart Rate: (!) 47  Heart Rate Source: Monitor; Apical  BP: (!) 102/48  BP Location: Left upper arm  BP Method: Automatic  Patient Position: High fowlers  MAP (Calculated): 66  Resp: 20  SpO2: 97 %  O2 Device: None (Room air)             Safety Devices  Type of Devices: Call light within reach; All fall risk precautions in place; Chair alarm in place; Left in chair;Nurse notified  Balance  Sitting: With support  Standing: Impaired  Standing - Static: Fair  Standing - Dynamic: Fair  Gait  Overall Level of Assistance: Assist X1;Contact-guard assistance (Patient impulsive with decreased safety awareness with funcitonal mobility.)  Assistive Device: Walker, rolling     AROM: Within functional limits  PROM: Within functional limits  Strength: Generally decreased, functional  Coordination: Generally decreased, functional  Tone: Normal  Sensation: Intact  ADL  Feeding: NPO  Grooming: Maximum assistance  UE Bathing: Maximum assistance  LE Bathing: Maximum assistance  UE Dressing: Maximum assistance  LE Dressing: Maximum assistance  Toileting: Maximum assistance        Bed mobility  Sit to Supine: Moderate assistance  Transfers  Stand Step Transfers: Contact guard assistance  Sit to stand: Contact guard assistance  Stand to sit: Contact guard assistance  Transfer Comments: patient impusive  Vision  Vision: Within Functional Limits  Hearing  Hearing: Within functional limits  Cognition  Overall Cognitive Status: Exceptions  Arousal/Alertness: Appropriate responses to stimuli  Following Commands:  Follows one step commands with increased time  Attention Span: Appears intact  Problem Solving: Assistance required to generate solutions  Orientation  Orientation Level: Oriented to person                  Education
Program  Education Method: Demonstration;Verbal  Barriers to Learning: Cognition  Education Outcome: Continued education needed    Goals  Short Term Goals  Time Frame for Short Term Goals: 21 visits  Short Term Goal 1: Patient to complete ADL transfers with SUP with improved safety. Short Term Goal 2: Patient to engage in daily ADL routine c Min A to improve ADL independence. Short Term Goal 3: Patient to engage in 15 minutes of ther ex/ther act/ROM to improve strength and activity tolerance during ADL.        Therapy Time   Individual Concurrent Group Co-treatment   Time In 1036         Time Out 1101         Minutes 25                 SANDRA Byrne
awareness  Transfers  Sit to Stand: Minimal Assistance  Stand to Sit: Minimal Assistance  Comment: Max cues for safe technique with poor follow through  Ambulation  Surface: Level tile  Device: Rolling Walker  Assistance: Minimal assistance; Moderate assistance  Distance: Pt amb 10ftx2 with FWW and min/modAx1; often abandons AD and tries to sit down in the middle of the floor; very poor safety awareness. Balance  Sitting - Static: Good  Sitting - Dynamic: Fair;+  Standing - Static: Fair;-  Standing - Dynamic: Poor           AM-PAC Score     AM-PAC Inpatient Mobility without Stair Climbing Raw Score : 13 (04/21/23 1259)  AM-PAC Inpatient without Stair Climbing T-Scale Score : 38.96 (04/21/23 1259)  Mobility Inpatient CMS 0-100% Score: 58.44 (04/21/23 1259)  Mobility Inpatient without Stair CMS G-Code Modifier : CK (04/21/23 1259)       Goals  Short Term Goals  Time Frame for Short Term Goals: 20 days  Short Term Goal 1: Patient to complete all transfers with SUP and no LOB to decrease fall risk. Short Term Goal 2: Patient to ambulate 25ft with FWW and CGA with no LOB and min cues for safety to improve mobility. Short Term Goal 3: Patient to tolerate 20-30 min of ther ex/act to improve functional strength. Short Term Goal 4: Patient to have Fair static standing balance to decrease fall risk. Education  Patient Education  Education Given To: Patient  Education Provided: Role of Therapy;Plan of Care;Transfer Training  Education Method: Verbal;Demonstration  Barriers to Learning: Cognition  Education Outcome: Continued education needed; Unable to demonstrate understanding      Therapy Time   Individual Concurrent Group Co-treatment   Time In 1240         Time Out 1255         Minutes 15         Timed Code Treatment Minutes: [address], PT, DPT

## 2023-07-01 ENCOUNTER — HOSPITAL ENCOUNTER (INPATIENT)
Age: 68
LOS: 5 days | Discharge: HOME | DRG: 389 | End: 2023-07-06
Attending: STUDENT IN AN ORGANIZED HEALTH CARE EDUCATION/TRAINING PROGRAM | Admitting: STUDENT IN AN ORGANIZED HEALTH CARE EDUCATION/TRAINING PROGRAM
Payer: MEDICARE

## 2023-07-01 ENCOUNTER — APPOINTMENT (OUTPATIENT)
Dept: GENERAL RADIOLOGY | Age: 68
DRG: 389 | End: 2023-07-01
Payer: MEDICARE

## 2023-07-01 ENCOUNTER — APPOINTMENT (OUTPATIENT)
Dept: CT IMAGING | Age: 68
DRG: 389 | End: 2023-07-01
Payer: MEDICARE

## 2023-07-01 DIAGNOSIS — K56.609 SMALL BOWEL OBSTRUCTION (HCC): Primary | ICD-10-CM

## 2023-07-01 LAB
ALBUMIN SERPL-MCNC: 4.7 G/DL (ref 3.5–5.2)
ALBUMIN/GLOB SERPL: 1.4 {RATIO} (ref 1–2.5)
ALP SERPL-CCNC: 159 U/L (ref 40–129)
ALT SERPL-CCNC: 24 U/L (ref 5–41)
ANION GAP SERPL CALCULATED.3IONS-SCNC: 12 MMOL/L (ref 9–17)
AST SERPL-CCNC: 20 U/L
BASOPHILS # BLD: 0.04 K/UL (ref 0–0.2)
BASOPHILS NFR BLD: 0 % (ref 0–2)
BILIRUB SERPL-MCNC: 0.3 MG/DL (ref 0.3–1.2)
BUN SERPL-MCNC: 29 MG/DL (ref 8–23)
BUN/CREAT SERPL: 25 (ref 9–20)
CALCIUM SERPL-MCNC: 9.6 MG/DL (ref 8.6–10.4)
CHLORIDE SERPL-SCNC: 99 MMOL/L (ref 98–107)
CO2 SERPL-SCNC: 31 MMOL/L (ref 20–31)
CREAT SERPL-MCNC: 1.15 MG/DL (ref 0.7–1.2)
EOSINOPHIL # BLD: <0.03 K/UL (ref 0–0.44)
EOSINOPHILS RELATIVE PERCENT: 0 % (ref 1–4)
ERYTHROCYTE [DISTWIDTH] IN BLOOD BY AUTOMATED COUNT: 13.1 % (ref 11.8–14.4)
GFR SERPL CREATININE-BSD FRML MDRD: >60 ML/MIN/1.73M2
GLUCOSE SERPL-MCNC: 113 MG/DL (ref 70–99)
HCT VFR BLD AUTO: 41.5 % (ref 40.7–50.3)
HGB BLD-MCNC: 13.7 G/DL (ref 13–17)
IMM GRANULOCYTES # BLD AUTO: 0.04 K/UL (ref 0–0.3)
IMM GRANULOCYTES NFR BLD: 0 %
LACTATE BLDV-SCNC: 1.3 MMOL/L (ref 0.5–2.2)
LIPASE SERPL-CCNC: 27 U/L (ref 13–60)
LYMPHOCYTES # BLD: 9 % (ref 24–43)
LYMPHOCYTES NFR BLD: 1.33 K/UL (ref 1.1–3.7)
MCH RBC QN AUTO: 32.3 PG (ref 25.2–33.5)
MCHC RBC AUTO-ENTMCNC: 33 G/DL (ref 28.4–34.8)
MCV RBC AUTO: 97.9 FL (ref 82.6–102.9)
MONOCYTES NFR BLD: 0.72 K/UL (ref 0.1–1.2)
MONOCYTES NFR BLD: 5 % (ref 3–12)
NEUTROPHILS NFR BLD: 86 % (ref 36–65)
NEUTS SEG NFR BLD: 12.71 K/UL (ref 1.5–8.1)
NRBC BLD-RTO: 0 PER 100 WBC
PLATELET # BLD AUTO: 245 K/UL (ref 138–453)
PMV BLD AUTO: 9.1 FL (ref 8.1–13.5)
POTASSIUM SERPL-SCNC: 3.7 MMOL/L (ref 3.7–5.3)
PROT SERPL-MCNC: 8.1 G/DL (ref 6.4–8.3)
RBC # BLD AUTO: 4.24 M/UL (ref 4.21–5.77)
SODIUM SERPL-SCNC: 142 MMOL/L (ref 135–144)
WBC OTHER # BLD: 14.9 K/UL (ref 3.5–11.3)

## 2023-07-01 PROCEDURE — 1200000000 HC SEMI PRIVATE

## 2023-07-01 PROCEDURE — 6360000004 HC RX CONTRAST MEDICATION: Performed by: PHYSICIAN ASSISTANT

## 2023-07-01 PROCEDURE — 83605 ASSAY OF LACTIC ACID: CPT

## 2023-07-01 PROCEDURE — 6360000002 HC RX W HCPCS: Performed by: PHYSICIAN ASSISTANT

## 2023-07-01 PROCEDURE — 96374 THER/PROPH/DIAG INJ IV PUSH: CPT

## 2023-07-01 PROCEDURE — 99285 EMERGENCY DEPT VISIT HI MDM: CPT

## 2023-07-01 PROCEDURE — 36415 COLL VENOUS BLD VENIPUNCTURE: CPT

## 2023-07-01 PROCEDURE — 83690 ASSAY OF LIPASE: CPT

## 2023-07-01 PROCEDURE — 80053 COMPREHEN METABOLIC PANEL: CPT

## 2023-07-01 PROCEDURE — 2580000003 HC RX 258: Performed by: PHYSICIAN ASSISTANT

## 2023-07-01 PROCEDURE — 74018 RADEX ABDOMEN 1 VIEW: CPT

## 2023-07-01 PROCEDURE — 94760 N-INVAS EAR/PLS OXIMETRY 1: CPT

## 2023-07-01 PROCEDURE — 2580000003 HC RX 258: Performed by: STUDENT IN AN ORGANIZED HEALTH CARE EDUCATION/TRAINING PROGRAM

## 2023-07-01 PROCEDURE — 74177 CT ABD & PELVIS W/CONTRAST: CPT

## 2023-07-01 PROCEDURE — 85027 COMPLETE CBC AUTOMATED: CPT

## 2023-07-01 RX ORDER — ACETAMINOPHEN 325 MG/1
650 TABLET ORAL EVERY 6 HOURS PRN
Status: DISCONTINUED | OUTPATIENT
Start: 2023-07-01 | End: 2023-07-06 | Stop reason: HOSPADM

## 2023-07-01 RX ORDER — SODIUM CHLORIDE 0.9 % (FLUSH) 0.9 %
10 SYRINGE (ML) INJECTION PRN
Status: DISCONTINUED | OUTPATIENT
Start: 2023-07-01 | End: 2023-07-06 | Stop reason: HOSPADM

## 2023-07-01 RX ORDER — HYDROXYZINE HYDROCHLORIDE 25 MG/1
25 TABLET, FILM COATED ORAL EVERY 12 HOURS PRN
Status: DISCONTINUED | OUTPATIENT
Start: 2023-07-01 | End: 2023-07-06 | Stop reason: HOSPADM

## 2023-07-01 RX ORDER — POLYETHYLENE GLYCOL 3350 17 G/17G
17 POWDER, FOR SOLUTION ORAL DAILY PRN
Status: DISCONTINUED | OUTPATIENT
Start: 2023-07-01 | End: 2023-07-06 | Stop reason: HOSPADM

## 2023-07-01 RX ORDER — LATANOPROST 50 UG/ML
1 SOLUTION/ DROPS OPHTHALMIC NIGHTLY
Status: DISCONTINUED | OUTPATIENT
Start: 2023-07-01 | End: 2023-07-06 | Stop reason: HOSPADM

## 2023-07-01 RX ORDER — ONDANSETRON 2 MG/ML
4 INJECTION INTRAMUSCULAR; INTRAVENOUS EVERY 6 HOURS PRN
Status: DISCONTINUED | OUTPATIENT
Start: 2023-07-01 | End: 2023-07-06 | Stop reason: HOSPADM

## 2023-07-01 RX ORDER — LAMOTRIGINE 100 MG/1
600 TABLET ORAL NIGHTLY
Status: DISCONTINUED | OUTPATIENT
Start: 2023-07-01 | End: 2023-07-06 | Stop reason: HOSPADM

## 2023-07-01 RX ORDER — FINASTERIDE 5 MG/1
5 TABLET, FILM COATED ORAL NIGHTLY
Status: DISCONTINUED | OUTPATIENT
Start: 2023-07-01 | End: 2023-07-06 | Stop reason: HOSPADM

## 2023-07-01 RX ORDER — MAGNESIUM SULFATE IN WATER 40 MG/ML
2000 INJECTION, SOLUTION INTRAVENOUS PRN
Status: DISCONTINUED | OUTPATIENT
Start: 2023-07-01 | End: 2023-07-06 | Stop reason: HOSPADM

## 2023-07-01 RX ORDER — SODIUM CHLORIDE, SODIUM LACTATE, POTASSIUM CHLORIDE, CALCIUM CHLORIDE 600; 310; 30; 20 MG/100ML; MG/100ML; MG/100ML; MG/100ML
INJECTION, SOLUTION INTRAVENOUS CONTINUOUS
Status: DISCONTINUED | OUTPATIENT
Start: 2023-07-01 | End: 2023-07-05

## 2023-07-01 RX ORDER — SENNA PLUS 8.6 MG/1
1 TABLET ORAL 2 TIMES DAILY
Status: DISCONTINUED | OUTPATIENT
Start: 2023-07-01 | End: 2023-07-05

## 2023-07-01 RX ORDER — LANOLIN ALCOHOL/MO/W.PET/CERES
3 CREAM (GRAM) TOPICAL NIGHTLY PRN
Status: DISCONTINUED | OUTPATIENT
Start: 2023-07-01 | End: 2023-07-06 | Stop reason: HOSPADM

## 2023-07-01 RX ORDER — SODIUM CHLORIDE 9 MG/ML
INJECTION, SOLUTION INTRAVENOUS PRN
Status: DISCONTINUED | OUTPATIENT
Start: 2023-07-01 | End: 2023-07-06 | Stop reason: HOSPADM

## 2023-07-01 RX ORDER — SODIUM CHLORIDE 0.9 % (FLUSH) 0.9 %
10 SYRINGE (ML) INJECTION EVERY 12 HOURS SCHEDULED
Status: DISCONTINUED | OUTPATIENT
Start: 2023-07-01 | End: 2023-07-06 | Stop reason: HOSPADM

## 2023-07-01 RX ORDER — VITAMIN B COMPLEX
1000 TABLET ORAL DAILY
Status: DISCONTINUED | OUTPATIENT
Start: 2023-07-02 | End: 2023-07-06 | Stop reason: HOSPADM

## 2023-07-01 RX ORDER — ACETAMINOPHEN 325 MG/1
650 TABLET ORAL EVERY 4 HOURS PRN
Status: DISCONTINUED | OUTPATIENT
Start: 2023-07-01 | End: 2023-07-01 | Stop reason: SDUPTHER

## 2023-07-01 RX ORDER — LEVETIRACETAM 500 MG/1
1500 TABLET ORAL EVERY MORNING
Status: DISCONTINUED | OUTPATIENT
Start: 2023-07-02 | End: 2023-07-06 | Stop reason: HOSPADM

## 2023-07-01 RX ORDER — LORAZEPAM 0.5 MG/1
0.5 TABLET ORAL EVERY 8 HOURS PRN
Status: DISCONTINUED | OUTPATIENT
Start: 2023-07-01 | End: 2023-07-06 | Stop reason: HOSPADM

## 2023-07-01 RX ORDER — POTASSIUM CHLORIDE 7.45 MG/ML
10 INJECTION INTRAVENOUS PRN
Status: DISCONTINUED | OUTPATIENT
Start: 2023-07-01 | End: 2023-07-03

## 2023-07-01 RX ORDER — TRAZODONE HYDROCHLORIDE 50 MG/1
50 TABLET ORAL NIGHTLY
Status: DISCONTINUED | OUTPATIENT
Start: 2023-07-01 | End: 2023-07-06 | Stop reason: HOSPADM

## 2023-07-01 RX ORDER — ACETAMINOPHEN 650 MG/1
650 SUPPOSITORY RECTAL EVERY 6 HOURS PRN
Status: DISCONTINUED | OUTPATIENT
Start: 2023-07-01 | End: 2023-07-06 | Stop reason: HOSPADM

## 2023-07-01 RX ORDER — ONDANSETRON 4 MG/1
4 TABLET, ORALLY DISINTEGRATING ORAL EVERY 8 HOURS PRN
Status: DISCONTINUED | OUTPATIENT
Start: 2023-07-01 | End: 2023-07-06 | Stop reason: HOSPADM

## 2023-07-01 RX ORDER — OXYBUTYNIN CHLORIDE 5 MG/1
10 TABLET, EXTENDED RELEASE ORAL DAILY
Status: DISCONTINUED | OUTPATIENT
Start: 2023-07-02 | End: 2023-07-05

## 2023-07-01 RX ORDER — LAMOTRIGINE 100 MG/1
400 TABLET ORAL EVERY MORNING
Status: DISCONTINUED | OUTPATIENT
Start: 2023-07-02 | End: 2023-07-05

## 2023-07-01 RX ORDER — MECLIZINE HYDROCHLORIDE 25 MG/1
25 TABLET ORAL EVERY 8 HOURS PRN
Status: DISCONTINUED | OUTPATIENT
Start: 2023-07-01 | End: 2023-07-06 | Stop reason: HOSPADM

## 2023-07-01 RX ORDER — ONDANSETRON 2 MG/ML
4 INJECTION INTRAMUSCULAR; INTRAVENOUS ONCE
Status: COMPLETED | OUTPATIENT
Start: 2023-07-01 | End: 2023-07-01

## 2023-07-01 RX ORDER — POTASSIUM CHLORIDE 20 MEQ/1
40 TABLET, EXTENDED RELEASE ORAL PRN
Status: DISCONTINUED | OUTPATIENT
Start: 2023-07-01 | End: 2023-07-03

## 2023-07-01 RX ORDER — LEVETIRACETAM 500 MG/1
2000 TABLET ORAL EVERY EVENING
Status: DISCONTINUED | OUTPATIENT
Start: 2023-07-01 | End: 2023-07-06 | Stop reason: HOSPADM

## 2023-07-01 RX ORDER — PRIMIDONE 250 MG/1
375 TABLET ORAL NIGHTLY
Status: DISCONTINUED | OUTPATIENT
Start: 2023-07-01 | End: 2023-07-06 | Stop reason: HOSPADM

## 2023-07-01 RX ORDER — 0.9 % SODIUM CHLORIDE 0.9 %
1000 INTRAVENOUS SOLUTION INTRAVENOUS ONCE
Status: COMPLETED | OUTPATIENT
Start: 2023-07-01 | End: 2023-07-01

## 2023-07-01 RX ORDER — DIAZEPAM 10 MG/2ML
20 GEL RECTAL PRN
Status: DISCONTINUED | OUTPATIENT
Start: 2023-07-01 | End: 2023-07-06 | Stop reason: HOSPADM

## 2023-07-01 RX ORDER — LACOSAMIDE 50 MG/1
150 TABLET ORAL 2 TIMES DAILY
Status: DISCONTINUED | OUTPATIENT
Start: 2023-07-01 | End: 2023-07-06 | Stop reason: HOSPADM

## 2023-07-01 RX ORDER — TAMSULOSIN HYDROCHLORIDE 0.4 MG/1
0.4 CAPSULE ORAL 2 TIMES DAILY
Status: DISCONTINUED | OUTPATIENT
Start: 2023-07-01 | End: 2023-07-05

## 2023-07-01 RX ORDER — ONDANSETRON 4 MG/1
4 TABLET, FILM COATED ORAL EVERY 6 HOURS PRN
Status: DISCONTINUED | OUTPATIENT
Start: 2023-07-01 | End: 2023-07-01 | Stop reason: SDUPTHER

## 2023-07-01 RX ORDER — CALCIUM CARBONATE 500 MG/1
1 TABLET, CHEWABLE ORAL 2 TIMES DAILY
Status: DISCONTINUED | OUTPATIENT
Start: 2023-07-01 | End: 2023-07-06 | Stop reason: HOSPADM

## 2023-07-01 RX ORDER — ENOXAPARIN SODIUM 100 MG/ML
40 INJECTION SUBCUTANEOUS DAILY
Status: DISCONTINUED | OUTPATIENT
Start: 2023-07-02 | End: 2023-07-06 | Stop reason: HOSPADM

## 2023-07-01 RX ORDER — PRIMIDONE 250 MG/1
250 TABLET ORAL EVERY MORNING
Status: DISCONTINUED | OUTPATIENT
Start: 2023-07-02 | End: 2023-07-05

## 2023-07-01 RX ADMIN — ONDANSETRON 4 MG: 2 INJECTION INTRAMUSCULAR; INTRAVENOUS at 20:48

## 2023-07-01 RX ADMIN — SODIUM CHLORIDE 1000 ML: 9 INJECTION, SOLUTION INTRAVENOUS at 19:46

## 2023-07-01 RX ADMIN — SODIUM CHLORIDE, POTASSIUM CHLORIDE, SODIUM LACTATE AND CALCIUM CHLORIDE: 600; 310; 30; 20 INJECTION, SOLUTION INTRAVENOUS at 23:22

## 2023-07-01 RX ADMIN — IOPAMIDOL 75 ML: 755 INJECTION, SOLUTION INTRAVENOUS at 20:28

## 2023-07-01 ASSESSMENT — LIFESTYLE VARIABLES
HOW MANY STANDARD DRINKS CONTAINING ALCOHOL DO YOU HAVE ON A TYPICAL DAY: PATIENT DOES NOT DRINK
HOW OFTEN DO YOU HAVE A DRINK CONTAINING ALCOHOL: NEVER

## 2023-07-01 ASSESSMENT — PAIN - FUNCTIONAL ASSESSMENT
PAIN_FUNCTIONAL_ASSESSMENT: NONE - DENIES PAIN
PAIN_FUNCTIONAL_ASSESSMENT: NONE - DENIES PAIN

## 2023-07-01 ASSESSMENT — ENCOUNTER SYMPTOMS: RESPIRATORY NEGATIVE: 1

## 2023-07-02 ENCOUNTER — APPOINTMENT (OUTPATIENT)
Dept: GENERAL RADIOLOGY | Age: 68
DRG: 389 | End: 2023-07-02
Payer: MEDICARE

## 2023-07-02 LAB
ALBUMIN SERPL-MCNC: 3.9 G/DL (ref 3.5–5.2)
ALBUMIN/GLOB SERPL: 1.2 {RATIO} (ref 1–2.5)
ALP SERPL-CCNC: 124 U/L (ref 40–129)
ALT SERPL-CCNC: 19 U/L (ref 5–41)
ANION GAP SERPL CALCULATED.3IONS-SCNC: 12 MMOL/L (ref 9–17)
AST SERPL-CCNC: 17 U/L
BASOPHILS # BLD: 0.04 K/UL (ref 0–0.2)
BASOPHILS NFR BLD: 0 % (ref 0–2)
BILIRUB SERPL-MCNC: 0.4 MG/DL (ref 0.3–1.2)
BUN SERPL-MCNC: 27 MG/DL (ref 8–23)
BUN/CREAT SERPL: 33 (ref 9–20)
CALCIUM SERPL-MCNC: 9.3 MG/DL (ref 8.6–10.4)
CHLORIDE SERPL-SCNC: 102 MMOL/L (ref 98–107)
CO2 SERPL-SCNC: 31 MMOL/L (ref 20–31)
CREAT SERPL-MCNC: 0.83 MG/DL (ref 0.7–1.2)
EOSINOPHIL # BLD: 0.09 K/UL (ref 0–0.44)
EOSINOPHILS RELATIVE PERCENT: 1 % (ref 1–4)
ERYTHROCYTE [DISTWIDTH] IN BLOOD BY AUTOMATED COUNT: 13.2 % (ref 11.8–14.4)
GFR SERPL CREATININE-BSD FRML MDRD: >60 ML/MIN/1.73M2
GLUCOSE SERPL-MCNC: 103 MG/DL (ref 70–99)
HCT VFR BLD AUTO: 36.8 % (ref 40.7–50.3)
HGB BLD-MCNC: 12.1 G/DL (ref 13–17)
IMM GRANULOCYTES # BLD AUTO: 0.03 K/UL (ref 0–0.3)
IMM GRANULOCYTES NFR BLD: 0 %
LYMPHOCYTES # BLD: 20 % (ref 24–43)
LYMPHOCYTES NFR BLD: 2.31 K/UL (ref 1.1–3.7)
MAGNESIUM SERPL-MCNC: 1.7 MG/DL (ref 1.6–2.6)
MCH RBC QN AUTO: 32.4 PG (ref 25.2–33.5)
MCHC RBC AUTO-ENTMCNC: 32.9 G/DL (ref 28.4–34.8)
MCV RBC AUTO: 98.4 FL (ref 82.6–102.9)
MONOCYTES NFR BLD: 0.83 K/UL (ref 0.1–1.2)
MONOCYTES NFR BLD: 7 % (ref 3–12)
NEUTROPHILS NFR BLD: 72 % (ref 36–65)
NEUTS SEG NFR BLD: 8.2 K/UL (ref 1.5–8.1)
NRBC BLD-RTO: 0 PER 100 WBC
PLATELET # BLD AUTO: 206 K/UL (ref 138–453)
PMV BLD AUTO: 9.5 FL (ref 8.1–13.5)
POTASSIUM SERPL-SCNC: 3.2 MMOL/L (ref 3.7–5.3)
PROT SERPL-MCNC: 7.2 G/DL (ref 6.4–8.3)
RBC # BLD AUTO: 3.74 M/UL (ref 4.21–5.77)
SODIUM SERPL-SCNC: 145 MMOL/L (ref 135–144)
WBC OTHER # BLD: 11.5 K/UL (ref 3.5–11.3)

## 2023-07-02 PROCEDURE — 97162 PT EVAL MOD COMPLEX 30 MIN: CPT

## 2023-07-02 PROCEDURE — 74018 RADEX ABDOMEN 1 VIEW: CPT

## 2023-07-02 PROCEDURE — 97166 OT EVAL MOD COMPLEX 45 MIN: CPT

## 2023-07-02 PROCEDURE — 99222 1ST HOSP IP/OBS MODERATE 55: CPT | Performed by: SPECIALIST

## 2023-07-02 PROCEDURE — 6360000002 HC RX W HCPCS: Performed by: STUDENT IN AN ORGANIZED HEALTH CARE EDUCATION/TRAINING PROGRAM

## 2023-07-02 PROCEDURE — 94761 N-INVAS EAR/PLS OXIMETRY MLT: CPT

## 2023-07-02 PROCEDURE — 1200000000 HC SEMI PRIVATE

## 2023-07-02 PROCEDURE — 6370000000 HC RX 637 (ALT 250 FOR IP): Performed by: STUDENT IN AN ORGANIZED HEALTH CARE EDUCATION/TRAINING PROGRAM

## 2023-07-02 PROCEDURE — 6370000000 HC RX 637 (ALT 250 FOR IP): Performed by: SPECIALIST

## 2023-07-02 PROCEDURE — 85027 COMPLETE CBC AUTOMATED: CPT

## 2023-07-02 PROCEDURE — 83735 ASSAY OF MAGNESIUM: CPT

## 2023-07-02 PROCEDURE — 36415 COLL VENOUS BLD VENIPUNCTURE: CPT

## 2023-07-02 PROCEDURE — 80053 COMPREHEN METABOLIC PANEL: CPT

## 2023-07-02 RX ORDER — POTASSIUM CHLORIDE 7.45 MG/ML
10 INJECTION INTRAVENOUS ONCE
Status: COMPLETED | OUTPATIENT
Start: 2023-07-02 | End: 2023-07-02

## 2023-07-02 RX ORDER — MAGNESIUM SULFATE 1 G/100ML
1000 INJECTION INTRAVENOUS ONCE
Status: COMPLETED | OUTPATIENT
Start: 2023-07-02 | End: 2023-07-02

## 2023-07-02 RX ORDER — ERYTHROMYCIN ETHYLSUCCINATE 200 MG/5ML
200 SUSPENSION ORAL
Status: DISCONTINUED | OUTPATIENT
Start: 2023-07-02 | End: 2023-07-04

## 2023-07-02 RX ORDER — LEVETIRACETAM 15 MG/ML
1500 INJECTION INTRAVASCULAR 2 TIMES DAILY
Status: DISCONTINUED | OUTPATIENT
Start: 2023-07-02 | End: 2023-07-05

## 2023-07-02 RX ORDER — POLYETHYLENE GLYCOL 3350 17 G/17G
17 POWDER, FOR SOLUTION ORAL DAILY
COMMUNITY

## 2023-07-02 RX ORDER — BISACODYL 10 MG
10 SUPPOSITORY, RECTAL RECTAL DAILY
Status: DISCONTINUED | OUTPATIENT
Start: 2023-07-02 | End: 2023-07-06

## 2023-07-02 RX ORDER — ERYTHROMYCIN ETHYLSUCCINATE 200 MG/5ML
200 SUSPENSION ORAL
Status: DISCONTINUED | OUTPATIENT
Start: 2023-07-02 | End: 2023-07-02

## 2023-07-02 RX ADMIN — POTASSIUM CHLORIDE 10 MEQ: 7.46 INJECTION, SOLUTION INTRAVENOUS at 18:31

## 2023-07-02 RX ADMIN — POTASSIUM CHLORIDE 10 MEQ: 7.46 INJECTION, SOLUTION INTRAVENOUS at 14:19

## 2023-07-02 RX ADMIN — POTASSIUM CHLORIDE 10 MEQ: 7.46 INJECTION, SOLUTION INTRAVENOUS at 15:22

## 2023-07-02 RX ADMIN — POTASSIUM CHLORIDE 10 MEQ: 7.46 INJECTION, SOLUTION INTRAVENOUS at 16:50

## 2023-07-02 RX ADMIN — ERYTHROMYCIN ETHYLSUCCINATE 200 MG: 200 GRANULE, FOR SUSPENSION ORAL at 12:07

## 2023-07-02 RX ADMIN — ENOXAPARIN SODIUM 40 MG: 100 INJECTION SUBCUTANEOUS at 13:18

## 2023-07-02 RX ADMIN — BISACODYL 10 MG: 10 SUPPOSITORY RECTAL at 12:06

## 2023-07-02 RX ADMIN — LATANOPROST 1 DROP: 50 SOLUTION/ DROPS OPHTHALMIC at 21:04

## 2023-07-02 RX ADMIN — MAGNESIUM SULFATE HEPTAHYDRATE 1000 MG: 1 INJECTION, SOLUTION INTRAVENOUS at 13:15

## 2023-07-02 RX ADMIN — ERYTHROMYCIN ETHYLSUCCINATE 200 MG: 200 GRANULE, FOR SUSPENSION ORAL at 17:24

## 2023-07-02 RX ADMIN — LEVETIRACETAM 1500 MG: 15 INJECTION, SOLUTION INTRAVENOUS at 21:03

## 2023-07-02 RX ADMIN — HYDROCORTISONE: 25 CREAM TOPICAL at 21:27

## 2023-07-02 RX ADMIN — ERYTHROMYCIN ETHYLSUCCINATE 200 MG: 200 GRANULE, FOR SUSPENSION ORAL at 21:03

## 2023-07-03 ENCOUNTER — APPOINTMENT (OUTPATIENT)
Dept: GENERAL RADIOLOGY | Age: 68
DRG: 389 | End: 2023-07-03
Payer: MEDICARE

## 2023-07-03 LAB
ALBUMIN SERPL-MCNC: 3.7 G/DL (ref 3.5–5.2)
ALBUMIN/GLOB SERPL: 1.2 {RATIO} (ref 1–2.5)
ALP SERPL-CCNC: 110 U/L (ref 40–129)
ALT SERPL-CCNC: 13 U/L (ref 5–41)
ANION GAP SERPL CALCULATED.3IONS-SCNC: 11 MMOL/L (ref 9–17)
AST SERPL-CCNC: 13 U/L
BASOPHILS # BLD: 0.06 K/UL (ref 0–0.2)
BASOPHILS NFR BLD: 1 % (ref 0–2)
BILIRUB SERPL-MCNC: 0.6 MG/DL (ref 0.3–1.2)
BUN SERPL-MCNC: 21 MG/DL (ref 8–23)
BUN/CREAT SERPL: 27 (ref 9–20)
CALCIUM SERPL-MCNC: 8.8 MG/DL (ref 8.6–10.4)
CHLORIDE SERPL-SCNC: 106 MMOL/L (ref 98–107)
CO2 SERPL-SCNC: 26 MMOL/L (ref 20–31)
CREAT SERPL-MCNC: 0.79 MG/DL (ref 0.7–1.2)
EOSINOPHIL # BLD: 0.05 K/UL (ref 0–0.44)
EOSINOPHILS RELATIVE PERCENT: 0 % (ref 1–4)
ERYTHROCYTE [DISTWIDTH] IN BLOOD BY AUTOMATED COUNT: 13.2 % (ref 11.8–14.4)
GFR SERPL CREATININE-BSD FRML MDRD: >60 ML/MIN/1.73M2
GLUCOSE SERPL-MCNC: 90 MG/DL (ref 70–99)
HCT VFR BLD AUTO: 33.4 % (ref 40.7–50.3)
HGB BLD-MCNC: 11.1 G/DL (ref 13–17)
IMM GRANULOCYTES # BLD AUTO: 0.05 K/UL (ref 0–0.3)
IMM GRANULOCYTES NFR BLD: 0 %
LYMPHOCYTES # BLD: 13 % (ref 24–43)
LYMPHOCYTES NFR BLD: 1.71 K/UL (ref 1.1–3.7)
MCH RBC QN AUTO: 32.7 PG (ref 25.2–33.5)
MCHC RBC AUTO-ENTMCNC: 33.2 G/DL (ref 28.4–34.8)
MCV RBC AUTO: 98.5 FL (ref 82.6–102.9)
MONOCYTES NFR BLD: 0.89 K/UL (ref 0.1–1.2)
MONOCYTES NFR BLD: 7 % (ref 3–12)
NEUTROPHILS NFR BLD: 79 % (ref 36–65)
NEUTS SEG NFR BLD: 10.07 K/UL (ref 1.5–8.1)
NRBC BLD-RTO: 0 PER 100 WBC
PLATELET # BLD AUTO: 170 K/UL (ref 138–453)
PMV BLD AUTO: 9.5 FL (ref 8.1–13.5)
POTASSIUM SERPL-SCNC: 3.8 MMOL/L (ref 3.7–5.3)
PROT SERPL-MCNC: 6.9 G/DL (ref 6.4–8.3)
RBC # BLD AUTO: 3.39 M/UL (ref 4.21–5.77)
SODIUM SERPL-SCNC: 143 MMOL/L (ref 135–144)
WBC OTHER # BLD: 12.8 K/UL (ref 3.5–11.3)

## 2023-07-03 PROCEDURE — 74019 RADEX ABDOMEN 2 VIEWS: CPT

## 2023-07-03 PROCEDURE — 1200000000 HC SEMI PRIVATE

## 2023-07-03 PROCEDURE — 6360000002 HC RX W HCPCS: Performed by: NURSE PRACTITIONER

## 2023-07-03 PROCEDURE — C9254 INJECTION, LACOSAMIDE: HCPCS | Performed by: NURSE PRACTITIONER

## 2023-07-03 PROCEDURE — A4216 STERILE WATER/SALINE, 10 ML: HCPCS | Performed by: NURSE PRACTITIONER

## 2023-07-03 PROCEDURE — 2580000003 HC RX 258: Performed by: STUDENT IN AN ORGANIZED HEALTH CARE EDUCATION/TRAINING PROGRAM

## 2023-07-03 PROCEDURE — 80053 COMPREHEN METABOLIC PANEL: CPT

## 2023-07-03 PROCEDURE — 6370000000 HC RX 637 (ALT 250 FOR IP): Performed by: SPECIALIST

## 2023-07-03 PROCEDURE — 36415 COLL VENOUS BLD VENIPUNCTURE: CPT

## 2023-07-03 PROCEDURE — 94761 N-INVAS EAR/PLS OXIMETRY MLT: CPT

## 2023-07-03 PROCEDURE — 97110 THERAPEUTIC EXERCISES: CPT

## 2023-07-03 PROCEDURE — C9113 INJ PANTOPRAZOLE SODIUM, VIA: HCPCS | Performed by: NURSE PRACTITIONER

## 2023-07-03 PROCEDURE — 97530 THERAPEUTIC ACTIVITIES: CPT

## 2023-07-03 PROCEDURE — 85027 COMPLETE CBC AUTOMATED: CPT

## 2023-07-03 PROCEDURE — 99232 SBSQ HOSP IP/OBS MODERATE 35: CPT | Performed by: SPECIALIST

## 2023-07-03 PROCEDURE — 6360000002 HC RX W HCPCS: Performed by: STUDENT IN AN ORGANIZED HEALTH CARE EDUCATION/TRAINING PROGRAM

## 2023-07-03 PROCEDURE — 2580000003 HC RX 258: Performed by: NURSE PRACTITIONER

## 2023-07-03 RX ORDER — LACOSAMIDE 10 MG/ML
150 INJECTION, SOLUTION INTRAVENOUS 2 TIMES DAILY
Status: DISCONTINUED | OUTPATIENT
Start: 2023-07-03 | End: 2023-07-03

## 2023-07-03 RX ADMIN — HYDROCORTISONE: 25 CREAM TOPICAL at 09:13

## 2023-07-03 RX ADMIN — PANTOPRAZOLE SODIUM 40 MG: 40 INJECTION, POWDER, FOR SOLUTION INTRAVENOUS at 12:09

## 2023-07-03 RX ADMIN — ERYTHROMYCIN ETHYLSUCCINATE 200 MG: 200 GRANULE, FOR SUSPENSION ORAL at 12:28

## 2023-07-03 RX ADMIN — ERYTHROMYCIN ETHYLSUCCINATE 200 MG: 200 GRANULE, FOR SUSPENSION ORAL at 20:36

## 2023-07-03 RX ADMIN — LEVETIRACETAM 1500 MG: 15 INJECTION, SOLUTION INTRAVENOUS at 12:17

## 2023-07-03 RX ADMIN — SODIUM CHLORIDE, PRESERVATIVE FREE 10 ML: 5 INJECTION INTRAVENOUS at 20:48

## 2023-07-03 RX ADMIN — LACOSAMIDE 150 MG: 10 INJECTION INTRAVENOUS at 20:57

## 2023-07-03 RX ADMIN — ERYTHROMYCIN ETHYLSUCCINATE 200 MG: 200 GRANULE, FOR SUSPENSION ORAL at 17:08

## 2023-07-03 RX ADMIN — ERYTHROMYCIN ETHYLSUCCINATE 200 MG: 200 GRANULE, FOR SUSPENSION ORAL at 09:05

## 2023-07-03 RX ADMIN — LACOSAMIDE 150 MG: 10 INJECTION INTRAVENOUS at 12:06

## 2023-07-03 RX ADMIN — BISACODYL 10 MG: 10 SUPPOSITORY RECTAL at 08:57

## 2023-07-03 RX ADMIN — ENOXAPARIN SODIUM 40 MG: 100 INJECTION SUBCUTANEOUS at 08:57

## 2023-07-03 RX ADMIN — SODIUM CHLORIDE, PRESERVATIVE FREE 10 ML: 5 INJECTION INTRAVENOUS at 12:00

## 2023-07-03 RX ADMIN — SODIUM CHLORIDE, POTASSIUM CHLORIDE, SODIUM LACTATE AND CALCIUM CHLORIDE: 600; 310; 30; 20 INJECTION, SOLUTION INTRAVENOUS at 12:06

## 2023-07-03 RX ADMIN — LEVETIRACETAM 1500 MG: 15 INJECTION, SOLUTION INTRAVENOUS at 20:41

## 2023-07-03 RX ADMIN — LATANOPROST 1 DROP: 50 SOLUTION/ DROPS OPHTHALMIC at 20:36

## 2023-07-03 ASSESSMENT — PAIN SCALES - GENERAL
PAINLEVEL_OUTOF10: 0
PAINLEVEL_OUTOF10: 0

## 2023-07-03 NOTE — PLAN OF CARE
Problem: Discharge Planning  Goal: Discharge to home or other facility with appropriate resources  Outcome: Progressing  Flowsheets (Taken 7/3/2023 0054)  Discharge to home or other facility with appropriate resources:   Identify barriers to discharge with patient and caregiver   Arrange for needed discharge resources and transportation as appropriate     Problem: Safety - Adult  Goal: Free from fall injury  Outcome: Progressing  Flowsheets (Taken 7/3/2023 0054)  Free From Fall Injury: Instruct family/caregiver on patient safety     Problem: Skin/Tissue Integrity  Goal: Absence of new skin breakdown  Description: 1. Monitor for areas of redness and/or skin breakdown  2. Assess vascular access sites hourly  3. Every 4-6 hours minimum:  Change oxygen saturation probe site  4. Every 4-6 hours:  If on nasal continuous positive airway pressure, respiratory therapy assess nares and determine need for appliance change or resting period. Outcome: Progressing  Note: Nikunj scale monitoring per protocol. Inspect skin for breakdown frequently. Encourage pt to make frequent large adjustments in position or assist patient with turning. Document all areas of breakdown.        Problem: Gastrointestinal - Adult  Goal: Maintains or returns to baseline bowel function  Outcome: Progressing  Flowsheets (Taken 7/3/2023 0054)  Maintains or returns to baseline bowel function:   Assess bowel function   Administer IV fluids as ordered to ensure adequate hydration   Administer ordered medications as needed

## 2023-07-04 ENCOUNTER — APPOINTMENT (OUTPATIENT)
Dept: GENERAL RADIOLOGY | Age: 68
DRG: 389 | End: 2023-07-04
Payer: MEDICARE

## 2023-07-04 LAB
ALBUMIN SERPL-MCNC: 3.7 G/DL (ref 3.5–5.2)
ALBUMIN/GLOB SERPL: 1.2 {RATIO} (ref 1–2.5)
ALP SERPL-CCNC: 98 U/L (ref 40–129)
ALT SERPL-CCNC: 12 U/L (ref 5–41)
ANION GAP SERPL CALCULATED.3IONS-SCNC: 12 MMOL/L (ref 9–17)
AST SERPL-CCNC: 13 U/L
BASOPHILS # BLD: 0.03 K/UL (ref 0–0.2)
BASOPHILS NFR BLD: 0 % (ref 0–2)
BILIRUB SERPL-MCNC: 0.8 MG/DL (ref 0.3–1.2)
BUN SERPL-MCNC: 19 MG/DL (ref 8–23)
BUN/CREAT SERPL: 22 (ref 9–20)
CALCIUM SERPL-MCNC: 8.7 MG/DL (ref 8.6–10.4)
CHLORIDE SERPL-SCNC: 104 MMOL/L (ref 98–107)
CO2 SERPL-SCNC: 25 MMOL/L (ref 20–31)
CREAT SERPL-MCNC: 0.85 MG/DL (ref 0.7–1.2)
EOSINOPHIL # BLD: <0.03 K/UL (ref 0–0.44)
EOSINOPHILS RELATIVE PERCENT: 0 % (ref 1–4)
ERYTHROCYTE [DISTWIDTH] IN BLOOD BY AUTOMATED COUNT: 13 % (ref 11.8–14.4)
GFR SERPL CREATININE-BSD FRML MDRD: >60 ML/MIN/1.73M2
GLUCOSE SERPL-MCNC: 94 MG/DL (ref 70–99)
HCT VFR BLD AUTO: 32 % (ref 40.7–50.3)
HGB BLD-MCNC: 10.5 G/DL (ref 13–17)
IMM GRANULOCYTES # BLD AUTO: 0.04 K/UL (ref 0–0.3)
IMM GRANULOCYTES NFR BLD: 0 %
LYMPHOCYTES # BLD: 11 % (ref 24–43)
LYMPHOCYTES NFR BLD: 1.34 K/UL (ref 1.1–3.7)
MCH RBC QN AUTO: 32.7 PG (ref 25.2–33.5)
MCHC RBC AUTO-ENTMCNC: 32.8 G/DL (ref 28.4–34.8)
MCV RBC AUTO: 99.7 FL (ref 82.6–102.9)
MONOCYTES NFR BLD: 1.01 K/UL (ref 0.1–1.2)
MONOCYTES NFR BLD: 8 % (ref 3–12)
NEUTROPHILS NFR BLD: 80 % (ref 36–65)
NEUTS SEG NFR BLD: 9.62 K/UL (ref 1.5–8.1)
NRBC BLD-RTO: 0 PER 100 WBC
PLATELET # BLD AUTO: 147 K/UL (ref 138–453)
PMV BLD AUTO: 9.2 FL (ref 8.1–13.5)
POTASSIUM SERPL-SCNC: 3.7 MMOL/L (ref 3.7–5.3)
PROT SERPL-MCNC: 6.8 G/DL (ref 6.4–8.3)
RBC # BLD AUTO: 3.21 M/UL (ref 4.21–5.77)
SODIUM SERPL-SCNC: 141 MMOL/L (ref 135–144)
WBC OTHER # BLD: 12.1 K/UL (ref 3.5–11.3)

## 2023-07-04 PROCEDURE — C9254 INJECTION, LACOSAMIDE: HCPCS | Performed by: NURSE PRACTITIONER

## 2023-07-04 PROCEDURE — 97530 THERAPEUTIC ACTIVITIES: CPT

## 2023-07-04 PROCEDURE — 6360000002 HC RX W HCPCS: Performed by: NURSE PRACTITIONER

## 2023-07-04 PROCEDURE — A4216 STERILE WATER/SALINE, 10 ML: HCPCS | Performed by: NURSE PRACTITIONER

## 2023-07-04 PROCEDURE — 85027 COMPLETE CBC AUTOMATED: CPT

## 2023-07-04 PROCEDURE — C9113 INJ PANTOPRAZOLE SODIUM, VIA: HCPCS | Performed by: NURSE PRACTITIONER

## 2023-07-04 PROCEDURE — 94761 N-INVAS EAR/PLS OXIMETRY MLT: CPT

## 2023-07-04 PROCEDURE — 2580000003 HC RX 258: Performed by: STUDENT IN AN ORGANIZED HEALTH CARE EDUCATION/TRAINING PROGRAM

## 2023-07-04 PROCEDURE — 36415 COLL VENOUS BLD VENIPUNCTURE: CPT

## 2023-07-04 PROCEDURE — 6360000002 HC RX W HCPCS: Performed by: STUDENT IN AN ORGANIZED HEALTH CARE EDUCATION/TRAINING PROGRAM

## 2023-07-04 PROCEDURE — 6370000000 HC RX 637 (ALT 250 FOR IP): Performed by: STUDENT IN AN ORGANIZED HEALTH CARE EDUCATION/TRAINING PROGRAM

## 2023-07-04 PROCEDURE — 80053 COMPREHEN METABOLIC PANEL: CPT

## 2023-07-04 PROCEDURE — 1200000000 HC SEMI PRIVATE

## 2023-07-04 PROCEDURE — 74019 RADEX ABDOMEN 2 VIEWS: CPT

## 2023-07-04 PROCEDURE — 97535 SELF CARE MNGMENT TRAINING: CPT

## 2023-07-04 PROCEDURE — 99221 1ST HOSP IP/OBS SF/LOW 40: CPT | Performed by: SPECIALIST

## 2023-07-04 PROCEDURE — 2580000003 HC RX 258: Performed by: NURSE PRACTITIONER

## 2023-07-04 RX ORDER — ERYTHROMYCIN ETHYLSUCCINATE 200 MG/5ML
200 SUSPENSION ORAL
Status: DISCONTINUED | OUTPATIENT
Start: 2023-07-04 | End: 2023-07-06 | Stop reason: HOSPADM

## 2023-07-04 RX ORDER — ERYTHROMYCIN ETHYLSUCCINATE 200 MG/5ML
200 SUSPENSION ORAL ONCE
Status: COMPLETED | OUTPATIENT
Start: 2023-07-04 | End: 2023-07-04

## 2023-07-04 RX ORDER — ERYTHROMYCIN ETHYLSUCCINATE 200 MG/5ML
200 SUSPENSION ORAL
Status: DISCONTINUED | OUTPATIENT
Start: 2023-07-04 | End: 2023-07-04

## 2023-07-04 RX ADMIN — PANTOPRAZOLE SODIUM 40 MG: 40 INJECTION, POWDER, FOR SOLUTION INTRAVENOUS at 08:59

## 2023-07-04 RX ADMIN — ERYTHROMYCIN 200 MG: 200 SUSPENSION ORAL at 20:51

## 2023-07-04 RX ADMIN — SODIUM CHLORIDE, POTASSIUM CHLORIDE, SODIUM LACTATE AND CALCIUM CHLORIDE: 600; 310; 30; 20 INJECTION, SOLUTION INTRAVENOUS at 02:33

## 2023-07-04 RX ADMIN — LACOSAMIDE 150 MG: 10 INJECTION INTRAVENOUS at 10:20

## 2023-07-04 RX ADMIN — ERYTHROMYCIN 200 MG: 200 SUSPENSION ORAL at 17:17

## 2023-07-04 RX ADMIN — SODIUM CHLORIDE, POTASSIUM CHLORIDE, SODIUM LACTATE AND CALCIUM CHLORIDE: 600; 310; 30; 20 INJECTION, SOLUTION INTRAVENOUS at 15:02

## 2023-07-04 RX ADMIN — ENOXAPARIN SODIUM 40 MG: 100 INJECTION SUBCUTANEOUS at 08:58

## 2023-07-04 RX ADMIN — LEVETIRACETAM 1500 MG: 15 INJECTION, SOLUTION INTRAVENOUS at 09:04

## 2023-07-04 RX ADMIN — LEVETIRACETAM 1500 MG: 15 INJECTION, SOLUTION INTRAVENOUS at 20:47

## 2023-07-04 RX ADMIN — LATANOPROST 1 DROP: 50 SOLUTION/ DROPS OPHTHALMIC at 20:50

## 2023-07-04 RX ADMIN — LACOSAMIDE 150 MG: 10 INJECTION INTRAVENOUS at 21:12

## 2023-07-04 RX ADMIN — ERYTHROMYCIN 200 MG: 200 SUSPENSION ORAL at 10:50

## 2023-07-04 RX ADMIN — ERYTHROMYCIN 200 MG: 200 SUSPENSION ORAL at 13:16

## 2023-07-04 ASSESSMENT — PAIN SCALES - GENERAL
PAINLEVEL_OUTOF10: 0
PAINLEVEL_OUTOF10: 0

## 2023-07-04 NOTE — PLAN OF CARE
Problem: Discharge Planning  Goal: Discharge to home or other facility with appropriate resources  Outcome: Progressing     Problem: Safety - Adult  Goal: Free from fall injury  Outcome: Progressing  Flowsheets (Taken 7/4/2023 0044)  Free From Fall Injury: Instruct family/caregiver on patient safety     Problem: Skin/Tissue Integrity  Goal: Absence of new skin breakdown  Description: 1. Monitor for areas of redness and/or skin breakdown  2. Assess vascular access sites hourly  3. Every 4-6 hours minimum:  Change oxygen saturation probe site  4. Every 4-6 hours:  If on nasal continuous positive airway pressure, respiratory therapy assess nares and determine need for appliance change or resting period.   Outcome: Progressing     Problem: Gastrointestinal - Adult  Goal: Maintains or returns to baseline bowel function  Outcome: Progressing  Flowsheets (Taken 7/4/2023 0044)  Maintains or returns to baseline bowel function:   Assess bowel function   Encourage oral fluids to ensure adequate hydration   Administer IV fluids as ordered to ensure adequate hydration   Encourage mobilization and activity   Administer ordered medications as needed

## 2023-07-05 ENCOUNTER — APPOINTMENT (OUTPATIENT)
Dept: GENERAL RADIOLOGY | Age: 68
DRG: 389 | End: 2023-07-05
Payer: MEDICARE

## 2023-07-05 PROBLEM — E44.1 MILD MALNUTRITION (HCC): Status: ACTIVE | Noted: 2023-07-05

## 2023-07-05 PROBLEM — E44.0 MODERATE MALNUTRITION (HCC): Status: RESOLVED | Noted: 2023-04-20 | Resolved: 2023-07-05

## 2023-07-05 LAB
ALBUMIN SERPL-MCNC: 3.2 G/DL (ref 3.5–5.2)
ALBUMIN/GLOB SERPL: 1 {RATIO} (ref 1–2.5)
ALP SERPL-CCNC: 91 U/L (ref 40–129)
ALT SERPL-CCNC: 16 U/L (ref 5–41)
ANION GAP SERPL CALCULATED.3IONS-SCNC: 9 MMOL/L (ref 9–17)
AST SERPL-CCNC: 20 U/L
BASOPHILS # BLD: 0.04 K/UL (ref 0–0.2)
BASOPHILS NFR BLD: 1 % (ref 0–2)
BILIRUB SERPL-MCNC: 0.7 MG/DL (ref 0.3–1.2)
BUN SERPL-MCNC: 19 MG/DL (ref 8–23)
BUN/CREAT SERPL: 28 (ref 9–20)
CALCIUM SERPL-MCNC: 8.3 MG/DL (ref 8.6–10.4)
CHLORIDE SERPL-SCNC: 108 MMOL/L (ref 98–107)
CO2 SERPL-SCNC: 25 MMOL/L (ref 20–31)
CREAT SERPL-MCNC: 0.68 MG/DL (ref 0.7–1.2)
EOSINOPHIL # BLD: 0.11 K/UL (ref 0–0.44)
EOSINOPHILS RELATIVE PERCENT: 2 % (ref 1–4)
ERYTHROCYTE [DISTWIDTH] IN BLOOD BY AUTOMATED COUNT: 12.7 % (ref 11.8–14.4)
GFR SERPL CREATININE-BSD FRML MDRD: >60 ML/MIN/1.73M2
GLUCOSE SERPL-MCNC: 84 MG/DL (ref 70–99)
HCT VFR BLD AUTO: 29.8 % (ref 40.7–50.3)
HGB BLD-MCNC: 9.8 G/DL (ref 13–17)
IMM GRANULOCYTES # BLD AUTO: <0.03 K/UL (ref 0–0.3)
IMM GRANULOCYTES NFR BLD: 0 %
LYMPHOCYTES # BLD: 20 % (ref 24–43)
LYMPHOCYTES NFR BLD: 1.36 K/UL (ref 1.1–3.7)
MCH RBC QN AUTO: 33 PG (ref 25.2–33.5)
MCHC RBC AUTO-ENTMCNC: 32.9 G/DL (ref 28.4–34.8)
MCV RBC AUTO: 100.3 FL (ref 82.6–102.9)
MONOCYTES NFR BLD: 0.62 K/UL (ref 0.1–1.2)
MONOCYTES NFR BLD: 9 % (ref 3–12)
NEUTROPHILS NFR BLD: 69 % (ref 36–65)
NEUTS SEG NFR BLD: 4.79 K/UL (ref 1.5–8.1)
NRBC BLD-RTO: 0 PER 100 WBC
PLATELET # BLD AUTO: 132 K/UL (ref 138–453)
PMV BLD AUTO: 9.6 FL (ref 8.1–13.5)
POTASSIUM SERPL-SCNC: 3.6 MMOL/L (ref 3.7–5.3)
PROT SERPL-MCNC: 6.5 G/DL (ref 6.4–8.3)
RBC # BLD AUTO: 2.97 M/UL (ref 4.21–5.77)
SODIUM SERPL-SCNC: 142 MMOL/L (ref 135–144)
WBC OTHER # BLD: 6.9 K/UL (ref 3.5–11.3)

## 2023-07-05 PROCEDURE — 97530 THERAPEUTIC ACTIVITIES: CPT

## 2023-07-05 PROCEDURE — 6360000002 HC RX W HCPCS: Performed by: STUDENT IN AN ORGANIZED HEALTH CARE EDUCATION/TRAINING PROGRAM

## 2023-07-05 PROCEDURE — C9254 INJECTION, LACOSAMIDE: HCPCS | Performed by: NURSE PRACTITIONER

## 2023-07-05 PROCEDURE — 6370000000 HC RX 637 (ALT 250 FOR IP): Performed by: STUDENT IN AN ORGANIZED HEALTH CARE EDUCATION/TRAINING PROGRAM

## 2023-07-05 PROCEDURE — A4216 STERILE WATER/SALINE, 10 ML: HCPCS | Performed by: NURSE PRACTITIONER

## 2023-07-05 PROCEDURE — 94761 N-INVAS EAR/PLS OXIMETRY MLT: CPT

## 2023-07-05 PROCEDURE — 6370000000 HC RX 637 (ALT 250 FOR IP): Performed by: SPECIALIST

## 2023-07-05 PROCEDURE — C9113 INJ PANTOPRAZOLE SODIUM, VIA: HCPCS | Performed by: NURSE PRACTITIONER

## 2023-07-05 PROCEDURE — 2580000003 HC RX 258: Performed by: NURSE PRACTITIONER

## 2023-07-05 PROCEDURE — 74018 RADEX ABDOMEN 1 VIEW: CPT

## 2023-07-05 PROCEDURE — 97110 THERAPEUTIC EXERCISES: CPT

## 2023-07-05 PROCEDURE — 1200000000 HC SEMI PRIVATE

## 2023-07-05 PROCEDURE — 2580000003 HC RX 258: Performed by: STUDENT IN AN ORGANIZED HEALTH CARE EDUCATION/TRAINING PROGRAM

## 2023-07-05 PROCEDURE — 80053 COMPREHEN METABOLIC PANEL: CPT

## 2023-07-05 PROCEDURE — 6360000002 HC RX W HCPCS: Performed by: NURSE PRACTITIONER

## 2023-07-05 PROCEDURE — 36415 COLL VENOUS BLD VENIPUNCTURE: CPT

## 2023-07-05 PROCEDURE — 85027 COMPLETE CBC AUTOMATED: CPT

## 2023-07-05 RX ORDER — PRIMIDONE 250 MG/1
250 TABLET ORAL EVERY MORNING
Status: DISCONTINUED | OUTPATIENT
Start: 2023-07-05 | End: 2023-07-06 | Stop reason: HOSPADM

## 2023-07-05 RX ORDER — OXYBUTYNIN CHLORIDE 5 MG/1
10 TABLET, EXTENDED RELEASE ORAL DAILY
Status: DISCONTINUED | OUTPATIENT
Start: 2023-07-05 | End: 2023-07-06 | Stop reason: HOSPADM

## 2023-07-05 RX ORDER — LAMOTRIGINE 100 MG/1
400 TABLET ORAL EVERY MORNING
Status: DISCONTINUED | OUTPATIENT
Start: 2023-07-05 | End: 2023-07-06 | Stop reason: HOSPADM

## 2023-07-05 RX ORDER — TAMSULOSIN HYDROCHLORIDE 0.4 MG/1
0.4 CAPSULE ORAL 2 TIMES DAILY
Status: DISCONTINUED | OUTPATIENT
Start: 2023-07-05 | End: 2023-07-06 | Stop reason: HOSPADM

## 2023-07-05 RX ORDER — SENNA PLUS 8.6 MG/1
1 TABLET ORAL 2 TIMES DAILY
Status: DISCONTINUED | OUTPATIENT
Start: 2023-07-05 | End: 2023-07-06 | Stop reason: HOSPADM

## 2023-07-05 RX ADMIN — SENNOSIDES 8.6 MG: 8.6 TABLET, FILM COATED ORAL at 20:32

## 2023-07-05 RX ADMIN — LACOSAMIDE 150 MG: 50 TABLET, FILM COATED ORAL at 20:31

## 2023-07-05 RX ADMIN — LATANOPROST 1 DROP: 50 SOLUTION/ DROPS OPHTHALMIC at 20:55

## 2023-07-05 RX ADMIN — OXYBUTYNIN CHLORIDE 10 MG: 5 TABLET, EXTENDED RELEASE ORAL at 11:33

## 2023-07-05 RX ADMIN — TRAZODONE HYDROCHLORIDE 50 MG: 50 TABLET ORAL at 20:31

## 2023-07-05 RX ADMIN — PRIMIDONE 250 MG: 250 TABLET ORAL at 11:36

## 2023-07-05 RX ADMIN — ERYTHROMYCIN 200 MG: 200 SUSPENSION ORAL at 16:48

## 2023-07-05 RX ADMIN — LEVETIRACETAM 1500 MG: 15 INJECTION, SOLUTION INTRAVENOUS at 08:58

## 2023-07-05 RX ADMIN — SODIUM CHLORIDE, POTASSIUM CHLORIDE, SODIUM LACTATE AND CALCIUM CHLORIDE: 600; 310; 30; 20 INJECTION, SOLUTION INTRAVENOUS at 04:19

## 2023-07-05 RX ADMIN — ERYTHROMYCIN 200 MG: 200 SUSPENSION ORAL at 11:37

## 2023-07-05 RX ADMIN — ANTACID TABLETS 500 MG: 500 TABLET, CHEWABLE ORAL at 20:31

## 2023-07-05 RX ADMIN — BISACODYL 10 MG: 10 SUPPOSITORY RECTAL at 08:53

## 2023-07-05 RX ADMIN — LACOSAMIDE 150 MG: 10 INJECTION INTRAVENOUS at 09:29

## 2023-07-05 RX ADMIN — PANTOPRAZOLE SODIUM 40 MG: 40 INJECTION, POWDER, FOR SOLUTION INTRAVENOUS at 08:53

## 2023-07-05 RX ADMIN — PRIMIDONE 375 MG: 250 TABLET ORAL at 20:31

## 2023-07-05 RX ADMIN — ERYTHROMYCIN 200 MG: 200 SUSPENSION ORAL at 08:55

## 2023-07-05 RX ADMIN — LEVETIRACETAM 2000 MG: 500 TABLET, FILM COATED ORAL at 17:32

## 2023-07-05 RX ADMIN — SODIUM CHLORIDE, PRESERVATIVE FREE 10 ML: 5 INJECTION INTRAVENOUS at 20:56

## 2023-07-05 RX ADMIN — LAMOTRIGINE 600 MG: 100 TABLET ORAL at 20:31

## 2023-07-05 RX ADMIN — ENOXAPARIN SODIUM 40 MG: 100 INJECTION SUBCUTANEOUS at 08:53

## 2023-07-05 RX ADMIN — HYDROCORTISONE: 25 CREAM TOPICAL at 20:56

## 2023-07-05 RX ADMIN — LAMOTRIGINE 400 MG: 100 TABLET ORAL at 11:33

## 2023-07-05 RX ADMIN — TAMSULOSIN HYDROCHLORIDE 0.4 MG: 0.4 CAPSULE ORAL at 20:31

## 2023-07-05 RX ADMIN — SENNOSIDES 8.6 MG: 8.6 TABLET, FILM COATED ORAL at 11:32

## 2023-07-05 RX ADMIN — ERYTHROMYCIN 200 MG: 200 SUSPENSION ORAL at 20:55

## 2023-07-05 RX ADMIN — TAMSULOSIN HYDROCHLORIDE 0.4 MG: 0.4 CAPSULE ORAL at 11:33

## 2023-07-05 RX ADMIN — FINASTERIDE 5 MG: 5 TABLET, FILM COATED ORAL at 20:32

## 2023-07-05 NOTE — CARE COORDINATION
[J72.752]    Transportation/Food Security/Housekeeping Addressed: No issues identified or concerns. The Patient and/or Patient Representative Agree with the Discharge Plan? Yes    he discharge plan at this time is to return to the [facility] in [facility].     Samir Aguirre, [address] [facility]  Case Management Department  Ph: [phone]

## 2023-07-06 ENCOUNTER — APPOINTMENT (OUTPATIENT)
Dept: GENERAL RADIOLOGY | Age: 68
DRG: 389 | End: 2023-07-06
Payer: MEDICARE

## 2023-07-06 VITALS
RESPIRATION RATE: 22 BRPM | HEART RATE: 58 BPM | OXYGEN SATURATION: 100 % | WEIGHT: 179 LBS | DIASTOLIC BLOOD PRESSURE: 66 MMHG | HEIGHT: 65 IN | SYSTOLIC BLOOD PRESSURE: 122 MMHG | TEMPERATURE: 97.4 F | BODY MASS INDEX: 29.82 KG/M2

## 2023-07-06 LAB
ALBUMIN SERPL-MCNC: 3 G/DL (ref 3.5–5.2)
ALBUMIN/GLOB SERPL: 1 {RATIO} (ref 1–2.5)
ALP SERPL-CCNC: 88 U/L (ref 40–129)
ALT SERPL-CCNC: 23 U/L (ref 5–41)
ANION GAP SERPL CALCULATED.3IONS-SCNC: 7 MMOL/L (ref 9–17)
AST SERPL-CCNC: 24 U/L
BASOPHILS # BLD: <0.03 K/UL (ref 0–0.2)
BASOPHILS NFR BLD: 0 % (ref 0–2)
BILIRUB SERPL-MCNC: 0.3 MG/DL (ref 0.3–1.2)
BUN SERPL-MCNC: 14 MG/DL (ref 8–23)
BUN/CREAT SERPL: 17 (ref 9–20)
CALCIUM SERPL-MCNC: 8.1 MG/DL (ref 8.6–10.4)
CHLORIDE SERPL-SCNC: 109 MMOL/L (ref 98–107)
CO2 SERPL-SCNC: 26 MMOL/L (ref 20–31)
CREAT SERPL-MCNC: 0.81 MG/DL (ref 0.7–1.2)
EOSINOPHIL # BLD: 0.21 K/UL (ref 0–0.44)
EOSINOPHILS RELATIVE PERCENT: 4 % (ref 1–4)
ERYTHROCYTE [DISTWIDTH] IN BLOOD BY AUTOMATED COUNT: 12.8 % (ref 11.8–14.4)
GFR SERPL CREATININE-BSD FRML MDRD: >60 ML/MIN/1.73M2
GLUCOSE SERPL-MCNC: 97 MG/DL (ref 70–99)
HCT VFR BLD AUTO: 28.9 % (ref 40.7–50.3)
HGB BLD-MCNC: 9.5 G/DL (ref 13–17)
IMM GRANULOCYTES # BLD AUTO: <0.03 K/UL (ref 0–0.3)
IMM GRANULOCYTES NFR BLD: 0 %
LYMPHOCYTES # BLD: 29 % (ref 24–43)
LYMPHOCYTES NFR BLD: 1.74 K/UL (ref 1.1–3.7)
MCH RBC QN AUTO: 32.8 PG (ref 25.2–33.5)
MCHC RBC AUTO-ENTMCNC: 32.9 G/DL (ref 28.4–34.8)
MCV RBC AUTO: 99.7 FL (ref 82.6–102.9)
MONOCYTES NFR BLD: 0.55 K/UL (ref 0.1–1.2)
MONOCYTES NFR BLD: 9 % (ref 3–12)
NEUTROPHILS NFR BLD: 58 % (ref 36–65)
NEUTS SEG NFR BLD: 3.48 K/UL (ref 1.5–8.1)
NRBC BLD-RTO: 0 PER 100 WBC
PLATELET # BLD AUTO: 145 K/UL (ref 138–453)
PMV BLD AUTO: 9.1 FL (ref 8.1–13.5)
POTASSIUM SERPL-SCNC: 3.7 MMOL/L (ref 3.7–5.3)
PROT SERPL-MCNC: 5.9 G/DL (ref 6.4–8.3)
RBC # BLD AUTO: 2.9 M/UL (ref 4.21–5.77)
SARS-COV-2 RDRP RESP QL NAA+PROBE: NOT DETECTED
SODIUM SERPL-SCNC: 142 MMOL/L (ref 135–144)
SPECIMEN DESCRIPTION: NORMAL
WBC OTHER # BLD: 6 K/UL (ref 3.5–11.3)

## 2023-07-06 PROCEDURE — 87635 SARS-COV-2 COVID-19 AMP PRB: CPT

## 2023-07-06 PROCEDURE — 36415 COLL VENOUS BLD VENIPUNCTURE: CPT

## 2023-07-06 PROCEDURE — 2580000003 HC RX 258: Performed by: STUDENT IN AN ORGANIZED HEALTH CARE EDUCATION/TRAINING PROGRAM

## 2023-07-06 PROCEDURE — 6370000000 HC RX 637 (ALT 250 FOR IP): Performed by: SPECIALIST

## 2023-07-06 PROCEDURE — 74019 RADEX ABDOMEN 2 VIEWS: CPT

## 2023-07-06 PROCEDURE — 94761 N-INVAS EAR/PLS OXIMETRY MLT: CPT

## 2023-07-06 PROCEDURE — C9803 HOPD COVID-19 SPEC COLLECT: HCPCS

## 2023-07-06 PROCEDURE — A4216 STERILE WATER/SALINE, 10 ML: HCPCS | Performed by: NURSE PRACTITIONER

## 2023-07-06 PROCEDURE — 6360000002 HC RX W HCPCS: Performed by: NURSE PRACTITIONER

## 2023-07-06 PROCEDURE — 97110 THERAPEUTIC EXERCISES: CPT

## 2023-07-06 PROCEDURE — 6360000002 HC RX W HCPCS: Performed by: STUDENT IN AN ORGANIZED HEALTH CARE EDUCATION/TRAINING PROGRAM

## 2023-07-06 PROCEDURE — C9113 INJ PANTOPRAZOLE SODIUM, VIA: HCPCS | Performed by: NURSE PRACTITIONER

## 2023-07-06 PROCEDURE — 97530 THERAPEUTIC ACTIVITIES: CPT

## 2023-07-06 PROCEDURE — 2580000003 HC RX 258: Performed by: NURSE PRACTITIONER

## 2023-07-06 PROCEDURE — 85027 COMPLETE CBC AUTOMATED: CPT

## 2023-07-06 PROCEDURE — 6370000000 HC RX 637 (ALT 250 FOR IP): Performed by: STUDENT IN AN ORGANIZED HEALTH CARE EDUCATION/TRAINING PROGRAM

## 2023-07-06 PROCEDURE — 80053 COMPREHEN METABOLIC PANEL: CPT

## 2023-07-06 RX ORDER — ERYTHROMYCIN ETHYLSUCCINATE 200 MG/5ML
200 SUSPENSION ORAL 3 TIMES DAILY
Qty: 150 ML | Refills: 0 | Status: SHIPPED | OUTPATIENT
Start: 2023-07-06 | End: 2023-07-16

## 2023-07-06 RX ORDER — ERYTHROMYCIN ETHYLSUCCINATE 200 MG/5ML
200 SUSPENSION ORAL
Qty: 200 ML | Refills: 0 | Status: SHIPPED | OUTPATIENT
Start: 2023-07-06 | End: 2023-07-06 | Stop reason: SDUPTHER

## 2023-07-06 RX ORDER — PANTOPRAZOLE SODIUM 40 MG/1
40 TABLET, DELAYED RELEASE ORAL
Qty: 90 TABLET | Refills: 1 | Status: SHIPPED | OUTPATIENT
Start: 2023-07-06

## 2023-07-06 RX ADMIN — LAMOTRIGINE 400 MG: 100 TABLET ORAL at 09:30

## 2023-07-06 RX ADMIN — TAMSULOSIN HYDROCHLORIDE 0.4 MG: 0.4 CAPSULE ORAL at 09:48

## 2023-07-06 RX ADMIN — LACOSAMIDE 150 MG: 50 TABLET, FILM COATED ORAL at 09:30

## 2023-07-06 RX ADMIN — ERYTHROMYCIN 200 MG: 200 SUSPENSION ORAL at 07:39

## 2023-07-06 RX ADMIN — SENNOSIDES 8.6 MG: 8.6 TABLET, FILM COATED ORAL at 09:47

## 2023-07-06 RX ADMIN — OXYBUTYNIN CHLORIDE 10 MG: 5 TABLET, EXTENDED RELEASE ORAL at 09:46

## 2023-07-06 RX ADMIN — PRIMIDONE 250 MG: 250 TABLET ORAL at 09:30

## 2023-07-06 RX ADMIN — ERYTHROMYCIN 200 MG: 200 SUSPENSION ORAL at 12:30

## 2023-07-06 RX ADMIN — ANTACID TABLETS 500 MG: 500 TABLET, CHEWABLE ORAL at 09:47

## 2023-07-06 RX ADMIN — SODIUM CHLORIDE, PRESERVATIVE FREE 10 ML: 5 INJECTION INTRAVENOUS at 07:40

## 2023-07-06 RX ADMIN — Medication 1000 UNITS: at 09:47

## 2023-07-06 RX ADMIN — LEVETIRACETAM 1500 MG: 500 TABLET, FILM COATED ORAL at 09:30

## 2023-07-06 RX ADMIN — PANTOPRAZOLE SODIUM 40 MG: 40 INJECTION, POWDER, FOR SOLUTION INTRAVENOUS at 09:49

## 2023-07-06 RX ADMIN — ENOXAPARIN SODIUM 40 MG: 100 INJECTION SUBCUTANEOUS at 09:30

## 2023-07-06 RX ADMIN — SODIUM CHLORIDE, PRESERVATIVE FREE 10 ML: 5 INJECTION INTRAVENOUS at 09:49

## 2023-07-06 ASSESSMENT — PAIN SCALES - GENERAL: PAINLEVEL_OUTOF10: 0

## 2023-07-06 NOTE — DISCHARGE SUMMARY
Discharge Summary    Juan A Galvez  :  1955  MRN:  [id number]    Admit date:  2023      Discharge date: 2023     Admitting Physician:  Pamela Cho MD    Discharge Diagnoses:    Principal Problem:    SBO (small bowel obstruction) ([address])  Active Problems:    Small bowel obstruction ([address])     Seizures ([address]) Chronic    MR (mental retardation), severe    Epilepsy ([address])    Mild malnutrition ([address])  Resolved Problems: Moderate malnutrition [facility])      Hospital Course:   Juan A Galvez is a 78 y.o. male admitted with small bowel obstruction. He scented to the emergency room from the group home with concerns of bowel obstruction. Patient is MRDD with seizure disorder. Patient has history of multiple small bowel obstructions in the past and generally resolve. Patient had intermittent abdominal pain with nausea and vomiting. No melena or hematochezia. No history of ill contacts. Patient was evaluated found to have small bowel obstruction NG was placed to low intermittent suction and he was admitted. General surgery was consulted x-rays were monitored. Patient began to have bowel movements obstruction resolved NG was removed. Diet was advanced he tolerated this well. Hemodynamically he is stable he has no complaints. Plan will be to discharge patient home today he will take erythromycin for short duration as well as Protonix. He will follow-up with primary care as an outpatient. Consultants:   Dr. Thad Frankel general surgery    Procedures: none    Complications: none    Discharge Condition: fair    Exam:  GEN:    Awake, alert and oriented to person only. EYES:   EOMI, pupils equal   NECK: Supple. No lymphadenopathy. No carotid bruit  CVS:     regular rate and rhythm, no audible murmur  PULM:  CTA, no wheezes, rales or rhonchi, no acute respiratory distress  ABD:     Bowels sounds active. Abdomen is soft. No distention. no tenderness to palpation. EXT:     no edema bilaterally .   No calf

## 2023-07-06 NOTE — PLAN OF CARE
Problem: Discharge Planning  Goal: Discharge to home or other facility with appropriate resources  Outcome: Completed     Problem: Safety - Adult  Goal: Free from fall injury  Outcome: Completed     Problem: Skin/Tissue Integrity  Goal: Absence of new skin breakdown  Description: 1. Monitor for areas of redness and/or skin breakdown  2. Assess vascular access sites hourly  3. Every 4-6 hours minimum:  Change oxygen saturation probe site  4. Every 4-6 hours:  If on nasal continuous positive airway pressure, respiratory therapy assess nares and determine need for appliance change or resting period.   Outcome: Completed     Problem: Gastrointestinal - Adult  Goal: Maintains or returns to baseline bowel function  Outcome: Completed     Problem: ABCDS Injury Assessment  Goal: Absence of physical injury  Outcome: Completed     Problem: Nutrition Deficit:  Goal: Optimize nutritional status  Outcome: Completed

## 2023-09-12 ENCOUNTER — HOSPITAL ENCOUNTER (EMERGENCY)
Age: 68
Discharge: NURSING, REHAB OR LONG-TERM CARE | End: 2023-09-12
Payer: MEDICARE

## 2023-09-12 VITALS
HEART RATE: 76 BPM | DIASTOLIC BLOOD PRESSURE: 72 MMHG | SYSTOLIC BLOOD PRESSURE: 106 MMHG | TEMPERATURE: 97.88 F | OXYGEN SATURATION: 97 % | RESPIRATION RATE: 18 BRPM

## 2023-09-12 VITALS — BODY MASS INDEX: 27.4 KG/M2

## 2023-09-12 DIAGNOSIS — Z79.899: ICD-10-CM

## 2023-09-12 DIAGNOSIS — S01.81XA: Primary | ICD-10-CM

## 2023-09-12 DIAGNOSIS — Z23: ICD-10-CM
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
## 2023-09-12 PROCEDURE — 99284 EMERGENCY DEPT VISIT MOD MDM: CPT

## 2023-09-12 PROCEDURE — 90471 IMMUNIZATION ADMIN: CPT

## 2023-09-12 PROCEDURE — 70450 CT HEAD/BRAIN W/O DYE: CPT

## 2023-09-12 PROCEDURE — 12011 RPR F/E/E/N/L/M 2.5 CM/<: CPT

## 2023-09-12 PROCEDURE — 90715 TDAP VACCINE 7 YRS/> IM: CPT

## 2023-11-08 ENCOUNTER — HOSPITAL ENCOUNTER (OUTPATIENT)
Age: 68
Setting detail: SPECIMEN
Discharge: HOME | End: 2023-11-08
Payer: MEDICARE

## 2023-11-08 LAB
BASOPHILS # BLD: 0.04 K/UL (ref 0–0.2)
BASOPHILS NFR BLD: 1 % (ref 0–2)
EOSINOPHIL # BLD: 0.14 K/UL (ref 0–0.44)
EOSINOPHILS RELATIVE PERCENT: 2 % (ref 1–4)
ERYTHROCYTE [DISTWIDTH] IN BLOOD BY AUTOMATED COUNT: 13 % (ref 11.8–14.4)
HCT VFR BLD AUTO: 40 % (ref 40.7–50.3)
HGB BLD-MCNC: 12.9 G/DL (ref 13–17)
IMM GRANULOCYTES # BLD AUTO: <0.03 K/UL (ref 0–0.3)
IMM GRANULOCYTES NFR BLD: 0 %
LYMPHOCYTES NFR BLD: 1.37 K/UL (ref 1.1–3.7)
LYMPHOCYTES RELATIVE PERCENT: 19 % (ref 24–43)
MCH RBC QN AUTO: 32.5 PG (ref 25.2–33.5)
MCHC RBC AUTO-ENTMCNC: 32.3 G/DL (ref 28.4–34.8)
MCV RBC AUTO: 100.8 FL (ref 82.6–102.9)
MONOCYTES NFR BLD: 0.59 K/UL (ref 0.1–1.2)
MONOCYTES NFR BLD: 8 % (ref 3–12)
NEUTROPHILS NFR BLD: 70 % (ref 36–65)
NEUTS SEG NFR BLD: 5.04 K/UL (ref 1.5–8.1)
NRBC BLD-RTO: 0 PER 100 WBC
PLATELET # BLD AUTO: 195 K/UL (ref 138–453)
PMV BLD AUTO: 9.5 FL (ref 8.1–13.5)
RBC # BLD AUTO: 3.97 M/UL (ref 4.21–5.77)
WBC OTHER # BLD: 7.2 K/UL (ref 3.5–11.3)

## 2023-11-08 PROCEDURE — 36415 COLL VENOUS BLD VENIPUNCTURE: CPT

## 2023-11-08 PROCEDURE — 85025 COMPLETE CBC W/AUTO DIFF WBC: CPT

## 2023-11-08 PROCEDURE — P9603 ONE-WAY ALLOW PRORATED MILES: HCPCS

## 2023-11-16 ENCOUNTER — HOSPITAL ENCOUNTER (OUTPATIENT)
Age: 68
Setting detail: SPECIMEN
Discharge: HOME | End: 2023-11-16
Payer: MEDICARE

## 2023-11-16 LAB
DATE, STOOL #1: NORMAL
HEMOCCULT SP1 STL QL: NEGATIVE
TIME, STOOL #1: 730

## 2023-11-16 PROCEDURE — 82272 OCCULT BLD FECES 1-3 TESTS: CPT

## 2023-12-08 ENCOUNTER — HOSPITAL ENCOUNTER (EMERGENCY)
Age: 68
LOS: 1 days | Discharge: ANOTHER HOSPITAL | End: 2023-12-09
Payer: MEDICARE

## 2023-12-08 VITALS — OXYGEN SATURATION: 96 % | HEART RATE: 100 BPM | RESPIRATION RATE: 23 BRPM

## 2023-12-08 VITALS
RESPIRATION RATE: 20 BRPM | OXYGEN SATURATION: 95 % | DIASTOLIC BLOOD PRESSURE: 82 MMHG | TEMPERATURE: 97.52 F | HEART RATE: 93 BPM | SYSTOLIC BLOOD PRESSURE: 124 MMHG

## 2023-12-08 VITALS — RESPIRATION RATE: 30 BRPM | HEART RATE: 102 BPM

## 2023-12-08 VITALS — HEART RATE: 85 BPM | RESPIRATION RATE: 21 BRPM

## 2023-12-08 VITALS — RESPIRATION RATE: 23 BRPM | HEART RATE: 104 BPM

## 2023-12-08 VITALS — OXYGEN SATURATION: 95 %

## 2023-12-08 VITALS — HEART RATE: 98 BPM | RESPIRATION RATE: 21 BRPM

## 2023-12-08 VITALS — RESPIRATION RATE: 18 BRPM | HEART RATE: 96 BPM

## 2023-12-08 VITALS — RESPIRATION RATE: 31 BRPM | HEART RATE: 107 BPM

## 2023-12-08 VITALS — HEART RATE: 105 BPM | OXYGEN SATURATION: 88 % | RESPIRATION RATE: 28 BRPM

## 2023-12-08 VITALS — RESPIRATION RATE: 23 BRPM | HEART RATE: 103 BPM

## 2023-12-08 VITALS — HEART RATE: 105 BPM | RESPIRATION RATE: 26 BRPM

## 2023-12-08 VITALS — RESPIRATION RATE: 29 BRPM | HEART RATE: 94 BPM

## 2023-12-08 VITALS — RESPIRATION RATE: 37 BRPM | HEART RATE: 104 BPM

## 2023-12-08 VITALS — HEART RATE: 98 BPM | RESPIRATION RATE: 25 BRPM

## 2023-12-08 VITALS — RESPIRATION RATE: 35 BRPM | HEART RATE: 103 BPM | SYSTOLIC BLOOD PRESSURE: 123 MMHG | DIASTOLIC BLOOD PRESSURE: 78 MMHG

## 2023-12-08 VITALS — RESPIRATION RATE: 26 BRPM | HEART RATE: 102 BPM

## 2023-12-08 VITALS — HEART RATE: 93 BPM | RESPIRATION RATE: 24 BRPM

## 2023-12-08 VITALS — RESPIRATION RATE: 27 BRPM | HEART RATE: 96 BPM

## 2023-12-08 VITALS — RESPIRATION RATE: 33 BRPM | HEART RATE: 105 BPM

## 2023-12-08 VITALS — RESPIRATION RATE: 23 BRPM | HEART RATE: 92 BPM

## 2023-12-08 VITALS — RESPIRATION RATE: 20 BRPM | HEART RATE: 92 BPM

## 2023-12-08 VITALS — RESPIRATION RATE: 27 BRPM | HEART RATE: 97 BPM

## 2023-12-08 VITALS — RESPIRATION RATE: 26 BRPM | HEART RATE: 107 BPM

## 2023-12-08 DIAGNOSIS — G40.909: ICD-10-CM

## 2023-12-08 DIAGNOSIS — Z79.899: ICD-10-CM

## 2023-12-08 DIAGNOSIS — F89: ICD-10-CM

## 2023-12-08 DIAGNOSIS — K56.609: Primary | ICD-10-CM

## 2023-12-08 LAB
ADD MANUAL DIFF: NO
ALANINE AMINOTRANSFERASE: 19 U/L (ref 16–63)
ALBUMIN GLOBULIN RATIO: 1
ALBUMIN LEVEL: 3.9 G/DL (ref 3.4–5)
ALKALINE PHOSPHATASE: 135 U/L (ref 46–116)
ANION GAP: 12.4
ASPARTATE AMINO TRANSFERASE: 6 U/L (ref 15–37)
BLOOD UREA NITROGEN: 22 MG/DL (ref 7–18)
CALCIUM: 9.4 MG/DL (ref 8.5–10.1)
CARBON DIOXIDE: 31.6 MMOL/L (ref 21–32)
CHLORIDE: 100 MMOL/L (ref 98–107)
CO2 BLD-SCNC: 31.6 MMOL/L (ref 21–32)
ESTIMATED GFR (AFRICAN AMERICA: >60 (ref 60–?)
ESTIMATED GFR (NON-AFRICAN AME: >60 (ref 60–?)
GLOBULIN: 3.8 G/DL
GLUCOSE BLD-MCNC: 108 MG/DL (ref 74–106)
HCT VFR BLD CALC: 37 % (ref 42–54)
HEMATOCRIT: 37 % (ref 42–54)
HEMOGLOBIN: 12 G/DL (ref 14–18)
IMMATURE GRANULOCYTES ABS AUTO: 0.04 10^3/UL (ref 0–0.03)
IMMATURE GRANULOCYTES PCT AUTO: 0.3 % (ref 0–0.5)
INR: 1.03
LACTATE/LACTIC ACID: 1.1 MMOL/L (ref 0.4–2)
LYMPHOCYTES  ABSOLUTE AUTO: 0.9 10^3/UL (ref 1.2–3.8)
MCV RBC: 101.1 FL (ref 80–94)
MEAN CORPUSCULAR HEMOGLOBIN: 32.8 PG (ref 25.9–34)
MEAN CORPUSCULAR HGB CONC: 32.4 G/DL (ref 29.9–35.2)
MEAN CORPUSCULAR VOLUME: 101.1 FL (ref 80–94)
PLATELET # BLD: 207 10^3/UL (ref 150–450)
PLATELET COUNT: 207 10^3/UL (ref 150–450)
POTASSIUM SERPLBLD-SCNC: 4 MMOL/L (ref 3.5–5.1)
POTASSIUM: 4 MMOL/L (ref 3.5–5.1)
PROTHROMBIN TIME: 10.9 SEC (ref 9–11.6)
RED BLOOD COUNT: 3.66 10^6/UL (ref 4.7–6.1)
SODIUM BLD-SCNC: 140 MMOL/L (ref 136–145)
SODIUM: 140 MMOL/L (ref 136–145)
THYROID STIMULATING HORMONE: 2.15 UIU/ML (ref 0.36–3.74)
TOTAL PROTEIN: 7.7 G/DL (ref 6.4–8.2)
WBC # BLD: 11.8 10^3/UL (ref 4–11)
WHITE BLOOD COUNT: 11.8 10^3/UL (ref 4–11)

## 2023-12-08 PROCEDURE — 84484 ASSAY OF TROPONIN QUANT: CPT

## 2023-12-08 PROCEDURE — 85610 PROTHROMBIN TIME: CPT

## 2023-12-08 PROCEDURE — 96361 HYDRATE IV INFUSION ADD-ON: CPT

## 2023-12-08 PROCEDURE — 74177 CT ABD & PELVIS W/CONTRAST: CPT

## 2023-12-08 PROCEDURE — 96375 TX/PRO/DX INJ NEW DRUG ADDON: CPT

## 2023-12-08 PROCEDURE — 83605 ASSAY OF LACTIC ACID: CPT

## 2023-12-08 PROCEDURE — 96374 THER/PROPH/DIAG INJ IV PUSH: CPT

## 2023-12-08 PROCEDURE — 85025 COMPLETE CBC W/AUTO DIFF WBC: CPT

## 2023-12-08 PROCEDURE — 99285 EMERGENCY DEPT VISIT HI MDM: CPT

## 2023-12-08 PROCEDURE — 93005 ELECTROCARDIOGRAM TRACING: CPT

## 2023-12-08 PROCEDURE — 84443 ASSAY THYROID STIM HORMONE: CPT

## 2023-12-08 PROCEDURE — 80053 COMPREHEN METABOLIC PANEL: CPT

## 2023-12-08 PROCEDURE — 36415 COLL VENOUS BLD VENIPUNCTURE: CPT

## 2023-12-08 PROCEDURE — 71045 X-RAY EXAM CHEST 1 VIEW: CPT

## 2023-12-09 ENCOUNTER — APPOINTMENT (OUTPATIENT)
Dept: GENERAL RADIOLOGY | Age: 68
DRG: 389 | End: 2023-12-09
Payer: MEDICARE

## 2023-12-09 ENCOUNTER — HOSPITAL ENCOUNTER (INPATIENT)
Age: 68
LOS: 4 days | Discharge: NURSING, REHAB OR LONG-TERM CARE | DRG: 389 | End: 2023-12-13
Attending: EMERGENCY MEDICINE | Admitting: STUDENT IN AN ORGANIZED HEALTH CARE EDUCATION/TRAINING PROGRAM
Payer: MEDICARE

## 2023-12-09 VITALS — HEART RATE: 104 BPM | RESPIRATION RATE: 24 BRPM | OXYGEN SATURATION: 93 %

## 2023-12-09 VITALS — RESPIRATION RATE: 29 BRPM | HEART RATE: 106 BPM | OXYGEN SATURATION: 91 %

## 2023-12-09 VITALS — HEART RATE: 106 BPM | RESPIRATION RATE: 27 BRPM | OXYGEN SATURATION: 94 %

## 2023-12-09 VITALS — RESPIRATION RATE: 25 BRPM | OXYGEN SATURATION: 94 % | HEART RATE: 103 BPM

## 2023-12-09 VITALS
RESPIRATION RATE: 25 BRPM | SYSTOLIC BLOOD PRESSURE: 100 MMHG | OXYGEN SATURATION: 94 % | HEART RATE: 99 BPM | DIASTOLIC BLOOD PRESSURE: 67 MMHG

## 2023-12-09 VITALS — RESPIRATION RATE: 30 BRPM | HEART RATE: 104 BPM | OXYGEN SATURATION: 93 %

## 2023-12-09 VITALS — OXYGEN SATURATION: 96 % | RESPIRATION RATE: 25 BRPM | HEART RATE: 107 BPM

## 2023-12-09 VITALS — HEART RATE: 107 BPM | RESPIRATION RATE: 28 BRPM

## 2023-12-09 VITALS — RESPIRATION RATE: 26 BRPM | HEART RATE: 114 BPM

## 2023-12-09 VITALS — HEART RATE: 116 BPM | RESPIRATION RATE: 28 BRPM

## 2023-12-09 VITALS — HEART RATE: 106 BPM | RESPIRATION RATE: 30 BRPM

## 2023-12-09 VITALS — RESPIRATION RATE: 22 BRPM | HEART RATE: 101 BPM | OXYGEN SATURATION: 93 %

## 2023-12-09 VITALS — HEART RATE: 118 BPM | RESPIRATION RATE: 34 BRPM

## 2023-12-09 VITALS — RESPIRATION RATE: 28 BRPM | HEART RATE: 110 BPM

## 2023-12-09 VITALS — RESPIRATION RATE: 44 BRPM | HEART RATE: 118 BPM

## 2023-12-09 VITALS — RESPIRATION RATE: 33 BRPM | HEART RATE: 115 BPM

## 2023-12-09 VITALS — HEART RATE: 108 BPM | RESPIRATION RATE: 27 BRPM | OXYGEN SATURATION: 93 %

## 2023-12-09 VITALS — HEART RATE: 109 BPM | RESPIRATION RATE: 29 BRPM

## 2023-12-09 VITALS — HEART RATE: 100 BPM | RESPIRATION RATE: 25 BRPM | OXYGEN SATURATION: 94 %

## 2023-12-09 VITALS — HEART RATE: 101 BPM | RESPIRATION RATE: 29 BRPM | OXYGEN SATURATION: 94 %

## 2023-12-09 VITALS — HEART RATE: 115 BPM | RESPIRATION RATE: 40 BRPM

## 2023-12-09 DIAGNOSIS — R11.2 NAUSEA AND VOMITING, UNSPECIFIED VOMITING TYPE: ICD-10-CM

## 2023-12-09 DIAGNOSIS — K56.609 SBO (SMALL BOWEL OBSTRUCTION) (HCC): Primary | ICD-10-CM

## 2023-12-09 DIAGNOSIS — G40.909 SEIZURE DISORDER (HCC): ICD-10-CM

## 2023-12-09 PROBLEM — G47.33 OSA (OBSTRUCTIVE SLEEP APNEA): Status: ACTIVE | Noted: 2017-08-09

## 2023-12-09 PROBLEM — F79 INTELLECTUAL DISABILITY: Status: ACTIVE | Noted: 2023-12-09

## 2023-12-09 LAB
ALBUMIN SERPL-MCNC: 3.7 G/DL (ref 3.5–5.2)
ALP SERPL-CCNC: 114 U/L (ref 40–129)
ALT SERPL-CCNC: 16 U/L (ref 5–41)
ANION GAP SERPL CALCULATED.3IONS-SCNC: 11 MMOL/L (ref 9–17)
AST SERPL-CCNC: 16 U/L
BASOPHILS # BLD: 0.03 K/UL (ref 0–0.2)
BASOPHILS NFR BLD: 0 % (ref 0–2)
BILIRUB SERPL-MCNC: 0.4 MG/DL (ref 0.3–1.2)
BUN SERPL-MCNC: 19 MG/DL (ref 8–23)
BUN/CREAT SERPL: 19 (ref 9–20)
CALCIUM SERPL-MCNC: 8.8 MG/DL (ref 8.6–10.4)
CHLORIDE SERPL-SCNC: 103 MMOL/L (ref 98–107)
CO2 SERPL-SCNC: 26 MMOL/L (ref 20–31)
CREAT SERPL-MCNC: 1 MG/DL (ref 0.7–1.2)
EOSINOPHIL # BLD: <0.03 K/UL (ref 0–0.44)
EOSINOPHILS RELATIVE PERCENT: 0 % (ref 1–4)
ERYTHROCYTE [DISTWIDTH] IN BLOOD BY AUTOMATED COUNT: 12.8 % (ref 11.8–14.4)
GFR SERPL CREATININE-BSD FRML MDRD: >60 ML/MIN/1.73M2
GLUCOSE SERPL-MCNC: 120 MG/DL (ref 70–99)
HCT VFR BLD AUTO: 33.2 % (ref 40.7–50.3)
HGB BLD-MCNC: 11 G/DL (ref 13–17)
IMM GRANULOCYTES # BLD AUTO: 0.05 K/UL (ref 0–0.3)
IMM GRANULOCYTES NFR BLD: 0 %
LACTIC ACID, WHOLE BLOOD: 1.1 MMOL/L (ref 0.7–2.1)
LYMPHOCYTES NFR BLD: 1.27 K/UL (ref 1.1–3.7)
LYMPHOCYTES RELATIVE PERCENT: 10 % (ref 24–43)
MCH RBC QN AUTO: 33.2 PG (ref 25.2–33.5)
MCHC RBC AUTO-ENTMCNC: 33.1 G/DL (ref 28.4–34.8)
MCV RBC AUTO: 100.3 FL (ref 82.6–102.9)
MONOCYTES NFR BLD: 0.57 K/UL (ref 0.1–1.2)
MONOCYTES NFR BLD: 5 % (ref 3–12)
NEUTROPHILS NFR BLD: 85 % (ref 36–65)
NEUTS SEG NFR BLD: 10.43 K/UL (ref 1.5–8.1)
NRBC BLD-RTO: 0 PER 100 WBC
PLATELET # BLD AUTO: 169 K/UL (ref 138–453)
PMV BLD AUTO: 9.3 FL (ref 8.1–13.5)
POTASSIUM SERPL-SCNC: 4 MMOL/L (ref 3.7–5.3)
PROT SERPL-MCNC: 6.3 G/DL (ref 6.4–8.3)
RBC # BLD AUTO: 3.31 M/UL (ref 4.21–5.77)
SODIUM SERPL-SCNC: 140 MMOL/L (ref 135–144)
WBC OTHER # BLD: 12.4 K/UL (ref 3.5–11.3)

## 2023-12-09 PROCEDURE — 80053 COMPREHEN METABOLIC PANEL: CPT

## 2023-12-09 PROCEDURE — 83605 ASSAY OF LACTIC ACID: CPT

## 2023-12-09 PROCEDURE — 93005 ELECTROCARDIOGRAM TRACING: CPT | Performed by: PEDIATRICS

## 2023-12-09 PROCEDURE — 71045 X-RAY EXAM CHEST 1 VIEW: CPT

## 2023-12-09 PROCEDURE — 6370000000 HC RX 637 (ALT 250 FOR IP): Performed by: STUDENT IN AN ORGANIZED HEALTH CARE EDUCATION/TRAINING PROGRAM

## 2023-12-09 PROCEDURE — 99222 1ST HOSP IP/OBS MODERATE 55: CPT | Performed by: SURGERY

## 2023-12-09 PROCEDURE — 2580000003 HC RX 258: Performed by: NURSE PRACTITIONER

## 2023-12-09 PROCEDURE — 99285 EMERGENCY DEPT VISIT HI MDM: CPT

## 2023-12-09 PROCEDURE — 2580000003 HC RX 258: Performed by: PEDIATRICS

## 2023-12-09 PROCEDURE — 2060000000 HC ICU INTERMEDIATE R&B

## 2023-12-09 PROCEDURE — 85025 COMPLETE CBC W/AUTO DIFF WBC: CPT

## 2023-12-09 PROCEDURE — 94761 N-INVAS EAR/PLS OXIMETRY MLT: CPT

## 2023-12-09 PROCEDURE — 1200000000 HC SEMI PRIVATE

## 2023-12-09 PROCEDURE — 99222 1ST HOSP IP/OBS MODERATE 55: CPT | Performed by: STUDENT IN AN ORGANIZED HEALTH CARE EDUCATION/TRAINING PROGRAM

## 2023-12-09 PROCEDURE — 6360000002 HC RX W HCPCS: Performed by: NURSE PRACTITIONER

## 2023-12-09 RX ORDER — ONDANSETRON 4 MG/1
4 TABLET, FILM COATED ORAL EVERY 6 HOURS PRN
Status: DISCONTINUED | OUTPATIENT
Start: 2023-12-09 | End: 2023-12-13 | Stop reason: HOSPADM

## 2023-12-09 RX ORDER — MECLIZINE HCL 12.5 MG/1
25 TABLET ORAL EVERY 8 HOURS PRN
Status: DISCONTINUED | OUTPATIENT
Start: 2023-12-09 | End: 2023-12-13 | Stop reason: HOSPADM

## 2023-12-09 RX ORDER — MAGNESIUM SULFATE 1 G/100ML
1000 INJECTION INTRAVENOUS PRN
Status: DISCONTINUED | OUTPATIENT
Start: 2023-12-09 | End: 2023-12-13 | Stop reason: HOSPADM

## 2023-12-09 RX ORDER — LAMOTRIGINE 100 MG/1
600 TABLET ORAL NIGHTLY
Status: DISCONTINUED | OUTPATIENT
Start: 2023-12-09 | End: 2023-12-13 | Stop reason: HOSPADM

## 2023-12-09 RX ORDER — ONDANSETRON 4 MG/1
4 TABLET, ORALLY DISINTEGRATING ORAL EVERY 8 HOURS PRN
Status: DISCONTINUED | OUTPATIENT
Start: 2023-12-09 | End: 2023-12-13 | Stop reason: HOSPADM

## 2023-12-09 RX ORDER — LACOSAMIDE 10 MG/ML
150 SOLUTION ORAL 2 TIMES DAILY
Status: DISCONTINUED | OUTPATIENT
Start: 2023-12-09 | End: 2023-12-10

## 2023-12-09 RX ORDER — FINASTERIDE 5 MG/1
5 TABLET, FILM COATED ORAL NIGHTLY
Status: DISCONTINUED | OUTPATIENT
Start: 2023-12-09 | End: 2023-12-13 | Stop reason: HOSPADM

## 2023-12-09 RX ORDER — PRIMIDONE 250 MG/1
250 TABLET ORAL EVERY MORNING
Status: DISCONTINUED | OUTPATIENT
Start: 2023-12-09 | End: 2023-12-13 | Stop reason: HOSPADM

## 2023-12-09 RX ORDER — POLYETHYLENE GLYCOL 3350 17 G/17G
17 POWDER, FOR SOLUTION ORAL DAILY
Status: DISCONTINUED | OUTPATIENT
Start: 2023-12-09 | End: 2023-12-13 | Stop reason: HOSPADM

## 2023-12-09 RX ORDER — LANOLIN ALCOHOL/MO/W.PET/CERES
3 CREAM (GRAM) TOPICAL NIGHTLY PRN
Status: DISCONTINUED | OUTPATIENT
Start: 2023-12-09 | End: 2023-12-13 | Stop reason: HOSPADM

## 2023-12-09 RX ORDER — TRAZODONE HYDROCHLORIDE 50 MG/1
50 TABLET ORAL NIGHTLY
Status: DISCONTINUED | OUTPATIENT
Start: 2023-12-09 | End: 2023-12-13 | Stop reason: HOSPADM

## 2023-12-09 RX ORDER — SODIUM CHLORIDE 0.9 % (FLUSH) 0.9 %
10 SYRINGE (ML) INJECTION PRN
Status: DISCONTINUED | OUTPATIENT
Start: 2023-12-09 | End: 2023-12-13 | Stop reason: HOSPADM

## 2023-12-09 RX ORDER — ONDANSETRON 2 MG/ML
4 INJECTION INTRAMUSCULAR; INTRAVENOUS EVERY 6 HOURS PRN
Status: DISCONTINUED | OUTPATIENT
Start: 2023-12-09 | End: 2023-12-13 | Stop reason: HOSPADM

## 2023-12-09 RX ORDER — OXYBUTYNIN CHLORIDE 10 MG/1
10 TABLET, EXTENDED RELEASE ORAL DAILY
Status: DISCONTINUED | OUTPATIENT
Start: 2023-12-09 | End: 2023-12-09

## 2023-12-09 RX ORDER — POLYETHYLENE GLYCOL 3350 17 G/17G
17 POWDER, FOR SOLUTION ORAL DAILY PRN
Status: DISCONTINUED | OUTPATIENT
Start: 2023-12-09 | End: 2023-12-13 | Stop reason: HOSPADM

## 2023-12-09 RX ORDER — SODIUM CHLORIDE 0.9 % (FLUSH) 0.9 %
5-40 SYRINGE (ML) INJECTION EVERY 12 HOURS SCHEDULED
Status: DISCONTINUED | OUTPATIENT
Start: 2023-12-09 | End: 2023-12-13 | Stop reason: HOSPADM

## 2023-12-09 RX ORDER — LEVETIRACETAM 500 MG/1
2000 TABLET ORAL EVERY EVENING
Status: DISCONTINUED | OUTPATIENT
Start: 2023-12-09 | End: 2023-12-10

## 2023-12-09 RX ORDER — LAMOTRIGINE 100 MG/1
400 TABLET ORAL EVERY MORNING
Status: DISCONTINUED | OUTPATIENT
Start: 2023-12-09 | End: 2023-12-13 | Stop reason: HOSPADM

## 2023-12-09 RX ORDER — PRIMIDONE 250 MG/1
375 TABLET ORAL NIGHTLY
Status: DISCONTINUED | OUTPATIENT
Start: 2023-12-09 | End: 2023-12-13 | Stop reason: HOSPADM

## 2023-12-09 RX ORDER — TAMSULOSIN HYDROCHLORIDE 0.4 MG/1
0.4 CAPSULE ORAL 2 TIMES DAILY
Status: DISCONTINUED | OUTPATIENT
Start: 2023-12-09 | End: 2023-12-13 | Stop reason: HOSPADM

## 2023-12-09 RX ORDER — OXYBUTYNIN CHLORIDE 5 MG/1
5 TABLET ORAL 2 TIMES DAILY
Status: DISCONTINUED | OUTPATIENT
Start: 2023-12-09 | End: 2023-12-13 | Stop reason: HOSPADM

## 2023-12-09 RX ORDER — DIAZEPAM 10 MG/2G
20 GEL RECTAL PRN
Status: DISCONTINUED | OUTPATIENT
Start: 2023-12-09 | End: 2023-12-13 | Stop reason: HOSPADM

## 2023-12-09 RX ORDER — LORAZEPAM 0.5 MG/1
0.5 TABLET ORAL EVERY 8 HOURS PRN
Status: DISCONTINUED | OUTPATIENT
Start: 2023-12-09 | End: 2023-12-10

## 2023-12-09 RX ORDER — POTASSIUM CHLORIDE 7.45 MG/ML
10 INJECTION INTRAVENOUS PRN
Status: DISCONTINUED | OUTPATIENT
Start: 2023-12-09 | End: 2023-12-13 | Stop reason: HOSPADM

## 2023-12-09 RX ORDER — ACETAMINOPHEN 325 MG/1
650 TABLET ORAL EVERY 6 HOURS PRN
Status: DISCONTINUED | OUTPATIENT
Start: 2023-12-09 | End: 2023-12-13 | Stop reason: HOSPADM

## 2023-12-09 RX ORDER — ENOXAPARIN SODIUM 100 MG/ML
40 INJECTION SUBCUTANEOUS DAILY
Status: DISCONTINUED | OUTPATIENT
Start: 2023-12-09 | End: 2023-12-13 | Stop reason: HOSPADM

## 2023-12-09 RX ORDER — LEVETIRACETAM 500 MG/1
1500 TABLET ORAL EVERY MORNING
Status: DISCONTINUED | OUTPATIENT
Start: 2023-12-09 | End: 2023-12-10

## 2023-12-09 RX ORDER — LANSOPRAZOLE 30 MG/1
30 TABLET, ORALLY DISINTEGRATING, DELAYED RELEASE ORAL
Status: DISCONTINUED | OUTPATIENT
Start: 2023-12-09 | End: 2023-12-13 | Stop reason: HOSPADM

## 2023-12-09 RX ORDER — SODIUM CHLORIDE 9 MG/ML
INJECTION, SOLUTION INTRAVENOUS CONTINUOUS
Status: ACTIVE | OUTPATIENT
Start: 2023-12-09 | End: 2023-12-11

## 2023-12-09 RX ORDER — SODIUM CHLORIDE 9 MG/ML
INJECTION, SOLUTION INTRAVENOUS PRN
Status: DISCONTINUED | OUTPATIENT
Start: 2023-12-09 | End: 2023-12-13 | Stop reason: HOSPADM

## 2023-12-09 RX ORDER — HYDROXYZINE HYDROCHLORIDE 25 MG/1
25 TABLET, FILM COATED ORAL EVERY 12 HOURS PRN
Status: DISCONTINUED | OUTPATIENT
Start: 2023-12-09 | End: 2023-12-13 | Stop reason: HOSPADM

## 2023-12-09 RX ORDER — SODIUM CHLORIDE, SODIUM LACTATE, POTASSIUM CHLORIDE, AND CALCIUM CHLORIDE .6; .31; .03; .02 G/100ML; G/100ML; G/100ML; G/100ML
1000 INJECTION, SOLUTION INTRAVENOUS ONCE
Status: COMPLETED | OUTPATIENT
Start: 2023-12-09 | End: 2023-12-09

## 2023-12-09 RX ORDER — PANTOPRAZOLE SODIUM 40 MG/1
40 TABLET, DELAYED RELEASE ORAL
Status: DISCONTINUED | OUTPATIENT
Start: 2023-12-10 | End: 2023-12-09

## 2023-12-09 RX ORDER — ACETAMINOPHEN 650 MG/1
650 SUPPOSITORY RECTAL EVERY 6 HOURS PRN
Status: DISCONTINUED | OUTPATIENT
Start: 2023-12-09 | End: 2023-12-13 | Stop reason: HOSPADM

## 2023-12-09 RX ADMIN — SODIUM CHLORIDE: 9 INJECTION, SOLUTION INTRAVENOUS at 08:20

## 2023-12-09 RX ADMIN — PRIMIDONE 250 MG: 250 TABLET ORAL at 13:15

## 2023-12-09 RX ADMIN — TAMSULOSIN HYDROCHLORIDE 0.4 MG: 0.4 CAPSULE ORAL at 20:42

## 2023-12-09 RX ADMIN — SODIUM CHLORIDE: 9 INJECTION, SOLUTION INTRAVENOUS at 20:32

## 2023-12-09 RX ADMIN — SODIUM CHLORIDE, POTASSIUM CHLORIDE, SODIUM LACTATE AND CALCIUM CHLORIDE 1000 ML: 600; 310; 30; 20 INJECTION, SOLUTION INTRAVENOUS at 05:18

## 2023-12-09 RX ADMIN — POLYETHYLENE GLYCOL 3350 17 G: 17 POWDER, FOR SOLUTION ORAL at 13:24

## 2023-12-09 RX ADMIN — SODIUM CHLORIDE, PRESERVATIVE FREE 10 ML: 5 INJECTION INTRAVENOUS at 21:35

## 2023-12-09 RX ADMIN — Medication 3 MG: at 20:41

## 2023-12-09 RX ADMIN — LACOSAMIDE ORAL SOLUTION 150 MG: 10 SOLUTION ORAL at 21:33

## 2023-12-09 RX ADMIN — TRAZODONE HYDROCHLORIDE 50 MG: 50 TABLET ORAL at 20:42

## 2023-12-09 RX ADMIN — FINASTERIDE 5 MG: 5 TABLET, FILM COATED ORAL at 20:41

## 2023-12-09 RX ADMIN — LEVETIRACETAM 2000 MG: 500 TABLET, FILM COATED ORAL at 20:41

## 2023-12-09 RX ADMIN — SODIUM CHLORIDE, PRESERVATIVE FREE 10 ML: 5 INJECTION INTRAVENOUS at 09:00

## 2023-12-09 RX ADMIN — PRIMIDONE 375 MG: 250 TABLET ORAL at 20:42

## 2023-12-09 RX ADMIN — LEVETIRACETAM 1500 MG: 500 TABLET, FILM COATED ORAL at 13:22

## 2023-12-09 RX ADMIN — LANSOPRAZOLE 30 MG: 30 TABLET, ORALLY DISINTEGRATING, DELAYED RELEASE ORAL at 21:32

## 2023-12-09 RX ADMIN — OXYBUTYNIN CHLORIDE 5 MG: 5 TABLET ORAL at 21:33

## 2023-12-09 RX ADMIN — LAMOTRIGINE 600 MG: 100 TABLET ORAL at 20:42

## 2023-12-09 RX ADMIN — ENOXAPARIN SODIUM 40 MG: 100 INJECTION SUBCUTANEOUS at 13:11

## 2023-12-09 RX ADMIN — LAMOTRIGINE 400 MG: 100 TABLET ORAL at 13:14

## 2023-12-09 ASSESSMENT — PAIN - FUNCTIONAL ASSESSMENT
PAIN_FUNCTIONAL_ASSESSMENT: NONE - DENIES PAIN
PAIN_FUNCTIONAL_ASSESSMENT: NONE - DENIES PAIN

## 2023-12-09 NOTE — ED NOTES
Lab called in regards to [facility] lab pending, lab reports atomizer down and should be resulted at 08:30     Steve Whelan RN  12/09/23 [id number]

## 2023-12-09 NOTE — H&P
[facility]  Office: [id number], DO, Nathaniel Perez, DO, Jenny Beard, DO, Phillip Db Blood, DO, Komal Gray MD, Jaret Henry MD, Tanika Batres MD, Arcelia Libman, MD,  Salinas French MD, Chato Mcclure MD, Grzegorz Meadows, DO, Emery Stallings MD,  Sofia Reynolds MD, Jessica Shen MD, Ck Dorsey DO, Kallie Beal MD,  Vinicio Orozco DO, Antony Prieto MD, Ninfa Cornejo MD, Blanca Reynaga MD, Era Brumfield MD,  Riley Camarena MD, Meeta Ozuna MD, Leigha Mcneil MD, Harriett Alberts DO, Taryn Knox MD,  Alberto Chavez MD, William Hoover, Yolette Dunham, CNP, Andreina, CNP, Sandra Gayle, CNP,  Trish Morrison, DNP, Pham Coppola, CNP, Missy Tena, CNP, Jonathon Choudhary, CNP, Edda Pichardo, CNP, Shakeel Jeffers, CNP, Mariam Byrnes PA-C, Andressa Vo, CNS, Sunita Jensen, CNP, Ej Marlow, [facility]    HISTORY AND PHYSICAL EXAMINATION            Date:   12/9/2023  Patient name:  Mary Carmen Negron  Date of admission:  12/9/2023  5:06 AM  MRN:   [id number]  Account:  [de-identified]  YOB: 1955  PCP:    Breanne Kramer MD  Room:   17/17  Code Status:    Full Code      History Obtained From:     patient, electronic medical record    History of Present Illness:     Mary Carmen Negron is a 80 y.o. Non- / non  male who presents with Abdominal Pain (CTE shows bowel obstruction at [facility])   and is admitted to the hospital for the management of SBO (small bowel obstruction) ([address] [facility]). Patient has a history of MRDD, seizure and small bowel obstruction with previous surgery at [facility], presented as a transfer from the there with chief complaint of vomiting and abdominal pain.   Imaging done at [facility] was suggestive of partial obstruction of proximal small bowel, patient got NG tube placed which eventually was pulled out by the

## 2023-12-09 NOTE — ED PROVIDER NOTES
[address] [place] [address] ED  Emergency Department Encounter  Emergency Medicine Resident     Pt Name:Jeremy King  MRN: [id number]  Birthdate 1955  Date of evaluation: 12/9/23  PCP:  Lila Villaseñor MD    5:10 AM EST     CHIEF COMPLAINT       Chief Complaint   Patient presents with    Abdominal Pain     CTE shows bowel obstruction at [facility]  (Location/Symptom, Timing/Onset, Context/Setting, Quality, Duration, Modifying Factors, Severity.)      Adia Holley is a 80 y.o. male who presents with patient has a history of MRDD and small bowel obstructions in the past with intra-abdominal surgeries to correct this in the past.  He had a large dilated stomach, bore NG's were placed and patient did remove these on his own. Prior to arrival he has no NG in place on arrival.  IV fluids were infused at previous facility. Images were obtained they were sent on the CT these were given to CT to be uploaded on patient's arrival.  He did sleep en route per EMS. No medications given. On arrival patient is not complaining of pain he states he is comfortable while lying flat in bed. PAST MEDICAL / SURGICAL / SOCIAL / FAMILY HISTORY      has a past medical history of MR (mental retardation), SBO (small bowel obstruction) ([address]), Seizures ([address]), and Ventral hernia. has a past surgical history that includes Abdomen surgery (2013, 2014); laparoscopy (8/17/14); laparotomy (1/81/66); cast application (Left); Hydrocele surgery (08/2016); Vagus nerve stimulator insertion; and Vagus nerve stimulator insertion (10/11/2016).       Social History     Socioeconomic History    Marital status: Single     Spouse name: Not on file    Number of children: 0    Years of education: Not on file    Highest education level: Not on file   Occupational History    Not on file   Tobacco Use    Smoking status: Never    Smokeless tobacco: Not on file   Substance and Sexual Activity    Alcohol use:

## 2023-12-09 NOTE — ED NOTES
Jeremy King   [birth year]  H/o SBO repair in April   Had nausea and vomiting around 6 pm   Dr. Delon Geiger   Accepted by Dr Jamie Frazier, [facility], [address] [place] [address]  12/09/23 7868

## 2023-12-09 NOTE — ED NOTES
CD scan result was given to CT staff to upload the result on patient's chart.      Lexa Camacho RN  12/09/23 0716

## 2023-12-09 NOTE — ED TRIAGE NOTES
Patient to ED transported by [facility] transferred from [place] [facility] today. Patient is currently staying at the group home per EMS with complaints of abdominal pain, GCS of 14 as baseline. Patient has history of SBO repair last April. Patient noted to have N/V at 1800 yesterday. On full cardiac monitor. Unknown blood thinning medication and thorough medical history due to current medical condition. Call light in reach. Will continue to monitor.

## 2023-12-09 NOTE — ED NOTES
Report given to [facility].   No change in patient status  Continues to rest quietly       Maru Howe, [address]  12/09/23 3372

## 2023-12-09 NOTE — ED NOTES
Vimpat, Ditropan and Prevacid were changed due to pt having NG tube. Medications not given due to medications needing verified by pharmacy.       Estiven Shipley RN  12/09/23 7108

## 2023-12-09 NOTE — ED NOTES
Dr. Wale Mark from trauma team at bedside for consult and evaluation.      Al Davidson RN  12/09/23 5878

## 2023-12-10 ENCOUNTER — APPOINTMENT (OUTPATIENT)
Dept: GENERAL RADIOLOGY | Age: 68
DRG: 389 | End: 2023-12-10
Payer: MEDICARE

## 2023-12-10 PROBLEM — R13.10 DYSPHAGIA: Status: ACTIVE | Noted: 2023-12-10

## 2023-12-10 PROBLEM — Z96.89 S/P PLACEMENT OF VNS (VAGUS NERVE STIMULATION) DEVICE: Status: ACTIVE | Noted: 2023-12-10

## 2023-12-10 PROBLEM — R79.1 ELEVATED INTERNATIONAL NORMALIZED RATIO (INR): Status: ACTIVE | Noted: 2023-12-10

## 2023-12-10 LAB
ANION GAP SERPL CALCULATED.3IONS-SCNC: 12 MMOL/L (ref 9–17)
BASOPHILS # BLD: 0 K/UL (ref 0–0.2)
BASOPHILS NFR BLD: 0 % (ref 0–2)
BUN SERPL-MCNC: 13 MG/DL (ref 8–23)
CALCIUM SERPL-MCNC: 8.6 MG/DL (ref 8.6–10.4)
CHLORIDE SERPL-SCNC: 103 MMOL/L (ref 98–107)
CO2 SERPL-SCNC: 23 MMOL/L (ref 20–31)
CREAT SERPL-MCNC: 0.8 MG/DL (ref 0.7–1.2)
EOSINOPHIL # BLD: 0 K/UL (ref 0–0.4)
EOSINOPHILS RELATIVE PERCENT: 0 % (ref 1–4)
ERYTHROCYTE [DISTWIDTH] IN BLOOD BY AUTOMATED COUNT: 12.9 % (ref 11.8–14.4)
GFR SERPL CREATININE-BSD FRML MDRD: >60 ML/MIN/1.73M2
GLUCOSE SERPL-MCNC: 82 MG/DL (ref 70–99)
HCT VFR BLD AUTO: 34.6 % (ref 40.7–50.3)
HGB BLD-MCNC: 10.9 G/DL (ref 13–17)
IMM GRANULOCYTES # BLD AUTO: 0 K/UL (ref 0–0.3)
IMM GRANULOCYTES NFR BLD: 0 %
INR PPP: 1.5
LACTIC ACID, SEPSIS WHOLE BLOOD: 1.7 MMOL/L (ref 0.5–1.9)
LYMPHOCYTES NFR BLD: 1 K/UL (ref 1–4.8)
LYMPHOCYTES RELATIVE PERCENT: 11 % (ref 24–44)
MCH RBC QN AUTO: 32.9 PG (ref 25.2–33.5)
MCHC RBC AUTO-ENTMCNC: 31.5 G/DL (ref 28.4–34.8)
MCV RBC AUTO: 104.5 FL (ref 82.6–102.9)
MONOCYTES NFR BLD: 0.82 K/UL (ref 0.1–0.8)
MONOCYTES NFR BLD: 9 % (ref 1–7)
MORPHOLOGY: ABNORMAL
NEUTROPHILS NFR BLD: 80 % (ref 36–66)
NEUTS SEG NFR BLD: 7.28 K/UL (ref 1.8–7.7)
NRBC BLD-RTO: 0 PER 100 WBC
PLATELET # BLD AUTO: 133 K/UL (ref 138–453)
PMV BLD AUTO: 9.5 FL (ref 8.1–13.5)
POTASSIUM SERPL-SCNC: 4.2 MMOL/L (ref 3.7–5.3)
PROTHROMBIN TIME: 17.4 SEC (ref 11.7–14.9)
RBC # BLD AUTO: 3.31 M/UL (ref 4.21–5.77)
SODIUM SERPL-SCNC: 138 MMOL/L (ref 135–144)
WBC OTHER # BLD: 9.1 K/UL (ref 3.5–11.3)

## 2023-12-10 PROCEDURE — 87040 BLOOD CULTURE FOR BACTERIA: CPT

## 2023-12-10 PROCEDURE — 80048 BASIC METABOLIC PNL TOTAL CA: CPT

## 2023-12-10 PROCEDURE — 6370000000 HC RX 637 (ALT 250 FOR IP): Performed by: NURSE PRACTITIONER

## 2023-12-10 PROCEDURE — 83605 ASSAY OF LACTIC ACID: CPT

## 2023-12-10 PROCEDURE — 99222 1ST HOSP IP/OBS MODERATE 55: CPT | Performed by: PSYCHIATRY & NEUROLOGY

## 2023-12-10 PROCEDURE — C9113 INJ PANTOPRAZOLE SODIUM, VIA: HCPCS | Performed by: INTERNAL MEDICINE

## 2023-12-10 PROCEDURE — 6360000002 HC RX W HCPCS: Performed by: NURSE PRACTITIONER

## 2023-12-10 PROCEDURE — 6360000002 HC RX W HCPCS: Performed by: INTERNAL MEDICINE

## 2023-12-10 PROCEDURE — 36415 COLL VENOUS BLD VENIPUNCTURE: CPT

## 2023-12-10 PROCEDURE — C9254 INJECTION, LACOSAMIDE: HCPCS | Performed by: PSYCHIATRY & NEUROLOGY

## 2023-12-10 PROCEDURE — 2580000003 HC RX 258: Performed by: INTERNAL MEDICINE

## 2023-12-10 PROCEDURE — 71045 X-RAY EXAM CHEST 1 VIEW: CPT

## 2023-12-10 PROCEDURE — 2580000003 HC RX 258: Performed by: PSYCHIATRY & NEUROLOGY

## 2023-12-10 PROCEDURE — 1200000000 HC SEMI PRIVATE

## 2023-12-10 PROCEDURE — 6370000000 HC RX 637 (ALT 250 FOR IP): Performed by: STUDENT IN AN ORGANIZED HEALTH CARE EDUCATION/TRAINING PROGRAM

## 2023-12-10 PROCEDURE — 85025 COMPLETE CBC W/AUTO DIFF WBC: CPT

## 2023-12-10 PROCEDURE — 74018 RADEX ABDOMEN 1 VIEW: CPT

## 2023-12-10 PROCEDURE — 99232 SBSQ HOSP IP/OBS MODERATE 35: CPT | Performed by: SURGERY

## 2023-12-10 PROCEDURE — 99232 SBSQ HOSP IP/OBS MODERATE 35: CPT | Performed by: NURSE PRACTITIONER

## 2023-12-10 PROCEDURE — 94761 N-INVAS EAR/PLS OXIMETRY MLT: CPT

## 2023-12-10 PROCEDURE — 2580000003 HC RX 258: Performed by: NURSE PRACTITIONER

## 2023-12-10 PROCEDURE — 85610 PROTHROMBIN TIME: CPT

## 2023-12-10 PROCEDURE — 6360000002 HC RX W HCPCS: Performed by: PSYCHIATRY & NEUROLOGY

## 2023-12-10 PROCEDURE — A4216 STERILE WATER/SALINE, 10 ML: HCPCS | Performed by: INTERNAL MEDICINE

## 2023-12-10 RX ORDER — LEVETIRACETAM 15 MG/ML
1500 INJECTION INTRAVASCULAR EVERY MORNING
Status: DISCONTINUED | OUTPATIENT
Start: 2023-12-10 | End: 2023-12-10

## 2023-12-10 RX ORDER — FUROSEMIDE 10 MG/ML
40 INJECTION INTRAMUSCULAR; INTRAVENOUS ONCE
Status: COMPLETED | OUTPATIENT
Start: 2023-12-10 | End: 2023-12-10

## 2023-12-10 RX ORDER — LACOSAMIDE 10 MG/ML
150 INJECTION, SOLUTION INTRAVENOUS 2 TIMES DAILY
Status: DISCONTINUED | OUTPATIENT
Start: 2023-12-11 | End: 2023-12-11

## 2023-12-10 RX ORDER — LORAZEPAM 2 MG/ML
0.5 INJECTION INTRAMUSCULAR EVERY 4 HOURS PRN
Status: DISCONTINUED | OUTPATIENT
Start: 2023-12-10 | End: 2023-12-11

## 2023-12-10 RX ORDER — LEVETIRACETAM 500 MG/5ML
2000 INJECTION, SOLUTION, CONCENTRATE INTRAVENOUS NIGHTLY
Status: DISCONTINUED | OUTPATIENT
Start: 2023-12-10 | End: 2023-12-11

## 2023-12-10 RX ORDER — LEVETIRACETAM 500 MG/5ML
1500 INJECTION, SOLUTION, CONCENTRATE INTRAVENOUS DAILY
Status: DISCONTINUED | OUTPATIENT
Start: 2023-12-10 | End: 2023-12-11

## 2023-12-10 RX ORDER — LEVETIRACETAM 15 MG/ML
1500 INJECTION INTRAVASCULAR DAILY
Status: DISCONTINUED | OUTPATIENT
Start: 2023-12-11 | End: 2023-12-10

## 2023-12-10 RX ADMIN — PANTOPRAZOLE SODIUM 40 MG: 40 INJECTION, POWDER, FOR SOLUTION INTRAVENOUS at 09:57

## 2023-12-10 RX ADMIN — LEVETIRACETAM 2000 MG: 100 INJECTION, SOLUTION INTRAVENOUS at 20:31

## 2023-12-10 RX ADMIN — Medication 3 MG: at 20:39

## 2023-12-10 RX ADMIN — SODIUM CHLORIDE: 9 INJECTION, SOLUTION INTRAVENOUS at 05:29

## 2023-12-10 RX ADMIN — FUROSEMIDE 40 MG: 10 INJECTION, SOLUTION INTRAMUSCULAR; INTRAVENOUS at 14:20

## 2023-12-10 RX ADMIN — PRIMIDONE 375 MG: 250 TABLET ORAL at 21:00

## 2023-12-10 RX ADMIN — ACETAMINOPHEN 650 MG: 325 TABLET ORAL at 20:28

## 2023-12-10 RX ADMIN — LAMOTRIGINE 600 MG: 100 TABLET ORAL at 20:27

## 2023-12-10 RX ADMIN — LAMOTRIGINE 400 MG: 100 TABLET ORAL at 08:08

## 2023-12-10 RX ADMIN — SODIUM CHLORIDE, PRESERVATIVE FREE 10 ML: 5 INJECTION INTRAVENOUS at 20:34

## 2023-12-10 RX ADMIN — SODIUM CHLORIDE, PRESERVATIVE FREE 10 ML: 5 INJECTION INTRAVENOUS at 08:08

## 2023-12-10 RX ADMIN — LEVETIRACETAM 1500 MG: 500 TABLET, FILM COATED ORAL at 08:08

## 2023-12-10 RX ADMIN — ENOXAPARIN SODIUM 40 MG: 100 INJECTION SUBCUTANEOUS at 09:58

## 2023-12-10 RX ADMIN — POLYETHYLENE GLYCOL 3350 17 G: 17 POWDER, FOR SOLUTION ORAL at 08:08

## 2023-12-10 RX ADMIN — PRIMIDONE 250 MG: 250 TABLET ORAL at 10:15

## 2023-12-10 RX ADMIN — LACOSAMIDE 150 MG: 10 INJECTION INTRAVENOUS at 20:53

## 2023-12-10 RX ADMIN — LACOSAMIDE ORAL SOLUTION 150 MG: 10 SOLUTION ORAL at 08:08

## 2023-12-10 RX ADMIN — OXYBUTYNIN CHLORIDE 5 MG: 5 TABLET ORAL at 08:07

## 2023-12-10 NOTE — PLAN OF CARE
Problem: Skin/Tissue Integrity  Goal: Absence of new skin breakdown  Description: 1. Monitor for areas of redness and/or skin breakdown  2. Assess vascular access sites hourly  3. Every 4-6 hours minimum:  Change oxygen saturation probe site  4. Every 4-6 hours:  If on nasal continuous positive airway pressure, respiratory therapy assess nares and determine need for appliance change or resting period.   12/10/2023 0623 by Corazon Goins RN  Outcome: Progressing     Problem: Safety - Adult  Goal: Free from fall injury  12/10/2023 0623 by Corazon Goins RN  Outcome: Progressing

## 2023-12-10 NOTE — CARE COORDINATION
Case Management Assessment  Initial Evaluation    Date/Time of Evaluation: 12/10/2023 3:54 PM  Assessment Completed by: Magdi Osei    If patient is discharged prior to next notation, then this note serves as note for discharge by case management. Patient Name: Jessica Cabrera                   YOB: 1955  Diagnosis: SBO (small bowel obstruction) ([facility]) [K56.609]  Nausea and vomiting, unspecified vomiting type [R11.2]                   Date / Time: 12/9/2023  5:06 AM    Patient Admission Status: Inpatient   Readmission Risk (Low < 19, Mod (19-27), High > 27): Readmission Risk Score: 19.3    Current PCP: Naa Hanna MD  PCP verified by CM? (P) Yes (Dr. Christ Shepherd)    Chart Reviewed: Yes      History Provided by: (P) Other (see comment) (Guardian, Esther/ sister)  Patient Orientation: (P) Alert and Oriented, Person, Place, Self, Other (see comment) (disabled)    Patient Cognition: (P) Severely Impaired    Hospitalization in the last 30 days (Readmission):  No    If yes, Readmission Assessment in CM Navigator will be completed.     Advance Directives:      Code Status: Full Code   Patient's Primary Decision Maker is: Named in Scanned ACP Document    Primary Decision Maker: Alanis Pierce, Legal Guardian - [phone]    Discharge Planning:    Patient lives with: (P) Other (Comment) (group home) Type of Home: (P) Group Home  Primary Care Giver: (P) Other (Comment) (lives in a group)  Patient Support Systems include: (P) Family Members, Home Care Staff   Current Financial resources: (P) Medicare  Current community resources: (P) None  Current services prior to admission: (P) None            Current DME:              Type of Home Care services:  (P) None    ADLS  Prior functional level: (P) Assistance with the following:, Bathing, Dressing, Toileting, Cooking, Housework, Shopping, Mobility  Current functional level: (P) Assistance with the following:, Bathing, Dressing,

## 2023-12-10 NOTE — PLAN OF CARE
Problem: Discharge Planning  Goal: Discharge to home or other facility with appropriate resources  Outcome: Progressing     Problem: Skin/Tissue Integrity  Goal: Absence of new skin breakdown  Description: 1. Monitor for areas of redness and/or skin breakdown  2. Assess vascular access sites hourly  3. Every 4-6 hours minimum:  Change oxygen saturation probe site  4. Every 4-6 hours:  If on nasal continuous positive airway pressure, respiratory therapy assess nares and determine need for appliance change or resting period.   12/10/2023 1623 by Boris Romo RN  Outcome: Progressing  12/10/2023 0623 by Corazon Goins RN  Outcome: Progressing     Problem: Safety - Adult  Goal: Free from fall injury  12/10/2023 1623 by Boris Romo RN  Outcome: Progressing  12/10/2023 0623 by Corazon Goins RN  Outcome: Progressing

## 2023-12-11 PROBLEM — G40.909 SEIZURE DISORDER (HCC): Status: ACTIVE | Noted: 2023-12-11

## 2023-12-11 PROBLEM — R11.2 NAUSEA AND VOMITING: Status: ACTIVE | Noted: 2023-12-11

## 2023-12-11 LAB
ANION GAP SERPL CALCULATED.3IONS-SCNC: 10 MMOL/L (ref 9–17)
BUN SERPL-MCNC: 13 MG/DL (ref 8–23)
CALCIUM SERPL-MCNC: 8.2 MG/DL (ref 8.6–10.4)
CHLORIDE SERPL-SCNC: 100 MMOL/L (ref 98–107)
CO2 SERPL-SCNC: 26 MMOL/L (ref 20–31)
CREAT SERPL-MCNC: 0.9 MG/DL (ref 0.7–1.2)
GFR SERPL CREATININE-BSD FRML MDRD: >60 ML/MIN/1.73M2
GLUCOSE BLD-MCNC: 109 MG/DL (ref 75–110)
GLUCOSE SERPL-MCNC: 96 MG/DL (ref 70–99)
LACTIC ACID, SEPSIS WHOLE BLOOD: 1.4 MMOL/L (ref 0.5–1.9)
POTASSIUM SERPL-SCNC: 3.8 MMOL/L (ref 3.7–5.3)
PROCALCITONIN SERPL-MCNC: 0.12 NG/ML
SODIUM SERPL-SCNC: 136 MMOL/L (ref 135–144)

## 2023-12-11 PROCEDURE — 6360000002 HC RX W HCPCS: Performed by: NURSE PRACTITIONER

## 2023-12-11 PROCEDURE — 6370000000 HC RX 637 (ALT 250 FOR IP): Performed by: INTERNAL MEDICINE

## 2023-12-11 PROCEDURE — C9113 INJ PANTOPRAZOLE SODIUM, VIA: HCPCS | Performed by: INTERNAL MEDICINE

## 2023-12-11 PROCEDURE — C9254 INJECTION, LACOSAMIDE: HCPCS | Performed by: PSYCHIATRY & NEUROLOGY

## 2023-12-11 PROCEDURE — 84145 PROCALCITONIN (PCT): CPT

## 2023-12-11 PROCEDURE — 99231 SBSQ HOSP IP/OBS SF/LOW 25: CPT | Performed by: NURSE PRACTITIONER

## 2023-12-11 PROCEDURE — 99222 1ST HOSP IP/OBS MODERATE 55: CPT | Performed by: INTERNAL MEDICINE

## 2023-12-11 PROCEDURE — 1200000000 HC SEMI PRIVATE

## 2023-12-11 PROCEDURE — 83605 ASSAY OF LACTIC ACID: CPT

## 2023-12-11 PROCEDURE — 2580000003 HC RX 258: Performed by: NURSE PRACTITIONER

## 2023-12-11 PROCEDURE — 99232 SBSQ HOSP IP/OBS MODERATE 35: CPT | Performed by: INTERNAL MEDICINE

## 2023-12-11 PROCEDURE — 36415 COLL VENOUS BLD VENIPUNCTURE: CPT

## 2023-12-11 PROCEDURE — A4216 STERILE WATER/SALINE, 10 ML: HCPCS | Performed by: INTERNAL MEDICINE

## 2023-12-11 PROCEDURE — 6370000000 HC RX 637 (ALT 250 FOR IP): Performed by: STUDENT IN AN ORGANIZED HEALTH CARE EDUCATION/TRAINING PROGRAM

## 2023-12-11 PROCEDURE — 99232 SBSQ HOSP IP/OBS MODERATE 35: CPT | Performed by: SURGERY

## 2023-12-11 PROCEDURE — 6370000000 HC RX 637 (ALT 250 FOR IP): Performed by: NURSE PRACTITIONER

## 2023-12-11 PROCEDURE — 2580000003 HC RX 258: Performed by: INTERNAL MEDICINE

## 2023-12-11 PROCEDURE — 6360000002 HC RX W HCPCS: Performed by: INTERNAL MEDICINE

## 2023-12-11 PROCEDURE — 80048 BASIC METABOLIC PNL TOTAL CA: CPT

## 2023-12-11 PROCEDURE — 82947 ASSAY GLUCOSE BLOOD QUANT: CPT

## 2023-12-11 PROCEDURE — 6360000002 HC RX W HCPCS: Performed by: PSYCHIATRY & NEUROLOGY

## 2023-12-11 RX ORDER — LEVOFLOXACIN 5 MG/ML
500 INJECTION, SOLUTION INTRAVENOUS EVERY 24 HOURS
Status: DISCONTINUED | OUTPATIENT
Start: 2023-12-11 | End: 2023-12-12

## 2023-12-11 RX ORDER — LORAZEPAM 2 MG/ML
2 INJECTION INTRAMUSCULAR EVERY 30 MIN PRN
Status: DISCONTINUED | OUTPATIENT
Start: 2023-12-11 | End: 2023-12-13 | Stop reason: HOSPADM

## 2023-12-11 RX ORDER — LEVETIRACETAM 500 MG/1
2000 TABLET ORAL NIGHTLY
Status: DISCONTINUED | OUTPATIENT
Start: 2023-12-11 | End: 2023-12-13 | Stop reason: HOSPADM

## 2023-12-11 RX ORDER — LEVETIRACETAM 500 MG/1
1500 TABLET ORAL DAILY
Status: DISCONTINUED | OUTPATIENT
Start: 2023-12-12 | End: 2023-12-13 | Stop reason: HOSPADM

## 2023-12-11 RX ORDER — GUAIFENESIN 600 MG/1
600 TABLET, EXTENDED RELEASE ORAL 2 TIMES DAILY
Status: DISCONTINUED | OUTPATIENT
Start: 2023-12-11 | End: 2023-12-13 | Stop reason: HOSPADM

## 2023-12-11 RX ADMIN — SODIUM CHLORIDE, PRESERVATIVE FREE 10 ML: 5 INJECTION INTRAVENOUS at 08:57

## 2023-12-11 RX ADMIN — GUAIFENESIN 600 MG: 600 TABLET, EXTENDED RELEASE ORAL at 13:23

## 2023-12-11 RX ADMIN — ENOXAPARIN SODIUM 40 MG: 100 INJECTION SUBCUTANEOUS at 09:08

## 2023-12-11 RX ADMIN — PRIMIDONE 250 MG: 250 TABLET ORAL at 10:21

## 2023-12-11 RX ADMIN — PANTOPRAZOLE SODIUM 40 MG: 40 INJECTION, POWDER, FOR SOLUTION INTRAVENOUS at 08:46

## 2023-12-11 RX ADMIN — PRIMIDONE 375 MG: 250 TABLET ORAL at 20:33

## 2023-12-11 RX ADMIN — FINASTERIDE 5 MG: 5 TABLET, FILM COATED ORAL at 20:30

## 2023-12-11 RX ADMIN — SODIUM CHLORIDE, PRESERVATIVE FREE 10 ML: 5 INJECTION INTRAVENOUS at 20:33

## 2023-12-11 RX ADMIN — GUAIFENESIN 600 MG: 600 TABLET, EXTENDED RELEASE ORAL at 20:30

## 2023-12-11 RX ADMIN — TRAZODONE HYDROCHLORIDE 50 MG: 50 TABLET ORAL at 20:30

## 2023-12-11 RX ADMIN — TAMSULOSIN HYDROCHLORIDE 0.4 MG: 0.4 CAPSULE ORAL at 20:30

## 2023-12-11 RX ADMIN — LEVETIRACETAM 1500 MG: 100 INJECTION, SOLUTION INTRAVENOUS at 08:45

## 2023-12-11 RX ADMIN — LACOSAMIDE 150 MG: 10 INJECTION INTRAVENOUS at 08:45

## 2023-12-11 RX ADMIN — LAMOTRIGINE 600 MG: 100 TABLET ORAL at 20:30

## 2023-12-11 RX ADMIN — LEVOFLOXACIN 500 MG: 5 INJECTION, SOLUTION INTRAVENOUS at 09:26

## 2023-12-11 RX ADMIN — LACOSAMIDE 150 MG: 100 TABLET, FILM COATED ORAL at 20:32

## 2023-12-11 RX ADMIN — Medication 3 MG: at 20:30

## 2023-12-11 RX ADMIN — POTASSIUM BICARBONATE 40 MEQ: 782 TABLET, EFFERVESCENT ORAL at 09:08

## 2023-12-11 RX ADMIN — LEVETIRACETAM 2000 MG: 500 TABLET, FILM COATED ORAL at 20:29

## 2023-12-11 RX ADMIN — LAMOTRIGINE 400 MG: 100 TABLET ORAL at 08:46

## 2023-12-11 NOTE — PLAN OF CARE
Problem: Discharge Planning  Goal: Discharge to home or other facility with appropriate resources  12/11/2023 0510 by Amando Smith RN  Outcome: Progressing     Problem: Skin/Tissue Integrity  Goal: Absence of new skin breakdown  Description: 1. Monitor for areas of redness and/or skin breakdown  2. Assess vascular access sites hourly  3. Every 4-6 hours minimum:  Change oxygen saturation probe site  4. Every 4-6 hours:  If on nasal continuous positive airway pressure, respiratory therapy assess nares and determine need for appliance change or resting period.   12/11/2023 0510 by Amando Smith RN  Outcome: Progressing     Problem: Safety - Adult  Goal: Free from fall injury  12/11/2023 0510 by Amando Smith RN  Outcome: Progressing  Flowsheets (Taken 12/10/2023 2000)  Free From Fall Injury: Instruct family/caregiver on patient safety

## 2023-12-11 NOTE — CARE COORDINATION
Transitional planning  Group home: [address] ([facility])  Called [phone], spoke with Niama Zuniga LPN. Staff could  today around 1 pm today. Perfect Serve to Dr. Sindhu Tinoco to notify on the above if the patient is medically stable. 66  Per Dr. Sindhu Tinoco, patient has developed fevers, concern for aspiration. May need a swallow study. Notified Ivelisse at the group home.

## 2023-12-11 NOTE — CONSULTS
General Surgery  Consult    PATIENT NAME: Jeremy King  AGE: 80 y.o. MEDICAL RECORD NO. [id number]  DATE: 12/9/2023  SURGEON: Dr Fransisco Milligan: Maria Guadalupe Chatman MD      Reason for evaluation:      Patient information was obtained from patient. History/Exam limitations: mental status. MMRD  Patient presented to the Emergency Department by ambulance     IMPRESSION:     Patient Active Problem List   Diagnosis    Small bowel obstruction (HCC)     Seizures ([address]) Chronic    MR (mental retardation), severe    Epilepsy (HCC)    SBO (small bowel obstruction) (HCC)    Mild malnutrition (HCC)    LEELEE (obstructive sleep apnea)    Epileptic seizures ([address])    Intellectual disability     80years old with extensive abdominal surgery history of present hospital with nausea and vomiting. General surgery is consulted for small bowel obstruction. PLAN:   No acute surgical intervention is indicated at this point. Manage nonoperatively at this point  Bedside insertion of NG tube  Low wall intermittent suction for the NG tube  Monitor bowel function, encouraged to ambulate      HISTORY:   History of Chief Complaint:    Karen Ramos is a 80 y.o. male with history of multiple bowel obstruction, multiple small bowel resection with extensive lysis of adhesion at Transfer [facility], ileocecectomy, MRDD, seizure, ventral hernia present hospital with nausea and vomiting. Due to MRDD, history and physical is mainly obtained from [facility], previous medical record. Patient is a transfer from [facility] for multiple episodes of emesis at the group home. Around 6 PM yesterday, patient had 4 episodes of emesis and \"filled 2 garbage bags\". Therefore, patient was sent to the hospital due to history of small bowel obstruction. Upon presentation, patient denies of having abdominal pain, per POA, patient does not complains of pain in general.  However he feels nauseated, he will make himself vomit.   CT of abdomen was
unchanged. There are scattered low-attenuation areas in the periventricular white  matter, compatible with chronic microvascular white matter ischemic disease. There are no areas of hemorrhage, mass, or midline shift. No abnormal  extra-axial fluid collections. Gray-white differentiation is otherwise maintained. ORBITS: There is a suspected left orbital floor fracture. The orbits are  otherwise unremarkable. SINUSES: There is scattered paranasal sinus disease with greatest involvement  in the left maxillary sinus and posterior left ethmoid air cells. Mastoid  air cells are clear. Cerumen is noted in the external auditory canals. SOFT TISSUES/SKULL:  The calvarium is intact. No appreciable scalp soft  tissue swelling. Impression  1. No acute intracranial abnormality. 2. Encephalomalacia in the right MCA distribution with associated ex vacuo  dilatation of the right lateral ventricle, unchanged. 3. Chronic microvascular white matter ischemic disease. I personally reviewed all of the above medications, clinical laboratory, imaging and other diagnostic tests. Impression:      72-year-old male with past medical history of intellectual disability, intractable epilepsy status post VNS, previous multiple small bowel obstructions with surgeries who was transferred for management of acute adhesive small bowel obstruction. Neurology consulted for history of epilepsy status post VNS and management of antiseizure medications. Patient with longstanding history of intractable epilepsy, status post VNS. Currently admitted for small bowel obstruction. Exam at baseline. No seizures while hospitalized. Current ASMs:  Keppra 1500/2000  Lacosamide 150/150  Primidone 250/375  Lamotrigine 400/600    History of intractable epilepsy status post VNS  History of intellectual disability, hypoxic brain injury/stroke    Plan:      Will continue current ASM regimen for now, convert LEV and LCM to
patient's smoking history and his age patient does not meet the criteria for lung cancer screening as the patient never smoked. Thank you for having us involved in the care of your patient. Please call us if you have any questions or concerns.       Nancy Saavedra MD, M.D.            12/11/2023, 1:14 PM

## 2023-12-11 NOTE — PLAN OF CARE
Problem: Discharge Planning  Goal: Discharge to home or other facility with appropriate resources  12/11/2023 1840 by Lynnette Lucio RN  Outcome: Progressing  12/11/2023 0510 by Dayna Araujo RN  Outcome: Progressing     Problem: Skin/Tissue Integrity  Goal: Absence of new skin breakdown  Description: 1. Monitor for areas of redness and/or skin breakdown  2. Assess vascular access sites hourly  3. Every 4-6 hours minimum:  Change oxygen saturation probe site  4. Every 4-6 hours:  If on nasal continuous positive airway pressure, respiratory therapy assess nares and determine need for appliance change or resting period.   12/11/2023 1840 by Lynnette Lucio RN  Outcome: Progressing  12/11/2023 0510 by Dayna Araujo RN  Outcome: Progressing     Problem: Safety - Adult  Goal: Free from fall injury  12/11/2023 1840 by Lynnette Lucio RN  Outcome: Progressing  12/11/2023 0510 by Dayna Araujo RN  Outcome: Progressing  Flowsheets (Taken 12/10/2023 2000)  Free From Fall Injury: Instruct family/caregiver on patient safety

## 2023-12-12 ENCOUNTER — APPOINTMENT (OUTPATIENT)
Dept: GENERAL RADIOLOGY | Age: 68
DRG: 389 | End: 2023-12-12
Payer: MEDICARE

## 2023-12-12 LAB
ANION GAP SERPL CALCULATED.3IONS-SCNC: 13 MMOL/L (ref 9–17)
BUN SERPL-MCNC: 12 MG/DL (ref 8–23)
CALCIUM SERPL-MCNC: 8.2 MG/DL (ref 8.6–10.4)
CHLORIDE SERPL-SCNC: 101 MMOL/L (ref 98–107)
CO2 SERPL-SCNC: 23 MMOL/L (ref 20–31)
CREAT SERPL-MCNC: 0.8 MG/DL (ref 0.7–1.2)
EKG ATRIAL RATE: 89 BPM
EKG P AXIS: -28 DEGREES
EKG P-R INTERVAL: 186 MS
EKG Q-T INTERVAL: 352 MS
EKG QRS DURATION: 90 MS
EKG QTC CALCULATION (BAZETT): 428 MS
EKG R AXIS: -25 DEGREES
EKG T AXIS: -41 DEGREES
EKG VENTRICULAR RATE: 89 BPM
ERYTHROCYTE [DISTWIDTH] IN BLOOD BY AUTOMATED COUNT: 12.5 % (ref 11.8–14.4)
GFR SERPL CREATININE-BSD FRML MDRD: >60 ML/MIN/1.73M2
GLUCOSE SERPL-MCNC: 104 MG/DL (ref 70–99)
HCT VFR BLD AUTO: 30.9 % (ref 40.7–50.3)
HGB BLD-MCNC: 9.8 G/DL (ref 13–17)
MCH RBC QN AUTO: 33.1 PG (ref 25.2–33.5)
MCHC RBC AUTO-ENTMCNC: 31.7 G/DL (ref 28.4–34.8)
MCV RBC AUTO: 104.4 FL (ref 82.6–102.9)
NRBC BLD-RTO: 0 PER 100 WBC
PLATELET # BLD AUTO: 142 K/UL (ref 138–453)
PMV BLD AUTO: 9.9 FL (ref 8.1–13.5)
POTASSIUM SERPL-SCNC: 3.7 MMOL/L (ref 3.7–5.3)
RBC # BLD AUTO: 2.96 M/UL (ref 4.21–5.77)
SODIUM SERPL-SCNC: 137 MMOL/L (ref 135–144)
WBC OTHER # BLD: 7.9 K/UL (ref 3.5–11.3)

## 2023-12-12 PROCEDURE — 93010 ELECTROCARDIOGRAM REPORT: CPT | Performed by: INTERNAL MEDICINE

## 2023-12-12 PROCEDURE — 6370000000 HC RX 637 (ALT 250 FOR IP): Performed by: NURSE PRACTITIONER

## 2023-12-12 PROCEDURE — 36415 COLL VENOUS BLD VENIPUNCTURE: CPT

## 2023-12-12 PROCEDURE — 97530 THERAPEUTIC ACTIVITIES: CPT

## 2023-12-12 PROCEDURE — 97162 PT EVAL MOD COMPLEX 30 MIN: CPT

## 2023-12-12 PROCEDURE — 6370000000 HC RX 637 (ALT 250 FOR IP): Performed by: INTERNAL MEDICINE

## 2023-12-12 PROCEDURE — 80048 BASIC METABOLIC PNL TOTAL CA: CPT

## 2023-12-12 PROCEDURE — 6370000000 HC RX 637 (ALT 250 FOR IP): Performed by: STUDENT IN AN ORGANIZED HEALTH CARE EDUCATION/TRAINING PROGRAM

## 2023-12-12 PROCEDURE — 1200000000 HC SEMI PRIVATE

## 2023-12-12 PROCEDURE — 97535 SELF CARE MNGMENT TRAINING: CPT

## 2023-12-12 PROCEDURE — 71045 X-RAY EXAM CHEST 1 VIEW: CPT

## 2023-12-12 PROCEDURE — 97166 OT EVAL MOD COMPLEX 45 MIN: CPT

## 2023-12-12 PROCEDURE — 85027 COMPLETE CBC AUTOMATED: CPT

## 2023-12-12 PROCEDURE — 99231 SBSQ HOSP IP/OBS SF/LOW 25: CPT | Performed by: SURGERY

## 2023-12-12 PROCEDURE — 2580000003 HC RX 258: Performed by: NURSE PRACTITIONER

## 2023-12-12 PROCEDURE — 99232 SBSQ HOSP IP/OBS MODERATE 35: CPT | Performed by: STUDENT IN AN ORGANIZED HEALTH CARE EDUCATION/TRAINING PROGRAM

## 2023-12-12 PROCEDURE — 99233 SBSQ HOSP IP/OBS HIGH 50: CPT | Performed by: INTERNAL MEDICINE

## 2023-12-12 PROCEDURE — 6360000002 HC RX W HCPCS: Performed by: NURSE PRACTITIONER

## 2023-12-12 RX ADMIN — PRIMIDONE 375 MG: 250 TABLET ORAL at 20:58

## 2023-12-12 RX ADMIN — TAMSULOSIN HYDROCHLORIDE 0.4 MG: 0.4 CAPSULE ORAL at 08:15

## 2023-12-12 RX ADMIN — PRIMIDONE 250 MG: 250 TABLET ORAL at 08:15

## 2023-12-12 RX ADMIN — LACOSAMIDE 150 MG: 100 TABLET, FILM COATED ORAL at 20:59

## 2023-12-12 RX ADMIN — LAMOTRIGINE 600 MG: 100 TABLET ORAL at 20:59

## 2023-12-12 RX ADMIN — GUAIFENESIN 600 MG: 600 TABLET, EXTENDED RELEASE ORAL at 20:59

## 2023-12-12 RX ADMIN — ENOXAPARIN SODIUM 40 MG: 100 INJECTION SUBCUTANEOUS at 08:21

## 2023-12-12 RX ADMIN — GUAIFENESIN 600 MG: 600 TABLET, EXTENDED RELEASE ORAL at 08:13

## 2023-12-12 RX ADMIN — LEVETIRACETAM 1500 MG: 500 TABLET, FILM COATED ORAL at 08:20

## 2023-12-12 RX ADMIN — LEVETIRACETAM 2000 MG: 500 TABLET, FILM COATED ORAL at 20:58

## 2023-12-12 RX ADMIN — FINASTERIDE 5 MG: 5 TABLET, FILM COATED ORAL at 20:59

## 2023-12-12 RX ADMIN — SODIUM CHLORIDE, PRESERVATIVE FREE 10 ML: 5 INJECTION INTRAVENOUS at 21:00

## 2023-12-12 RX ADMIN — LAMOTRIGINE 400 MG: 100 TABLET ORAL at 08:13

## 2023-12-12 RX ADMIN — TAMSULOSIN HYDROCHLORIDE 0.4 MG: 0.4 CAPSULE ORAL at 20:58

## 2023-12-12 RX ADMIN — LACOSAMIDE 150 MG: 100 TABLET, FILM COATED ORAL at 08:16

## 2023-12-12 RX ADMIN — TRAZODONE HYDROCHLORIDE 50 MG: 50 TABLET ORAL at 20:58

## 2023-12-12 RX ADMIN — POLYETHYLENE GLYCOL 3350 17 G: 17 POWDER, FOR SOLUTION ORAL at 08:21

## 2023-12-12 NOTE — PLAN OF CARE
Problem: Skin/Tissue Integrity  Goal: Absence of new skin breakdown  Description: 1. Monitor for areas of redness and/or skin breakdown  2. Assess vascular access sites hourly  3. Every 4-6 hours minimum:  Change oxygen saturation probe site  4. Every 4-6 hours:  If on nasal continuous positive airway pressure, respiratory therapy assess nares and determine need for appliance change or resting period.   12/12/2023 0532 by Taurus Montana RN  Outcome: Progressing  12/11/2023 1840 by Jonathan Carver RN  Outcome: Progressing     Problem: Safety - Adult  Goal: Free from fall injury  12/12/2023 0532 by Taurus Montana RN  Outcome: Progressing  12/11/2023 1840 by Jonathan Carver RN  Outcome: Progressing

## 2023-12-12 NOTE — PLAN OF CARE
Problem: Discharge Planning  Goal: Discharge to home or other facility with appropriate resources  Outcome: Progressing     Problem: Skin/Tissue Integrity  Goal: Absence of new skin breakdown  Description: 1. Monitor for areas of redness and/or skin breakdown  2. Assess vascular access sites hourly  3. Every 4-6 hours minimum:  Change oxygen saturation probe site  4. Every 4-6 hours:  If on nasal continuous positive airway pressure, respiratory therapy assess nares and determine need for appliance change or resting period.   12/12/2023 1648 by Santo Adame RN  Outcome: Progressing     Problem: Safety - Adult  Goal: Free from fall injury  12/12/2023 1648 by Santo Adame RN  Outcome: Progressing

## 2023-12-13 VITALS
WEIGHT: 181 LBS | SYSTOLIC BLOOD PRESSURE: 99 MMHG | RESPIRATION RATE: 17 BRPM | BODY MASS INDEX: 25.91 KG/M2 | HEART RATE: 72 BPM | HEIGHT: 70 IN | OXYGEN SATURATION: 96 % | TEMPERATURE: 98.2 F | DIASTOLIC BLOOD PRESSURE: 60 MMHG

## 2023-12-13 PROCEDURE — 99232 SBSQ HOSP IP/OBS MODERATE 35: CPT | Performed by: INTERNAL MEDICINE

## 2023-12-13 PROCEDURE — 99239 HOSP IP/OBS DSCHRG MGMT >30: CPT | Performed by: STUDENT IN AN ORGANIZED HEALTH CARE EDUCATION/TRAINING PROGRAM

## 2023-12-13 PROCEDURE — 6370000000 HC RX 637 (ALT 250 FOR IP): Performed by: STUDENT IN AN ORGANIZED HEALTH CARE EDUCATION/TRAINING PROGRAM

## 2023-12-13 PROCEDURE — 6370000000 HC RX 637 (ALT 250 FOR IP): Performed by: NURSE PRACTITIONER

## 2023-12-13 PROCEDURE — 6360000002 HC RX W HCPCS: Performed by: NURSE PRACTITIONER

## 2023-12-13 PROCEDURE — 2580000003 HC RX 258: Performed by: NURSE PRACTITIONER

## 2023-12-13 PROCEDURE — 6370000000 HC RX 637 (ALT 250 FOR IP): Performed by: INTERNAL MEDICINE

## 2023-12-13 RX ORDER — DIAZEPAM 10 MG/2G
20 GEL RECTAL PRN
Qty: 15 EACH | Refills: 1 | Status: SHIPPED | OUTPATIENT
Start: 2023-12-13 | End: 2023-12-23

## 2023-12-13 RX ORDER — LEVETIRACETAM 750 MG/1
1500 TABLET ORAL DAILY
Qty: 60 TABLET | Refills: 3 | Status: SHIPPED | OUTPATIENT
Start: 2023-12-14

## 2023-12-13 RX ORDER — LEVETIRACETAM 1000 MG/1
2000 TABLET ORAL NIGHTLY
Qty: 60 TABLET | Refills: 3 | Status: SHIPPED | OUTPATIENT
Start: 2023-12-13

## 2023-12-13 RX ADMIN — POLYETHYLENE GLYCOL 3350 17 G: 17 POWDER, FOR SOLUTION ORAL at 10:34

## 2023-12-13 RX ADMIN — PRIMIDONE 250 MG: 250 TABLET ORAL at 10:35

## 2023-12-13 RX ADMIN — TAMSULOSIN HYDROCHLORIDE 0.4 MG: 0.4 CAPSULE ORAL at 10:35

## 2023-12-13 RX ADMIN — LACOSAMIDE 150 MG: 100 TABLET, FILM COATED ORAL at 10:35

## 2023-12-13 RX ADMIN — GUAIFENESIN 600 MG: 600 TABLET, EXTENDED RELEASE ORAL at 10:34

## 2023-12-13 RX ADMIN — LAMOTRIGINE 400 MG: 100 TABLET ORAL at 10:35

## 2023-12-13 RX ADMIN — ENOXAPARIN SODIUM 40 MG: 100 INJECTION SUBCUTANEOUS at 10:35

## 2023-12-13 RX ADMIN — LEVETIRACETAM 1500 MG: 500 TABLET, FILM COATED ORAL at 10:34

## 2023-12-13 RX ADMIN — SODIUM CHLORIDE, PRESERVATIVE FREE 10 ML: 5 INJECTION INTRAVENOUS at 10:36

## 2023-12-13 RX ADMIN — LANSOPRAZOLE 30 MG: 30 TABLET, ORALLY DISINTEGRATING, DELAYED RELEASE ORAL at 10:35

## 2023-12-13 NOTE — PLAN OF CARE
PIV removed, reported called, transport arrived. Patient d/c. Problem: Discharge Planning  Goal: Discharge to home or other facility with appropriate resources  12/13/2023 1253 by Mariiln Gan RN  Outcome: Completed  12/13/2023 0527 by Carl Ann RN  Outcome: Progressing     Problem: Skin/Tissue Integrity  Goal: Absence of new skin breakdown  Description: 1. Monitor for areas of redness and/or skin breakdown  2. Assess vascular access sites hourly  3. Every 4-6 hours minimum:  Change oxygen saturation probe site  4. Every 4-6 hours:  If on nasal continuous positive airway pressure, respiratory therapy assess nares and determine need for appliance change or resting period.   12/13/2023 1253 by Marilin Gan RN  Outcome: Completed  12/13/2023 0527 by Carl Ann RN  Outcome: Progressing     Problem: Safety - Adult  Goal: Free from fall injury  12/13/2023 1253 by Marilin Gan RN  Outcome: Completed  12/13/2023 0527 by Carl Ann RN  Outcome: Progressing

## 2023-12-13 NOTE — CARE COORDINATION
Transitional Planning  Notified patient is ready for dc, will need transportation. Called patient's home at [phone], address verified and able to accept back today. Faxed LFN with request for transportation. 1200 pm Call from [facility], transportation set up for today at [phone].      Called legal guardian, Stephanie [place] update provided. Called patient's home, spoke with Duyen, notified of transportation time. Request dc paper work faxed to [phone]. 1215 pm ANIL faxed to number provided. 138 pm Spoke with RN Jenise Infante, questioning medications  should have been sent with patient. Called home, spoke with Dayana Brantley, states head RN is round now, to call back in 15 minutes. Update given to Jenise Infante.      Discharge [address] Case Management Department  Written by: Kiley Gardner RN    Patient Name: Meena Wilcox  Attending Provider: No att. providers found  Admit Date: 2023  5:06 AM  MRN: [id number]  Account: [de-identified]                     : 1955  Discharge Date: 2023      Disposition: home-group home     Kiley Gardner RN

## 2023-12-13 NOTE — PLAN OF CARE
Problem: Discharge Planning  Goal: Discharge to home or other facility with appropriate resources  12/13/2023 0527 by Enmanuel Clarke RN  Outcome: Progressing  12/12/2023 1648 by Oswaldo Villareal RN  Outcome: Progressing     Problem: Skin/Tissue Integrity  Goal: Absence of new skin breakdown  Description: 1. Monitor for areas of redness and/or skin breakdown  2. Assess vascular access sites hourly  3. Every 4-6 hours minimum:  Change oxygen saturation probe site  4. Every 4-6 hours:  If on nasal continuous positive airway pressure, respiratory therapy assess nares and determine need for appliance change or resting period.   12/13/2023 0527 by Enmanuel Clarke RN  Outcome: Progressing  12/12/2023 1648 by Oswaldo Villareal RN  Outcome: Progressing     Problem: Safety - Adult  Goal: Free from fall injury  12/13/2023 0527 by Enmanuel Clarke RN  Outcome: Progressing  12/12/2023 1648 by Oswaldo Villareal RN  Outcome: Progressing

## 2023-12-13 NOTE — DISCHARGE INSTR - COC
Medical Equipment (for information only, NOT a DME order):  wheelchair, bath bench, bedside commode, and hospital bed  Other Treatments:     Ava King is a 80 y.o.admitted to the hospital for the management of SBO (small bowel obstruction) ([address]). Patient presented as a transfer from [facility] with chief complaint of vomiting and abdominal pain. Imaging done at [facility] was suggestive of partial obstruction of proximal small bowel, patient got NG tube placed which eventually was pulled out by the patient. On arrival to ER at [facility] there was no NG tube. General surgery was consulted by ER who recommended admission to medicine floor and placement of NG tube with bridle. King Corrente King Corrente King Corrente Patient received Ringer lactate 1 L bolus and admitted on medicine floor for further management. General surgery evaluated the patient, concern for adhesive small bowel obstruction. Recommended NGT tube decompression and bowel rest. Patient's f/u KUB negative for SBO. He's passing gas and had a BM. NG removed 12/11/23. Last BM on 12/12/23. He's had some fevers, and CXR concerning for RLL pneumonia. Started IV Levaquin, which are now d/c'd.     Patient's personal belongings (please select all that are sent with patient):  watch    RN SIGNATURE:  Electronically signed by Jozef Cooley RN on 12/13/23 at 12:01 PM EST    CASE MANAGEMENT/SOCIAL WORK SECTION    Inpatient Status Date: 12-9-2023    Readmission Risk Assessment Score:  Readmission Risk              Risk of Unplanned Readmission:  25           Discharging to Facility/ Agency   Name: [facility]   Address:  Phone:  Fax:    Dialysis Facility (if applicable)   Name:  Address:  Dialysis Schedule:  Phone:  Fax:    / signature: Electronically signed by Modesto Alonso RN on 12/13/23 at 12:10 PM EST    PHYSICIAN SECTION    Prognosis: Good    Condition at Discharge: Stable    Rehab Potential (if transferring to Rehab):

## 2023-12-13 NOTE — DISCHARGE SUMMARY
[facility]  Office: [phone]  Elaine Speck, DO, Bridger Howell, DO, Annel Sri Leigh, DO, Princess Toussaint MD, Selene Bueno MD, Cole Christianson MD, Arianna Culp MD,  Desire Bell MD, James Vera MD, Cat Moody MD,  Sarahi Herron MD, Shannon Reyes MD, Janessa Hernández, DO, Gustavus Najjar, MD,  Channing Velasquez, DO, Angélica Hinkle MD, Gerald Kanner, MD, Harish Alaniz MD, Chris Parker MD,  Briana Hylton MD, Jennifer Rosario MD, Arsalan Child MD, Ally Newton MD, Gricel Mcclain MD, Stanley Martin MD, Cookie Brittle, DO, Meagan Arce, DO, Heidi Salas MD,  Geraldo Constantino MD, Blessing Anne, CNP,  Aurora Baez CNP, Agus Geronimo, CNP,  Ja Jurado, [facility], Ananya Solo, CNP, Slick Ahn, CNP, Hansel Angelucci, CNP, Ivelisse Mason, CNP, Leyla Benito, CNP, Devaughn Adrian PA-C, Laxmi David PA-C, Conchita Heller, CNP, Juan Manuel Porras, [facility], Janelle Hickey, CNP, Esequiel Haywood, CNP, Geovani Buckner, [facility]    Discharge Summary     Patient ID: Adia Holley  :  1955   MRN: [id number]     ACCOUNT:  [de-identified]   Patient's PCP: Lila Villaseñor MD  Admit Date: 2023   Discharge Date: 2023     Length of Stay: 4  Code Status:  Full Code  Admitting Physician: No admitting provider for patient encounter. Discharge Physician: Stanley Martin MD     Active Discharge Diagnoses:     Hospital Problem Lists:  Principal Problem:    SBO (small bowel obstruction) ([facility])  Active Problems:    Seizures ([address]) Chronic    MR (mental retardation), severe    LEELEE (obstructive sleep apnea)    S/P placement of VNS (vagus nerve stimulation) device    Dysphagia    Elevated international normalized ratio (INR)    Nausea and vomiting    Seizure disorder ([address])  Resolved Problems:    * No resolved hospital problems.  *      Admission Condition:  fair     Discharged Condition:

## 2023-12-15 LAB
MICROORGANISM SPEC CULT: NORMAL
MICROORGANISM SPEC CULT: NORMAL
SERVICE CMNT-IMP: NORMAL
SERVICE CMNT-IMP: NORMAL
SPECIMEN DESCRIPTION: NORMAL
SPECIMEN DESCRIPTION: NORMAL

## 2024-01-17 ENCOUNTER — HOSPITAL ENCOUNTER (OUTPATIENT)
Age: 69
Setting detail: SPECIMEN
Discharge: HOME | End: 2024-01-17
Payer: MEDICARE

## 2024-01-17 LAB — PSA SERPL-MCNC: 0.3 NG/ML (ref 0–4)

## 2024-01-17 PROCEDURE — 36415 COLL VENOUS BLD VENIPUNCTURE: CPT

## 2024-01-17 PROCEDURE — P9603 ONE-WAY ALLOW PRORATED MILES: HCPCS

## 2024-01-17 PROCEDURE — G0103 PSA SCREENING: HCPCS

## 2024-02-19 ENCOUNTER — HOSPITAL ENCOUNTER (INPATIENT)
Dept: HOSPITAL 101 - ER | Age: 69
LOS: 1 days | Discharge: ANOTHER HOSPITAL | DRG: 389 | End: 2024-02-20
Payer: MEDICARE

## 2024-02-19 VITALS — DIASTOLIC BLOOD PRESSURE: 76 MMHG | HEART RATE: 110 BPM | SYSTOLIC BLOOD PRESSURE: 117 MMHG | OXYGEN SATURATION: 100 %

## 2024-02-19 VITALS — DIASTOLIC BLOOD PRESSURE: 80 MMHG | SYSTOLIC BLOOD PRESSURE: 122 MMHG

## 2024-02-19 VITALS
HEART RATE: 109 BPM | RESPIRATION RATE: 12 BRPM | OXYGEN SATURATION: 97 % | SYSTOLIC BLOOD PRESSURE: 115 MMHG | DIASTOLIC BLOOD PRESSURE: 79 MMHG | TEMPERATURE: 98.9 F

## 2024-02-19 VITALS — HEART RATE: 95 BPM | RESPIRATION RATE: 37 BRPM | OXYGEN SATURATION: 99 %

## 2024-02-19 VITALS
TEMPERATURE: 98.2 F | HEART RATE: 109 BPM | SYSTOLIC BLOOD PRESSURE: 108 MMHG | DIASTOLIC BLOOD PRESSURE: 75 MMHG | OXYGEN SATURATION: 97 % | RESPIRATION RATE: 24 BRPM

## 2024-02-19 VITALS — OXYGEN SATURATION: 96 % | DIASTOLIC BLOOD PRESSURE: 74 MMHG | SYSTOLIC BLOOD PRESSURE: 120 MMHG

## 2024-02-19 VITALS
DIASTOLIC BLOOD PRESSURE: 79 MMHG | OXYGEN SATURATION: 95 % | SYSTOLIC BLOOD PRESSURE: 115 MMHG | HEART RATE: 102 BPM | RESPIRATION RATE: 10 BRPM | TEMPERATURE: 98.96 F

## 2024-02-19 VITALS
HEART RATE: 97 BPM | OXYGEN SATURATION: 98 % | RESPIRATION RATE: 36 BRPM | SYSTOLIC BLOOD PRESSURE: 127 MMHG | DIASTOLIC BLOOD PRESSURE: 78 MMHG

## 2024-02-19 VITALS — HEART RATE: 108 BPM | RESPIRATION RATE: 27 BRPM

## 2024-02-19 VITALS — HEART RATE: 96 BPM | RESPIRATION RATE: 29 BRPM | OXYGEN SATURATION: 97 %

## 2024-02-19 VITALS — HEART RATE: 102 BPM | RESPIRATION RATE: 22 BRPM

## 2024-02-19 VITALS — OXYGEN SATURATION: 97 % | HEART RATE: 99 BPM | RESPIRATION RATE: 30 BRPM

## 2024-02-19 VITALS — HEART RATE: 93 BPM | OXYGEN SATURATION: 97 % | RESPIRATION RATE: 27 BRPM

## 2024-02-19 VITALS — OXYGEN SATURATION: 97 % | SYSTOLIC BLOOD PRESSURE: 113 MMHG | DIASTOLIC BLOOD PRESSURE: 62 MMHG

## 2024-02-19 VITALS — RESPIRATION RATE: 36 BRPM | HEART RATE: 106 BPM

## 2024-02-19 VITALS — DIASTOLIC BLOOD PRESSURE: 88 MMHG | SYSTOLIC BLOOD PRESSURE: 128 MMHG | HEART RATE: 99 BPM | RESPIRATION RATE: 37 BRPM

## 2024-02-19 VITALS
RESPIRATION RATE: 10 BRPM | DIASTOLIC BLOOD PRESSURE: 69 MMHG | OXYGEN SATURATION: 97 % | SYSTOLIC BLOOD PRESSURE: 111 MMHG | HEART RATE: 107 BPM | TEMPERATURE: 98.8 F

## 2024-02-19 VITALS — RESPIRATION RATE: 19 BRPM | HEART RATE: 102 BPM

## 2024-02-19 VITALS
RESPIRATION RATE: 28 BRPM | SYSTOLIC BLOOD PRESSURE: 104 MMHG | OXYGEN SATURATION: 97 % | DIASTOLIC BLOOD PRESSURE: 75 MMHG | HEART RATE: 99 BPM

## 2024-02-19 VITALS — HEART RATE: 97 BPM | RESPIRATION RATE: 42 BRPM | DIASTOLIC BLOOD PRESSURE: 71 MMHG | SYSTOLIC BLOOD PRESSURE: 105 MMHG

## 2024-02-19 VITALS — HEART RATE: 98 BPM | RESPIRATION RATE: 29 BRPM

## 2024-02-19 VITALS — RESPIRATION RATE: 31 BRPM | DIASTOLIC BLOOD PRESSURE: 74 MMHG | HEART RATE: 107 BPM | SYSTOLIC BLOOD PRESSURE: 108 MMHG

## 2024-02-19 VITALS — OXYGEN SATURATION: 97 % | RESPIRATION RATE: 20 BRPM | HEART RATE: 106 BPM

## 2024-02-19 VITALS — OXYGEN SATURATION: 98 % | RESPIRATION RATE: 36 BRPM | HEART RATE: 98 BPM

## 2024-02-19 VITALS — HEART RATE: 93 BPM | OXYGEN SATURATION: 96 % | RESPIRATION RATE: 30 BRPM

## 2024-02-19 VITALS
HEART RATE: 99 BPM | DIASTOLIC BLOOD PRESSURE: 80 MMHG | SYSTOLIC BLOOD PRESSURE: 110 MMHG | OXYGEN SATURATION: 98 % | RESPIRATION RATE: 38 BRPM

## 2024-02-19 VITALS
OXYGEN SATURATION: 96 % | DIASTOLIC BLOOD PRESSURE: 79 MMHG | SYSTOLIC BLOOD PRESSURE: 112 MMHG | RESPIRATION RATE: 29 BRPM | HEART RATE: 92 BPM

## 2024-02-19 VITALS — BODY MASS INDEX: 24.9 KG/M2 | BODY MASS INDEX: 23.5 KG/M2

## 2024-02-19 VITALS — SYSTOLIC BLOOD PRESSURE: 108 MMHG | DIASTOLIC BLOOD PRESSURE: 75 MMHG | OXYGEN SATURATION: 96 %

## 2024-02-19 VITALS — HEART RATE: 100 BPM | RESPIRATION RATE: 39 BRPM

## 2024-02-19 VITALS — HEART RATE: 110 BPM

## 2024-02-19 VITALS — HEART RATE: 115 BPM | RESPIRATION RATE: 30 BRPM

## 2024-02-19 VITALS — HEART RATE: 97 BPM | OXYGEN SATURATION: 95 % | RESPIRATION RATE: 30 BRPM

## 2024-02-19 VITALS — HEART RATE: 104 BPM | RESPIRATION RATE: 26 BRPM

## 2024-02-19 VITALS — HEART RATE: 94 BPM | RESPIRATION RATE: 28 BRPM

## 2024-02-19 VITALS — OXYGEN SATURATION: 97 % | HEART RATE: 93 BPM | RESPIRATION RATE: 24 BRPM

## 2024-02-19 DIAGNOSIS — H91.90: ICD-10-CM

## 2024-02-19 DIAGNOSIS — B96.20: ICD-10-CM

## 2024-02-19 DIAGNOSIS — G47.00: ICD-10-CM

## 2024-02-19 DIAGNOSIS — F71: ICD-10-CM

## 2024-02-19 DIAGNOSIS — R35.1: ICD-10-CM

## 2024-02-19 DIAGNOSIS — N39.0: ICD-10-CM

## 2024-02-19 DIAGNOSIS — R11.2: ICD-10-CM

## 2024-02-19 DIAGNOSIS — R62.50: ICD-10-CM

## 2024-02-19 DIAGNOSIS — N40.1: ICD-10-CM

## 2024-02-19 DIAGNOSIS — K56.609: Primary | ICD-10-CM

## 2024-02-19 DIAGNOSIS — K59.89: ICD-10-CM

## 2024-02-19 DIAGNOSIS — K21.9: ICD-10-CM

## 2024-02-19 DIAGNOSIS — M19.90: ICD-10-CM

## 2024-02-19 DIAGNOSIS — E78.5: ICD-10-CM

## 2024-02-19 DIAGNOSIS — G40.909: ICD-10-CM

## 2024-02-19 DIAGNOSIS — H40.9: ICD-10-CM

## 2024-02-19 LAB
ADD MANUAL DIFF: NO
ALANINE AMINOTRANSFERASE: 43 U/L (ref 16–63)
ALBUMIN GLOBULIN RATIO: 0.7
ALBUMIN LEVEL: 3.9 G/DL (ref 3.4–5)
ALKALINE PHOSPHATASE: 151 U/L (ref 46–116)
ANION GAP: 18.8
ASPARTATE AMINO TRANSFERASE: 20 U/L (ref 15–37)
BLOOD UREA NITROGEN: 28 MG/DL (ref 7–18)
CALCIUM: 10.3 MG/DL (ref 8.5–10.1)
CARBON DIOXIDE: 23.6 MMOL/L (ref 21–32)
CHLORIDE: 105 MMOL/L (ref 98–107)
CO2 BLD-SCNC: 23.6 MMOL/L (ref 21–32)
ESTIMATED GFR (AFRICAN AMERICA: 53 (ref 60–?)
ESTIMATED GFR (NON-AFRICAN AME: 44 (ref 60–?)
GLOBULIN: 5.3 G/DL
GLUCOSE BLD-MCNC: 121 MG/DL (ref 74–106)
HCT VFR BLD CALC: 41 % (ref 42–54)
HEMATOCRIT: 41 % (ref 42–54)
HEMOGLOBIN: 13 G/DL (ref 14–18)
IMMATURE GRANULOCYTES ABS AUTO: 0.12 10^3/UL (ref 0–0.03)
IMMATURE GRANULOCYTES PCT AUTO: 0.6 % (ref 0–0.5)
LACTATE/LACTIC ACID: 3 MMOL/L (ref 0.4–2)
LIPASE: 61 U/L (ref 16–77)
LYMPHOCYTES  ABSOLUTE AUTO: 1.3 10^3/UL (ref 1.2–3.8)
MCV RBC: 104.6 FL (ref 80–94)
MEAN CORPUSCULAR HEMOGLOBIN: 33.2 PG (ref 25.9–34)
MEAN CORPUSCULAR HGB CONC: 31.7 G/DL (ref 29.9–35.2)
MEAN CORPUSCULAR VOLUME: 104.6 FL (ref 80–94)
PLATELET # BLD: 449 10^3/UL (ref 150–450)
PLATELET COUNT: 449 10^3/UL (ref 150–450)
POTASSIUM SERPLBLD-SCNC: 4.4 MMOL/L (ref 3.5–5.1)
POTASSIUM: 4.4 MMOL/L (ref 3.5–5.1)
PROCALCITONIN: <0.05 NG/ML (ref 0–0.5)
RED BLOOD COUNT: 3.92 10^6/UL (ref 4.7–6.1)
SODIUM BLD-SCNC: 143 MMOL/L (ref 136–145)
SODIUM: 143 MMOL/L (ref 136–145)
TOTAL PROTEIN: 9.2 G/DL (ref 6.4–8.2)
WBC # BLD: 19.4 10^3/UL (ref 4–11)
WHITE BLOOD COUNT: 19.4 10^3/UL (ref 4–11)

## 2024-02-19 PROCEDURE — 80053 COMPREHEN METABOLIC PANEL: CPT

## 2024-02-19 PROCEDURE — 96361 HYDRATE IV INFUSION ADD-ON: CPT

## 2024-02-19 PROCEDURE — 83690 ASSAY OF LIPASE: CPT

## 2024-02-19 PROCEDURE — 87040 BLOOD CULTURE FOR BACTERIA: CPT

## 2024-02-19 PROCEDURE — 82800 BLOOD PH: CPT

## 2024-02-19 PROCEDURE — 87150 DNA/RNA AMPLIFIED PROBE: CPT

## 2024-02-19 PROCEDURE — 84484 ASSAY OF TROPONIN QUANT: CPT

## 2024-02-19 PROCEDURE — 99285 EMERGENCY DEPT VISIT HI MDM: CPT

## 2024-02-19 PROCEDURE — 87086 URINE CULTURE/COLONY COUNT: CPT

## 2024-02-19 PROCEDURE — 96365 THER/PROPH/DIAG IV INF INIT: CPT

## 2024-02-19 PROCEDURE — 84145 PROCALCITONIN (PCT): CPT

## 2024-02-19 PROCEDURE — 74177 CT ABD & PELVIS W/CONTRAST: CPT

## 2024-02-19 PROCEDURE — 36415 COLL VENOUS BLD VENIPUNCTURE: CPT

## 2024-02-19 PROCEDURE — 85025 COMPLETE CBC W/AUTO DIFF WBC: CPT

## 2024-02-19 PROCEDURE — 93005 ELECTROCARDIOGRAM TRACING: CPT

## 2024-02-19 PROCEDURE — 71045 X-RAY EXAM CHEST 1 VIEW: CPT

## 2024-02-19 PROCEDURE — 83605 ASSAY OF LACTIC ACID: CPT

## 2024-02-19 PROCEDURE — 96375 TX/PRO/DX INJ NEW DRUG ADDON: CPT

## 2024-02-19 PROCEDURE — 81001 URINALYSIS AUTO W/SCOPE: CPT

## 2024-02-19 PROCEDURE — G0378 HOSPITAL OBSERVATION PER HR: HCPCS

## 2024-02-19 PROCEDURE — 96372 THER/PROPH/DIAG INJ SC/IM: CPT

## 2024-02-19 PROCEDURE — 80048 BASIC METABOLIC PNL TOTAL CA: CPT

## 2024-02-19 PROCEDURE — 87186 SC STD MICRODIL/AGAR DIL: CPT

## 2024-02-19 RX ADMIN — SODIUM CHLORIDE 999 ML: 900 INJECTION, SOLUTION INTRAVENOUS at 15:10

## 2024-02-19 RX ADMIN — HEPARIN SODIUM 5000 UNIT: 5000 INJECTION, SOLUTION INTRAVENOUS; SUBCUTANEOUS at 22:54

## 2024-02-19 RX ADMIN — LORAZEPAM 1 MG: 2 INJECTION INTRAMUSCULAR; INTRAVENOUS at 16:48

## 2024-02-19 RX ADMIN — DEXTROSE MONOHYDRATE AND SODIUM CHLORIDE 125 ML: 5; .45 INJECTION, SOLUTION INTRAVENOUS at 22:54

## 2024-02-20 ENCOUNTER — HOSPITAL ENCOUNTER (INPATIENT)
Age: 69
LOS: 5 days | Discharge: HOME | DRG: 389 | End: 2024-02-25
Attending: INTERNAL MEDICINE | Admitting: INTERNAL MEDICINE
Payer: MEDICARE

## 2024-02-20 ENCOUNTER — APPOINTMENT (OUTPATIENT)
Dept: GENERAL RADIOLOGY | Age: 69
DRG: 389 | End: 2024-02-20
Attending: INTERNAL MEDICINE
Payer: MEDICARE

## 2024-02-20 VITALS
OXYGEN SATURATION: 98 % | SYSTOLIC BLOOD PRESSURE: 94 MMHG | HEART RATE: 79 BPM | RESPIRATION RATE: 18 BRPM | DIASTOLIC BLOOD PRESSURE: 61 MMHG | TEMPERATURE: 97.88 F

## 2024-02-20 VITALS — HEART RATE: 78 BPM | RESPIRATION RATE: 16 BRPM | OXYGEN SATURATION: 98 %

## 2024-02-20 VITALS — RESPIRATION RATE: 19 BRPM | HEART RATE: 78 BPM | OXYGEN SATURATION: 99 %

## 2024-02-20 VITALS — RESPIRATION RATE: 18 BRPM | OXYGEN SATURATION: 99 % | HEART RATE: 80 BPM

## 2024-02-20 VITALS — HEART RATE: 79 BPM | RESPIRATION RATE: 30 BRPM | OXYGEN SATURATION: 98 %

## 2024-02-20 VITALS — HEART RATE: 77 BPM | OXYGEN SATURATION: 98 % | RESPIRATION RATE: 34 BRPM

## 2024-02-20 VITALS — OXYGEN SATURATION: 100 % | HEART RATE: 76 BPM | RESPIRATION RATE: 28 BRPM

## 2024-02-20 VITALS — OXYGEN SATURATION: 98 % | HEART RATE: 72 BPM

## 2024-02-20 VITALS — DIASTOLIC BLOOD PRESSURE: 55 MMHG | HEART RATE: 71 BPM | RESPIRATION RATE: 20 BRPM | SYSTOLIC BLOOD PRESSURE: 88 MMHG

## 2024-02-20 VITALS — OXYGEN SATURATION: 100 % | RESPIRATION RATE: 16 BRPM | HEART RATE: 74 BPM

## 2024-02-20 VITALS — OXYGEN SATURATION: 98 % | HEART RATE: 70 BPM | RESPIRATION RATE: 23 BRPM

## 2024-02-20 VITALS — RESPIRATION RATE: 21 BRPM | HEART RATE: 71 BPM | OXYGEN SATURATION: 97 %

## 2024-02-20 VITALS — RESPIRATION RATE: 25 BRPM | OXYGEN SATURATION: 100 % | HEART RATE: 77 BPM

## 2024-02-20 VITALS — OXYGEN SATURATION: 83 % | DIASTOLIC BLOOD PRESSURE: 65 MMHG | SYSTOLIC BLOOD PRESSURE: 98 MMHG

## 2024-02-20 VITALS — RESPIRATION RATE: 17 BRPM | OXYGEN SATURATION: 98 % | HEART RATE: 84 BPM

## 2024-02-20 VITALS — HEART RATE: 75 BPM | OXYGEN SATURATION: 94 % | RESPIRATION RATE: 27 BRPM

## 2024-02-20 VITALS — TEMPERATURE: 98.1 F

## 2024-02-20 VITALS — HEART RATE: 87 BPM | RESPIRATION RATE: 23 BRPM

## 2024-02-20 VITALS — OXYGEN SATURATION: 98 % | RESPIRATION RATE: 21 BRPM | HEART RATE: 78 BPM

## 2024-02-20 VITALS
HEART RATE: 82 BPM | OXYGEN SATURATION: 91 % | RESPIRATION RATE: 16 BRPM | DIASTOLIC BLOOD PRESSURE: 69 MMHG | SYSTOLIC BLOOD PRESSURE: 107 MMHG

## 2024-02-20 VITALS — RESPIRATION RATE: 18 BRPM | HEART RATE: 79 BPM

## 2024-02-20 VITALS — HEART RATE: 73 BPM | RESPIRATION RATE: 23 BRPM | OXYGEN SATURATION: 96 %

## 2024-02-20 VITALS — OXYGEN SATURATION: 98 %

## 2024-02-20 VITALS — HEART RATE: 85 BPM | OXYGEN SATURATION: 97 % | RESPIRATION RATE: 26 BRPM

## 2024-02-20 VITALS — RESPIRATION RATE: 10 BRPM

## 2024-02-20 LAB
ADD MANUAL DIFF: NO
ANION GAP: 15.2
BLOOD UREA NITROGEN: 32 MG/DL (ref 7–18)
CALCIUM: 8.8 MG/DL (ref 8.5–10.1)
CARBON DIOXIDE: 22.9 MMOL/L (ref 21–32)
CAST SEEN?: (no result) #/LPF
CHLORIDE: 110 MMOL/L (ref 98–107)
CO2 BLD-SCNC: 22.9 MMOL/L (ref 21–32)
ESTIMATED GFR (AFRICAN AMERICA: >60 (ref 60–?)
ESTIMATED GFR (NON-AFRICAN AME: >60 (ref 60–?)
GLUCOSE BLD-MCNC: 118 MG/DL (ref 74–106)
GLUCOSE URINE UA: NEGATIVE MG/DL
HCT VFR BLD CALC: 34.8 % (ref 42–54)
HEMATOCRIT: 34.8 % (ref 42–54)
HEMOGLOBIN: 10.8 G/DL (ref 14–18)
IMMATURE GRANULOCYTES ABS AUTO: 0.03 10^3/UL (ref 0–0.03)
IMMATURE GRANULOCYTES PCT AUTO: 0.2 % (ref 0–0.5)
LYMPHOCYTES  ABSOLUTE AUTO: 1.8 10^3/UL (ref 1.2–3.8)
MCV RBC: 105.5 FL (ref 80–94)
MEAN CORPUSCULAR HEMOGLOBIN: 32.7 PG (ref 25.9–34)
MEAN CORPUSCULAR HGB CONC: 31 G/DL (ref 29.9–35.2)
MEAN CORPUSCULAR VOLUME: 105.5 FL (ref 80–94)
PLATELET # BLD: 333 10^3/UL (ref 150–450)
PLATELET COUNT: 333 10^3/UL (ref 150–450)
POTASSIUM SERPLBLD-SCNC: 4.1 MMOL/L (ref 3.5–5.1)
POTASSIUM: 4.1 MMOL/L (ref 3.5–5.1)
RED BLOOD COUNT: 3.3 10^6/UL (ref 4.7–6.1)
SODIUM BLD-SCNC: 144 MMOL/L (ref 136–145)
SODIUM: 144 MMOL/L (ref 136–145)
URINE CULTURE INDICATED: YES
WBC # BLD: 12.7 10^3/UL (ref 4–11)
WHITE BLOOD COUNT: 12.7 10^3/UL (ref 4–11)

## 2024-02-20 PROCEDURE — 2580000003 HC RX 258: Performed by: NURSE PRACTITIONER

## 2024-02-20 PROCEDURE — 74018 RADEX ABDOMEN 1 VIEW: CPT

## 2024-02-20 PROCEDURE — 1200000000 HC SEMI PRIVATE

## 2024-02-20 RX ORDER — ACETAMINOPHEN 650 MG/1
650 SUPPOSITORY RECTAL EVERY 6 HOURS PRN
Status: DISCONTINUED | OUTPATIENT
Start: 2024-02-20 | End: 2024-02-25 | Stop reason: HOSPADM

## 2024-02-20 RX ORDER — MAGNESIUM SULFATE 1 G/100ML
1000 INJECTION INTRAVENOUS PRN
Status: DISCONTINUED | OUTPATIENT
Start: 2024-02-20 | End: 2024-02-25 | Stop reason: HOSPADM

## 2024-02-20 RX ORDER — SODIUM CHLORIDE 9 MG/ML
INJECTION, SOLUTION INTRAVENOUS PRN
Status: DISCONTINUED | OUTPATIENT
Start: 2024-02-20 | End: 2024-02-25 | Stop reason: HOSPADM

## 2024-02-20 RX ORDER — PROCHLORPERAZINE EDISYLATE 5 MG/ML
10 INJECTION INTRAMUSCULAR; INTRAVENOUS EVERY 6 HOURS PRN
Status: DISCONTINUED | OUTPATIENT
Start: 2024-02-20 | End: 2024-02-25 | Stop reason: HOSPADM

## 2024-02-20 RX ORDER — SODIUM CHLORIDE 9 MG/ML
INJECTION, SOLUTION INTRAVENOUS CONTINUOUS
Status: ACTIVE | OUTPATIENT
Start: 2024-02-20 | End: 2024-02-22

## 2024-02-20 RX ORDER — ONDANSETRON 4 MG/1
4 TABLET, ORALLY DISINTEGRATING ORAL EVERY 8 HOURS PRN
Status: DISCONTINUED | OUTPATIENT
Start: 2024-02-20 | End: 2024-02-24

## 2024-02-20 RX ORDER — POLYETHYLENE GLYCOL 3350 17 G/17G
17 POWDER, FOR SOLUTION ORAL DAILY PRN
Status: DISCONTINUED | OUTPATIENT
Start: 2024-02-20 | End: 2024-02-21

## 2024-02-20 RX ORDER — ONDANSETRON 2 MG/ML
4 INJECTION INTRAMUSCULAR; INTRAVENOUS EVERY 6 HOURS PRN
Status: DISCONTINUED | OUTPATIENT
Start: 2024-02-20 | End: 2024-02-25 | Stop reason: HOSPADM

## 2024-02-20 RX ORDER — ENOXAPARIN SODIUM 100 MG/ML
40 INJECTION SUBCUTANEOUS DAILY
Status: DISCONTINUED | OUTPATIENT
Start: 2024-02-21 | End: 2024-02-25 | Stop reason: HOSPADM

## 2024-02-20 RX ORDER — POTASSIUM CHLORIDE 20 MEQ/1
40 TABLET, EXTENDED RELEASE ORAL PRN
Status: DISCONTINUED | OUTPATIENT
Start: 2024-02-20 | End: 2024-02-25 | Stop reason: HOSPADM

## 2024-02-20 RX ORDER — SODIUM CHLORIDE 0.9 % (FLUSH) 0.9 %
5-40 SYRINGE (ML) INJECTION EVERY 12 HOURS SCHEDULED
Status: DISCONTINUED | OUTPATIENT
Start: 2024-02-20 | End: 2024-02-25 | Stop reason: HOSPADM

## 2024-02-20 RX ORDER — POTASSIUM CHLORIDE 7.45 MG/ML
10 INJECTION INTRAVENOUS PRN
Status: DISCONTINUED | OUTPATIENT
Start: 2024-02-20 | End: 2024-02-25 | Stop reason: HOSPADM

## 2024-02-20 RX ORDER — SODIUM CHLORIDE 0.9 % (FLUSH) 0.9 %
10 SYRINGE (ML) INJECTION PRN
Status: DISCONTINUED | OUTPATIENT
Start: 2024-02-20 | End: 2024-02-25 | Stop reason: HOSPADM

## 2024-02-20 RX ORDER — ACETAMINOPHEN 325 MG/1
650 TABLET ORAL EVERY 6 HOURS PRN
Status: DISCONTINUED | OUTPATIENT
Start: 2024-02-20 | End: 2024-02-25 | Stop reason: HOSPADM

## 2024-02-20 RX ADMIN — HEPARIN SODIUM 5000 UNIT: 5000 INJECTION, SOLUTION INTRAVENOUS; SUBCUTANEOUS at 09:10

## 2024-02-20 RX ADMIN — DEXTROSE MONOHYDRATE AND SODIUM CHLORIDE 125 ML: 5; .45 INJECTION, SOLUTION INTRAVENOUS at 13:24

## 2024-02-20 RX ADMIN — SODIUM CHLORIDE: 9 INJECTION, SOLUTION INTRAVENOUS at 23:16

## 2024-02-20 RX ADMIN — PANTOPRAZOLE SODIUM 40 MG: 40 INJECTION, POWDER, FOR SOLUTION INTRAVENOUS at 05:34

## 2024-02-20 RX ADMIN — DEXTROSE MONOHYDRATE AND SODIUM CHLORIDE 125 ML: 5; .45 INJECTION, SOLUTION INTRAVENOUS at 06:33

## 2024-02-21 ENCOUNTER — APPOINTMENT (OUTPATIENT)
Dept: GENERAL RADIOLOGY | Age: 69
DRG: 389 | End: 2024-02-21
Attending: INTERNAL MEDICINE
Payer: MEDICARE

## 2024-02-21 LAB
ANION GAP SERPL CALCULATED.3IONS-SCNC: 9 MMOL/L (ref 9–17)
BASOPHILS # BLD: 0.04 K/UL (ref 0–0.2)
BASOPHILS NFR BLD: 1 % (ref 0–2)
BUN SERPL-MCNC: 23 MG/DL (ref 8–23)
BUN SERPL-MCNC: 24 MG/DL (ref 8–23)
BUN/CREAT SERPL: 29 (ref 9–20)
CALCIUM SERPL-MCNC: 9.2 MG/DL (ref 8.6–10.4)
CHLORIDE SERPL-SCNC: 105 MMOL/L (ref 98–107)
CO2 SERPL-SCNC: 28 MMOL/L (ref 20–31)
CREAT SERPL-MCNC: 0.7 MG/DL (ref 0.7–1.2)
CREAT SERPL-MCNC: 0.8 MG/DL (ref 0.7–1.2)
EOSINOPHIL # BLD: 0.23 K/UL (ref 0–0.44)
EOSINOPHILS RELATIVE PERCENT: 4 % (ref 1–4)
ERYTHROCYTE [DISTWIDTH] IN BLOOD BY AUTOMATED COUNT: 13.2 % (ref 11.8–14.4)
GFR SERPL CREATININE-BSD FRML MDRD: >60 ML/MIN/1.73M2
GFR SERPL CREATININE-BSD FRML MDRD: >60 ML/MIN/1.73M2
GLUCOSE SERPL-MCNC: 83 MG/DL (ref 70–99)
HCT VFR BLD AUTO: 32 % (ref 40.7–50.3)
HGB BLD-MCNC: 10.2 G/DL (ref 13–17)
IMM GRANULOCYTES # BLD AUTO: 0.06 K/UL (ref 0–0.3)
IMM GRANULOCYTES NFR BLD: 1 %
LACTATE BLDV-SCNC: 0.6 MMOL/L (ref 0.5–1.9)
LYMPHOCYTES NFR BLD: 1.51 K/UL (ref 1.1–3.7)
LYMPHOCYTES RELATIVE PERCENT: 23 % (ref 24–43)
MCH RBC QN AUTO: 32.8 PG (ref 25.2–33.5)
MCHC RBC AUTO-ENTMCNC: 31.9 G/DL (ref 28.4–34.8)
MCV RBC AUTO: 102.9 FL (ref 82.6–102.9)
MONOCYTES NFR BLD: 0.45 K/UL (ref 0.1–1.2)
MONOCYTES NFR BLD: 7 % (ref 3–12)
NEUTROPHILS NFR BLD: 64 % (ref 36–65)
NEUTS SEG NFR BLD: 4.22 K/UL (ref 1.5–8.1)
NRBC BLD-RTO: 0 PER 100 WBC
PLATELET # BLD AUTO: 283 K/UL (ref 138–453)
PMV BLD AUTO: 8.9 FL (ref 8.1–13.5)
POTASSIUM SERPL-SCNC: 3.7 MMOL/L (ref 3.7–5.3)
RBC # BLD AUTO: 3.11 M/UL (ref 4.21–5.77)
SODIUM SERPL-SCNC: 142 MMOL/L (ref 135–144)
WBC OTHER # BLD: 6.5 K/UL (ref 3.5–11.3)

## 2024-02-21 PROCEDURE — 1200000000 HC SEMI PRIVATE

## 2024-02-21 PROCEDURE — 36415 COLL VENOUS BLD VENIPUNCTURE: CPT

## 2024-02-21 PROCEDURE — 83605 ASSAY OF LACTIC ACID: CPT

## 2024-02-21 PROCEDURE — 6360000002 HC RX W HCPCS: Performed by: NURSE PRACTITIONER

## 2024-02-21 PROCEDURE — APPSS30 APP SPLIT SHARED TIME 16-30 MINUTES: Performed by: NURSE PRACTITIONER

## 2024-02-21 PROCEDURE — 85025 COMPLETE CBC W/AUTO DIFF WBC: CPT

## 2024-02-21 PROCEDURE — 99222 1ST HOSP IP/OBS MODERATE 55: CPT | Performed by: INTERNAL MEDICINE

## 2024-02-21 PROCEDURE — 84520 ASSAY OF UREA NITROGEN: CPT

## 2024-02-21 PROCEDURE — 97530 THERAPEUTIC ACTIVITIES: CPT

## 2024-02-21 PROCEDURE — 2580000003 HC RX 258: Performed by: NURSE PRACTITIONER

## 2024-02-21 PROCEDURE — 97162 PT EVAL MOD COMPLEX 30 MIN: CPT

## 2024-02-21 PROCEDURE — 82565 ASSAY OF CREATININE: CPT

## 2024-02-21 PROCEDURE — 71045 X-RAY EXAM CHEST 1 VIEW: CPT

## 2024-02-21 PROCEDURE — 80048 BASIC METABOLIC PNL TOTAL CA: CPT

## 2024-02-21 PROCEDURE — 97166 OT EVAL MOD COMPLEX 45 MIN: CPT

## 2024-02-21 PROCEDURE — 97535 SELF CARE MNGMENT TRAINING: CPT

## 2024-02-21 RX ORDER — HYDROXYZINE HYDROCHLORIDE 25 MG/1
25 TABLET, FILM COATED ORAL 2 TIMES DAILY PRN
Status: ON HOLD | COMMUNITY

## 2024-02-21 RX ORDER — LORAZEPAM 0.5 MG/1
0.5 TABLET ORAL EVERY 8 HOURS PRN
Status: ON HOLD | COMMUNITY

## 2024-02-21 RX ORDER — LEVETIRACETAM 750 MG/1
1500 TABLET ORAL DAILY
Status: DISCONTINUED | OUTPATIENT
Start: 2024-02-21 | End: 2024-02-21

## 2024-02-21 RX ORDER — BENZONATATE 100 MG/1
100 CAPSULE ORAL 3 TIMES DAILY PRN
Status: ON HOLD | COMMUNITY

## 2024-02-21 RX ORDER — ONDANSETRON 4 MG/1
4 TABLET, ORALLY DISINTEGRATING ORAL EVERY 6 HOURS PRN
Status: ON HOLD | COMMUNITY

## 2024-02-21 RX ORDER — ALBUTEROL SULFATE 90 UG/1
2 AEROSOL, METERED RESPIRATORY (INHALATION) EVERY 4 HOURS PRN
Status: ON HOLD | COMMUNITY

## 2024-02-21 RX ORDER — LOPERAMIDE HYDROCHLORIDE 2 MG/1
2 CAPSULE ORAL 2 TIMES DAILY PRN
Status: ON HOLD | COMMUNITY
End: 2024-02-24 | Stop reason: HOSPADM

## 2024-02-21 RX ORDER — LANOLIN ALCOHOL/MO/W.PET/CERES
3 CREAM (GRAM) TOPICAL NIGHTLY PRN
Status: ON HOLD | COMMUNITY

## 2024-02-21 RX ORDER — MECLIZINE HYDROCHLORIDE 25 MG/1
25 TABLET ORAL DAILY
Status: ON HOLD | COMMUNITY

## 2024-02-21 RX ORDER — LANOLIN ALCOHOL/MO/W.PET/CERES
400 CREAM (GRAM) TOPICAL DAILY
Status: ON HOLD | COMMUNITY

## 2024-02-21 RX ORDER — ACETAMINOPHEN 500 MG
500 TABLET ORAL EVERY 6 HOURS PRN
Status: ON HOLD | COMMUNITY
End: 2024-02-24 | Stop reason: HOSPADM

## 2024-02-21 RX ORDER — DIAZEPAM 20 MG/4G
20 GEL RECTAL PRN
Status: ON HOLD | COMMUNITY

## 2024-02-21 RX ORDER — LEVETIRACETAM 500 MG/5ML
1500 INJECTION, SOLUTION, CONCENTRATE INTRAVENOUS DAILY
Status: DISCONTINUED | OUTPATIENT
Start: 2024-02-21 | End: 2024-02-25 | Stop reason: HOSPADM

## 2024-02-21 RX ORDER — LEVETIRACETAM 500 MG/5ML
2000 INJECTION, SOLUTION, CONCENTRATE INTRAVENOUS NIGHTLY
Status: DISCONTINUED | OUTPATIENT
Start: 2024-02-21 | End: 2024-02-25 | Stop reason: HOSPADM

## 2024-02-21 RX ADMIN — LEVETIRACETAM 1500 MG: 100 INJECTION, SOLUTION INTRAVENOUS at 11:27

## 2024-02-21 RX ADMIN — LEVETIRACETAM 2000 MG: 100 INJECTION, SOLUTION INTRAVENOUS at 20:41

## 2024-02-21 RX ADMIN — SODIUM CHLORIDE: 9 INJECTION, SOLUTION INTRAVENOUS at 19:50

## 2024-02-21 RX ADMIN — SODIUM CHLORIDE, PRESERVATIVE FREE 10 ML: 5 INJECTION INTRAVENOUS at 20:42

## 2024-02-21 NOTE — ACP (ADVANCE CARE PLANNING)
Advance Care Planning     Advance Care Planning Activator (Inpatient)  Conversation Note      Date of ACP Conversation: 2/21/2024       ACP Activator: Brenna Gray RN      Health Care Decision Maker:     Current Designated Health Care Decision Maker:     Primary Decision Maker: Esther Denny - Legal Guardian, Legal Guardian - [phone]    Today we documented Decision Maker(s) consistent with ACP documents on file.    Letter of Guardianship on file from 10/2015

## 2024-02-21 NOTE — H&P
smoked. He does not have any smokeless tobacco history on file.  Alcohol:      reports no history of alcohol use.  Drug Use:  reports no history of drug use.    Family History:     Family History   Problem Relation Age of Onset   • High Blood Pressure Mother    • Thyroid Cancer Mother    • Other Mother         Myeloma   • Heart Disease Mother         2 Stents   • Arthritis Mother         2 Back surgeries   • Lung Cancer Father    • Heart Disease Brother         Defibrilator   • Heart Attack Maternal Grandfather    • Diabetes Paternal Grandmother        Review of Systems:     Positive and Negative as described in HPI.    Review of Systems   Unable to perform ROS: Other (MRDD)       Physical Exam:   /64   Pulse 66   Temp 97.9 °F (36.6 °C) (Oral)   Resp 24   Ht 1.778 m (5' 10\")   Wt 77.2 kg (170 lb 1.6 oz)   SpO2 90%   BMI 24.41 kg/m²   Temp (24hrs), Av.9 °F (36.6 °C), Min:97.9 °F (36.6 °C), Max:97.9 °F (36.6 °C)    No results for input(s): \"POCGLU\" in the last 72 hours.    Intake/Output Summary (Last 24 hours) at 2024 0148  Last data filed at 2024 2316  Gross per 24 hour   Intake --   Output 300 ml   Net -300 ml       Physical Exam  Vitals and nursing note reviewed.   Constitutional:       General: He is not in acute distress.     Appearance: He is ill-appearing. He is not toxic-appearing or diaphoretic.   HENT:      Head: Normocephalic and atraumatic.      Right Ear: External ear normal.      Left Ear: External ear normal.      Nose: Nose normal.      Mouth/Throat:      Mouth: Mucous membranes are moist.   Eyes:      General: No scleral icterus.        Right eye: No discharge.         Left eye: No discharge.      Conjunctiva/sclera: Conjunctivae normal.      Pupils: Pupils are equal, round, and reactive to light.   Cardiovascular:      Rate and Rhythm: Normal rate and regular rhythm.      Pulses: Normal pulses.      Heart sounds: Normal heart sounds. No murmur heard.     No friction rub. No

## 2024-02-22 ENCOUNTER — APPOINTMENT (OUTPATIENT)
Dept: GENERAL RADIOLOGY | Age: 69
DRG: 389 | End: 2024-02-22
Attending: INTERNAL MEDICINE
Payer: MEDICARE

## 2024-02-22 PROBLEM — R56.9 SEIZURE-LIKE ACTIVITY (HCC): Status: ACTIVE | Noted: 2024-02-22

## 2024-02-22 LAB
ANION GAP SERPL CALCULATED.3IONS-SCNC: 13 MMOL/L (ref 9–17)
BASOPHILS # BLD: 0.05 K/UL (ref 0–0.2)
BASOPHILS NFR BLD: 1 % (ref 0–2)
BUN SERPL-MCNC: 23 MG/DL (ref 8–23)
BUN/CREAT SERPL: 29 (ref 9–20)
CALCIUM SERPL-MCNC: 9.6 MG/DL (ref 8.6–10.4)
CHLORIDE SERPL-SCNC: 106 MMOL/L (ref 98–107)
CO2 SERPL-SCNC: 28 MMOL/L (ref 20–31)
CREAT SERPL-MCNC: 0.8 MG/DL (ref 0.7–1.2)
EOSINOPHIL # BLD: 0.09 K/UL (ref 0–0.44)
EOSINOPHILS RELATIVE PERCENT: 1 % (ref 1–4)
ERYTHROCYTE [DISTWIDTH] IN BLOOD BY AUTOMATED COUNT: 13.1 % (ref 11.8–14.4)
GFR SERPL CREATININE-BSD FRML MDRD: >60 ML/MIN/1.73M2
GLUCOSE SERPL-MCNC: 83 MG/DL (ref 70–99)
HCT VFR BLD AUTO: 33.5 % (ref 40.7–50.3)
HGB BLD-MCNC: 10.9 G/DL (ref 13–17)
IMM GRANULOCYTES # BLD AUTO: 0.02 K/UL (ref 0–0.3)
IMM GRANULOCYTES NFR BLD: 0 %
LYMPHOCYTES NFR BLD: 1.26 K/UL (ref 1.1–3.7)
LYMPHOCYTES RELATIVE PERCENT: 16 % (ref 24–43)
MCH RBC QN AUTO: 33.1 PG (ref 25.2–33.5)
MCHC RBC AUTO-ENTMCNC: 32.5 G/DL (ref 28.4–34.8)
MCV RBC AUTO: 101.8 FL (ref 82.6–102.9)
MONOCYTES NFR BLD: 0.55 K/UL (ref 0.1–1.2)
MONOCYTES NFR BLD: 7 % (ref 3–12)
NEUTROPHILS NFR BLD: 75 % (ref 36–65)
NEUTS SEG NFR BLD: 6.09 K/UL (ref 1.5–8.1)
NRBC BLD-RTO: 0 PER 100 WBC
PLATELET # BLD AUTO: 267 K/UL (ref 138–453)
PMV BLD AUTO: 8.8 FL (ref 8.1–13.5)
POTASSIUM SERPL-SCNC: 3.6 MMOL/L (ref 3.7–5.3)
RBC # BLD AUTO: 3.29 M/UL (ref 4.21–5.77)
SODIUM SERPL-SCNC: 147 MMOL/L (ref 135–144)
WBC OTHER # BLD: 8.1 K/UL (ref 3.5–11.3)

## 2024-02-22 PROCEDURE — 80048 BASIC METABOLIC PNL TOTAL CA: CPT

## 2024-02-22 PROCEDURE — 6360000002 HC RX W HCPCS: Performed by: NURSE PRACTITIONER

## 2024-02-22 PROCEDURE — 6360000002 HC RX W HCPCS: Performed by: STUDENT IN AN ORGANIZED HEALTH CARE EDUCATION/TRAINING PROGRAM

## 2024-02-22 PROCEDURE — 2580000003 HC RX 258: Performed by: NURSE PRACTITIONER

## 2024-02-22 PROCEDURE — 99254 IP/OBS CNSLTJ NEW/EST MOD 60: CPT | Performed by: PSYCHIATRY & NEUROLOGY

## 2024-02-22 PROCEDURE — 99232 SBSQ HOSP IP/OBS MODERATE 35: CPT | Performed by: INTERNAL MEDICINE

## 2024-02-22 PROCEDURE — 74018 RADEX ABDOMEN 1 VIEW: CPT

## 2024-02-22 PROCEDURE — 95816 EEG AWAKE AND DROWSY: CPT | Performed by: PSYCHIATRY & NEUROLOGY

## 2024-02-22 PROCEDURE — 6360000002 HC RX W HCPCS

## 2024-02-22 PROCEDURE — 2060000000 HC ICU INTERMEDIATE R&B

## 2024-02-22 PROCEDURE — C9254 INJECTION, LACOSAMIDE: HCPCS | Performed by: PSYCHIATRY & NEUROLOGY

## 2024-02-22 PROCEDURE — 97110 THERAPEUTIC EXERCISES: CPT

## 2024-02-22 PROCEDURE — 6360000002 HC RX W HCPCS: Performed by: PSYCHIATRY & NEUROLOGY

## 2024-02-22 PROCEDURE — 97530 THERAPEUTIC ACTIVITIES: CPT

## 2024-02-22 PROCEDURE — 36415 COLL VENOUS BLD VENIPUNCTURE: CPT

## 2024-02-22 PROCEDURE — 85025 COMPLETE CBC W/AUTO DIFF WBC: CPT

## 2024-02-22 PROCEDURE — 2580000003 HC RX 258: Performed by: PSYCHIATRY & NEUROLOGY

## 2024-02-22 PROCEDURE — 95816 EEG AWAKE AND DROWSY: CPT

## 2024-02-22 RX ORDER — LORAZEPAM 2 MG/ML
4 INJECTION INTRAMUSCULAR EVERY 5 MIN PRN
Status: DISCONTINUED | OUTPATIENT
Start: 2024-02-22 | End: 2024-02-25 | Stop reason: HOSPADM

## 2024-02-22 RX ORDER — POTASSIUM CHLORIDE 7.45 MG/ML
10 INJECTION INTRAVENOUS
Status: COMPLETED | OUTPATIENT
Start: 2024-02-22 | End: 2024-02-22

## 2024-02-22 RX ORDER — LORAZEPAM 2 MG/ML
INJECTION INTRAMUSCULAR
Status: COMPLETED
Start: 2024-02-22 | End: 2024-02-22

## 2024-02-22 RX ORDER — LORAZEPAM 2 MG/ML
2 INJECTION INTRAMUSCULAR ONCE
Status: COMPLETED | OUTPATIENT
Start: 2024-02-22 | End: 2024-02-22

## 2024-02-22 RX ADMIN — SODIUM CHLORIDE: 9 INJECTION, SOLUTION INTRAVENOUS at 04:40

## 2024-02-22 RX ADMIN — LEVETIRACETAM 2000 MG: 100 INJECTION, SOLUTION INTRAVENOUS at 21:56

## 2024-02-22 RX ADMIN — SODIUM CHLORIDE: 9 INJECTION, SOLUTION INTRAVENOUS at 07:47

## 2024-02-22 RX ADMIN — ENOXAPARIN SODIUM 40 MG: 100 INJECTION SUBCUTANEOUS at 09:07

## 2024-02-22 RX ADMIN — POTASSIUM CHLORIDE 10 MEQ: 7.46 INJECTION, SOLUTION INTRAVENOUS at 09:24

## 2024-02-22 RX ADMIN — LEVETIRACETAM 1500 MG: 100 INJECTION, SOLUTION INTRAVENOUS at 09:07

## 2024-02-22 RX ADMIN — LORAZEPAM 4 MG: 2 INJECTION INTRAMUSCULAR; INTRAVENOUS at 03:30

## 2024-02-22 RX ADMIN — LORAZEPAM 2 MG: 2 INJECTION INTRAMUSCULAR at 03:22

## 2024-02-22 RX ADMIN — POTASSIUM CHLORIDE 10 MEQ: 7.46 INJECTION, SOLUTION INTRAVENOUS at 12:04

## 2024-02-22 RX ADMIN — POTASSIUM CHLORIDE 10 MEQ: 7.46 INJECTION, SOLUTION INTRAVENOUS at 10:52

## 2024-02-22 RX ADMIN — POTASSIUM CHLORIDE 10 MEQ: 7.46 INJECTION, SOLUTION INTRAVENOUS at 07:49

## 2024-02-22 RX ADMIN — LORAZEPAM 2 MG: 2 INJECTION INTRAMUSCULAR; INTRAVENOUS at 03:22

## 2024-02-22 RX ADMIN — LACOSAMIDE 150 MG: 10 INJECTION INTRAVENOUS at 17:20

## 2024-02-22 NOTE — SIGNIFICANT EVENT
Called to bedside for breakthrough seizure activity. Patient showing repetitive movements with upper extremities with grunting respirations. Blood glucose WNL. Placed on supp O2- SpO2 in mid 90s, HR  110s.. IV Ativan given to break seizure- 6 mg total given until patient responsive verbally and myoclonic activity stopped. Airway remained patent throughout seizure.     Patient transferred to PCU and neuro consult placed via [employer] to Dr Corona.

## 2024-02-22 NOTE — FLOWSHEET NOTE
02/22/24 1144   Enema   Enema Type Soap suds   Amount Instilled 500 mL   Preparation Positions left lat recumb;Instruct patient on proper administration   Result Good   How tolerated? Tolerated well     Soap suds enema given per orders. Patient had one large loosely formed brown bowel movement. Patient tolerated well

## 2024-02-22 NOTE — PROCEDURES
EEG REPORT       Patient: Jeremy King Age: 68 y.o.  MRN: [id number]      Referring Provider: Sukumar Larose MD  Attending Physician:  Elder Leigh DO      History: This is a routine scalp EEG recorded with time-locked video monitoring.  Patient is being evaluated for seizure like activity.     This EEG was performed to evaluate for focal and epileptiform abnormalities.       Jeremy King   Current Facility-Administered Medications   Medication Dose Route Frequency Provider Last Rate Last Admin    LORazepam (ATIVAN) injection 4 mg  4 mg IntraVENous Q5 Min PRN Era Campos APRN - NP   4 mg at 02/22/24 0330    lacosamide (VIMPAT) 150 mg in sodium chloride 0.9 % 65 mL IVPB  150 mg IntraVENous BID Libby Corona  mL/hr at 02/22/24 1720 150 mg at 02/22/24 1720    levETIRAcetam (KEPPRA) injection 1,500 mg  1,500 mg IntraVENous Daily Era Campos APRN - NP   1,500 mg at 02/22/24 0907    levETIRAcetam (KEPPRA) injection 2,000 mg  2,000 mg IntraVENous Nightly Era Campos APRN - NP   2,000 mg at 02/21/24 2041    sodium chloride flush 0.9 % injection 5-40 mL  5-40 mL IntraVENous 2 times per day Dalila Canales APRN - CNP   10 mL at 02/21/24 2042    sodium chloride flush 0.9 % injection 10 mL  10 mL IntraVENous PRN Dalila Canales APRN - CNP        0.9 % sodium chloride infusion   IntraVENous PRN Dalila Canales APRN - CNP 5 mL/hr at 02/22/24 0747 New Bag at 02/22/24 0747    potassium chloride (KLOR-CON M) extended release tablet 40 mEq  40 mEq Oral PRN Dalila Canales APRN - ELIJAH        Or    potassium bicarb-citric acid (EFFER-K) effervescent tablet 40 mEq  40 mEq Oral PRN Dalila Canales APRN - ELIJAH        Or    potassium chloride 10 mEq/100 mL IVPB (Peripheral Line)  10 mEq IntraVENous PRN Dalila Canales APRN - CNP        magnesium sulfate 1000 mg in dextrose 5% 100 mL IVPB  1,000 mg IntraVENous PRN Parker, Dalila M, APRN - CNP        enoxaparin (LOVENOX) injection 40 mg  40 mg

## 2024-02-23 LAB
ANION GAP SERPL CALCULATED.3IONS-SCNC: 18 MMOL/L (ref 9–17)
BASOPHILS # BLD: 0.06 K/UL (ref 0–0.2)
BASOPHILS NFR BLD: 1 % (ref 0–2)
BUN SERPL-MCNC: 21 MG/DL (ref 8–23)
BUN/CREAT SERPL: 30 (ref 9–20)
CALCIUM SERPL-MCNC: 9.2 MG/DL (ref 8.6–10.4)
CHLORIDE SERPL-SCNC: 106 MMOL/L (ref 98–107)
CO2 SERPL-SCNC: 24 MMOL/L (ref 20–31)
CREAT SERPL-MCNC: 0.7 MG/DL (ref 0.7–1.2)
EOSINOPHIL # BLD: 0.11 K/UL (ref 0–0.44)
EOSINOPHILS RELATIVE PERCENT: 1 % (ref 1–4)
ERYTHROCYTE [DISTWIDTH] IN BLOOD BY AUTOMATED COUNT: 12.8 % (ref 11.8–14.4)
GFR SERPL CREATININE-BSD FRML MDRD: >60 ML/MIN/1.73M2
GLUCOSE SERPL-MCNC: 69 MG/DL (ref 70–99)
HCT VFR BLD AUTO: 34.4 % (ref 40.7–50.3)
HGB BLD-MCNC: 11 G/DL (ref 13–17)
IMM GRANULOCYTES # BLD AUTO: 0.01 K/UL (ref 0–0.3)
IMM GRANULOCYTES NFR BLD: 0 %
LYMPHOCYTES NFR BLD: 1.55 K/UL (ref 1.1–3.7)
LYMPHOCYTES RELATIVE PERCENT: 20 % (ref 24–43)
MCH RBC QN AUTO: 32.8 PG (ref 25.2–33.5)
MCHC RBC AUTO-ENTMCNC: 32 G/DL (ref 28.4–34.8)
MCV RBC AUTO: 102.7 FL (ref 82.6–102.9)
MONOCYTES NFR BLD: 0.49 K/UL (ref 0.1–1.2)
MONOCYTES NFR BLD: 6 % (ref 3–12)
NEUTROPHILS NFR BLD: 72 % (ref 36–65)
NEUTS SEG NFR BLD: 5.65 K/UL (ref 1.5–8.1)
NRBC BLD-RTO: 0 PER 100 WBC
PLATELET # BLD AUTO: 242 K/UL (ref 138–453)
PMV BLD AUTO: 8.8 FL (ref 8.1–13.5)
POTASSIUM SERPL-SCNC: 3.9 MMOL/L (ref 3.7–5.3)
RBC # BLD AUTO: 3.35 M/UL (ref 4.21–5.77)
SODIUM SERPL-SCNC: 148 MMOL/L (ref 135–144)
WBC OTHER # BLD: 7.9 K/UL (ref 3.5–11.3)

## 2024-02-23 PROCEDURE — 97530 THERAPEUTIC ACTIVITIES: CPT

## 2024-02-23 PROCEDURE — 99232 SBSQ HOSP IP/OBS MODERATE 35: CPT | Performed by: INTERNAL MEDICINE

## 2024-02-23 PROCEDURE — 97535 SELF CARE MNGMENT TRAINING: CPT

## 2024-02-23 PROCEDURE — 2060000000 HC ICU INTERMEDIATE R&B

## 2024-02-23 PROCEDURE — 2580000003 HC RX 258: Performed by: NURSE PRACTITIONER

## 2024-02-23 PROCEDURE — 99232 SBSQ HOSP IP/OBS MODERATE 35: CPT | Performed by: PSYCHIATRY & NEUROLOGY

## 2024-02-23 PROCEDURE — 36415 COLL VENOUS BLD VENIPUNCTURE: CPT

## 2024-02-23 PROCEDURE — 2580000003 HC RX 258: Performed by: PSYCHIATRY & NEUROLOGY

## 2024-02-23 PROCEDURE — 6360000002 HC RX W HCPCS: Performed by: PSYCHIATRY & NEUROLOGY

## 2024-02-23 PROCEDURE — 6360000002 HC RX W HCPCS: Performed by: NURSE PRACTITIONER

## 2024-02-23 PROCEDURE — 80048 BASIC METABOLIC PNL TOTAL CA: CPT

## 2024-02-23 PROCEDURE — C9254 INJECTION, LACOSAMIDE: HCPCS | Performed by: PSYCHIATRY & NEUROLOGY

## 2024-02-23 PROCEDURE — 85025 COMPLETE CBC W/AUTO DIFF WBC: CPT

## 2024-02-23 RX ADMIN — LEVETIRACETAM 2000 MG: 100 INJECTION, SOLUTION INTRAVENOUS at 20:53

## 2024-02-23 RX ADMIN — LEVETIRACETAM 1500 MG: 100 INJECTION, SOLUTION INTRAVENOUS at 08:03

## 2024-02-23 RX ADMIN — SODIUM CHLORIDE, PRESERVATIVE FREE 10 ML: 5 INJECTION INTRAVENOUS at 08:24

## 2024-02-23 RX ADMIN — ENOXAPARIN SODIUM 40 MG: 100 INJECTION SUBCUTANEOUS at 08:03

## 2024-02-23 RX ADMIN — LACOSAMIDE 150 MG: 10 INJECTION INTRAVENOUS at 21:00

## 2024-02-23 RX ADMIN — SODIUM CHLORIDE, PRESERVATIVE FREE 10 ML: 5 INJECTION INTRAVENOUS at 20:53

## 2024-02-23 RX ADMIN — LACOSAMIDE 150 MG: 10 INJECTION INTRAVENOUS at 08:27

## 2024-02-23 RX ADMIN — SODIUM CHLORIDE: 9 INJECTION, SOLUTION INTRAVENOUS at 08:25

## 2024-02-24 LAB
ANION GAP SERPL CALCULATED.3IONS-SCNC: 10 MMOL/L (ref 9–17)
BASOPHILS # BLD: 0.04 K/UL (ref 0–0.2)
BASOPHILS NFR BLD: 1 % (ref 0–2)
BUN SERPL-MCNC: 22 MG/DL (ref 8–23)
BUN/CREAT SERPL: 31 (ref 9–20)
CALCIUM SERPL-MCNC: 9.2 MG/DL (ref 8.6–10.4)
CHLORIDE SERPL-SCNC: 110 MMOL/L (ref 98–107)
CO2 SERPL-SCNC: 27 MMOL/L (ref 20–31)
CREAT SERPL-MCNC: 0.7 MG/DL (ref 0.7–1.2)
EOSINOPHIL # BLD: 0.25 K/UL (ref 0–0.44)
EOSINOPHILS RELATIVE PERCENT: 4 % (ref 1–4)
ERYTHROCYTE [DISTWIDTH] IN BLOOD BY AUTOMATED COUNT: 12.9 % (ref 11.8–14.4)
GFR SERPL CREATININE-BSD FRML MDRD: >60 ML/MIN/1.73M2
GLUCOSE SERPL-MCNC: 95 MG/DL (ref 70–99)
HCT VFR BLD AUTO: 32.2 % (ref 40.7–50.3)
HGB BLD-MCNC: 10.7 G/DL (ref 13–17)
IMM GRANULOCYTES # BLD AUTO: 0.01 K/UL (ref 0–0.3)
IMM GRANULOCYTES NFR BLD: 0 %
LYMPHOCYTES NFR BLD: 1.6 K/UL (ref 1.1–3.7)
LYMPHOCYTES RELATIVE PERCENT: 26 % (ref 24–43)
MCH RBC QN AUTO: 33 PG (ref 25.2–33.5)
MCHC RBC AUTO-ENTMCNC: 33.2 G/DL (ref 28.4–34.8)
MCV RBC AUTO: 99.4 FL (ref 82.6–102.9)
MONOCYTES NFR BLD: 0.46 K/UL (ref 0.1–1.2)
MONOCYTES NFR BLD: 7 % (ref 3–12)
NEUTROPHILS NFR BLD: 62 % (ref 36–65)
NEUTS SEG NFR BLD: 3.85 K/UL (ref 1.5–8.1)
NRBC BLD-RTO: 0 PER 100 WBC
PLATELET # BLD AUTO: 283 K/UL (ref 138–453)
PMV BLD AUTO: 9 FL (ref 8.1–13.5)
POTASSIUM SERPL-SCNC: 3.9 MMOL/L (ref 3.7–5.3)
RBC # BLD AUTO: 3.24 M/UL (ref 4.21–5.77)
SODIUM SERPL-SCNC: 147 MMOL/L (ref 135–144)
WBC OTHER # BLD: 6.2 K/UL (ref 3.5–11.3)

## 2024-02-24 PROCEDURE — 6360000002 HC RX W HCPCS: Performed by: NURSE PRACTITIONER

## 2024-02-24 PROCEDURE — 6360000002 HC RX W HCPCS: Performed by: PSYCHIATRY & NEUROLOGY

## 2024-02-24 PROCEDURE — 2580000003 HC RX 258: Performed by: PSYCHIATRY & NEUROLOGY

## 2024-02-24 PROCEDURE — 85025 COMPLETE CBC W/AUTO DIFF WBC: CPT

## 2024-02-24 PROCEDURE — 80048 BASIC METABOLIC PNL TOTAL CA: CPT

## 2024-02-24 PROCEDURE — 36415 COLL VENOUS BLD VENIPUNCTURE: CPT

## 2024-02-24 PROCEDURE — 99232 SBSQ HOSP IP/OBS MODERATE 35: CPT | Performed by: INTERNAL MEDICINE

## 2024-02-24 PROCEDURE — 6370000000 HC RX 637 (ALT 250 FOR IP): Performed by: INTERNAL MEDICINE

## 2024-02-24 PROCEDURE — 2060000000 HC ICU INTERMEDIATE R&B

## 2024-02-24 PROCEDURE — 2580000003 HC RX 258: Performed by: NURSE PRACTITIONER

## 2024-02-24 PROCEDURE — C9254 INJECTION, LACOSAMIDE: HCPCS | Performed by: PSYCHIATRY & NEUROLOGY

## 2024-02-24 RX ORDER — HYDROXYZINE HYDROCHLORIDE 25 MG/1
25 TABLET, FILM COATED ORAL 2 TIMES DAILY PRN
Status: DISCONTINUED | OUTPATIENT
Start: 2024-02-24 | End: 2024-02-25 | Stop reason: HOSPADM

## 2024-02-24 RX ORDER — PRIMIDONE 250 MG/1
375 TABLET ORAL NIGHTLY
Status: DISCONTINUED | OUTPATIENT
Start: 2024-02-24 | End: 2024-02-25 | Stop reason: HOSPADM

## 2024-02-24 RX ORDER — LANOLIN ALCOHOL/MO/W.PET/CERES
400 CREAM (GRAM) TOPICAL DAILY
Status: DISCONTINUED | OUTPATIENT
Start: 2024-02-24 | End: 2024-02-25 | Stop reason: HOSPADM

## 2024-02-24 RX ORDER — TAMSULOSIN HYDROCHLORIDE 0.4 MG/1
0.4 CAPSULE ORAL 2 TIMES DAILY
Status: DISCONTINUED | OUTPATIENT
Start: 2024-02-24 | End: 2024-02-25 | Stop reason: HOSPADM

## 2024-02-24 RX ORDER — PRIMIDONE 250 MG/1
250 TABLET ORAL EVERY MORNING
Status: DISCONTINUED | OUTPATIENT
Start: 2024-02-24 | End: 2024-02-25 | Stop reason: HOSPADM

## 2024-02-24 RX ORDER — POLYETHYLENE GLYCOL 3350 17 G/17G
17 POWDER, FOR SOLUTION ORAL DAILY
Status: DISCONTINUED | OUTPATIENT
Start: 2024-02-24 | End: 2024-02-25 | Stop reason: HOSPADM

## 2024-02-24 RX ORDER — FINASTERIDE 5 MG/1
5 TABLET, FILM COATED ORAL NIGHTLY
Status: DISCONTINUED | OUTPATIENT
Start: 2024-02-24 | End: 2024-02-25 | Stop reason: HOSPADM

## 2024-02-24 RX ORDER — LAMOTRIGINE 100 MG/1
600 TABLET ORAL NIGHTLY
Status: DISCONTINUED | OUTPATIENT
Start: 2024-02-24 | End: 2024-02-25 | Stop reason: HOSPADM

## 2024-02-24 RX ORDER — LORAZEPAM 0.5 MG/1
0.5 TABLET ORAL EVERY 8 HOURS PRN
Status: DISCONTINUED | OUTPATIENT
Start: 2024-02-24 | End: 2024-02-25 | Stop reason: HOSPADM

## 2024-02-24 RX ORDER — LANOLIN ALCOHOL/MO/W.PET/CERES
3 CREAM (GRAM) TOPICAL NIGHTLY PRN
Status: DISCONTINUED | OUTPATIENT
Start: 2024-02-24 | End: 2024-02-25 | Stop reason: HOSPADM

## 2024-02-24 RX ORDER — CALCIUM CARBONATE 500 MG/1
1 TABLET, CHEWABLE ORAL 2 TIMES DAILY
Status: DISCONTINUED | OUTPATIENT
Start: 2024-02-24 | End: 2024-02-25 | Stop reason: HOSPADM

## 2024-02-24 RX ORDER — ONDANSETRON 4 MG/1
4 TABLET, ORALLY DISINTEGRATING ORAL EVERY 6 HOURS PRN
Status: DISCONTINUED | OUTPATIENT
Start: 2024-02-24 | End: 2024-02-25 | Stop reason: HOSPADM

## 2024-02-24 RX ORDER — OXYBUTYNIN CHLORIDE 10 MG/1
10 TABLET, EXTENDED RELEASE ORAL DAILY
Status: DISCONTINUED | OUTPATIENT
Start: 2024-02-24 | End: 2024-02-25 | Stop reason: HOSPADM

## 2024-02-24 RX ORDER — LAMOTRIGINE 100 MG/1
400 TABLET ORAL EVERY MORNING
Status: DISCONTINUED | OUTPATIENT
Start: 2024-02-24 | End: 2024-02-25 | Stop reason: HOSPADM

## 2024-02-24 RX ORDER — PANTOPRAZOLE SODIUM 40 MG/1
40 TABLET, DELAYED RELEASE ORAL
Status: DISCONTINUED | OUTPATIENT
Start: 2024-02-25 | End: 2024-02-25 | Stop reason: HOSPADM

## 2024-02-24 RX ORDER — MECLIZINE HCL 12.5 MG/1
25 TABLET ORAL DAILY
Status: DISCONTINUED | OUTPATIENT
Start: 2024-02-24 | End: 2024-02-25 | Stop reason: HOSPADM

## 2024-02-24 RX ADMIN — OXYBUTYNIN CHLORIDE 10 MG: 10 TABLET, EXTENDED RELEASE ORAL at 13:29

## 2024-02-24 RX ADMIN — ENOXAPARIN SODIUM 40 MG: 100 INJECTION SUBCUTANEOUS at 09:10

## 2024-02-24 RX ADMIN — TAMSULOSIN HYDROCHLORIDE 0.4 MG: 0.4 CAPSULE ORAL at 21:43

## 2024-02-24 RX ADMIN — TAMSULOSIN HYDROCHLORIDE 0.4 MG: 0.4 CAPSULE ORAL at 13:28

## 2024-02-24 RX ADMIN — Medication 500 MG: at 21:45

## 2024-02-24 RX ADMIN — LAMOTRIGINE 400 MG: 100 TABLET ORAL at 13:28

## 2024-02-24 RX ADMIN — POLYETHYLENE GLYCOL 3350 17 G: 17 POWDER, FOR SOLUTION ORAL at 13:28

## 2024-02-24 RX ADMIN — FINASTERIDE 5 MG: 5 TABLET, FILM COATED ORAL at 21:43

## 2024-02-24 RX ADMIN — Medication 400 MG: at 13:28

## 2024-02-24 RX ADMIN — LEVETIRACETAM 2000 MG: 100 INJECTION, SOLUTION INTRAVENOUS at 21:41

## 2024-02-24 RX ADMIN — LACOSAMIDE 150 MG: 10 INJECTION INTRAVENOUS at 09:23

## 2024-02-24 RX ADMIN — SODIUM CHLORIDE, PRESERVATIVE FREE 10 ML: 5 INJECTION INTRAVENOUS at 21:44

## 2024-02-24 RX ADMIN — LAMOTRIGINE 600 MG: 100 TABLET ORAL at 21:43

## 2024-02-24 RX ADMIN — PRIMIDONE 375 MG: 250 TABLET ORAL at 21:42

## 2024-02-24 RX ADMIN — LEVETIRACETAM 1500 MG: 100 INJECTION, SOLUTION INTRAVENOUS at 09:10

## 2024-02-24 RX ADMIN — SODIUM CHLORIDE, PRESERVATIVE FREE 10 ML: 5 INJECTION INTRAVENOUS at 09:10

## 2024-02-24 RX ADMIN — PRIMIDONE 250 MG: 250 TABLET ORAL at 13:28

## 2024-02-24 RX ADMIN — Medication 500 MG: at 13:28

## 2024-02-24 RX ADMIN — LACOSAMIDE 150 MG: 100 TABLET, FILM COATED ORAL at 21:42

## 2024-02-24 RX ADMIN — MECLIZINE 25 MG: 12.5 TABLET ORAL at 13:29

## 2024-02-24 NOTE — DISCHARGE INSTR - COC
Continuity of Care Form    Patient Name: Jeremy King   :  1955  MRN:  [id number]    Admit date:  2024  Discharge date:      Code Status Order: Full Code   Advance Directives:     Admitting Physician:  Elder Leigh DO  PCP: Sukumar Larose MD    Discharging Nurse: Crystal BOLTON   Discharging Hospital Unit/Room#: 1018/1018-02  Discharging Unit Phone Number: [phone]    Emergency Contact:   Extended Emergency Contact Information  Primary Emergency Contact: Esther Denny   [facility]  Home Phone: [phone]  Relation: Legal Guardian  Secondary Emergency Contact: Samir King  Home Phone: [phone]  Relation: Brother/Sister    Past Surgical History:  Past Surgical History:   Procedure Laterality Date    ABDOMEN SURGERY  ,     Bowel Obstruction X2    CAST APPLICATION Left     Lt. Ankle , X3    HYDROCELE EXCISION  2016    LAPAROSCOPY  15    with BERNARD    LAPAROTOMY  14    with partial cecectomy    VAGAL NERVE STIMULATION      placed, then replaced    VAGAL NERVE STIMULATION  10/11/2016    replaced generator battery       Immunization History:   Immunization History   Administered Date(s) Administered    COVID-19, PFIZER PURPLE top, DILUTE for use, (age 12 y+), 30mcg/0.3mL 2021, 2021, 2022    TDaP, ADACEL (age 10y-64y), BOOSTRIX (age 10y+), IM, 0.5mL 2013       Active Problems:  Patient Active Problem List   Diagnosis Code    Small bowel obstruction (HCC)  K56.609    Seizures (HCC) Chronic R56.9    MR (mental retardation), severe F72    Epilepsy (HCC) G40.909    SBO (small bowel obstruction) (HCC) K56.609    Mild malnutrition (HCC) E44.1    LEELEE (obstructive sleep apnea) G47.33    Epileptic seizures (HCC) G40.909    Intellectual disability F79    S/P placement of VNS (vagus nerve stimulation) device Z96.89    Dysphagia R13.10    Elevated international normalized ratio (INR) R79.1    Nausea and vomiting R11.2    Seizure disorder

## 2024-02-24 NOTE — DISCHARGE SUMMARY
[facility]  Office: [phone]  Ayden Trejo DO, Samir Yousif DO, Vijay Ramirez DO, Elder Leigh DO, Jeremias Martin MD, Herminia Slaughter MD, Partha Snow MD, Adela Mcdonough MD,  Patrick Pacheco MD, Sandhya Fontaine MD, Iris Collazo MD,  Evert Perez DO, Galo Hampton MD, Abel Dietz MD, Robert Trejo DO, Luz Ellis MD,  Tim Mcwilliams DO, Delisa Huff MD, Gayle Guevara MD, Pearl Davis MD, Tyler Banuelos MD,  Aadma Johnson MD, Craig Bradford MD, Bambi Bowen MD, Dusty Landis MD, Juan Oliveira MD, Carrie Thakur MD, Krishna Lux DO, Aniceto Roth DO, Betzaida Campbell MD,  Fernando Burt MD, Shirley Waterhouse, CNP,  Regina Kern, CNP, Epi Henry, CNP,  Yuliya Juan, DNP, Era Campos, CNP, Lu Ziegler, CNP, Dalila Canales CNP, Zee Crystal, CNP, Gwen Duran, CNP, Avelina Sharp, PA-C, Laxmi David PA-C, Ondina Leslie, CNP, Silvia Steward, CNP, Janice Burger, CNP, Alison Mendoza, CNS, Shy Corbin, CNP, Saba Marr, CNP, Tracy Schwab, CNP         [facility]   IN-PATIENT SERVICE   [facility]    Discharge Summary     Patient ID: Jeremy King  :  1955   MRN: [id number]     ACCOUNT:  [id number]   Patient's PCP: Sukumar Larose MD  Admit Date: 2024   Discharge Date: 2024     Length of Stay: 4  Code Status:  Full Code  Admitting Physician: Elder Leigh DO  Discharge Physician: Vijay Ramirez DO     Active Discharge Diagnoses:     Hospital Problem Lists:  Principal Problem:    Small bowel obstruction (HCC)   Active Problems:    Seizures (HCC) Chronic    MR (mental retardation), severe    Epilepsy (HCC)    LEELEE (obstructive sleep apnea)    Seizure-like activity (HCC)  Resolved Problems:    * No resolved hospital problems. *      Admission Condition:  fair     Discharged Condition: stable    Hospital Stay:     Hospital Course:  Jeremy King is a 68 y.o. male who was admitted for the management

## 2024-02-25 VITALS
TEMPERATURE: 97.3 F | HEART RATE: 82 BPM | OXYGEN SATURATION: 98 % | WEIGHT: 161.6 LBS | DIASTOLIC BLOOD PRESSURE: 84 MMHG | SYSTOLIC BLOOD PRESSURE: 97 MMHG | HEIGHT: 70 IN | BODY MASS INDEX: 23.13 KG/M2 | RESPIRATION RATE: 16 BRPM

## 2024-02-25 PROCEDURE — 2580000003 HC RX 258: Performed by: NURSE PRACTITIONER

## 2024-02-25 PROCEDURE — 6360000002 HC RX W HCPCS: Performed by: NURSE PRACTITIONER

## 2024-02-25 PROCEDURE — 99232 SBSQ HOSP IP/OBS MODERATE 35: CPT | Performed by: INTERNAL MEDICINE

## 2024-02-25 PROCEDURE — 6370000000 HC RX 637 (ALT 250 FOR IP): Performed by: INTERNAL MEDICINE

## 2024-02-25 RX ADMIN — Medication 400 MG: at 10:19

## 2024-02-25 RX ADMIN — ENOXAPARIN SODIUM 40 MG: 100 INJECTION SUBCUTANEOUS at 10:19

## 2024-02-25 RX ADMIN — LACOSAMIDE 150 MG: 100 TABLET, FILM COATED ORAL at 10:18

## 2024-02-25 RX ADMIN — LAMOTRIGINE 400 MG: 100 TABLET ORAL at 10:18

## 2024-02-25 RX ADMIN — PRIMIDONE 250 MG: 250 TABLET ORAL at 10:19

## 2024-02-25 RX ADMIN — SODIUM CHLORIDE, PRESERVATIVE FREE 10 ML: 5 INJECTION INTRAVENOUS at 10:20

## 2024-02-25 RX ADMIN — OXYBUTYNIN CHLORIDE 10 MG: 10 TABLET, EXTENDED RELEASE ORAL at 10:18

## 2024-02-25 RX ADMIN — ONDANSETRON 4 MG: 2 INJECTION INTRAMUSCULAR; INTRAVENOUS at 02:30

## 2024-02-25 RX ADMIN — LEVETIRACETAM 1500 MG: 100 INJECTION, SOLUTION INTRAVENOUS at 10:20

## 2024-02-25 RX ADMIN — Medication 500 MG: at 10:33

## 2024-02-25 RX ADMIN — POLYETHYLENE GLYCOL 3350 17 G: 17 POWDER, FOR SOLUTION ORAL at 10:19

## 2024-02-25 RX ADMIN — MECLIZINE 25 MG: 12.5 TABLET ORAL at 10:19

## 2024-02-25 RX ADMIN — LINACLOTIDE 145 MCG: 145 CAPSULE, GELATIN COATED ORAL at 05:40

## 2024-02-25 RX ADMIN — PANTOPRAZOLE SODIUM 40 MG: 40 TABLET, DELAYED RELEASE ORAL at 05:40

## 2024-02-25 RX ADMIN — TAMSULOSIN HYDROCHLORIDE 0.4 MG: 0.4 CAPSULE ORAL at 10:32

## 2024-02-25 NOTE — PROGRESS NOTES
[facility]  [address]  [place] [address]  PH:  [phone] or [phone]  FAX:  [phone]            Brief history: Jeremy King is a 68 y.o. old male admitted on 2024 with  SBO.      Subjective: No new neurological events overnight. EEG reviewed and showed encephalopathy. Patient has a VNS and family to bring in magnet.      Objective: /78   Pulse 83   Temp 97.9 °F (36.6 °C)   Resp 20   Ht 1.778 m (5' 10\")   Wt 72.3 kg (159 lb 6.3 oz)   SpO2 94%   BMI 22.87 kg/m²       Medications:    lacosamide (VIMPAT) 150 mg in sodium chloride 0.9 % 65 mL IVPB  150 mg IntraVENous BID    levETIRAcetam  1,500 mg IntraVENous Daily    levETIRAcetam  2,000 mg IntraVENous Nightly    sodium chloride flush  5-40 mL IntraVENous 2 times per day    enoxaparin  40 mg SubCUTAneous Daily          Physical Exam:   /78   Pulse 83   Temp 97.9 °F (36.6 °C)   Resp 20   Ht 1.778 m (5' 10\")   Wt 72.3 kg (159 lb 6.3 oz)   SpO2 94%   BMI 22.87 kg/m²   Temp (24hrs), Av.8 °F (36.6 °C), Min:97.6 °F (36.4 °C), Max:98.2 °F (36.8 °C)      General examination:       General Appearance:  alert, ill appearing, and in no acute distress  HEENT: Normocephalic, atraumatic, +NG tube  Neck: supple, no carotid bruits, (-) nuchal rigidity  Lungs:  Respirations unlabored, chest wall no deformity, BS normal  Cardiovascular: normal rate, regular rhythm  Abdomen: +distended   Skin: No gross lesions, rashes, bruising or bleeding on exposed skin area  Extremities:  peripheral pulses palpable, no cyanosis, clubbing or edema       Neurological examination:     Mental status    Developmentally delayed, dysarthria, AxOx2-3, slightly agitated       Cranial nerves    II - visual fields intact to confrontation; pupils reactive  III, IV, VI - extraocular muscles intact; no YOLANDA; no nystagmus; no ptosis   V - normal facial sensation                                                             
Bedside RN and another RN heard grunting noises, both RN went down the beltrán to assess where it was coming from. Patient was actively seizing. Seizure started around 0314, a rapid response was called at 0316. BS was taken and was 96. Set of VS were taken all within normal range except HR was showing tachycardia with HR ranging from 110-115 once hooked up to monitor.     Era NP at bedside ordered 1 2mg dose of ativan and administered at 0322, patient continued to seize and become unresponsive. Another dose of ativan ordered, 4mg, administered at 0330. Patient continued to be unresponsive.     About 0345 patient had a small break through by saying \"what\" a few times when bedside RN was calling name    Neuro consult ordered. Patient being transferred to PCU and progressive status.   
EEG completed full report to follow  
General Surgery Progress Note            PATIENT NAME: Jeremy King     TODAY'S DATE: 2/24/2024, 7:43 AM    SUBJECTIVE:    Patient seen and examined at bedside. No acute events overnight. VSS, afebrile. Patient states he is feeling well this morning. Denies any abdominal pain. NG tube removed yesterday, patient tolerating clears without nausea or vomiting. Pt is passing gas, no bowel movement overnight. No other complaints at this time.    OBJECTIVE:   VITALS:  /68   Pulse 72   Temp 97.7 °F (36.5 °C)   Resp 17   Ht 1.778 m (5' 10\")   Wt 72.3 kg (159 lb 6.3 oz)   SpO2 97%   BMI 22.87 kg/m²      INTAKE/OUTPUT:    No intake or output data in the 24 hours ending 02/24/24 0743      PHYSICAL EXAM  GEN: awake, alert, no apparent distres  HEENT: normocephalic, no scleral icterus, moist mucous membranes, NGT intact   CV: regular rate   LUNG: no respiratory distress, no audible wheezing  ABDOMEN: soft, non-distended, non-tender to palpation. No rebound, guarding, or rigidity   EXTREMITIES: No edema, cyanosis or clubbing    Data:  CBC with Differential:    Lab Results   Component Value Date/Time    WBC 6.2 02/24/2024 05:00 AM    RBC 3.24 02/24/2024 05:00 AM    RBC 3.43 08/29/2011 11:48 AM    HGB 10.7 02/24/2024 05:00 AM    HCT 32.2 02/24/2024 05:00 AM     02/24/2024 05:00 AM     08/29/2011 11:48 AM    MCV 99.4 02/24/2024 05:00 AM    MCH 33.0 02/24/2024 05:00 AM    MCHC 33.2 02/24/2024 05:00 AM    RDW 12.9 02/24/2024 05:00 AM    LYMPHOPCT 26 02/24/2024 05:00 AM    MONOPCT 7 02/24/2024 05:00 AM    BASOPCT 1 02/24/2024 05:00 AM    MONOSABS 0.46 02/24/2024 05:00 AM    LYMPHSABS 1.60 02/24/2024 05:00 AM    EOSABS 0.25 02/24/2024 05:00 AM    BASOSABS 0.04 02/24/2024 05:00 AM    DIFFTYPE NOT REPORTED 02/09/2022 07:30 AM     BMP:    Lab Results   Component Value Date/Time     02/24/2024 05:00 AM    K 3.9 02/24/2024 05:00 AM     02/24/2024 05:00 AM    CO2 27 02/24/2024 05:00 AM    BUN 22 
NG removed per order at 1345. Patient tolerated well    1500- pt started on clear liquid diet tolerated well, 1:1 sitter discontinued. Will increase frequency of rounds to maintain pt. safety.   
[facility]  Office: [phone]  Ayden Trejo DO, Samir Yousif DO, Vijay Ramirez DO, Elder Leigh DO, Jeremias Martin MD, Herminia Slaughter MD, Partha Snow MD, Adela Mcdonough MD,  Patrick Pacheco MD, Sandhya Fontaine MD, Iris Collazo MD,  Evert Perez DO, Galo Hampton MD, Abel Dietz MD, Robert Trejo DO, Luz Ellis MD,  Tim Mcwilliams DO, Delisa Huff MD, Gayle Guevara MD, Pearl Davis MD, Tyler Banuelos MD,  Adama Johnson MD, Craig Bradford MD, Bambi Bowen MD, Dusty Landis MD, Juan Oliveira MD, Carrie Thakur MD, Krishna Lux DO, Aniceto Roth DO, Betzaida Campbell MD,  Fernando Burt MD, Shirley Waterhouse, CNP,  Regina Kern CNP, Epi Henry, CNP,  Yuliya Juan, DNP, Era Campos, CNP, Lu Ziegler, CNP, Dalila Canales CNP, Zee Crystal, CNP, Gwen Duran, CNP, Avelina Sharp, PA-C, Laxmi David, PA-C, Ondina Leslie, CNP, Silvia Steward, CNP, Janice Burger, CNP, Alison Mendoza, CNS, Shy Corbin, CNP, Saba Marr, CNP, Tracy Schwab, CNP         [facility]   IN-PATIENT SERVICE   [facility]    Progress Note    2/22/2024    10:03 AM    Name:   Jeremy King  MRN:     [id number]     Acct:      [id number]   Room:   [facility] Day:  2  Admit Date:  2/20/2024 10:56 PM    PCP:   Sukumar Larose MD  Code Status:  Full Code    Subjective:     C/C: sbo  Interval History Status: not changed.     Awake, alert  Unable to give history    Pulls at ng sometimes  Sitter at bedside    Had a seizure last night and given ativan    Brief History:     Per my partner:  \"This is a 68-year-old male with history of MRDD and prior bowel obstruction who presents as a transfer from [facility] with recurrent bowel obstruction. Apparently was recently treated and released from [facility] with similar complaints and now returns with increasing abdominal distention and pain. Patient is currently seen in his room and denies any 
Patient picked up by transport. Personal belongings packed and sent with transport. IV and telemetry removed. Pt denies pain on discharge.   
Patient to be picked up by [facility] at 3 PM.     Report given to Ivelisse and all questions answered. Call also made to Lucy, pt's legal guardian, to update on  time.    
Physical Therapy  Facility/Department: [facility] PROGRESSIVE CARE  Daily Treatment Note  NAME: Jeremy King  : 1955  MRN: [id number]    Date of Service: 2024    HOANG Guerra reports patient is medically stable for therapy treatment this date.    Chart reviewed prior to treatment and patient is agreeable for therapy.  All lines intact and patient positioned comfortably at end of treatment.  All patient needs addressed prior to ending therapy session.     Discharge Recommendations:  Patient would benefit from continued therapy after discharge   PT Equipment Recommendations  Equipment Needed: No    Patient Diagnosis(es): There were no encounter diagnoses.    Assessment   Assessment: 67 y/o male referred to PT. Cognition a barrier to therapy progression. Patient requiring mod-max assist x2 with bed mobility; transfers and gait with HHA from EOB to bedside chair.  TD with line management. Impulsive behaviors intermittently. Patient requires redirecting to task. Patient is functioning below baseline and would benefit from continued PT  Activity Tolerance: Patient limited by fatigue;Treatment limited secondary to decreased cognition  Equipment Needed: No     Plan    Physical Therapy Plan  General Plan: 5-7 times per week  Current Treatment Recommendations: Strengthening;ROM;Balance training;Functional mobility training;Transfer training;Neuromuscular re-education;Home exercise program;Endurance training;Safety education & training;Patient/Caregiver education & training;Equipment evaluation, education, & procurement;Therapeutic activities;Positioning     Restrictions  Restrictions/Precautions  Restrictions/Precautions: General Precautions, Fall Risk, Seizure  Required Braces or Orthoses?: No  Position Activity Restriction  Other position/activity restrictions: Up w/ assist, NG tube to suction, LUE IV     Subjective    Subjective  Subjective: Pt supine in bed upon arrival w/ sitter and RN present at bedside.   RN 
Pt remained calm and cooperative throughout shift. Pt remained on RA. Pt tolerating clear liquid diet. Brief changed as needed. Need for 1:1 still remains unnecessary, pt resting well since NG has been pulled. Diet to be advanced as tolerated and per order. Will continue with care plan.  
Pt remained overall calm and cooperative throughout shift, until around 2am. Pt was not acting like himself. Prior to this, pt was responding in sentences, took his pills whole in thickened apple juice, was able to state how he felt, etc. Writer noticed pt was tachy at this time on telemetry, so walked into room to see pt putting fingers into his mouth and not responding the same. Pt looked distressed, but was unable to express how he felt. Writer administered IV PRN Zofran at 0230. Once Zofran was in system, pt stopped putting fingers in his mouth and HR decreased and went back into sinus rhythm (was sinus tachy). Brief changed as needed. See chart for output. No seizure activity noted. Pt remained on RA. Will continue with care plan  
Pt remained overall calm and cooperative tonight, but fidgety and itchy. Lotion applied to affected areas. 1:1 sitter remains. NG output dark brown, remains connected to intermittent suction. No seizure activity this shift. Pt remained on RA. Will continue with care plan  
RN called patients legal guardian Esther and informed her about patients seizure, condition and change of unit.     Legal guardian said there is a magnet under his left armpit (magnet in a red bag) if it was swiped. RN stated she was told in report he had a magnet, but there was no other information given, nor information about it in his chart. She said its a watch battery size and swipe it in any direction under his left underarm and it sends an electrical impulse to his brain to help seizure     Esther stated that post ativan he is sleepy and pretty lethargic.     Esther stated he has a magnet so he can't be around MRI. Patient is usually mechanical soft and thickened liquids.     All questions and concerns answered by RN  
[facility]  Office: [phone]  Ayden Trejo DO, Samir Yousif DO, Vijay Ramirez DO, Elder Leigh DO, Jeremias Martin MD, Herminia Slaughter MD, Partha Snow MD, Adela Mcdonough MD,  Patrick Pacheco MD, Sandhya Fontaine MD, Iris Collazo MD,  Evert Perez DO, Galo Hampton MD, Abel Dietz MD, Robert Trejo DO, Luz Ellis MD,  Tim Mcwilliams DO, Delisa Huff MD, Gayle Guevara MD, Pearl Davis MD, Tyler Banuelos MD,  Adama Johnson MD, Craig Bradford MD, Bambi Bowen MD, Dusty Landis MD, Juan Oliveira MD, Carrie Thakur MD, Krishna Lux DO, Aniceto Roth DO, Betzaida Campbell MD,  Fernando Burt MD, Shirley Waterhouse, CNP,  Regina Kern, CNP, Epi Henry, CNP,  Yuliya Juan, DNP, Era Campos, CNP, Lu Ziegler, CNP, Dalila Canales CNP, Zee Crystal, CNP, Gwen Duran, CNP, Avelina Sharp, PA-C, Laxmi David, PA-C, Ondina Leslie, CNP, Silvia Steward, CNP, Janice Burger, CNP, Alison Mendoza, CNS, Shy Corbin, CNP, Saba Marr, CNP, Tracy Schwab, CNP         [facility]   IN-PATIENT SERVICE   [facility]    Progress Note    2/23/2024    9:50 AM    Name:   Jeremy King  MRN:     [id number]     Acct:      [id number]   Room:   1018/1018-02   Day:  3  Admit Date:  2/20/2024 10:56 PM    PCP:   Sukumar Larose MD  Code Status:  Full Code    Subjective:     C/C: Small bowel obstruction    Interval History Status: not changed.     Patient continues with NG tube to low intermittent suction, he denies any complaints of chest pain, shortness of breath, nausea or vomiting, fevers chills, abdominal pain or other acute complaints    Brief History:     This is a six 8-year-old male with history of MRDD and prior bowel obstructions that presents as a transfer from [facility] with recurrent bowel obstruction.  Apparently at the group home and noted decreasing abdominal distention and complaints of pain and was sent to the emergency 
Transitions of Care Pharmacy Service   Medication Review    The patient's list of current home medications has been reviewed.     Source(s) of information: med list from group home ([facility])      PROVIDER ACTION REQUESTED  Medications that need to be addressed by a physician/nurse practitioner:    Medication Action Requested      Home med list is ready for physician review         Please feel free to call me with any questions about this encounter. Thank you.    Stormy Pickett [facility]   Transitions of Care Pharmacy Service  Phone:  [phone]  Fax: [phone]      Electronically signed by Stormy Pickett [facility] on 2/21/2024 at 6:06 PM         Medications Prior to Admission:   hydrOXYzine HCl (ATARAX) 25 MG tablet, Take 1 tablet by mouth 2 times daily as needed for Itching  loperamide (IMODIUM) 2 MG capsule, Take 1 capsule by mouth 2 times daily as needed for Diarrhea  LORazepam (ATIVAN) 0.5 MG tablet, Take 1 tablet by mouth every 8 hours as needed (seizure activity).   melatonin 3 MG TABS tablet, Take 1 tablet by mouth nightly as needed  albuterol sulfate HFA (PROAIR HFA) 108 (90 Base) MCG/ACT inhaler, Inhale 2 puffs into the lungs every 4 hours as needed for Wheezing  benzonatate (TESSALON) 100 MG capsule, Take 1 capsule by mouth 3 times daily as needed for Cough  NONFORMULARY, Take 2 tablets by mouth daily Inulin-chromium picolinate 2gm-100mcg chewable tab  magnesium oxide (MAG-OX) 400 (240 Mg) MG tablet, Take 1 tablet by mouth daily  meclizine (ANTIVERT) 25 MG tablet, Take 1 tablet by mouth daily  polyethyl glycol-propyl glycol 0.4-0.3 % (SYSTANE) 0.4-0.3 % ophthalmic solution, Place 1 drop into both eyes nightly Systane nighttime ointment  acetaminophen (TYLENOL) 500 MG tablet, Take 1 tablet by mouth every 6 hours as needed for Pain  diazePAM (DIASTAT) 20 MG GEL, Place 20 mg rectally as needed (seizure activity>15 minutes).  ondansetron (ZOFRAN-ODT) 4 MG disintegrating tablet, Take 1 tablet by 
02/23/2024 06:07 AM    MONOPCT 6 02/23/2024 06:07 AM    BASOPCT 1 02/23/2024 06:07 AM    MONOSABS 0.49 02/23/2024 06:07 AM    LYMPHSABS 1.55 02/23/2024 06:07 AM    EOSABS 0.11 02/23/2024 06:07 AM    BASOSABS 0.06 02/23/2024 06:07 AM    DIFFTYPE NOT REPORTED 02/09/2022 07:30 AM     BMP:    Lab Results   Component Value Date/Time     02/23/2024 05:17 AM    K 3.9 02/23/2024 05:17 AM     02/23/2024 05:17 AM    CO2 24 02/23/2024 05:17 AM    BUN 21 02/23/2024 05:17 AM    LABALBU 3.7 12/09/2023 05:11 AM    CREATININE 0.7 02/23/2024 05:17 AM    CALCIUM 9.2 02/23/2024 05:17 AM    GFRAA >60 07/09/2022 10:31 PM    LABGLOM >60 02/23/2024 05:17 AM    GLUCOSE 69 02/23/2024 05:17 AM    GLUCOSE 92 08/29/2011 11:48 AM       Radiology Review:    XR ABDOMEN (KUB) (SINGLE AP VIEW)    Result Date: 2/22/2024  EXAMINATION: ONE SUPINE XRAY VIEW(S) OF THE ABDOMEN 2/22/2024 8:59 am COMPARISON: 02/20/2024, 02/19/2024 HISTORY: ORDERING SYSTEM PROVIDED HISTORY: sbo vs ileus TECHNOLOGIST PROVIDED HISTORY: sbo vs ileus Reason for Exam: SBO vs ileus FINDINGS: Gas is present within nondilated small and large bowel.  The enteric tube tip and side hole project at the level of the body of the stomach.     Nonobstructive bowel gas pattern.  Enteric tube remains in place.        ASSESSMENT   68 year old male with small bowel obstruction versus ileus     Plan  Clamp NGT. Will monitor how patient does with clamp trial and will consider removal with initiation of clear liquid diet this afternoon if tolerates.   Monitor intake and output   Monitor abdominal exam   No anti-diarrheals please  Encourage ambulation and incentive spirometer use       -----------------------------------------------  Porsche Rosenbaum, DO PGY-4  2/23/2024 at 5:27 AM   Attending Physician Statement  I have discussed the case, including pertinent history and exam findings with the resident. I agree with the assessment, plan and orders as documented by the resident.    Attempt 
LYMPHSABS 1.26 02/22/2024 05:18 AM    EOSABS 0.09 02/22/2024 05:18 AM    BASOSABS 0.05 02/22/2024 05:18 AM    DIFFTYPE NOT REPORTED 02/09/2022 07:30 AM     BMP:    Lab Results   Component Value Date/Time     02/21/2024 11:57 AM    K 3.7 02/21/2024 11:57 AM     02/21/2024 11:57 AM    CO2 28 02/21/2024 11:57 AM    BUN 23 02/21/2024 11:57 AM    LABALBU 3.7 12/09/2023 05:11 AM    CREATININE 0.8 02/21/2024 11:57 AM    CALCIUM 9.2 02/21/2024 11:57 AM    GFRAA >60 07/09/2022 10:31 PM    LABGLOM >60 02/21/2024 11:57 AM    GLUCOSE 83 02/21/2024 11:57 AM    GLUCOSE 92 08/29/2011 11:48 AM       Radiology Review:  KUB pending    ASSESSMENT:  Active Hospital Problems    Diagnosis Date Noted    LEELEE (obstructive sleep apnea) [G47.33] 08/09/2017    Epilepsy (HCC) [G40.909] 10/11/2016    MR (mental retardation), severe [F72] 08/18/2014    Small bowel obstruction (HCC)  [K56.609] 08/16/2014    Seizures ([facility]) Chronic [R56.9] 08/16/2014       68 y.o. male with small bowel obstruction vs ileus    Plan:  Continue NG tube to LIWS  Soap suds enema this morning  F/u am KUB and labs- keep electrolytes within normal limits  Continue NPO diet  Hold all anti-diarrheals   Rest of care per medical team    Electronically signed by Zee Spring DO  on 2/22/2024 at 5:41 AM   Attending Physician Statement  I have discussed the case, including pertinent history and exam findings with the resident. I agree with the assessment, plan and orders as documented by the resident.     
cues;Increased time to complete;Head of bed raised;With handrails  Sit to Supine  Assistance Level: Moderate assistance;Requires x 2 assistance  Supine to Sit  Assistance Level: Maximum assistance;Requires x 2 assistance  Skilled Clinical Factors: Pt w/ significant difficulty throughout bed mobility this date requiring increased time and effort to perform tasks throughout.  Pt requiring max verbal and tactile cueing for initiation, progression, and sequencing of BLE & trunk throughout w/ poor return demo.  Pt able to initiate BLE towards EOB however requiring assist to progress off EOB and initiate trunk movement.  Pt denying any dizziness/lightheadedness upon sitting at EOB.  Pt's sitting balance fluctuating between CGA-MaxA throughout as w/ difficulty sitting still and frequently swaying anterior/posterior however too far to correct w/o assist.  Scooting  Assistance Level: Moderate assistance;Requires x 2 assistance  Balance  Sitting Balance: Moderate assistance  Standing Balance: Maximum assistance  Transfers  Surface: To bed;From bed  Additional Factors: Set-up;Verbal cues;Increased time to complete  Device:  (2 person HH assist)  Sit to Stand  Assistance Level: Moderate assistance;Maximum assistance;Requires x 2 assistance  Skilled Clinical Factors: Pt w/ difficulty throughout STS transfers this date requiring skilled 2 person assist to maintain safety throughout.  Pt w/ forward flexed trunk upon standing requiring max verbal cueing for activation of hip extensors w/ poor return demo.  Heavy reliance on HH assist to for UE support throughout.  Stand to Sit  Assistance Level: Moderate assistance;Requires x 2 assistance  Skilled Clinical Factors: Poor eccentric quad control requiring assist to to control descent throughout.      Environmental Mobility  Ambulation  Surface: Level surface  Device:  (HH assist x 2)  Distance: 3 steps laterally along EOB  Activity Comments: steps at EOB for better repositioning upon 
had NG tube placed and was transferred to our facility for further management.  He had a recent hospital stay at [facility] apparently family refused for him to go back to their facility.    Review of Systems:     Constitutional:  negative for chills, fevers, sweats  Respiratory:  negative for cough, dyspnea on exertion, shortness of breath, wheezing  Cardiovascular:  negative for chest pain, chest pressure/discomfort, lower extremity edema, palpitations  Gastrointestinal:  negative for abdominal pain, constipation, diarrhea, nausea, vomiting  Neurological:  negative for dizziness, headache    Medications:     Allergies:    Allergies   Allergen Reactions    Penicillins Hives, Shortness Of Breath and Swelling    Sodium Hypochlorite Hives and Itching    Clindamycin Rash       Current Meds:   Scheduled Meds:    lacosamide (VIMPAT) 150 mg in sodium chloride 0.9 % 65 mL IVPB  150 mg IntraVENous BID    levETIRAcetam  1,500 mg IntraVENous Daily    levETIRAcetam  2,000 mg IntraVENous Nightly    sodium chloride flush  5-40 mL IntraVENous 2 times per day    enoxaparin  40 mg SubCUTAneous Daily     Continuous Infusions:    sodium chloride 5 mL/hr at 02/23/24 0825     PRN Meds: LORazepam, sodium chloride flush, sodium chloride, potassium chloride **OR** potassium alternative oral replacement **OR** potassium chloride, magnesium sulfate, ondansetron **OR** ondansetron, acetaminophen **OR** acetaminophen, prochlorperazine    Data:     Past Medical History:   has a past medical history of Arthritis, BPH (benign prostatic hyperplasia), Dysphagia, GERD (gastroesophageal reflux disease), Hearing loss, HLD (hyperlipidemia), Insomnia, MR (mental retardation), Periorbital cellulitis, SBO (small bowel obstruction) ([facility]), Seizures ([facility]), Ventral hernia, and Vitamin D deficiency.    Social History:   reports that he has never smoked. He does not have any smokeless tobacco history on file. He reports that he does not drink alcohol and 
assistance;Maximum assistance  Stand to Sit: Moderate assistance;Maximum assistance;Assist X2      Neuromuscular Education  Neuromuscular education: Yes  NDT Treatment: Sitting;Standing      ADL  UE Dressing: Maximum assistance;Setup;Dependent/Total (to tie/adjust hosp gown for modesty)  LE Dressing: Dependent/Total (to adjust B socks while supine in bed)  Functional Mobility: Maximum assistance (x2 assitance)  Functional Mobility Skilled Clinical Factors: Pt completed steps in place and lateral steps towards HOB with B hand held assistance. Pt given Max verbal cues/tactile cues for initiation, sequencing, upright posture, weight shifting, progression of BLE's. Pt very unsteady/impulsive. Would recommend non therapy staff use Madhavi Stedy to safely mobilize pt.      OT Exercises  Exercise Treatment: Attempted to have pt complete UB exercises while seated on EOB. Pt unwilling to attempt despite MAX verbal cues/demonstration.       Safety Devices  Type of Devices: Bed alarm in place;Call light within reach;Left in bed;Gait belt;Nurse notified;Sitter present (RN and sitter in room with pt at end of session)  Restraints  Restraints Initially in Place: No       Patient Education  Education Given To: Patient  Education Provided: Role of Therapy;Transfer Training;Equipment;Plan of Care;Energy Conservation;Fall Prevention Strategies;ADL Adaptive Strategies  Education Provided Comments: safety in function, pressure relief fall prevention/call light use, OT POC, role of OT in acute care, benefits of being OOB, recommendations for discharge/AE  Education Method: Verbal  Barriers to Learning: Cognition  Education Outcome: Continued education needed;Unable to demonstrate understanding;Unable to verbalize      Goals  Short Term Goals  Time Frame for Short Term Goals: By discharge, pt to demo  Short Term Goal 1: ADL transfers and functional mobility to Min A with use of AD and proper .  Short Term Goal 2: grooming task to 
(degrees)  RLE AROM: WFL  RLE General AROM: ankle DF/PF generally limited  AROM LLE (degrees)  LLE AROM : WFL  LLE General AROM: ankle DF/PF generally limited  AROM RUE (degrees)  RUE AROM : WFL  AROM LUE (degrees)  LUE AROM : WFL  Strength RLE  Strength RLE: WFL  Comment: Difficult to formally assess MMT this date; Appears grossly WFL  Strength LLE  Strength LLE: WFL  Comment: Difficult to formally assess MMT this date; Appears grossly WFL  Strength RUE  Strength RUE: WFL  Strength LUE  Strength LUE: WFL          Bed mobility  Rolling to Left: Stand by assistance  Rolling to Right: Stand by assistance  Supine to Sit: Moderate assistance (assist for progression of trunk)  Sit to Supine: Stand by assistance  Scooting: Stand by assistance  Bed Mobility Comments: Pt w/ mild difficulty throughout bed mobility this date requiring cueing for initiation, progression, and sequencing throughout.  Pt denying any dizziness/lightheadedness throughout position changes.  CGA-Elida required to maintain static sitting balance throughout.  Assist required for safe line mgmt throughout.    Transfers  Sit to Stand: Moderate Assistance;2 Person Assistance  Stand to Sit: Moderate Assistance;2 Person Assistance  Comment: Pt demonstrating poor steadiness throughout STS transfers this date requiring multiple attempts to get upright on initial transfer attempt.  HH assist provided throughout as pt able to tell staff he doesn't use RW or other AD.  Pt w/ heavy posterior lean upon standing requiring  mod-max verbal cueing for upright posture w/ fair return demo.  Poor eccentric quad control throughout stand>sit transfers requiring assist to control descent throughout.    Ambulation  Comments: Not appropriate; Pt non-ambulatory at baseline.       Balance  Posture: Fair  Sitting - Static: Fair;+  Sitting - Dynamic: Fair;-  Standing - Static: Poor;+  Standing - Dynamic: Poor  Single Leg Stance R Le  Single Leg Stance L Le  Comments: 
2 staff assist for safe engagement in bed mobility, transfers and functional mobility tasks. Pt impulsive at times and is a HIGH fall risk. Attempted to facilitate AROM to BUEs to all available planes once seated at recliner, with pt becoming agitated with attempts to exercise, so discontinued at this time.          Safety Devices  Type of Devices: Call light within reach;Gait belt;Nurse notified;Sitter present;All fall risk precautions in place;Chair alarm in place;Left in chair  Restraints  Restraints Initially in Place: No     Patient Education  Education Given To: Patient  Education Provided: Role of Therapy;Transfer Training;Plan of Care;Fall Prevention Strategies;ADL Adaptive Strategies;Home Exercise Program;Precautions  Education Method: Verbal  Barriers to Learning: Cognition  Education Outcome: Continued education needed;Unable to demonstrate understanding;Unable to verbalize    Goals  Short Term Goals  Time Frame for Short Term Goals: By discharge, pt to demo  Short Term Goal 1: ADL transfers and functional mobility to Min A with use of AD and proper .  Short Term Goal 2: grooming task to Min A with use of AD/AE and verbal cues as needed.  Short Term Goal 3: I with simple B UE HEP to maintain strength for functional tasks with use of handouts as needed.  Short Term Goal 4: increased standing benigno > 5 min with Min A using AD as needed to reduce risk of falls during functional tasks.  Short Term Goal 5: increased sitting benigno > 15 min with CGA to assist with completion of ADL routine.  Long Term Goals  Long Term Goal 1: Pt/caregiver to be I with fall prevention edu, EC/WS tech, recommendations for discharge/AE, pressure relief/skin integrity edu with use of handouts as needed.  Patient Goals   Patient goals : Did not state!    AM-PAC - ADL  AM-PAC Daily Activity - Inpatient   How much help is needed for putting on and taking off regular lower body clothing?: Total  How much help is needed for 
\"LABGGT\", \"BILITOT\", \"BILIDIR\", \"AMMONIA\", \"AMYLASE\", \"LIPASE\", \"LACTATE\", \"CHOL\", \"HDL\", \"LDLCHOLESTEROL\", \"CHOLHDLRATIO\", \"TRIG\", \"VLDL\", \"ZYW81CM\", \"PHENYTOIN\", \"PHENYF\", \"URICACID\", \"POCGLU\" in the last 72 hours.  ABG:No results found for: \"POCPH\", \"PHART\", \"PH\", \"POCPCO2\", \"UHN5LFW\", \"PCO2\", \"POCPO2\", \"PO2ART\", \"PO2\", \"POCHCO3\", \"NVQ3ECR\", \"HCO3\", \"NBEA\", \"PBEA\", \"BEART\", \"BE\", \"THGBART\", \"THB\", \"POU3SOA\", \"BUSN4NLQ\", \"I2FEVJOC\", \"O2SAT\", \"FIO2\"  Lab Results   Component Value Date/Time    SPECIAL NOT REPORTED 10/03/2019 06:15 AM     Lab Results   Component Value Date/Time    CULTURE NO GROWTH 5 DAYS 12/10/2023 10:25 PM       Radiology:  XR ABDOMEN (KUB) (SINGLE AP VIEW)    Result Date: 2/22/2024  Nonobstructive bowel gas pattern.  Enteric tube remains in place.     XR CHEST PORTABLE    Result Date: 2/21/2024  Enteric tube in satisfactory position.  No acute process in the lungs.     XR ABDOMEN FOR NG/OG/NE TUBE PLACEMENT    Result Date: 2/21/2024  1. NG tube extends into the upper stomach. 2. Moderate amount of gas and stool scattered in the proximal and mid colon. Moderate amount of gas can be identified in some loops of small bowel mainly in the left side of abdomen.       Physical Examination:        General appearance:  alert, cooperative and no distress  Mental Status:  oriented to person, place and time and normal affect  Lungs:  clear to auscultation bilaterally, normal effort  Heart:  regular rate and rhythm, no murmur  Abdomen:  soft, nontender, nondistended, normal bowel sounds, no masses, hepatomegaly, splenomegaly  Extremities:  no edema, redness, tenderness in the calves  Skin:  no gross lesions, rashes, induration    Assessment:        Hospital Problems             Last Modified POA    * (Principal) Small bowel obstruction (HCC)  2/21/2024 Yes    Seizures (HCC) Chronic (Chronic) 2/21/2024 Yes    MR (mental retardation), severe (Chronic) 2/21/2024 Yes    Overview Signed 6/10/2019  8:51 AM by 
light use, OT POC, role of OT in acute care, benefits of being OOB, recommendations for discharge/AE  Education Method: Verbal  Barriers to Learning: Cognition  Education Outcome: Continued education needed;Unable to demonstrate understanding;Unable to verbalize                               AM-PAC - ADL  AM-PAC Daily Activity - Inpatient   How much help is needed for putting on and taking off regular lower body clothing?: Total  How much help is needed for bathing (which includes washing, rinsing, drying)?: A Lot  How much help is needed for toileting (which includes using toilet, bedpan, or urinal)?: Total  How much help is needed for putting on and taking off regular upper body clothing?: A Lot  How much help is needed for taking care of personal grooming?: A Little  How much help for eating meals?: A Little  AM-PAC Inpatient Daily Activity Raw Score: 12  AM-PAC Inpatient ADL T-Scale Score : 30.6  ADL Inpatient CMS 0-100% Score: 66.57  ADL Inpatient CMS G-Code Modifier : CL    Tinneti Score       Goals  Short Term Goals  Time Frame for Short Term Goals: By discharge, pt to demo  Short Term Goal 1: ADL transfers and functional mobility to Min A with use of AD and proper .  Short Term Goal 2: grooming task to Min A with use of AD/AE and verbal cues as needed.  Short Term Goal 3: I with simple B UE HEP to maintain strength for functional tasks with use of handouts as needed.  Short Term Goal 4: increased standing benigno > 5 min with Min A using AD as needed to reduce risk of falls during functional tasks.  Short Term Goal 5: increased sitting benigno > 15 min with CGA to assist with completion of ADL routine.  Long Term Goals  Long Term Goal 1: Pt/caregiver to be I with fall prevention edu, EC/WS tech, recommendations for discharge/AE, pressure relief/skin integrity edu with use of handouts as needed.  Patient Goals   Patient goals : Did not state!       Therapy Time   Individual Concurrent Group Co-treatment

## 2024-02-25 NOTE — CARE COORDINATION
Discharge planning    Discharge order is in. Sister concerned that patient needs to be observed since starting new diet. Will monitor overnight to see how he does.   
Discharge planning    Patient admitted sbo, gen surgery advanced diet and has signed off.     Neuro following for sz and vimpat ordered, EEG reviewed. Keppra increased to 1500 bid. Neuro has also signed off    From [facility]. POA is sister Rush Denny. She will need to be called and updated when discharge order is in.     Call to che at discharge from group [place] to arrange transportation. Phone is [phone]    If med change may need to confirm with [facility] if can obtain it if sent to [facility] in [place] ( pharmacy listed )  
Discharge planning    Patient can be discharged home today. Call to Ivelisse at [phone] and updated on discharge. She will need report called to her and has to be there before 1830.     Sister notified of discharge and agreeable.     Call to [facility] and transport set up for 3 pm. Notified RN and she will call report and notify of the time   
Discharge planning    Patient chart reviewed. Patient from [facility].     Patient has legal guardian Esther Denny ([phone]) for all decisions and CM attempted to speak with but has not made contact.  Will need to f./u with guardian to confirm ok to return to [facility] once medically cleared.     If ok to return per guardian will need to call Ivelisse at [facility] at [phone] so they can transport.     Per epic notes RN did speak with guardian last night. Aware of admission and seizure that occurred yesterday. Hence transfer to PCU. Neurology has been consulted.     Patient was admitted with SBO vs ileus. Has NG to LIWS. Follow up KUB for today. NPO.   
patient representative Jeremy and his family were provided with a choice of provider and agrees with the discharge plan. Freedom of choice list with basic dialogue that supports the patient's individualized plan of care/goals and shares the quality data associated with the providers was provided to: Patient Representative   Patient Representative Name:       The Patient and/or Patient Representative Agree with the Discharge Plan? Yes    Brenna Gray RN  Case Management Department  Ph:[phone]

## 2024-02-25 NOTE — PLAN OF CARE
Problem: Discharge Planning  Goal: Discharge to home or other facility with appropriate resources  2/21/2024 2255 by Robyn Dodson, RN  Outcome: Progressing  2/21/2024 2026 by Stephanie Roa RN  Outcome: Progressing  Flowsheets (Taken 2/21/2024 0800)  Discharge to home or other facility with appropriate resources:   Identify barriers to discharge with patient and caregiver   Arrange for needed discharge resources and transportation as appropriate   Identify discharge learning needs (meds, wound care, etc)   Refer to discharge planning if patient needs post-hospital services based on physician order or complex needs related to functional status, cognitive ability or social support system     Problem: Safety - Adult  Goal: Free from fall injury  2/21/2024 2255 by Robyn Dodson RN  Outcome: Progressing  2/21/2024 2026 by Stephanie Roa, RN  Outcome: Progressing     Problem: Skin/Tissue Integrity  Goal: Absence of new skin breakdown  Description: 1.  Monitor for areas of redness and/or skin breakdown  2.  Assess vascular access sites hourly  3.  Every 4-6 hours minimum:  Change oxygen saturation probe site  4.  Every 4-6 hours:  If on nasal continuous positive airway pressure, respiratory therapy assess nares and determine need for appliance change or resting period.  2/21/2024 2255 by Robyn Dodson, RN  Outcome: Progressing  2/21/2024 2026 by Stephanie Roa, RN  Outcome: Progressing     
  Problem: Discharge Planning  Goal: Discharge to home or other facility with appropriate resources  2/23/2024 0342 by Marily Marvin RN  Outcome: Progressing  Flowsheets (Taken 2/22/2024 2000)  Discharge to home or other facility with appropriate resources:   Identify barriers to discharge with patient and caregiver   Arrange for needed discharge resources and transportation as appropriate   Identify discharge learning needs (meds, wound care, etc)     Problem: Safety - Adult  Goal: Free from fall injury  2/23/2024 0342 by Marily Marvin RN  Outcome: Progressing     Problem: Skin/Tissue Integrity  Goal: Absence of new skin breakdown  Description: 1.  Monitor for areas of redness and/or skin breakdown  2.  Assess vascular access sites hourly  3.  Every 4-6 hours minimum:  Change oxygen saturation probe site  4.  Every 4-6 hours:  If on nasal continuous positive airway pressure, respiratory therapy assess nares and determine need for appliance change or resting period.  2/23/2024 0342 by Marily Marvin RN  Outcome: Progressing     Problem: Neurosensory - Adult  Goal: Achieves stable or improved neurological status  2/23/2024 0342 by Marily Marvin RN  Outcome: Progressing     Problem: Neurosensory - Adult  Goal: Absence of seizures  2/23/2024 0342 by Marily Marvin RN  Outcome: Progressing  Flowsheets (Taken 2/22/2024 2000)  Absence of seizures: Monitor for seizure activity.  If seizure occurs, document type and location of movements and any associated apnea     Problem: Neurosensory - Adult  Goal: Remains free of injury related to seizures activity  2/23/2024 0342 by Marily Marvin RN  Outcome: Progressing  Flowsheets (Taken 2/22/2024 2000)  Remains free of injury related to seizure activity:   Maintain airway, patient safety  and administer oxygen as ordered   Monitor patient for seizure activity, document and report duration and description of seizure to Licensed Independent Practitioner     Problem: Neurosensory - 
  Problem: Discharge Planning  Goal: Discharge to home or other facility with appropriate resources  2/25/2024 0004 by Marily Marvin RN  Outcome: Progressing  Flowsheets (Taken 2/24/2024 2000)  Discharge to home or other facility with appropriate resources:   Identify barriers to discharge with patient and caregiver   Arrange for needed discharge resources and transportation as appropriate   Identify discharge learning needs (meds, wound care, etc)     Problem: Safety - Adult  Goal: Free from fall injury  2/25/2024 0004 by Marily Marvin RN  Outcome: Progressing     Problem: Skin/Tissue Integrity  Goal: Absence of new skin breakdown  Description: 1.  Monitor for areas of redness and/or skin breakdown  2.  Assess vascular access sites hourly  3.  Every 4-6 hours minimum:  Change oxygen saturation probe site  4.  Every 4-6 hours:  If on nasal continuous positive airway pressure, respiratory therapy assess nares and determine need for appliance change or resting period.  2/25/2024 0004 by Marily Marvin RN  Outcome: Progressing     Problem: Neurosensory - Adult  Goal: Achieves stable or improved neurological status  2/25/2024 0004 by Marily Marvin RN  Outcome: Progressing     Problem: Neurosensory - Adult  Goal: Absence of seizures  2/25/2024 0004 by Marily Marvin RN  Outcome: Progressing  Flowsheets (Taken 2/24/2024 2000)  Absence of seizures: Monitor for seizure activity.  If seizure occurs, document type and location of movements and any associated apnea     Problem: Neurosensory - Adult  Goal: Remains free of injury related to seizures activity  2/25/2024 0004 by Marily Marvin RN  Outcome: Progressing  Flowsheets (Taken 2/24/2024 2000)  Remains free of injury related to seizure activity: Maintain airway, patient safety  and administer oxygen as ordered     Problem: Neurosensory - Adult  Goal: Achieves maximal functionality and self care  2/25/2024 0004 by Marily Marvin RN  Outcome: Progressing     Problem: 
  Problem: Discharge Planning  Goal: Discharge to home or other facility with appropriate resources  Outcome: Progressing  Flowsheets (Taken 2/21/2024 0800)  Discharge to home or other facility with appropriate resources:   Identify barriers to discharge with patient and caregiver   Arrange for needed discharge resources and transportation as appropriate   Identify discharge learning needs (meds, wound care, etc)   Refer to discharge planning if patient needs post-hospital services based on physician order or complex needs related to functional status, cognitive ability or social support system     Problem: Safety - Adult  Goal: Free from fall injury  Outcome: Progressing   Patient is a fall risk during this admission. Fall risk assessment was performed. Patient is absent of falls. Bed is in the lowest position. Wheels on the bed are locked. Call light and bed side table are within reach. Clutter is removed. Patient was educated to call out when needing assistance or wanting to get out of bed. Patient offered toileting assistance during rounding. Hourly rounds have been performed.      Problem: Skin/Tissue Integrity  Goal: Absence of new skin breakdown  Description: 1.  Monitor for areas of redness and/or skin breakdown  2.  Assess vascular access sites hourly  3.  Every 4-6 hours minimum:  Change oxygen saturation probe site  4.  Every 4-6 hours:  If on nasal continuous positive airway pressure, respiratory therapy assess nares and determine need for appliance change or resting period.  Outcome: Progressing  Skin assessment performed. Patient turns self PRN. Pressure ulcer prevention being practiced.          
Patient admitted for SBO. NG set to LIWS. 600mL dark brown/dark red output noted this shift. Soap suds enema given this morning, patient had one bowel movement. Patient has history of recurrent seizures. On IV keppra and vimpat. PRN ativan ordered for breakthrough seizures. EEG ordered today, results pending. Patient has VNS in left underarm, family to bring magnet tomorrow. Magnet can be swiped Q3 minutes during breakthrough seizures. 1:1 sitter at bedside for constant pulling of lines. Patient alert to self only but does follow commands. Patient has been scratching body throughout the day. Patient's sister/legal guardian informed staff that patient has allergy to bleach. Bleach-free linens provided and allergy noted in chart. HR was tachy this morning in low 100s, has since been jessica in the 40s-50s since 1500- physician aware. VSS, call light within reach, safety maintained        Problem: Discharge Planning  Goal: Discharge to home or other facility with appropriate resources  Outcome: Progressing     Problem: Safety - Adult  Goal: Free from fall injury  Outcome: Progressing     Problem: Skin/Tissue Integrity  Goal: Absence of new skin breakdown  Description: 1.  Monitor for areas of redness and/or skin breakdown  2.  Assess vascular access sites hourly  3.  Every 4-6 hours minimum:  Change oxygen saturation probe site  4.  Every 4-6 hours:  If on nasal continuous positive airway pressure, respiratory therapy assess nares and determine need for appliance change or resting period.  Outcome: Progressing     Problem: Neurosensory - Adult  Goal: Achieves stable or improved neurological status  Outcome: Progressing  Goal: Absence of seizures  Outcome: Progressing  Goal: Remains free of injury related to seizures activity  Outcome: Progressing  Goal: Achieves maximal functionality and self care  Outcome: Progressing     Problem: Respiratory - Adult  Goal: Achieves optimal ventilation and oxygenation  Outcome: 
Patient resting in bed and sleeping on/off throughout shift. Pt's legal guardian, Lucy updated on plan of care. Diet advanced to regular, low fiber. Pt tolerating diet with no c/o of nausea and abd pain. No BM's this shift. Pt remains on room air with cont pulse ox in place. No seizure activity observed this shift. Safety maintained - bed locked, in lowest position, side rails up x2. Call light placed within reach.     Problem: Discharge Planning  Goal: Discharge to home or other facility with appropriate resources  Outcome: Progressing     Problem: Safety - Adult  Goal: Free from fall injury  Outcome: Progressing     Problem: Skin/Tissue Integrity  Goal: Absence of new skin breakdown  Description: 1.  Monitor for areas of redness and/or skin breakdown  2.  Assess vascular access sites hourly  3.  Every 4-6 hours minimum:  Change oxygen saturation probe site  Outcome: Progressing     Problem: Neurosensory - Adult  Goal: Achieves stable or improved neurological status  Outcome: Progressing     Problem: Neurosensory - Adult  Goal: Absence of seizures  Outcome: Progressing     Problem: Neurosensory - Adult  Goal: Remains free of injury related to seizures activity  Outcome: Progressing     Problem: Neurosensory - Adult  Goal: Achieves maximal functionality and self care  Outcome: Progressing     Problem: Respiratory - Adult  Goal: Achieves optimal ventilation and oxygenation  Outcome: Progressing     Problem: Skin/Tissue Integrity - Adult  Goal: Skin integrity remains intact  Outcome: Progressing     Problem: Gastrointestinal - Adult  Goal: Minimal or absence of nausea and vomiting  Outcome: Progressing     Problem: Gastrointestinal - Adult  Goal: Maintains or returns to baseline bowel function  Outcome: Progressing     Problem: Gastrointestinal - Adult  Goal: Maintains adequate nutritional intake  Outcome: Progressing     Problem: ABCDS Injury Assessment  Goal: Absence of physical injury  Outcome: Progressing     
part  Outcome: Progressing  Goal: Return ADL status to a safe level of function  Outcome: Progressing     Problem: Gastrointestinal - Adult  Goal: Minimal or absence of nausea and vomiting  Outcome: Progressing  Goal: Maintains or returns to baseline bowel function  Outcome: Progressing  Goal: Maintains adequate nutritional intake  Outcome: Progressing  Goal: Establish and maintain optimal ostomy function  Outcome: Progressing     Problem: ABCDS Injury Assessment  Goal: Absence of physical injury  Outcome: Progressing     
adequate nutritional intake  2/23/2024 2336 by Marily Marvin, RN  Outcome: Progressing     Problem: Gastrointestinal - Adult  Goal: Establish and maintain optimal ostomy function  2/23/2024 2336 by Marily Marvin RN  Outcome: Progressing     Problem: ABCDS Injury Assessment  Goal: Absence of physical injury  2/23/2024 2336 by Marily Marvin, RN  Outcome: Progressing

## 2024-02-26 ENCOUNTER — HOSPITAL ENCOUNTER (INPATIENT)
Age: 69
LOS: 7 days | Discharge: HOME | DRG: 389 | End: 2024-03-04
Attending: INTERNAL MEDICINE | Admitting: INTERNAL MEDICINE
Payer: MEDICARE

## 2024-02-26 ENCOUNTER — HOSPITAL ENCOUNTER (EMERGENCY)
Age: 69
Discharge: ANOTHER HOSPITAL | End: 2024-02-26
Payer: MEDICARE

## 2024-02-26 VITALS — OXYGEN SATURATION: 93 %

## 2024-02-26 VITALS — SYSTOLIC BLOOD PRESSURE: 121 MMHG | DIASTOLIC BLOOD PRESSURE: 82 MMHG | OXYGEN SATURATION: 92 %

## 2024-02-26 VITALS
OXYGEN SATURATION: 91 % | RESPIRATION RATE: 25 BRPM | SYSTOLIC BLOOD PRESSURE: 115 MMHG | DIASTOLIC BLOOD PRESSURE: 70 MMHG | HEART RATE: 89 BPM

## 2024-02-26 VITALS — HEART RATE: 89 BPM | OXYGEN SATURATION: 94 % | DIASTOLIC BLOOD PRESSURE: 71 MMHG | SYSTOLIC BLOOD PRESSURE: 105 MMHG

## 2024-02-26 VITALS — SYSTOLIC BLOOD PRESSURE: 105 MMHG | DIASTOLIC BLOOD PRESSURE: 72 MMHG

## 2024-02-26 VITALS — RESPIRATION RATE: 13 BRPM | OXYGEN SATURATION: 79 %

## 2024-02-26 VITALS — SYSTOLIC BLOOD PRESSURE: 128 MMHG | DIASTOLIC BLOOD PRESSURE: 70 MMHG

## 2024-02-26 VITALS
SYSTOLIC BLOOD PRESSURE: 89 MMHG | HEART RATE: 104 BPM | RESPIRATION RATE: 19 BRPM | OXYGEN SATURATION: 88 % | DIASTOLIC BLOOD PRESSURE: 75 MMHG

## 2024-02-26 VITALS
HEART RATE: 91 BPM | DIASTOLIC BLOOD PRESSURE: 69 MMHG | RESPIRATION RATE: 26 BRPM | SYSTOLIC BLOOD PRESSURE: 108 MMHG | OXYGEN SATURATION: 93 %

## 2024-02-26 VITALS — RESPIRATION RATE: 31 BRPM | HEART RATE: 94 BPM | OXYGEN SATURATION: 95 %

## 2024-02-26 VITALS — HEART RATE: 91 BPM | RESPIRATION RATE: 23 BRPM | OXYGEN SATURATION: 92 %

## 2024-02-26 VITALS — DIASTOLIC BLOOD PRESSURE: 71 MMHG | SYSTOLIC BLOOD PRESSURE: 107 MMHG

## 2024-02-26 VITALS — HEART RATE: 89 BPM | OXYGEN SATURATION: 93 % | RESPIRATION RATE: 31 BRPM

## 2024-02-26 VITALS — HEART RATE: 92 BPM | RESPIRATION RATE: 20 BRPM | OXYGEN SATURATION: 97 %

## 2024-02-26 VITALS — RESPIRATION RATE: 24 BRPM | HEART RATE: 94 BPM

## 2024-02-26 VITALS — HEART RATE: 93 BPM | RESPIRATION RATE: 27 BRPM

## 2024-02-26 VITALS
OXYGEN SATURATION: 96 % | RESPIRATION RATE: 19 BRPM | HEART RATE: 95 BPM | SYSTOLIC BLOOD PRESSURE: 107 MMHG | DIASTOLIC BLOOD PRESSURE: 73 MMHG

## 2024-02-26 VITALS
DIASTOLIC BLOOD PRESSURE: 71 MMHG | HEART RATE: 99 BPM | SYSTOLIC BLOOD PRESSURE: 97 MMHG | OXYGEN SATURATION: 93 % | RESPIRATION RATE: 17 BRPM

## 2024-02-26 VITALS — HEART RATE: 91 BPM | OXYGEN SATURATION: 92 % | RESPIRATION RATE: 20 BRPM

## 2024-02-26 VITALS — RESPIRATION RATE: 38 BRPM | OXYGEN SATURATION: 92 % | HEART RATE: 90 BPM

## 2024-02-26 VITALS — SYSTOLIC BLOOD PRESSURE: 111 MMHG | DIASTOLIC BLOOD PRESSURE: 70 MMHG | RESPIRATION RATE: 21 BRPM | HEART RATE: 101 BPM

## 2024-02-26 VITALS — HEART RATE: 90 BPM | DIASTOLIC BLOOD PRESSURE: 76 MMHG | SYSTOLIC BLOOD PRESSURE: 115 MMHG | RESPIRATION RATE: 21 BRPM

## 2024-02-26 VITALS — HEART RATE: 88 BPM | RESPIRATION RATE: 23 BRPM | OXYGEN SATURATION: 92 %

## 2024-02-26 VITALS — SYSTOLIC BLOOD PRESSURE: 110 MMHG | RESPIRATION RATE: 23 BRPM | DIASTOLIC BLOOD PRESSURE: 80 MMHG | HEART RATE: 92 BPM

## 2024-02-26 VITALS
OXYGEN SATURATION: 92 % | SYSTOLIC BLOOD PRESSURE: 102 MMHG | RESPIRATION RATE: 23 BRPM | HEART RATE: 90 BPM | DIASTOLIC BLOOD PRESSURE: 81 MMHG

## 2024-02-26 VITALS — OXYGEN SATURATION: 87 %

## 2024-02-26 VITALS
DIASTOLIC BLOOD PRESSURE: 67 MMHG | SYSTOLIC BLOOD PRESSURE: 114 MMHG | HEART RATE: 93 BPM | RESPIRATION RATE: 23 BRPM | OXYGEN SATURATION: 92 %

## 2024-02-26 VITALS
SYSTOLIC BLOOD PRESSURE: 115 MMHG | DIASTOLIC BLOOD PRESSURE: 81 MMHG | OXYGEN SATURATION: 92 % | RESPIRATION RATE: 26 BRPM | HEART RATE: 89 BPM

## 2024-02-26 VITALS
DIASTOLIC BLOOD PRESSURE: 69 MMHG | RESPIRATION RATE: 21 BRPM | SYSTOLIC BLOOD PRESSURE: 111 MMHG | OXYGEN SATURATION: 94 % | HEART RATE: 91 BPM

## 2024-02-26 VITALS — HEART RATE: 92 BPM | OXYGEN SATURATION: 95 % | RESPIRATION RATE: 27 BRPM

## 2024-02-26 VITALS — HEART RATE: 89 BPM | RESPIRATION RATE: 25 BRPM

## 2024-02-26 VITALS
SYSTOLIC BLOOD PRESSURE: 126 MMHG | HEART RATE: 91 BPM | DIASTOLIC BLOOD PRESSURE: 69 MMHG | RESPIRATION RATE: 31 BRPM | OXYGEN SATURATION: 93 %

## 2024-02-26 VITALS — HEART RATE: 98 BPM

## 2024-02-26 VITALS — OXYGEN SATURATION: 95 % | HEART RATE: 102 BPM | RESPIRATION RATE: 19 BRPM

## 2024-02-26 VITALS — OXYGEN SATURATION: 93 % | RESPIRATION RATE: 23 BRPM | HEART RATE: 89 BPM

## 2024-02-26 VITALS — HEART RATE: 93 BPM | OXYGEN SATURATION: 91 %

## 2024-02-26 VITALS — HEART RATE: 92 BPM | SYSTOLIC BLOOD PRESSURE: 107 MMHG | DIASTOLIC BLOOD PRESSURE: 79 MMHG | OXYGEN SATURATION: 92 %

## 2024-02-26 VITALS
DIASTOLIC BLOOD PRESSURE: 86 MMHG | RESPIRATION RATE: 16 BRPM | OXYGEN SATURATION: 89 % | SYSTOLIC BLOOD PRESSURE: 128 MMHG | HEART RATE: 90 BPM

## 2024-02-26 VITALS
DIASTOLIC BLOOD PRESSURE: 58 MMHG | SYSTOLIC BLOOD PRESSURE: 92 MMHG | HEART RATE: 108 BPM | RESPIRATION RATE: 23 BRPM | OXYGEN SATURATION: 79 %

## 2024-02-26 VITALS — SYSTOLIC BLOOD PRESSURE: 109 MMHG | DIASTOLIC BLOOD PRESSURE: 84 MMHG | OXYGEN SATURATION: 97 %

## 2024-02-26 VITALS
OXYGEN SATURATION: 88 % | SYSTOLIC BLOOD PRESSURE: 107 MMHG | DIASTOLIC BLOOD PRESSURE: 80 MMHG | HEART RATE: 96 BPM | RESPIRATION RATE: 26 BRPM

## 2024-02-26 VITALS — OXYGEN SATURATION: 89 % | RESPIRATION RATE: 26 BRPM | HEART RATE: 96 BPM

## 2024-02-26 VITALS
OXYGEN SATURATION: 95 % | DIASTOLIC BLOOD PRESSURE: 68 MMHG | RESPIRATION RATE: 21 BRPM | SYSTOLIC BLOOD PRESSURE: 120 MMHG | HEART RATE: 93 BPM

## 2024-02-26 VITALS — SYSTOLIC BLOOD PRESSURE: 108 MMHG | DIASTOLIC BLOOD PRESSURE: 76 MMHG | HEART RATE: 93 BPM | OXYGEN SATURATION: 92 %

## 2024-02-26 VITALS
HEART RATE: 104 BPM | SYSTOLIC BLOOD PRESSURE: 92 MMHG | RESPIRATION RATE: 18 BRPM | TEMPERATURE: 97.5 F | DIASTOLIC BLOOD PRESSURE: 58 MMHG | OXYGEN SATURATION: 94 %

## 2024-02-26 VITALS — SYSTOLIC BLOOD PRESSURE: 104 MMHG | DIASTOLIC BLOOD PRESSURE: 55 MMHG | HEART RATE: 96 BPM | RESPIRATION RATE: 19 BRPM

## 2024-02-26 VITALS — DIASTOLIC BLOOD PRESSURE: 75 MMHG | SYSTOLIC BLOOD PRESSURE: 96 MMHG | HEART RATE: 84 BPM | RESPIRATION RATE: 20 BRPM

## 2024-02-26 VITALS — OXYGEN SATURATION: 68 % | HEART RATE: 105 BPM | RESPIRATION RATE: 16 BRPM

## 2024-02-26 VITALS — OXYGEN SATURATION: 95 %

## 2024-02-26 VITALS — RESPIRATION RATE: 22 BRPM | HEART RATE: 89 BPM | OXYGEN SATURATION: 95 %

## 2024-02-26 VITALS
RESPIRATION RATE: 26 BRPM | OXYGEN SATURATION: 94 % | HEART RATE: 90 BPM | DIASTOLIC BLOOD PRESSURE: 67 MMHG | SYSTOLIC BLOOD PRESSURE: 108 MMHG

## 2024-02-26 VITALS
OXYGEN SATURATION: 94 % | RESPIRATION RATE: 15 BRPM | DIASTOLIC BLOOD PRESSURE: 65 MMHG | SYSTOLIC BLOOD PRESSURE: 97 MMHG | HEART RATE: 93 BPM

## 2024-02-26 VITALS — OXYGEN SATURATION: 97 % | RESPIRATION RATE: 15 BRPM | HEART RATE: 96 BPM

## 2024-02-26 VITALS — RESPIRATION RATE: 23 BRPM | HEART RATE: 90 BPM | OXYGEN SATURATION: 92 %

## 2024-02-26 VITALS — OXYGEN SATURATION: 94 % | SYSTOLIC BLOOD PRESSURE: 122 MMHG | DIASTOLIC BLOOD PRESSURE: 73 MMHG

## 2024-02-26 VITALS — OXYGEN SATURATION: 95 % | RESPIRATION RATE: 18 BRPM | HEART RATE: 100 BPM

## 2024-02-26 VITALS — OXYGEN SATURATION: 94 % | HEART RATE: 93 BPM

## 2024-02-26 VITALS — RESPIRATION RATE: 21 BRPM | OXYGEN SATURATION: 94 % | HEART RATE: 91 BPM

## 2024-02-26 VITALS — OXYGEN SATURATION: 92 % | HEART RATE: 90 BPM | RESPIRATION RATE: 20 BRPM

## 2024-02-26 VITALS — HEART RATE: 99 BPM | RESPIRATION RATE: 18 BRPM

## 2024-02-26 VITALS — RESPIRATION RATE: 22 BRPM | HEART RATE: 85 BPM

## 2024-02-26 VITALS — BODY MASS INDEX: 25.1 KG/M2

## 2024-02-26 VITALS — RESPIRATION RATE: 23 BRPM | HEART RATE: 90 BPM

## 2024-02-26 VITALS — HEART RATE: 88 BPM | OXYGEN SATURATION: 94 % | RESPIRATION RATE: 24 BRPM

## 2024-02-26 VITALS — DIASTOLIC BLOOD PRESSURE: 65 MMHG | SYSTOLIC BLOOD PRESSURE: 127 MMHG

## 2024-02-26 VITALS
SYSTOLIC BLOOD PRESSURE: 103 MMHG | OXYGEN SATURATION: 92 % | HEART RATE: 89 BPM | DIASTOLIC BLOOD PRESSURE: 64 MMHG | RESPIRATION RATE: 31 BRPM

## 2024-02-26 VITALS — OXYGEN SATURATION: 95 % | DIASTOLIC BLOOD PRESSURE: 80 MMHG | SYSTOLIC BLOOD PRESSURE: 119 MMHG

## 2024-02-26 VITALS — HEART RATE: 106 BPM | OXYGEN SATURATION: 82 % | RESPIRATION RATE: 26 BRPM

## 2024-02-26 VITALS — HEART RATE: 86 BPM

## 2024-02-26 VITALS — HEART RATE: 106 BPM

## 2024-02-26 VITALS — SYSTOLIC BLOOD PRESSURE: 97 MMHG | DIASTOLIC BLOOD PRESSURE: 66 MMHG

## 2024-02-26 VITALS — SYSTOLIC BLOOD PRESSURE: 119 MMHG | DIASTOLIC BLOOD PRESSURE: 75 MMHG

## 2024-02-26 VITALS — SYSTOLIC BLOOD PRESSURE: 126 MMHG | DIASTOLIC BLOOD PRESSURE: 73 MMHG

## 2024-02-26 VITALS — RESPIRATION RATE: 18 BRPM | HEART RATE: 94 BPM | OXYGEN SATURATION: 95 %

## 2024-02-26 VITALS — RESPIRATION RATE: 21 BRPM | OXYGEN SATURATION: 92 % | HEART RATE: 97 BPM

## 2024-02-26 DIAGNOSIS — K56.609: Primary | ICD-10-CM

## 2024-02-26 DIAGNOSIS — N17.9: ICD-10-CM

## 2024-02-26 DIAGNOSIS — E83.42: ICD-10-CM

## 2024-02-26 DIAGNOSIS — Z79.899: ICD-10-CM

## 2024-02-26 LAB
ADD MANUAL DIFF: NO
ALANINE AMINOTRANSFERASE: 41 U/L (ref 16–63)
ALBUMIN GLOBULIN RATIO: 0.8
ALBUMIN LEVEL: 3.3 G/DL (ref 3.4–5)
ALKALINE PHOSPHATASE: 126 U/L (ref 46–116)
ANION GAP: 14.5
ASPARTATE AMINO TRANSFERASE: 15 U/L (ref 15–37)
BLOOD UREA NITROGEN: 31 MG/DL (ref 7–18)
CALCIUM: 8.9 MG/DL (ref 8.5–10.1)
CARBON DIOXIDE: 23.1 MMOL/L (ref 21–32)
CHLORIDE: 108 MMOL/L (ref 98–107)
CO2 BLD-SCNC: 23.1 MMOL/L (ref 21–32)
ESTIMATED GFR (AFRICAN AMERICA: 33 (ref 60–?)
ESTIMATED GFR (NON-AFRICAN AME: 28 (ref 60–?)
GLOBULIN: 4.3 G/DL
GLUCOSE BLD-MCNC: 145 MG/DL (ref 74–106)
HCT VFR BLD CALC: 36.9 % (ref 42–54)
HEMATOCRIT: 36.9 % (ref 42–54)
HEMOGLOBIN: 11.5 G/DL (ref 14–18)
IMMATURE GRANULOCYTES ABS AUTO: 0.07 10^3/UL (ref 0–0.03)
IMMATURE GRANULOCYTES PCT AUTO: 0.6 % (ref 0–0.5)
INR: 1.12
LACTATE/LACTIC ACID: 1.9 MMOL/L (ref 0.4–2)
LACTATE/LACTIC ACID: 2.8 MMOL/L (ref 0.4–2)
LYMPHOCYTES  ABSOLUTE AUTO: 1 10^3/UL (ref 1.2–3.8)
MAGNESIUM: 1.7 MG/DL (ref 1.8–2.4)
MCV RBC: 105.1 FL (ref 80–94)
MEAN CORPUSCULAR HEMOGLOBIN: 32.8 PG (ref 25.9–34)
MEAN CORPUSCULAR HGB CONC: 31.2 G/DL (ref 29.9–35.2)
MEAN CORPUSCULAR VOLUME: 105.1 FL (ref 80–94)
PLATELET # BLD: 273 10^3/UL (ref 150–450)
PLATELET COUNT: 273 10^3/UL (ref 150–450)
POTASSIUM SERPLBLD-SCNC: 3.6 MMOL/L (ref 3.5–5.1)
POTASSIUM: 3.6 MMOL/L (ref 3.5–5.1)
PROTHROMBIN TIME: 11.8 SEC (ref 9–11.6)
RED BLOOD COUNT: 3.51 10^6/UL (ref 4.7–6.1)
SODIUM BLD-SCNC: 142 MMOL/L (ref 136–145)
SODIUM: 142 MMOL/L (ref 136–145)
TOTAL PROTEIN: 7.6 G/DL (ref 6.4–8.2)
WBC # BLD: 12.3 10^3/UL (ref 4–11)
WHITE BLOOD COUNT: 12.3 10^3/UL (ref 4–11)

## 2024-02-26 PROCEDURE — 83735 ASSAY OF MAGNESIUM: CPT

## 2024-02-26 PROCEDURE — 96361 HYDRATE IV INFUSION ADD-ON: CPT

## 2024-02-26 PROCEDURE — 0D9670Z DRAINAGE OF STOMACH WITH DRAINAGE DEVICE, VIA NATURAL OR ARTIFICIAL OPENING: ICD-10-PCS | Performed by: INTERNAL MEDICINE

## 2024-02-26 PROCEDURE — 83605 ASSAY OF LACTIC ACID: CPT

## 2024-02-26 PROCEDURE — 99285 EMERGENCY DEPT VISIT HI MDM: CPT

## 2024-02-26 PROCEDURE — C1887 CATHETER, GUIDING: HCPCS

## 2024-02-26 PROCEDURE — 36415 COLL VENOUS BLD VENIPUNCTURE: CPT

## 2024-02-26 PROCEDURE — 96365 THER/PROPH/DIAG IV INF INIT: CPT

## 2024-02-26 PROCEDURE — 99223 1ST HOSP IP/OBS HIGH 75: CPT | Performed by: NURSE PRACTITIONER

## 2024-02-26 PROCEDURE — 96366 THER/PROPH/DIAG IV INF ADDON: CPT

## 2024-02-26 PROCEDURE — 96368 THER/DIAG CONCURRENT INF: CPT

## 2024-02-26 PROCEDURE — 84484 ASSAY OF TROPONIN QUANT: CPT

## 2024-02-26 PROCEDURE — 1200000000 HC SEMI PRIVATE

## 2024-02-26 PROCEDURE — 36410 VNPNXR 3YR/> PHY/QHP DX/THER: CPT

## 2024-02-26 PROCEDURE — 80053 COMPREHEN METABOLIC PANEL: CPT

## 2024-02-26 PROCEDURE — 85025 COMPLETE CBC W/AUTO DIFF WBC: CPT

## 2024-02-26 PROCEDURE — 74176 CT ABD & PELVIS W/O CONTRAST: CPT

## 2024-02-26 PROCEDURE — 71045 X-RAY EXAM CHEST 1 VIEW: CPT

## 2024-02-26 PROCEDURE — 85610 PROTHROMBIN TIME: CPT

## 2024-02-26 PROCEDURE — 93005 ELECTROCARDIOGRAM TRACING: CPT

## 2024-02-26 RX ORDER — BENZONATATE 100 MG/1
100 CAPSULE ORAL 3 TIMES DAILY PRN
Status: DISCONTINUED | OUTPATIENT
Start: 2024-02-26 | End: 2024-02-26

## 2024-02-26 RX ORDER — HYDROXYZINE HYDROCHLORIDE 25 MG/1
25 TABLET, FILM COATED ORAL 2 TIMES DAILY PRN
Status: DISCONTINUED | OUTPATIENT
Start: 2024-02-26 | End: 2024-02-26

## 2024-02-26 RX ORDER — ONDANSETRON 2 MG/ML
4 INJECTION INTRAMUSCULAR; INTRAVENOUS EVERY 6 HOURS PRN
Status: DISCONTINUED | OUTPATIENT
Start: 2024-02-26 | End: 2024-03-04 | Stop reason: HOSPADM

## 2024-02-26 RX ORDER — LEVETIRACETAM 500 MG/5ML
1500 INJECTION, SOLUTION, CONCENTRATE INTRAVENOUS EVERY 12 HOURS
Status: DISCONTINUED | OUTPATIENT
Start: 2024-02-27 | End: 2024-03-02

## 2024-02-26 RX ORDER — ALBUTEROL SULFATE 90 UG/1
2 AEROSOL, METERED RESPIRATORY (INHALATION) EVERY 4 HOURS PRN
Status: DISCONTINUED | OUTPATIENT
Start: 2024-02-26 | End: 2024-03-04 | Stop reason: HOSPADM

## 2024-02-26 RX ORDER — LEVETIRACETAM 750 MG/1
1500 TABLET ORAL DAILY
Status: DISCONTINUED | OUTPATIENT
Start: 2024-02-27 | End: 2024-02-26

## 2024-02-26 RX ORDER — OXYBUTYNIN CHLORIDE 10 MG/1
10 TABLET, EXTENDED RELEASE ORAL DAILY
Status: DISCONTINUED | OUTPATIENT
Start: 2024-02-27 | End: 2024-03-04 | Stop reason: HOSPADM

## 2024-02-26 RX ORDER — ACETAMINOPHEN 325 MG/1
650 TABLET ORAL EVERY 6 HOURS PRN
Status: DISCONTINUED | OUTPATIENT
Start: 2024-02-26 | End: 2024-02-26

## 2024-02-26 RX ORDER — FINASTERIDE 5 MG/1
5 TABLET, FILM COATED ORAL NIGHTLY
Status: DISCONTINUED | OUTPATIENT
Start: 2024-02-26 | End: 2024-03-04 | Stop reason: HOSPADM

## 2024-02-26 RX ORDER — METRONIDAZOLE 500 MG/100ML
500 INJECTION, SOLUTION INTRAVENOUS EVERY 8 HOURS
Status: DISCONTINUED | OUTPATIENT
Start: 2024-02-27 | End: 2024-03-02

## 2024-02-26 RX ORDER — SODIUM CHLORIDE 0.9 % (FLUSH) 0.9 %
5-40 SYRINGE (ML) INJECTION EVERY 12 HOURS SCHEDULED
Status: DISCONTINUED | OUTPATIENT
Start: 2024-02-26 | End: 2024-03-04 | Stop reason: HOSPADM

## 2024-02-26 RX ORDER — TAMSULOSIN HYDROCHLORIDE 0.4 MG/1
0.4 CAPSULE ORAL 2 TIMES DAILY
Status: DISCONTINUED | OUTPATIENT
Start: 2024-02-26 | End: 2024-03-04 | Stop reason: HOSPADM

## 2024-02-26 RX ORDER — MECLIZINE HCL 12.5 MG/1
25 TABLET ORAL DAILY
Status: DISCONTINUED | OUTPATIENT
Start: 2024-02-27 | End: 2024-03-04 | Stop reason: HOSPADM

## 2024-02-26 RX ORDER — ACETAMINOPHEN 650 MG/1
650 SUPPOSITORY RECTAL EVERY 6 HOURS PRN
Status: DISCONTINUED | OUTPATIENT
Start: 2024-02-26 | End: 2024-03-04 | Stop reason: HOSPADM

## 2024-02-26 RX ORDER — SODIUM CHLORIDE 9 MG/ML
INJECTION, SOLUTION INTRAVENOUS CONTINUOUS
Status: DISCONTINUED | OUTPATIENT
Start: 2024-02-26 | End: 2024-02-29

## 2024-02-26 RX ORDER — ONDANSETRON 4 MG/1
4 TABLET, ORALLY DISINTEGRATING ORAL EVERY 8 HOURS PRN
Status: DISCONTINUED | OUTPATIENT
Start: 2024-02-26 | End: 2024-03-04 | Stop reason: HOSPADM

## 2024-02-26 RX ORDER — LEVETIRACETAM 500 MG/5ML
2000 INJECTION, SOLUTION, CONCENTRATE INTRAVENOUS ONCE
Status: DISCONTINUED | OUTPATIENT
Start: 2024-02-27 | End: 2024-02-26

## 2024-02-26 RX ORDER — SODIUM CHLORIDE 9 MG/ML
INJECTION, SOLUTION INTRAVENOUS PRN
Status: DISCONTINUED | OUTPATIENT
Start: 2024-02-26 | End: 2024-03-04 | Stop reason: HOSPADM

## 2024-02-26 RX ORDER — LAMOTRIGINE 100 MG/1
400 TABLET ORAL DAILY
Status: DISCONTINUED | OUTPATIENT
Start: 2024-02-27 | End: 2024-03-04 | Stop reason: HOSPADM

## 2024-02-26 RX ORDER — PRIMIDONE 250 MG/1
375 TABLET ORAL NIGHTLY
Status: DISCONTINUED | OUTPATIENT
Start: 2024-02-26 | End: 2024-03-04 | Stop reason: HOSPADM

## 2024-02-26 RX ORDER — DIPHENHYDRAMINE HYDROCHLORIDE 50 MG/ML
12.5 INJECTION INTRAMUSCULAR; INTRAVENOUS EVERY 6 HOURS PRN
Status: DISCONTINUED | OUTPATIENT
Start: 2024-02-26 | End: 2024-03-04 | Stop reason: HOSPADM

## 2024-02-26 RX ORDER — SODIUM CHLORIDE 0.9 % (FLUSH) 0.9 %
5-40 SYRINGE (ML) INJECTION PRN
Status: DISCONTINUED | OUTPATIENT
Start: 2024-02-26 | End: 2024-03-04 | Stop reason: HOSPADM

## 2024-02-26 RX ORDER — LAMOTRIGINE 100 MG/1
600 TABLET ORAL NIGHTLY
Status: DISCONTINUED | OUTPATIENT
Start: 2024-02-26 | End: 2024-03-04 | Stop reason: HOSPADM

## 2024-02-26 RX ORDER — PANTOPRAZOLE SODIUM 40 MG/1
40 TABLET, DELAYED RELEASE ORAL
Status: DISCONTINUED | OUTPATIENT
Start: 2024-02-27 | End: 2024-03-04 | Stop reason: HOSPADM

## 2024-02-26 RX ORDER — PRIMIDONE 250 MG/1
250 TABLET ORAL EVERY MORNING
Status: DISCONTINUED | OUTPATIENT
Start: 2024-02-27 | End: 2024-03-04 | Stop reason: HOSPADM

## 2024-02-27 ENCOUNTER — APPOINTMENT (OUTPATIENT)
Dept: GENERAL RADIOLOGY | Age: 69
DRG: 389 | End: 2024-02-27
Attending: INTERNAL MEDICINE
Payer: MEDICARE

## 2024-02-27 PROBLEM — A41.9 SEPSIS (HCC): Status: ACTIVE | Noted: 2024-02-27

## 2024-02-27 PROBLEM — N17.9 AKI (ACUTE KIDNEY INJURY) (HCC): Status: ACTIVE | Noted: 2024-02-27

## 2024-02-27 PROBLEM — K63.89 PNEUMATOSIS INTESTINALIS: Status: ACTIVE | Noted: 2024-02-27

## 2024-02-27 LAB
ALBUMIN SERPL-MCNC: 3.5 G/DL (ref 3.5–5.2)
ALP SERPL-CCNC: 119 U/L (ref 40–129)
ALT SERPL-CCNC: 22 U/L (ref 5–41)
ANION GAP SERPL CALCULATED.3IONS-SCNC: 13 MMOL/L (ref 9–17)
ANION GAP SERPL CALCULATED.3IONS-SCNC: 7 MMOL/L (ref 9–17)
AST SERPL-CCNC: 14 U/L
BASOPHILS # BLD: 0.03 K/UL (ref 0–0.2)
BASOPHILS NFR BLD: 0 % (ref 0–2)
BILIRUB SERPL-MCNC: 0.3 MG/DL (ref 0.3–1.2)
BUN SERPL-MCNC: 25 MG/DL (ref 8–23)
BUN SERPL-MCNC: 26 MG/DL (ref 8–23)
BUN/CREAT SERPL: 20 (ref 9–20)
BUN/CREAT SERPL: 21 (ref 9–20)
CALCIUM SERPL-MCNC: 8.3 MG/DL (ref 8.6–10.4)
CALCIUM SERPL-MCNC: 8.6 MG/DL (ref 8.6–10.4)
CHLORIDE SERPL-SCNC: 107 MMOL/L (ref 98–107)
CHLORIDE SERPL-SCNC: 112 MMOL/L (ref 98–107)
CO2 SERPL-SCNC: 24 MMOL/L (ref 20–31)
CO2 SERPL-SCNC: 24 MMOL/L (ref 20–31)
CREAT SERPL-MCNC: 1.2 MG/DL (ref 0.7–1.2)
CREAT SERPL-MCNC: 1.3 MG/DL (ref 0.7–1.2)
EOSINOPHIL # BLD: 0.36 K/UL (ref 0–0.44)
EOSINOPHILS RELATIVE PERCENT: 4 % (ref 1–4)
ERYTHROCYTE [DISTWIDTH] IN BLOOD BY AUTOMATED COUNT: 13.2 % (ref 11.8–14.4)
GFR SERPL CREATININE-BSD FRML MDRD: >60 ML/MIN/1.73M2
GFR SERPL CREATININE-BSD FRML MDRD: >60 ML/MIN/1.73M2
GLUCOSE SERPL-MCNC: 102 MG/DL (ref 70–99)
GLUCOSE SERPL-MCNC: 108 MG/DL (ref 70–99)
HCT VFR BLD AUTO: 28 % (ref 40.7–50.3)
HCT VFR BLD AUTO: 29.9 % (ref 40.7–50.3)
HCT VFR BLD AUTO: 32.9 % (ref 40.7–50.3)
HCT VFR BLD AUTO: 33.2 % (ref 40.7–50.3)
HGB BLD-MCNC: 10.3 G/DL (ref 13–17)
HGB BLD-MCNC: 10.3 G/DL (ref 13–17)
HGB BLD-MCNC: 8.8 G/DL (ref 13–17)
HGB BLD-MCNC: 9.3 G/DL (ref 13–17)
IMM GRANULOCYTES # BLD AUTO: 0.02 K/UL (ref 0–0.3)
IMM GRANULOCYTES NFR BLD: 0 %
IRON SATN MFR SERPL: 32 % (ref 20–55)
IRON SERPL-MCNC: 43 UG/DL (ref 59–158)
LACTATE BLDV-SCNC: 0.7 MMOL/L (ref 0.5–1.9)
LACTATE BLDV-SCNC: 1.2 MMOL/L (ref 0.5–1.9)
LYMPHOCYTES NFR BLD: 1.73 K/UL (ref 1.1–3.7)
LYMPHOCYTES RELATIVE PERCENT: 19 % (ref 24–43)
MAGNESIUM SERPL-MCNC: 2.2 MG/DL (ref 1.6–2.6)
MCH RBC QN AUTO: 33.1 PG (ref 25.2–33.5)
MCHC RBC AUTO-ENTMCNC: 31.3 G/DL (ref 28.4–34.8)
MCV RBC AUTO: 105.8 FL (ref 82.6–102.9)
MONOCYTES NFR BLD: 0.57 K/UL (ref 0.1–1.2)
MONOCYTES NFR BLD: 6 % (ref 3–12)
NEUTROPHILS NFR BLD: 71 % (ref 36–65)
NEUTS SEG NFR BLD: 6.54 K/UL (ref 1.5–8.1)
NRBC BLD-RTO: 0 PER 100 WBC
PARTIAL THROMBOPLASTIN TIME: 27.3 SEC (ref 23.9–33.8)
PLATELET # BLD AUTO: 209 K/UL (ref 138–453)
PMV BLD AUTO: 9.5 FL (ref 8.1–13.5)
POTASSIUM SERPL-SCNC: 3.6 MMOL/L (ref 3.7–5.3)
POTASSIUM SERPL-SCNC: 4 MMOL/L (ref 3.7–5.3)
PROT SERPL-MCNC: 6.4 G/DL (ref 6.4–8.3)
RBC # BLD AUTO: 3.11 M/UL (ref 4.21–5.77)
RBC # BLD: ABNORMAL 10*6/UL
SODIUM SERPL-SCNC: 143 MMOL/L (ref 135–144)
SODIUM SERPL-SCNC: 144 MMOL/L (ref 135–144)
TIBC SERPL-MCNC: 133 UG/DL (ref 250–450)
UNSATURATED IRON BINDING CAPACITY: 90 UG/DL (ref 112–347)
WBC OTHER # BLD: 9.3 K/UL (ref 3.5–11.3)

## 2024-02-27 PROCEDURE — 97163 PT EVAL HIGH COMPLEX 45 MIN: CPT

## 2024-02-27 PROCEDURE — 2580000003 HC RX 258: Performed by: STUDENT IN AN ORGANIZED HEALTH CARE EDUCATION/TRAINING PROGRAM

## 2024-02-27 PROCEDURE — 2580000003 HC RX 258: Performed by: NURSE PRACTITIONER

## 2024-02-27 PROCEDURE — 83735 ASSAY OF MAGNESIUM: CPT

## 2024-02-27 PROCEDURE — 80048 BASIC METABOLIC PNL TOTAL CA: CPT

## 2024-02-27 PROCEDURE — 97535 SELF CARE MNGMENT TRAINING: CPT

## 2024-02-27 PROCEDURE — 97167 OT EVAL HIGH COMPLEX 60 MIN: CPT

## 2024-02-27 PROCEDURE — 6360000002 HC RX W HCPCS: Performed by: NURSE PRACTITIONER

## 2024-02-27 PROCEDURE — 97530 THERAPEUTIC ACTIVITIES: CPT

## 2024-02-27 PROCEDURE — 1200000000 HC SEMI PRIVATE

## 2024-02-27 PROCEDURE — 85025 COMPLETE CBC W/AUTO DIFF WBC: CPT

## 2024-02-27 PROCEDURE — 51798 US URINE CAPACITY MEASURE: CPT

## 2024-02-27 PROCEDURE — 85018 HEMOGLOBIN: CPT

## 2024-02-27 PROCEDURE — 6360000002 HC RX W HCPCS: Performed by: STUDENT IN AN ORGANIZED HEALTH CARE EDUCATION/TRAINING PROGRAM

## 2024-02-27 PROCEDURE — 36415 COLL VENOUS BLD VENIPUNCTURE: CPT

## 2024-02-27 PROCEDURE — 74019 RADEX ABDOMEN 2 VIEWS: CPT

## 2024-02-27 PROCEDURE — 83605 ASSAY OF LACTIC ACID: CPT

## 2024-02-27 PROCEDURE — 2500000003 HC RX 250 WO HCPCS: Performed by: NURSE PRACTITIONER

## 2024-02-27 PROCEDURE — 99232 SBSQ HOSP IP/OBS MODERATE 35: CPT | Performed by: INTERNAL MEDICINE

## 2024-02-27 PROCEDURE — 85730 THROMBOPLASTIN TIME PARTIAL: CPT

## 2024-02-27 PROCEDURE — 83550 IRON BINDING TEST: CPT

## 2024-02-27 PROCEDURE — 74018 RADEX ABDOMEN 1 VIEW: CPT

## 2024-02-27 PROCEDURE — 83540 ASSAY OF IRON: CPT

## 2024-02-27 PROCEDURE — 97110 THERAPEUTIC EXERCISES: CPT

## 2024-02-27 PROCEDURE — C9254 INJECTION, LACOSAMIDE: HCPCS | Performed by: NURSE PRACTITIONER

## 2024-02-27 PROCEDURE — 80053 COMPREHEN METABOLIC PANEL: CPT

## 2024-02-27 PROCEDURE — A4216 STERILE WATER/SALINE, 10 ML: HCPCS | Performed by: NURSE PRACTITIONER

## 2024-02-27 PROCEDURE — 85014 HEMATOCRIT: CPT

## 2024-02-27 RX ORDER — HEPARIN SODIUM 5000 [USP'U]/ML
5000 INJECTION, SOLUTION INTRAVENOUS; SUBCUTANEOUS EVERY 8 HOURS SCHEDULED
Status: DISCONTINUED | OUTPATIENT
Start: 2024-02-27 | End: 2024-03-04 | Stop reason: HOSPADM

## 2024-02-27 RX ORDER — POTASSIUM CHLORIDE 7.45 MG/ML
10 INJECTION INTRAVENOUS
Status: COMPLETED | OUTPATIENT
Start: 2024-02-27 | End: 2024-02-27

## 2024-02-27 RX ORDER — CIPROFLOXACIN 2 MG/ML
400 INJECTION, SOLUTION INTRAVENOUS EVERY 12 HOURS
Status: DISCONTINUED | OUTPATIENT
Start: 2024-02-27 | End: 2024-03-02

## 2024-02-27 RX ADMIN — METRONIDAZOLE 500 MG: 500 INJECTION, SOLUTION INTRAVENOUS at 01:33

## 2024-02-27 RX ADMIN — LEVETIRACETAM 1500 MG: 100 INJECTION, SOLUTION INTRAVENOUS at 12:16

## 2024-02-27 RX ADMIN — METRONIDAZOLE 500 MG: 500 INJECTION, SOLUTION INTRAVENOUS at 23:49

## 2024-02-27 RX ADMIN — SODIUM CHLORIDE, PRESERVATIVE FREE 20 MG: 5 INJECTION INTRAVENOUS at 00:40

## 2024-02-27 RX ADMIN — POTASSIUM CHLORIDE 10 MEQ: 7.46 INJECTION, SOLUTION INTRAVENOUS at 03:02

## 2024-02-27 RX ADMIN — SODIUM CHLORIDE, PRESERVATIVE FREE 20 MG: 5 INJECTION INTRAVENOUS at 08:10

## 2024-02-27 RX ADMIN — CIPROFLOXACIN 400 MG: 400 INJECTION, SOLUTION INTRAVENOUS at 06:53

## 2024-02-27 RX ADMIN — METRONIDAZOLE 500 MG: 500 INJECTION, SOLUTION INTRAVENOUS at 08:21

## 2024-02-27 RX ADMIN — LACOSAMIDE 150 MG: 10 INJECTION INTRAVENOUS at 13:40

## 2024-02-27 RX ADMIN — SODIUM CHLORIDE: 9 INJECTION, SOLUTION INTRAVENOUS at 00:12

## 2024-02-27 RX ADMIN — POTASSIUM CHLORIDE 10 MEQ: 7.46 INJECTION, SOLUTION INTRAVENOUS at 04:04

## 2024-02-27 RX ADMIN — LEVETIRACETAM 1500 MG: 100 INJECTION, SOLUTION INTRAVENOUS at 23:41

## 2024-02-27 RX ADMIN — SODIUM CHLORIDE, PRESERVATIVE FREE 20 MG: 5 INJECTION INTRAVENOUS at 20:22

## 2024-02-27 RX ADMIN — SODIUM CHLORIDE: 9 INJECTION, SOLUTION INTRAVENOUS at 12:16

## 2024-02-27 RX ADMIN — LACOSAMIDE 150 MG: 10 INJECTION INTRAVENOUS at 00:54

## 2024-02-27 RX ADMIN — SODIUM CHLORIDE, PRESERVATIVE FREE 10 ML: 5 INJECTION INTRAVENOUS at 08:18

## 2024-02-27 RX ADMIN — METRONIDAZOLE 500 MG: 500 INJECTION, SOLUTION INTRAVENOUS at 16:08

## 2024-02-27 RX ADMIN — HEPARIN SODIUM 5000 UNITS: 5000 INJECTION INTRAVENOUS; SUBCUTANEOUS at 13:41

## 2024-02-27 RX ADMIN — CIPROFLOXACIN 400 MG: 400 INJECTION, SOLUTION INTRAVENOUS at 18:06

## 2024-02-27 RX ADMIN — LEVETIRACETAM 1500 MG: 100 INJECTION, SOLUTION INTRAVENOUS at 00:33

## 2024-02-27 RX ADMIN — SODIUM CHLORIDE, PRESERVATIVE FREE 10 ML: 5 INJECTION INTRAVENOUS at 00:47

## 2024-02-27 RX ADMIN — HEPARIN SODIUM 5000 UNITS: 5000 INJECTION INTRAVENOUS; SUBCUTANEOUS at 06:55

## 2024-02-27 NOTE — PLAN OF CARE
Pt remains safe and free from falls thus far in shift  On RA no oxygen needed  NPO, NG in placed for SBO, pt has been asking for food but reoriented pt on the need for bowel rest at this moment.  Hx MRDD  IV Cipro and flagyl  Left picc in place with NS running @ 100 ml/hr  Unit draw for labs  Call light in reach  Bed alarm on for safety  Bed locked and lowest position  Care ongoing   Problem: Discharge Planning  Goal: Discharge to home or other facility with appropriate resources  2/27/2024 1514 by Chela Dunn RN  Outcome: Progressing     Problem: Respiratory - Adult  Goal: Achieves optimal ventilation and oxygenation  2/27/2024 1514 by Chela Dunn RN  Outcome: Progressing     Problem: Safety - Adult  Goal: Free from fall injury  2/27/2024 1514 by Chela Dunn RN  Outcome: Progressing     Problem: Skin/Tissue Integrity  Goal: Absence of new skin breakdown  Description: 1.  Monitor for areas of redness and/or skin breakdown  2.  Assess vascular access sites hourly  3.  Every 4-6 hours minimum:  Change oxygen saturation probe site  4.  Every 4-6 hours:  If on nasal continuous positive airway pressure, respiratory therapy assess nares and determine need for appliance change or resting period.  2/27/2024 1514 by Chela Dunn RN  Outcome: Progressing     Problem: ABCDS Injury Assessment  Goal: Absence of physical injury  2/27/2024 1514 by Chela Dunn RN  Outcome: Progressing

## 2024-02-27 NOTE — FLOWSHEET NOTE
02/27/24 1815   Urine Assessment   Bladder Scan Volume (mL) 410 mL      Writer let the provider aware. Orders to straight cath but to give pt a an hour to see if pt voids on his own.Pt has no discomfort.  Care ongoing.

## 2024-02-27 NOTE — PLAN OF CARE
2/15/24 discharge summary from [facility]   He had a small bowel follow-through that showed no evidence of obstruction. He would NG tube removed on 02/06/2024. Patient had asymptomatic bradycardia. Cardiology was consulted. The patient had a transthoracic echo on 02/07/2024 showing normal systolic function ejection fraction 55-60%. The patient had a Cortrak place for tube feeding. The patient had several swallow study evaluations. And followed with speech therapy throughout. There was recommendation for no liquids and discussion with the patient's POA regarding the possibility of PEG tube placement oral alternative feeding methods for adequate nutrition. This history would only want a PEG tube as a very last resort as he would not be able to return to his group home with a feeding tube. She reported he was previously doing very well with diet. That he is followed closely at group home and only takes very small amount of liquids at a time. With evaluation with speech therapy recommendations of:    Diet:  Staff must assist patient with following guidelines: Diet: Adult diet Level 4 Pureed diet; Level 3 Moderately Thick; supervised liquids only. one sip at a time

## 2024-02-27 NOTE — CONSULTS
General Surgery   Consultation        PATIENT NAME: Jeremy King   YOB: 1955  MRN: [id number]    ADMISSION DATE: 2/26/2024 10:45 PM     Consulting Physician: Regina MORENO-ELIJAH     Consulted Physician: Dr. Gore      TODAY'S DATE: 2/27/2024    Consult regarding: Small bowel obstruction       Cc: Decreased oral intake     HPI:  The patient is a 68 y.o. male  who presented to an outside facility due to decreased oral intake.  Patient was recently admitted to [facility] for a small bowel obstruction.  During that admission he had an NG tube placed, and had return of bowel function.  He was having bowel movements, and tolerating regular diet prior to discharge.  Patient reportedly with decreased oral intake, and was sent to the hospital for evaluation by his facility.  Per chart review, a CT scan obtained there showed a small bowel obstruction with transition point at the surgical anastomosis in the left mid abdomen, as well as some pneumatosis intestinalis within the small bowel of the lower abdomen.  No pneumoperitoneum or ascites.  Has a little lactic acid was 2.8, which downtrended to 1.2 after IV fluids.  An NG tube was placed, with 700 cc of output overnight.  Upon assessment, patient is resting comfortably and denies any abdominal pain.  He has had loose bowel movements overnight per nursing.  Patient does have a history of exploratory laparotomies for small bowel obstructions in the past.    Past Medical History:        Diagnosis Date    Arthritis     BPH (benign prostatic hyperplasia)     Dysphagia     GERD (gastroesophageal reflux disease)     Hearing loss     HLD (hyperlipidemia)     Insomnia     MR (mental retardation)     Periorbital cellulitis     SBO (small bowel obstruction) (HCC)     Seizures (HCC)     Epilepsy    Ventral hernia     Vitamin D deficiency        Past Surgical History:        Procedure Laterality Date    ABDOMEN SURGERY  2013, 2014    Bowel Obstruction X2    CAST

## 2024-02-27 NOTE — H&P
145 MCG capsule Take 1 capsule by mouth every morning (before breakfast) 2/26/24   Vijay Ramirez DO   hydrOXYzine HCl (ATARAX) 25 MG tablet Take 1 tablet by mouth 2 times daily as needed for Itching    Fauzia Loera MD   LORazepam (ATIVAN) 0.5 MG tablet Take 1 tablet by mouth every 8 hours as needed (seizure activity). Max Daily Amount: 1.5 mg    Fauzia Loera MD   melatonin 3 MG TABS tablet Take 1 tablet by mouth nightly as needed    Fauzia Loera MD   albuterol sulfate HFA (PROAIR HFA) 108 (90 Base) MCG/ACT inhaler Inhale 2 puffs into the lungs every 4 hours as needed for Wheezing    Fauzia Loera MD   benzonatate (TESSALON) 100 MG capsule Take 1 capsule by mouth 3 times daily as needed for Cough    Fauzia Loera MD   NONFORMULARY Take 2 tablets by mouth daily Inulin-chromium picolinate 2gm-100mcg chewable tab    Fauzia Loera MD   magnesium oxide (MAG-OX) 400 (240 Mg) MG tablet Take 1 tablet by mouth daily    Fauzia Loera MD   meclizine (ANTIVERT) 25 MG tablet Take 1 tablet by mouth daily    Fauzia Loera MD   polyethyl glycol-propyl glycol 0.4-0.3 % (SYSTANE) 0.4-0.3 % ophthalmic solution Place 1 drop into both eyes nightly Systane nighttime ointment    Fauzia Loera MD   diazePAM (DIASTAT) 20 MG GEL Place 20 mg rectally as needed (seizure activity>15 minutes).    Fauzia Loera MD   ondansetron (ZOFRAN-ODT) 4 MG disintegrating tablet Take 1 tablet by mouth every 6 hours as needed for Nausea or Vomiting    Fauzia Loera MD   levETIRAcetam (KEPPRA) 750 MG tablet Take 2 tablets by mouth daily 12/14/23   Carrie Thakur MD   levETIRAcetam (KEPPRA) 1000 MG tablet Take 2 tablets by mouth nightly 12/13/23   Carrie Thakur MD   pantoprazole (PROTONIX) 40 MG tablet Take 1 tablet by mouth every morning (before breakfast) 7/6/23   Anne Quevedo, APRN - CNP   polyethylene glycol (GLYCOLAX) 17 g packet Take 17 g by mouth

## 2024-02-27 NOTE — PLAN OF CARE
Pt is currently resting in bed with no complaints of pain. Bowel sounds are decreased. BG in right nostril is set up to LIWS. He has had 700 ml out throughout my shift. Pt has multiple large Bms very loose in consistency. Brief has been changed repeatedly throughout the night. PICC line in left arm infusing NS @ 100 ml/hr. Lab was unable to draw off pt. On call NP notified. Order to draw off line placed. K+ resulted as 3.6, on call NP ordered replacement. Potassium has been replaced with 20 mEq. Awaiting redraw results. Upon pt admission multiple seizure medications were incorrect with the current home medication list. On call NP notified. See new orders. Vimpat, and Keppra given via IV. See MAR. Gen surg has been consulted d/t SBO. Possible procedure today.     Standard safety precautions in place. Bed in locked and lowest position. Call light within reach. All questions and concerns have been addressed. Will continue to monitor.     Problem: Discharge Planning  Goal: Discharge to home or other facility with appropriate resources  Outcome: Progressing     Problem: Respiratory - Adult  Goal: Achieves optimal ventilation and oxygenation  2/27/2024 0436 by Shawn Hart, RN  Outcome: Progressing  2/26/2024 2321 by Judy Marvin, RCP  Outcome: Progressing     Problem: Safety - Adult  Goal: Free from fall injury  Outcome: Progressing     Problem: Skin/Tissue Integrity  Goal: Absence of new skin breakdown  Description: 1.  Monitor for areas of redness and/or skin breakdown  2.  Assess vascular access sites hourly  3.  Every 4-6 hours minimum:  Change oxygen saturation probe site  4.  Every 4-6 hours:  If on nasal continuous positive airway pressure, respiratory therapy assess nares and determine need for appliance change or resting period.  Outcome: Progressing     Problem: ABCDS Injury Assessment  Goal: Absence of physical injury  Outcome: Progressing

## 2024-02-27 NOTE — ACP (ADVANCE CARE PLANNING)
Advance Care Planning     Advance Care Planning Activator (Inpatient)  Conversation Note      Date of ACP Conversation: 2/27/2024     Conversation Conducted with:  Healthcare Decision Maker: Named in Advance Directive or Healthcare Power of  (name) Esther Denny - Legal guardian    ACP Activator: APRIL COATES RN      Health Care Decision Maker:     Current Designated Health Care Decision Maker:     Primary Decision Maker: Esther Denny - Legal Guardian, Legal Guardian - [phone]      Care Preferences    Ventilation:  \"If you were in your present state of health and suddenly became very ill and were unable to breathe on your own, what would your preference be about the use of a ventilator (breathing machine) if it were available to you?\"      Would the patient desire the use of ventilator (breathing machine)?: yes    \"If your health worsens and it becomes clear that your chance of recovery is unlikely, what would your preference be about the use of a ventilator (breathing machine) if it were available to you?\"     Would the patient desire the use of ventilator (breathing machine)?: Yes      Resuscitation  \"CPR works best to restart the heart when there is a sudden event, like a heart attack, in someone who is otherwise healthy. Unfortunately, CPR does not typically restart the heart for people who have serious health conditions or who are very sick.\"    \"In the event your heart stopped as a result of an underlying serious health condition, would you want attempts to be made to restart your heart (answer \"yes\" for attempt to resuscitate) or would you prefer a natural death (answer \"no\" for do not attempt to resuscitate)?\" yes       [] Yes   [x] No   Educated Patient / Decision Maker regarding differences between Advance Directives and portable DNR orders.    Length of ACP Conversation in minutes:  5 min    Conversation Outcomes:  ACP discussion completed

## 2024-02-28 ENCOUNTER — APPOINTMENT (OUTPATIENT)
Dept: GENERAL RADIOLOGY | Age: 69
DRG: 389 | End: 2024-02-28
Attending: INTERNAL MEDICINE
Payer: MEDICARE

## 2024-02-28 LAB
ANION GAP SERPL CALCULATED.3IONS-SCNC: 4 MMOL/L (ref 9–17)
BASOPHILS # BLD: 0.03 K/UL (ref 0–0.2)
BASOPHILS NFR BLD: 1 % (ref 0–2)
BUN SERPL-MCNC: 19 MG/DL (ref 8–23)
BUN/CREAT SERPL: 24 (ref 9–20)
CALCIUM SERPL-MCNC: 7.9 MG/DL (ref 8.6–10.4)
CHLORIDE SERPL-SCNC: 117 MMOL/L (ref 98–107)
CO2 SERPL-SCNC: 22 MMOL/L (ref 20–31)
CREAT SERPL-MCNC: 0.8 MG/DL (ref 0.7–1.2)
EOSINOPHIL # BLD: 0.26 K/UL (ref 0–0.44)
EOSINOPHILS RELATIVE PERCENT: 5 % (ref 1–4)
ERYTHROCYTE [DISTWIDTH] IN BLOOD BY AUTOMATED COUNT: 12.8 % (ref 11.8–14.4)
GFR SERPL CREATININE-BSD FRML MDRD: >60 ML/MIN/1.73M2
GLUCOSE SERPL-MCNC: 87 MG/DL (ref 70–99)
HCT VFR BLD AUTO: 26 % (ref 40.7–50.3)
HCT VFR BLD AUTO: 26.2 % (ref 40.7–50.3)
HCT VFR BLD AUTO: 26.5 % (ref 40.7–50.3)
HGB BLD-MCNC: 8.1 G/DL (ref 13–17)
HGB BLD-MCNC: 8.2 G/DL (ref 13–17)
HGB BLD-MCNC: 8.2 G/DL (ref 13–17)
IMM GRANULOCYTES # BLD AUTO: 0.01 K/UL (ref 0–0.3)
IMM GRANULOCYTES NFR BLD: 0 %
LYMPHOCYTES NFR BLD: 1.49 K/UL (ref 1.1–3.7)
LYMPHOCYTES RELATIVE PERCENT: 30 % (ref 24–43)
MAGNESIUM SERPL-MCNC: 1.5 MG/DL (ref 1.6–2.6)
MCH RBC QN AUTO: 32.8 PG (ref 25.2–33.5)
MCHC RBC AUTO-ENTMCNC: 30.9 G/DL (ref 28.4–34.8)
MCV RBC AUTO: 106.1 FL (ref 82.6–102.9)
MONOCYTES NFR BLD: 0.37 K/UL (ref 0.1–1.2)
MONOCYTES NFR BLD: 7 % (ref 3–12)
NEUTROPHILS NFR BLD: 57 % (ref 36–65)
NEUTS SEG NFR BLD: 2.85 K/UL (ref 1.5–8.1)
NRBC BLD-RTO: 0 PER 100 WBC
PLATELET # BLD AUTO: 134 K/UL (ref 138–453)
PMV BLD AUTO: 9.2 FL (ref 8.1–13.5)
POTASSIUM SERPL-SCNC: 3.7 MMOL/L (ref 3.7–5.3)
POTASSIUM SERPL-SCNC: 3.7 MMOL/L (ref 3.7–5.3)
RBC # BLD AUTO: 2.47 M/UL (ref 4.21–5.77)
RBC # BLD: ABNORMAL 10*6/UL
SODIUM SERPL-SCNC: 143 MMOL/L (ref 135–144)
TROPONIN I SERPL HS-MCNC: 12 NG/L (ref 0–22)
WBC OTHER # BLD: 5 K/UL (ref 3.5–11.3)

## 2024-02-28 PROCEDURE — 2580000003 HC RX 258: Performed by: NURSE PRACTITIONER

## 2024-02-28 PROCEDURE — 99232 SBSQ HOSP IP/OBS MODERATE 35: CPT | Performed by: INTERNAL MEDICINE

## 2024-02-28 PROCEDURE — 97535 SELF CARE MNGMENT TRAINING: CPT

## 2024-02-28 PROCEDURE — C9254 INJECTION, LACOSAMIDE: HCPCS | Performed by: NURSE PRACTITIONER

## 2024-02-28 PROCEDURE — 84484 ASSAY OF TROPONIN QUANT: CPT

## 2024-02-28 PROCEDURE — 6360000002 HC RX W HCPCS: Performed by: STUDENT IN AN ORGANIZED HEALTH CARE EDUCATION/TRAINING PROGRAM

## 2024-02-28 PROCEDURE — 97530 THERAPEUTIC ACTIVITIES: CPT

## 2024-02-28 PROCEDURE — 6360000002 HC RX W HCPCS: Performed by: NURSE PRACTITIONER

## 2024-02-28 PROCEDURE — 85018 HEMOGLOBIN: CPT

## 2024-02-28 PROCEDURE — 1200000000 HC SEMI PRIVATE

## 2024-02-28 PROCEDURE — 85014 HEMATOCRIT: CPT

## 2024-02-28 PROCEDURE — 83735 ASSAY OF MAGNESIUM: CPT

## 2024-02-28 PROCEDURE — 84132 ASSAY OF SERUM POTASSIUM: CPT

## 2024-02-28 PROCEDURE — 93005 ELECTROCARDIOGRAM TRACING: CPT | Performed by: NURSE PRACTITIONER

## 2024-02-28 PROCEDURE — 74018 RADEX ABDOMEN 1 VIEW: CPT

## 2024-02-28 PROCEDURE — 2580000003 HC RX 258: Performed by: STUDENT IN AN ORGANIZED HEALTH CARE EDUCATION/TRAINING PROGRAM

## 2024-02-28 PROCEDURE — 80048 BASIC METABOLIC PNL TOTAL CA: CPT

## 2024-02-28 PROCEDURE — A4216 STERILE WATER/SALINE, 10 ML: HCPCS | Performed by: NURSE PRACTITIONER

## 2024-02-28 PROCEDURE — 85025 COMPLETE CBC W/AUTO DIFF WBC: CPT

## 2024-02-28 PROCEDURE — 2500000003 HC RX 250 WO HCPCS: Performed by: NURSE PRACTITIONER

## 2024-02-28 RX ORDER — MAGNESIUM SULFATE IN WATER 40 MG/ML
2000 INJECTION, SOLUTION INTRAVENOUS PRN
Status: DISCONTINUED | OUTPATIENT
Start: 2024-02-28 | End: 2024-03-04 | Stop reason: HOSPADM

## 2024-02-28 RX ORDER — POTASSIUM CHLORIDE 7.45 MG/ML
10 INJECTION INTRAVENOUS
Status: COMPLETED | OUTPATIENT
Start: 2024-02-28 | End: 2024-02-28

## 2024-02-28 RX ADMIN — SODIUM CHLORIDE, PRESERVATIVE FREE 20 MG: 5 INJECTION INTRAVENOUS at 08:09

## 2024-02-28 RX ADMIN — SODIUM CHLORIDE, PRESERVATIVE FREE 10 ML: 5 INJECTION INTRAVENOUS at 08:14

## 2024-02-28 RX ADMIN — POTASSIUM CHLORIDE 10 MEQ: 7.46 INJECTION, SOLUTION INTRAVENOUS at 06:53

## 2024-02-28 RX ADMIN — SODIUM CHLORIDE: 9 INJECTION, SOLUTION INTRAVENOUS at 17:20

## 2024-02-28 RX ADMIN — LACOSAMIDE 150 MG: 10 INJECTION INTRAVENOUS at 13:58

## 2024-02-28 RX ADMIN — CIPROFLOXACIN 400 MG: 400 INJECTION, SOLUTION INTRAVENOUS at 05:49

## 2024-02-28 RX ADMIN — LACOSAMIDE 150 MG: 10 INJECTION INTRAVENOUS at 00:54

## 2024-02-28 RX ADMIN — METRONIDAZOLE 500 MG: 500 INJECTION, SOLUTION INTRAVENOUS at 17:23

## 2024-02-28 RX ADMIN — SODIUM CHLORIDE: 9 INJECTION, SOLUTION INTRAVENOUS at 17:22

## 2024-02-28 RX ADMIN — SODIUM CHLORIDE: 9 INJECTION, SOLUTION INTRAVENOUS at 08:16

## 2024-02-28 RX ADMIN — SODIUM CHLORIDE, PRESERVATIVE FREE 20 MG: 5 INJECTION INTRAVENOUS at 19:50

## 2024-02-28 RX ADMIN — POTASSIUM CHLORIDE 10 MEQ: 7.46 INJECTION, SOLUTION INTRAVENOUS at 08:08

## 2024-02-28 RX ADMIN — METRONIDAZOLE 500 MG: 500 INJECTION, SOLUTION INTRAVENOUS at 08:20

## 2024-02-28 RX ADMIN — SODIUM CHLORIDE: 9 INJECTION, SOLUTION INTRAVENOUS at 00:54

## 2024-02-28 RX ADMIN — HEPARIN SODIUM 5000 UNITS: 5000 INJECTION INTRAVENOUS; SUBCUTANEOUS at 20:00

## 2024-02-28 RX ADMIN — LEVETIRACETAM 1500 MG: 100 INJECTION, SOLUTION INTRAVENOUS at 13:32

## 2024-02-28 RX ADMIN — MAGNESIUM SULFATE HEPTAHYDRATE 2000 MG: 40 INJECTION, SOLUTION INTRAVENOUS at 20:41

## 2024-02-28 RX ADMIN — CIPROFLOXACIN 400 MG: 400 INJECTION, SOLUTION INTRAVENOUS at 18:24

## 2024-02-28 RX ADMIN — POTASSIUM CHLORIDE 10 MEQ: 7.46 INJECTION, SOLUTION INTRAVENOUS at 10:11

## 2024-02-28 NOTE — PLAN OF CARE
D/c plan awaiting POC from Gen Surg, no surgical plan as of now. Pt remains A&O x2 mainly self and general time of year, on RA. NGT remains in place to LIWS. No new s/s of skin breakdown or injury. Safety protocols in place and enforced. Pt ambulates x2 with walker. PT/OT to eval and work with pt later today, pt will likely go back to group home.     Problem: Discharge Planning  Goal: Discharge to home or other facility with appropriate resources  2/28/2024 1037 by Janice Grimes RN  Outcome: Progressing     Problem: Respiratory - Adult  Goal: Achieves optimal ventilation and oxygenation  2/28/2024 1037 by Janice Grimes RN  Outcome: Progressing     Problem: Safety - Adult  Goal: Free from fall injury  2/28/2024 1037 by Janice Grimes RN  Outcome: Progressing     Problem: Skin/Tissue Integrity  Goal: Absence of new skin breakdown  Description: 1.  Monitor for areas of redness and/or skin breakdown  2.  Assess vascular access sites hourly  3.  Every 4-6 hours minimum:  Change oxygen saturation probe site  4.  Every 4-6 hours:  If on nasal continuous positive airway pressure, respiratory therapy assess nares and determine need for appliance change or resting period.  2/28/2024 1037 by Janice Grimes RN  Outcome: Progressing     Problem: ABCDS Injury Assessment  Goal: Absence of physical injury  2/28/2024 1037 by Janice Grimes RN  Outcome: Progressing

## 2024-02-28 NOTE — PLAN OF CARE
Patient oriented to self, place and month  MRDD  NG in right nare at 60cm, for SBO, LIWS with minimal output  Brief in place for incontinence, multiple wet briefs, bladder scan showed less than 100ml each time   PICC line in LUE, draw orders in place  IV Antibiotics  IV seizure medications for seizure history and patient being NPO  BP soft but stable  Care ongoing    Problem: Discharge Planning  Goal: Discharge to home or other facility with appropriate resources  2/28/2024 0254 by Oli Parmar, RN  Outcome: Progressing     Problem: Respiratory - Adult  Goal: Achieves optimal ventilation and oxygenation  2/28/2024 0254 by Oli Parmar, RN  Outcome: Progressing     Problem: Safety - Adult  Goal: Free from fall injury  2/28/2024 0254 by Oli Parmar, RN  Outcome: Progressing     Problem: Skin/Tissue Integrity  Goal: Absence of new skin breakdown  Description: 1.  Monitor for areas of redness and/or skin breakdown  2/28/2024 0254 by Oli Parmar, RN  Outcome: Progressing     Problem: ABCDS Injury Assessment  Goal: Absence of physical injury  2/28/2024 0254 by Oli Parmar, RN  Outcome: Progressing

## 2024-02-29 ENCOUNTER — APPOINTMENT (OUTPATIENT)
Dept: GENERAL RADIOLOGY | Age: 69
DRG: 389 | End: 2024-02-29
Attending: INTERNAL MEDICINE
Payer: MEDICARE

## 2024-02-29 LAB
ANION GAP SERPL CALCULATED.3IONS-SCNC: 13 MMOL/L (ref 9–17)
BASOPHILS # BLD: 0.03 K/UL (ref 0–0.2)
BASOPHILS NFR BLD: 1 % (ref 0–2)
BUN SERPL-MCNC: 11 MG/DL (ref 8–23)
BUN/CREAT SERPL: 16 (ref 9–20)
CALCIUM SERPL-MCNC: 7.9 MG/DL (ref 8.6–10.4)
CHLORIDE SERPL-SCNC: 108 MMOL/L (ref 98–107)
CO2 SERPL-SCNC: 19 MMOL/L (ref 20–31)
CREAT SERPL-MCNC: 0.7 MG/DL (ref 0.7–1.2)
EOSINOPHIL # BLD: 0.17 K/UL (ref 0–0.44)
EOSINOPHILS RELATIVE PERCENT: 4 % (ref 1–4)
ERYTHROCYTE [DISTWIDTH] IN BLOOD BY AUTOMATED COUNT: 12.5 % (ref 11.8–14.4)
FOLATE SERPL-MCNC: 14.1 NG/ML
GFR SERPL CREATININE-BSD FRML MDRD: >60 ML/MIN/1.73M2
GLUCOSE SERPL-MCNC: 69 MG/DL (ref 70–99)
HCT VFR BLD AUTO: 25.3 % (ref 40.7–50.3)
HGB BLD-MCNC: 8.1 G/DL (ref 13–17)
IMM GRANULOCYTES # BLD AUTO: 0.01 K/UL (ref 0–0.3)
IMM GRANULOCYTES NFR BLD: 0 %
LYMPHOCYTES NFR BLD: 1.29 K/UL (ref 1.1–3.7)
LYMPHOCYTES RELATIVE PERCENT: 27 % (ref 24–43)
MCH RBC QN AUTO: 33.2 PG (ref 25.2–33.5)
MCHC RBC AUTO-ENTMCNC: 32 G/DL (ref 28.4–34.8)
MCV RBC AUTO: 103.7 FL (ref 82.6–102.9)
MONOCYTES NFR BLD: 0.35 K/UL (ref 0.1–1.2)
MONOCYTES NFR BLD: 7 % (ref 3–12)
NEUTROPHILS NFR BLD: 61 % (ref 36–65)
NEUTS SEG NFR BLD: 2.94 K/UL (ref 1.5–8.1)
NRBC BLD-RTO: 0 PER 100 WBC
PLATELET # BLD AUTO: 110 K/UL (ref 138–453)
PMV BLD AUTO: 9.5 FL (ref 8.1–13.5)
POTASSIUM SERPL-SCNC: 3.8 MMOL/L (ref 3.7–5.3)
RBC # BLD AUTO: 2.44 M/UL (ref 4.21–5.77)
RBC # BLD: ABNORMAL 10*6/UL
SODIUM SERPL-SCNC: 140 MMOL/L (ref 135–144)
VIT B12 SERPL-MCNC: 506 PG/ML (ref 232–1245)
WBC OTHER # BLD: 4.8 K/UL (ref 3.5–11.3)

## 2024-02-29 PROCEDURE — A4216 STERILE WATER/SALINE, 10 ML: HCPCS | Performed by: NURSE PRACTITIONER

## 2024-02-29 PROCEDURE — 82607 VITAMIN B-12: CPT

## 2024-02-29 PROCEDURE — C9254 INJECTION, LACOSAMIDE: HCPCS | Performed by: NURSE PRACTITIONER

## 2024-02-29 PROCEDURE — 2580000003 HC RX 258: Performed by: INTERNAL MEDICINE

## 2024-02-29 PROCEDURE — 2580000003 HC RX 258: Performed by: NURSE PRACTITIONER

## 2024-02-29 PROCEDURE — 6360000002 HC RX W HCPCS: Performed by: STUDENT IN AN ORGANIZED HEALTH CARE EDUCATION/TRAINING PROGRAM

## 2024-02-29 PROCEDURE — 74018 RADEX ABDOMEN 1 VIEW: CPT

## 2024-02-29 PROCEDURE — 85025 COMPLETE CBC W/AUTO DIFF WBC: CPT

## 2024-02-29 PROCEDURE — 6360000002 HC RX W HCPCS: Performed by: NURSE PRACTITIONER

## 2024-02-29 PROCEDURE — 1200000000 HC SEMI PRIVATE

## 2024-02-29 PROCEDURE — 99232 SBSQ HOSP IP/OBS MODERATE 35: CPT | Performed by: INTERNAL MEDICINE

## 2024-02-29 PROCEDURE — 80048 BASIC METABOLIC PNL TOTAL CA: CPT

## 2024-02-29 PROCEDURE — 2500000003 HC RX 250 WO HCPCS: Performed by: STUDENT IN AN ORGANIZED HEALTH CARE EDUCATION/TRAINING PROGRAM

## 2024-02-29 PROCEDURE — 2500000003 HC RX 250 WO HCPCS: Performed by: NURSE PRACTITIONER

## 2024-02-29 PROCEDURE — 82746 ASSAY OF FOLIC ACID SERUM: CPT

## 2024-02-29 PROCEDURE — 36415 COLL VENOUS BLD VENIPUNCTURE: CPT

## 2024-02-29 RX ORDER — DEXTROSE MONOHYDRATE, SODIUM CHLORIDE, AND POTASSIUM CHLORIDE 50; .745; 4.5 G/1000ML; G/1000ML; G/1000ML
INJECTION, SOLUTION INTRAVENOUS CONTINUOUS
Status: DISCONTINUED | OUTPATIENT
Start: 2024-02-29 | End: 2024-03-02

## 2024-02-29 RX ORDER — 0.9 % SODIUM CHLORIDE 0.9 %
500 INTRAVENOUS SOLUTION INTRAVENOUS ONCE
Status: COMPLETED | OUTPATIENT
Start: 2024-02-29 | End: 2024-02-29

## 2024-02-29 RX ADMIN — POTASSIUM CHLORIDE, DEXTROSE MONOHYDRATE AND SODIUM CHLORIDE: 75; 5; 450 INJECTION, SOLUTION INTRAVENOUS at 18:30

## 2024-02-29 RX ADMIN — LACOSAMIDE 150 MG: 10 INJECTION INTRAVENOUS at 00:46

## 2024-02-29 RX ADMIN — METRONIDAZOLE 500 MG: 500 INJECTION, SOLUTION INTRAVENOUS at 09:29

## 2024-02-29 RX ADMIN — LEVETIRACETAM 1500 MG: 100 INJECTION, SOLUTION INTRAVENOUS at 13:40

## 2024-02-29 RX ADMIN — HEPARIN SODIUM 5000 UNITS: 5000 INJECTION INTRAVENOUS; SUBCUTANEOUS at 06:06

## 2024-02-29 RX ADMIN — POTASSIUM CHLORIDE, DEXTROSE MONOHYDRATE AND SODIUM CHLORIDE: 75; 5; 450 INJECTION, SOLUTION INTRAVENOUS at 06:01

## 2024-02-29 RX ADMIN — LACOSAMIDE 150 MG: 10 INJECTION INTRAVENOUS at 15:13

## 2024-02-29 RX ADMIN — HEPARIN SODIUM 5000 UNITS: 5000 INJECTION INTRAVENOUS; SUBCUTANEOUS at 15:14

## 2024-02-29 RX ADMIN — SODIUM CHLORIDE 500 ML: 9 INJECTION, SOLUTION INTRAVENOUS at 12:33

## 2024-02-29 RX ADMIN — SODIUM CHLORIDE: 9 INJECTION, SOLUTION INTRAVENOUS at 15:13

## 2024-02-29 RX ADMIN — METRONIDAZOLE 500 MG: 500 INJECTION, SOLUTION INTRAVENOUS at 18:45

## 2024-02-29 RX ADMIN — LEVETIRACETAM 1500 MG: 100 INJECTION, SOLUTION INTRAVENOUS at 00:43

## 2024-02-29 RX ADMIN — DIPHENHYDRAMINE HYDROCHLORIDE 12.5 MG: 50 INJECTION INTRAMUSCULAR; INTRAVENOUS at 23:43

## 2024-02-29 RX ADMIN — HEPARIN SODIUM 5000 UNITS: 5000 INJECTION INTRAVENOUS; SUBCUTANEOUS at 20:54

## 2024-02-29 RX ADMIN — CIPROFLOXACIN 400 MG: 400 INJECTION, SOLUTION INTRAVENOUS at 19:48

## 2024-02-29 RX ADMIN — CIPROFLOXACIN 400 MG: 400 INJECTION, SOLUTION INTRAVENOUS at 06:04

## 2024-02-29 RX ADMIN — SODIUM CHLORIDE, PRESERVATIVE FREE 20 MG: 5 INJECTION INTRAVENOUS at 20:54

## 2024-02-29 RX ADMIN — METRONIDAZOLE 500 MG: 500 INJECTION, SOLUTION INTRAVENOUS at 01:25

## 2024-02-29 RX ADMIN — SODIUM CHLORIDE, PRESERVATIVE FREE 20 MG: 5 INJECTION INTRAVENOUS at 09:23

## 2024-02-29 RX ADMIN — LEVETIRACETAM 1500 MG: 100 INJECTION, SOLUTION INTRAVENOUS at 23:36

## 2024-02-29 NOTE — PLAN OF CARE
Pt has been resting comfortably in bed. He denies pain/discomfort. NG residual after being clamped for 6 hrs was less than 5 mls. NPO diet orders changed to sips of clear liquids. So far pt has been tolerating clear liquids. No signs of distress. Pt did get hypotensive today, after receiving a NS bolus BP in now WNL. Cardiology consulted d/t bradycardia. Pt has remained free from seizures. Spoke with legal guardian over the phone. All questions and concerns have been addressed. Bed is locked and in the lowest position with the call light in reach. Safety maintained.      Problem: Discharge Planning  Goal: Discharge to home or other facility with appropriate resources  Outcome: Progressing     Problem: Respiratory - Adult  Goal: Achieves optimal ventilation and oxygenation  Outcome: Progressing  Flowsheets (Taken 2/29/2024 0800)  Achieves optimal ventilation and oxygenation: Assess for changes in respiratory status     Problem: Safety - Adult  Goal: Free from fall injury  Outcome: Progressing     Problem: Skin/Tissue Integrity  Goal: Absence of new skin breakdown  Description: 1.  Monitor for areas of redness and/or skin breakdown  2.  Assess vascular access sites hourly  3.  Every 4-6 hours minimum:  Change oxygen saturation probe site  4.  Every 4-6 hours:  If on nasal continuous positive airway pressure, respiratory therapy assess nares and determine need for appliance change or resting period.  Outcome: Progressing

## 2024-02-29 NOTE — SIGNIFICANT EVENT
Consulted for patient due to Sinus Bradycardia  On chart review, the patient was in [facility] a few weeks ago with similar and seen by their cardiology service with plans for FU as OP  Due to the above established relationship, will change consult to their service  Please call back if there is any issues or if they are unable to see the patient.  Thank you for the consideration

## 2024-02-29 NOTE — PLAN OF CARE
Patient oriented to self, place and month  MRDD  Patient HR in 40's but asymptomatic, cardiology consulted and aware, no new orders  NG in right nare at 60cm, for SBO, LIWS with minimal output. No surgical intervention as of now from surgery standpoint  Brief in place for incontinence, will use urinal if prompted  PICC line in LUE, draw orders in place  IV Antibiotics  IV seizure medications for seizure history and patient being NPO  BP soft but stable  Care ongoing            Problem: Discharge Planning  Goal: Discharge to home or other facility with appropriate resources  Outcome: Progressing     Problem: Respiratory - Adult  Goal: Achieves optimal ventilation and oxygenation  Outcome: Progressing     Problem: Safety - Adult  Goal: Free from fall injury  Outcome: Progressing     Problem: Skin/Tissue Integrity  Goal: Absence of new skin breakdown  Description: 1.  Monitor for areas of redness and/or skin breakdown  2.  Assess vascular access sites hourly  3.  Every 4-6 hours minimum:  Change oxygen saturation probe site  4.  Every 4-6 hours:  If on nasal continuous positive airway pressure, respiratory therapy assess nares and determine need for appliance change or resting period.  Outcome: Progressing     Problem: ABCDS Injury Assessment  Goal: Absence of physical injury  Outcome: Progressing      Duplicate pa request.

## 2024-03-01 LAB
ANION GAP SERPL CALCULATED.3IONS-SCNC: 6 MMOL/L (ref 9–17)
BASOPHILS # BLD: 0.03 K/UL (ref 0–0.2)
BASOPHILS NFR BLD: 1 % (ref 0–2)
BUN SERPL-MCNC: 5 MG/DL (ref 8–23)
BUN/CREAT SERPL: 6 (ref 9–20)
CALCIUM SERPL-MCNC: 8 MG/DL (ref 8.6–10.4)
CHLORIDE SERPL-SCNC: 114 MMOL/L (ref 98–107)
CO2 SERPL-SCNC: 23 MMOL/L (ref 20–31)
CREAT SERPL-MCNC: 0.8 MG/DL (ref 0.7–1.2)
EOSINOPHIL # BLD: 0.19 K/UL (ref 0–0.44)
EOSINOPHILS RELATIVE PERCENT: 5 % (ref 1–4)
ERYTHROCYTE [DISTWIDTH] IN BLOOD BY AUTOMATED COUNT: 12.6 % (ref 11.8–14.4)
GFR SERPL CREATININE-BSD FRML MDRD: >60 ML/MIN/1.73M2
GLUCOSE SERPL-MCNC: 101 MG/DL (ref 70–99)
HCT VFR BLD AUTO: 28.5 % (ref 40.7–50.3)
HGB BLD-MCNC: 9.3 G/DL (ref 13–17)
IMM GRANULOCYTES # BLD AUTO: 0.01 K/UL (ref 0–0.3)
IMM GRANULOCYTES NFR BLD: 0 %
LYMPHOCYTES NFR BLD: 1.36 K/UL (ref 1.1–3.7)
LYMPHOCYTES RELATIVE PERCENT: 32 % (ref 24–43)
MCH RBC QN AUTO: 32.9 PG (ref 25.2–33.5)
MCHC RBC AUTO-ENTMCNC: 32.6 G/DL (ref 28.4–34.8)
MCV RBC AUTO: 100.7 FL (ref 82.6–102.9)
MONOCYTES NFR BLD: 0.39 K/UL (ref 0.1–1.2)
MONOCYTES NFR BLD: 9 % (ref 3–12)
NEUTROPHILS NFR BLD: 53 % (ref 36–65)
NEUTS SEG NFR BLD: 2.27 K/UL (ref 1.5–8.1)
NRBC BLD-RTO: 0 PER 100 WBC
PLATELET # BLD AUTO: 144 K/UL (ref 138–453)
PMV BLD AUTO: 9.9 FL (ref 8.1–13.5)
POTASSIUM SERPL-SCNC: 3.7 MMOL/L (ref 3.7–5.3)
RBC # BLD AUTO: 2.83 M/UL (ref 4.21–5.77)
SODIUM SERPL-SCNC: 143 MMOL/L (ref 135–144)
WBC OTHER # BLD: 4.3 K/UL (ref 3.5–11.3)

## 2024-03-01 PROCEDURE — 2500000003 HC RX 250 WO HCPCS: Performed by: NURSE PRACTITIONER

## 2024-03-01 PROCEDURE — 1200000000 HC SEMI PRIVATE

## 2024-03-01 PROCEDURE — 99232 SBSQ HOSP IP/OBS MODERATE 35: CPT | Performed by: INTERNAL MEDICINE

## 2024-03-01 PROCEDURE — C9254 INJECTION, LACOSAMIDE: HCPCS | Performed by: NURSE PRACTITIONER

## 2024-03-01 PROCEDURE — 97530 THERAPEUTIC ACTIVITIES: CPT

## 2024-03-01 PROCEDURE — 6360000002 HC RX W HCPCS: Performed by: STUDENT IN AN ORGANIZED HEALTH CARE EDUCATION/TRAINING PROGRAM

## 2024-03-01 PROCEDURE — 97535 SELF CARE MNGMENT TRAINING: CPT

## 2024-03-01 PROCEDURE — 6360000002 HC RX W HCPCS: Performed by: NURSE PRACTITIONER

## 2024-03-01 PROCEDURE — 2500000003 HC RX 250 WO HCPCS: Performed by: INTERNAL MEDICINE

## 2024-03-01 PROCEDURE — 2580000003 HC RX 258: Performed by: NURSE PRACTITIONER

## 2024-03-01 PROCEDURE — 85025 COMPLETE CBC W/AUTO DIFF WBC: CPT

## 2024-03-01 PROCEDURE — 97110 THERAPEUTIC EXERCISES: CPT

## 2024-03-01 PROCEDURE — 2500000003 HC RX 250 WO HCPCS: Performed by: STUDENT IN AN ORGANIZED HEALTH CARE EDUCATION/TRAINING PROGRAM

## 2024-03-01 PROCEDURE — A4216 STERILE WATER/SALINE, 10 ML: HCPCS | Performed by: NURSE PRACTITIONER

## 2024-03-01 PROCEDURE — 80048 BASIC METABOLIC PNL TOTAL CA: CPT

## 2024-03-01 RX ADMIN — LEVETIRACETAM 1500 MG: 100 INJECTION, SOLUTION INTRAVENOUS at 13:29

## 2024-03-01 RX ADMIN — CIPROFLOXACIN 400 MG: 400 INJECTION, SOLUTION INTRAVENOUS at 08:52

## 2024-03-01 RX ADMIN — HEPARIN SODIUM 5000 UNITS: 5000 INJECTION INTRAVENOUS; SUBCUTANEOUS at 22:28

## 2024-03-01 RX ADMIN — POTASSIUM CHLORIDE, DEXTROSE MONOHYDRATE AND SODIUM CHLORIDE: 75; 5; 450 INJECTION, SOLUTION INTRAVENOUS at 05:37

## 2024-03-01 RX ADMIN — HEPARIN SODIUM 5000 UNITS: 5000 INJECTION INTRAVENOUS; SUBCUTANEOUS at 05:12

## 2024-03-01 RX ADMIN — CIPROFLOXACIN 400 MG: 400 INJECTION, SOLUTION INTRAVENOUS at 21:50

## 2024-03-01 RX ADMIN — SODIUM CHLORIDE: 9 INJECTION, SOLUTION INTRAVENOUS at 08:51

## 2024-03-01 RX ADMIN — SODIUM CHLORIDE, PRESERVATIVE FREE 20 MG: 5 INJECTION INTRAVENOUS at 22:28

## 2024-03-01 RX ADMIN — HEPARIN SODIUM 5000 UNITS: 5000 INJECTION INTRAVENOUS; SUBCUTANEOUS at 13:30

## 2024-03-01 RX ADMIN — METRONIDAZOLE 500 MG: 500 INJECTION, SOLUTION INTRAVENOUS at 09:55

## 2024-03-01 RX ADMIN — LACOSAMIDE 150 MG: 10 INJECTION INTRAVENOUS at 13:31

## 2024-03-01 RX ADMIN — POTASSIUM CHLORIDE, DEXTROSE MONOHYDRATE AND SODIUM CHLORIDE: 75; 5; 450 INJECTION, SOLUTION INTRAVENOUS at 17:47

## 2024-03-01 RX ADMIN — METRONIDAZOLE 500 MG: 500 INJECTION, SOLUTION INTRAVENOUS at 18:23

## 2024-03-01 RX ADMIN — LACOSAMIDE 150 MG: 10 INJECTION INTRAVENOUS at 00:46

## 2024-03-01 RX ADMIN — SODIUM CHLORIDE, PRESERVATIVE FREE 10 ML: 5 INJECTION INTRAVENOUS at 08:56

## 2024-03-01 RX ADMIN — METRONIDAZOLE 500 MG: 500 INJECTION, SOLUTION INTRAVENOUS at 01:19

## 2024-03-01 RX ADMIN — SODIUM CHLORIDE, PRESERVATIVE FREE 20 MG: 5 INJECTION INTRAVENOUS at 08:56

## 2024-03-01 NOTE — CONSULTS
[facility]  [address] [place], [place] [id number]  [phone]               Cardiology Consult           Date of Admission:  2/26/2024  Date of Consultation:  3/1/2024      PCP:  Sukumar Larose MD      Chief Complaint: Small bowel obstruction    History of Present Illness:  Jeremy King is a 68 y.o. male who presents with small bowel obstruction.  We are asked to see for sinus bradycardia.  Chart review shows that the patient was admitted at [facility] approximately 2 to 3 weeks ago with a small bowel obstruction.  At that time he was seen by our service and a workup was completed.  Patient was diagnosed with asymptomatic sinus bradycardia at that time.    Patient has MRDD and not able to provide any significant review of systems at this time other than \"I am all right\"      PMH:   has a past medical history of Arthritis, BPH (benign prostatic hyperplasia), Dysphagia, GERD (gastroesophageal reflux disease), Hearing loss, HLD (hyperlipidemia), Insomnia, MR (mental retardation), Periorbital cellulitis, SBO (small bowel obstruction) ([facility]), Seizures (HCC), Ventral hernia, and Vitamin D deficiency.      PSH:   has a past surgical history that includes Abdomen surgery (2013, 2014); laparoscopy (8/17/14); laparotomy (8/17/14); cast application (Left); Hydrocele surgery (08/2016); Vagus nerve stimulator insertion; and Vagus nerve stimulator insertion (10/11/2016).    Allergies:    Allergies   Allergen Reactions    Penicillins Hives, Shortness Of Breath and Swelling    Sodium Hypochlorite Hives and Itching    Clindamycin Rash        Home Meds:    Prior to Admission medications    Medication Sig Start Date End Date Taking? Authorizing Provider   linaclotide (LINZESS) 145 MCG capsule Take 1 capsule by mouth every morning (before breakfast) 2/26/24   Vijay Ramirez DO   hydrOXYzine HCl (ATARAX) 25 MG tablet Take 1 tablet by mouth 2 times daily as needed for Itching    Provider,

## 2024-03-01 NOTE — PLAN OF CARE
Patient oriented to self, place and month  Patient MRDD  NG removed yesterday, patient tolerating clear liquids  Brief in place for incontinence, will use urinal if prompted  PICC line in LUE, draw orders in place  IV Antibiotics  IV seizure medications for seizure history  PRN Benadryl given for patient being itchy  BP soft but stable  Plan will be to return to [facility] when stable for discharge  Care ongoing    Problem: Discharge Planning  Goal: Discharge to home or other facility with appropriate resources  3/1/2024 0314 by Oli Parmar, RN  Outcome: Progressing     Problem: Respiratory - Adult  Goal: Achieves optimal ventilation and oxygenation  3/1/2024 0314 by Oli Parmar, RN  Outcome: Progressing     Problem: Safety - Adult  Goal: Free from fall injury  3/1/2024 0314 by Oli Parmar, RN  Outcome: Progressing     Problem: Skin/Tissue Integrity  Goal: Absence of new skin breakdown  Description: 1.  Monitor for areas of redness and/or skin breakdown  3/1/2024 0314 by lOi Parmar, RN  Outcome: Progressing     Problem: ABCDS Injury Assessment  Goal: Absence of physical injury  Outcome: Progressing

## 2024-03-01 NOTE — PLAN OF CARE
D/c plan pt to go back to group home. Gen Surg advancing diet slowly. Pt remains A&O to self only, remains at baseline, on RA. No new s/s of skin breakdown or injury. Safety protocols in place and enforced. Pt ambulates SB 2x with walker.     Problem: Discharge Planning  Goal: Discharge to home or other facility with appropriate resources  3/1/2024 1704 by Janice Grimes RN  Outcome: Progressing     Problem: Respiratory - Adult  Goal: Achieves optimal ventilation and oxygenation  3/1/2024 1704 by Janice Grimes RN  Outcome: Progressing     Problem: Safety - Adult  Goal: Free from fall injury  3/1/2024 1704 by Janice Grimes RN  Outcome: Progressing     Problem: Skin/Tissue Integrity  Goal: Absence of new skin breakdown  Description: 1.  Monitor for areas of redness and/or skin breakdown  2.  Assess vascular access sites hourly  3.  Every 4-6 hours minimum:  Change oxygen saturation probe site  4.  Every 4-6 hours:  If on nasal continuous positive airway pressure, respiratory therapy assess nares and determine need for appliance change or resting period.  3/1/2024 1704 by Janice Grimes RN  Outcome: Progressing     Problem: ABCDS Injury Assessment  Goal: Absence of physical injury  3/1/2024 1704 by Janice Grimes RN  Outcome: Progressing

## 2024-03-02 LAB
ANION GAP SERPL CALCULATED.3IONS-SCNC: 6 MMOL/L (ref 9–17)
BASOPHILS # BLD: 0.04 K/UL (ref 0–0.2)
BASOPHILS NFR BLD: 1 % (ref 0–2)
BUN SERPL-MCNC: 2 MG/DL (ref 8–23)
BUN/CREAT SERPL: 3 (ref 9–20)
CALCIUM SERPL-MCNC: 7.9 MG/DL (ref 8.6–10.4)
CHLORIDE SERPL-SCNC: 113 MMOL/L (ref 98–107)
CO2 SERPL-SCNC: 23 MMOL/L (ref 20–31)
CREAT SERPL-MCNC: 0.7 MG/DL (ref 0.7–1.2)
EOSINOPHIL # BLD: 0.16 K/UL (ref 0–0.44)
EOSINOPHILS RELATIVE PERCENT: 3 % (ref 1–4)
ERYTHROCYTE [DISTWIDTH] IN BLOOD BY AUTOMATED COUNT: 12.8 % (ref 11.8–14.4)
GFR SERPL CREATININE-BSD FRML MDRD: >60 ML/MIN/1.73M2
GLUCOSE SERPL-MCNC: 97 MG/DL (ref 70–99)
HCT VFR BLD AUTO: 30.3 % (ref 40.7–50.3)
HGB BLD-MCNC: 9.7 G/DL (ref 13–17)
IMM GRANULOCYTES # BLD AUTO: 0.01 K/UL (ref 0–0.3)
IMM GRANULOCYTES NFR BLD: 0 %
LYMPHOCYTES NFR BLD: 1.65 K/UL (ref 1.1–3.7)
LYMPHOCYTES RELATIVE PERCENT: 32 % (ref 24–43)
MAGNESIUM SERPL-MCNC: 1.3 MG/DL (ref 1.6–2.6)
MCH RBC QN AUTO: 32.8 PG (ref 25.2–33.5)
MCHC RBC AUTO-ENTMCNC: 32 G/DL (ref 28.4–34.8)
MCV RBC AUTO: 102.4 FL (ref 82.6–102.9)
MONOCYTES NFR BLD: 0.5 K/UL (ref 0.1–1.2)
MONOCYTES NFR BLD: 10 % (ref 3–12)
NEUTROPHILS NFR BLD: 54 % (ref 36–65)
NEUTS SEG NFR BLD: 2.76 K/UL (ref 1.5–8.1)
NRBC BLD-RTO: 0 PER 100 WBC
PLATELET # BLD AUTO: 154 K/UL (ref 138–453)
PMV BLD AUTO: 9.9 FL (ref 8.1–13.5)
POTASSIUM SERPL-SCNC: 3.7 MMOL/L (ref 3.7–5.3)
RBC # BLD AUTO: 2.96 M/UL (ref 4.21–5.77)
SODIUM SERPL-SCNC: 142 MMOL/L (ref 135–144)
WBC OTHER # BLD: 5.1 K/UL (ref 3.5–11.3)

## 2024-03-02 PROCEDURE — 2580000003 HC RX 258: Performed by: NURSE PRACTITIONER

## 2024-03-02 PROCEDURE — 97530 THERAPEUTIC ACTIVITIES: CPT

## 2024-03-02 PROCEDURE — 6370000000 HC RX 637 (ALT 250 FOR IP): Performed by: NURSE PRACTITIONER

## 2024-03-02 PROCEDURE — 80048 BASIC METABOLIC PNL TOTAL CA: CPT

## 2024-03-02 PROCEDURE — 6360000002 HC RX W HCPCS: Performed by: NURSE PRACTITIONER

## 2024-03-02 PROCEDURE — 6360000002 HC RX W HCPCS: Performed by: STUDENT IN AN ORGANIZED HEALTH CARE EDUCATION/TRAINING PROGRAM

## 2024-03-02 PROCEDURE — 2500000003 HC RX 250 WO HCPCS: Performed by: NURSE PRACTITIONER

## 2024-03-02 PROCEDURE — 6370000000 HC RX 637 (ALT 250 FOR IP): Performed by: SURGERY

## 2024-03-02 PROCEDURE — A4216 STERILE WATER/SALINE, 10 ML: HCPCS | Performed by: NURSE PRACTITIONER

## 2024-03-02 PROCEDURE — 97535 SELF CARE MNGMENT TRAINING: CPT

## 2024-03-02 PROCEDURE — 97110 THERAPEUTIC EXERCISES: CPT

## 2024-03-02 PROCEDURE — 99232 SBSQ HOSP IP/OBS MODERATE 35: CPT | Performed by: INTERNAL MEDICINE

## 2024-03-02 PROCEDURE — C9254 INJECTION, LACOSAMIDE: HCPCS | Performed by: NURSE PRACTITIONER

## 2024-03-02 PROCEDURE — 1200000000 HC SEMI PRIVATE

## 2024-03-02 PROCEDURE — 85025 COMPLETE CBC W/AUTO DIFF WBC: CPT

## 2024-03-02 PROCEDURE — 83735 ASSAY OF MAGNESIUM: CPT

## 2024-03-02 PROCEDURE — 36415 COLL VENOUS BLD VENIPUNCTURE: CPT

## 2024-03-02 PROCEDURE — 6370000000 HC RX 637 (ALT 250 FOR IP): Performed by: INTERNAL MEDICINE

## 2024-03-02 RX ORDER — LEVETIRACETAM 750 MG/1
1500 TABLET ORAL 2 TIMES DAILY
Status: DISCONTINUED | OUTPATIENT
Start: 2024-03-02 | End: 2024-03-04 | Stop reason: HOSPADM

## 2024-03-02 RX ORDER — BISACODYL 10 MG
10 SUPPOSITORY, RECTAL RECTAL ONCE
Status: COMPLETED | OUTPATIENT
Start: 2024-03-02 | End: 2024-03-02

## 2024-03-02 RX ADMIN — HEPARIN SODIUM 5000 UNITS: 5000 INJECTION INTRAVENOUS; SUBCUTANEOUS at 21:24

## 2024-03-02 RX ADMIN — LACOSAMIDE 150 MG: 100 TABLET, FILM COATED ORAL at 21:23

## 2024-03-02 RX ADMIN — LAMOTRIGINE 600 MG: 100 TABLET ORAL at 21:22

## 2024-03-02 RX ADMIN — HEPARIN SODIUM 5000 UNITS: 5000 INJECTION INTRAVENOUS; SUBCUTANEOUS at 14:26

## 2024-03-02 RX ADMIN — SODIUM CHLORIDE, PRESERVATIVE FREE 20 MG: 5 INJECTION INTRAVENOUS at 08:43

## 2024-03-02 RX ADMIN — LEVETIRACETAM 1500 MG: 750 TABLET, FILM COATED ORAL at 22:43

## 2024-03-02 RX ADMIN — FINASTERIDE 5 MG: 5 TABLET, FILM COATED ORAL at 21:23

## 2024-03-02 RX ADMIN — LEVETIRACETAM 1500 MG: 100 INJECTION, SOLUTION INTRAVENOUS at 00:37

## 2024-03-02 RX ADMIN — LEVETIRACETAM 1500 MG: 100 INJECTION, SOLUTION INTRAVENOUS at 12:25

## 2024-03-02 RX ADMIN — METRONIDAZOLE 500 MG: 500 INJECTION, SOLUTION INTRAVENOUS at 10:28

## 2024-03-02 RX ADMIN — CIPROFLOXACIN 400 MG: 400 INJECTION, SOLUTION INTRAVENOUS at 08:45

## 2024-03-02 RX ADMIN — SODIUM CHLORIDE, PRESERVATIVE FREE 10 ML: 5 INJECTION INTRAVENOUS at 08:56

## 2024-03-02 RX ADMIN — LACOSAMIDE 150 MG: 100 TABLET, FILM COATED ORAL at 14:39

## 2024-03-02 RX ADMIN — LACOSAMIDE 150 MG: 10 INJECTION INTRAVENOUS at 01:49

## 2024-03-02 RX ADMIN — BISACODYL 10 MG: 10 SUPPOSITORY RECTAL at 08:45

## 2024-03-02 RX ADMIN — PRIMIDONE 375 MG: 250 TABLET ORAL at 21:23

## 2024-03-02 RX ADMIN — METRONIDAZOLE 500 MG: 500 INJECTION, SOLUTION INTRAVENOUS at 02:28

## 2024-03-02 RX ADMIN — HEPARIN SODIUM 5000 UNITS: 5000 INJECTION INTRAVENOUS; SUBCUTANEOUS at 06:19

## 2024-03-02 RX ADMIN — TAMSULOSIN HYDROCHLORIDE 0.4 MG: 0.4 CAPSULE ORAL at 21:23

## 2024-03-02 RX ADMIN — SODIUM CHLORIDE, PRESERVATIVE FREE 10 ML: 5 INJECTION INTRAVENOUS at 21:24

## 2024-03-02 NOTE — PLAN OF CARE
Pt had an uneventful night with no falls or injuries. He is alert to only self and birthdate. He remained sinus jessica for shift. Received scheduled doses of cipro, flagyl, and vimpat. No complaints of nausea or vomiting, 2 urine occurrences in brief, with redness noted on buttock.     Problem: Discharge Planning  Goal: Discharge to home or other facility with appropriate resources  Outcome: Progressing  Discharge to home or other facility with appropriate resources:   Identify barriers to discharge with patient and caregiver   Arrange for needed discharge resources and transportation as appropriate   Identify discharge learning needs (meds, wound care, etc)      Problem: Respiratory - Adult  Goal: Achieves optimal ventilation and oxygenation  Outcome: Progressing  Achieves optimal ventilation and oxygenation: Assess for changes in respiratory status      Problem: Safety - Adult  Goal: Free from fall injury  Outcome: Progressing  Problem: Skin/Tissue Integrity  Goal: Absence of new skin breakdown  Description: 1.  Monitor for areas of redness and/or skin breakdown  2.  Assess vascular access sites hourly  3.  Every 4-6 hours minimum:  Change oxygen saturation probe site  4.  Every 4-6 hours:  If on nasal continuous positive airway pressure, respiratory therapy assess nares and determine need for appliance change or resting period.  Outcome: Progressing

## 2024-03-02 NOTE — SIGNIFICANT EVENT
Reconsult placed to discuss pacemaker implantation.      On review of nursing notes, it appears that family was under the impression that a pacemaker was refused to the patient because he was felt to not be a candidate.  On review of previous admission to [facility] and my partner's note from yesterday, patient does not meet criteria for needing a pacemaker.  Patient is currently in sinus rhythm with heart rate in the 60s.  There is no evidence of high-degree AV block.  Workup has been completed already.  No additional testing is required.    If patient's condition changes and there is evidence of significant symptomatic bradycardia or high-degree AV block, please do not hesitate to call us back.  Thank you.

## 2024-03-03 LAB
ANION GAP SERPL CALCULATED.3IONS-SCNC: 7 MMOL/L (ref 9–17)
BUN SERPL-MCNC: <2 MG/DL (ref 8–23)
BUN/CREAT SERPL: ABNORMAL (ref 9–20)
CALCIUM SERPL-MCNC: 8.3 MG/DL (ref 8.6–10.4)
CHLORIDE SERPL-SCNC: 111 MMOL/L (ref 98–107)
CO2 SERPL-SCNC: 25 MMOL/L (ref 20–31)
CREAT SERPL-MCNC: 0.8 MG/DL (ref 0.7–1.2)
GFR SERPL CREATININE-BSD FRML MDRD: >60 ML/MIN/1.73M2
GLUCOSE SERPL-MCNC: 92 MG/DL (ref 70–99)
POTASSIUM SERPL-SCNC: 3.3 MMOL/L (ref 3.7–5.3)
SODIUM SERPL-SCNC: 143 MMOL/L (ref 135–144)

## 2024-03-03 PROCEDURE — 6360000002 HC RX W HCPCS: Performed by: NURSE PRACTITIONER

## 2024-03-03 PROCEDURE — 6370000000 HC RX 637 (ALT 250 FOR IP): Performed by: INTERNAL MEDICINE

## 2024-03-03 PROCEDURE — 1200000000 HC SEMI PRIVATE

## 2024-03-03 PROCEDURE — 80048 BASIC METABOLIC PNL TOTAL CA: CPT

## 2024-03-03 PROCEDURE — 99232 SBSQ HOSP IP/OBS MODERATE 35: CPT | Performed by: INTERNAL MEDICINE

## 2024-03-03 PROCEDURE — 6370000000 HC RX 637 (ALT 250 FOR IP): Performed by: NURSE PRACTITIONER

## 2024-03-03 PROCEDURE — 6370000000 HC RX 637 (ALT 250 FOR IP)

## 2024-03-03 PROCEDURE — 2580000003 HC RX 258: Performed by: NURSE PRACTITIONER

## 2024-03-03 RX ORDER — POTASSIUM CHLORIDE 7.45 MG/ML
10 INJECTION INTRAVENOUS PRN
Status: DISCONTINUED | OUTPATIENT
Start: 2024-03-03 | End: 2024-03-04 | Stop reason: HOSPADM

## 2024-03-03 RX ADMIN — SODIUM CHLORIDE, PRESERVATIVE FREE 10 ML: 5 INJECTION INTRAVENOUS at 22:25

## 2024-03-03 RX ADMIN — HEPARIN SODIUM 5000 UNITS: 5000 INJECTION INTRAVENOUS; SUBCUTANEOUS at 05:04

## 2024-03-03 RX ADMIN — TAMSULOSIN HYDROCHLORIDE 0.4 MG: 0.4 CAPSULE ORAL at 09:09

## 2024-03-03 RX ADMIN — PRIMIDONE 250 MG: 250 TABLET ORAL at 09:09

## 2024-03-03 RX ADMIN — HEPARIN SODIUM 5000 UNITS: 5000 INJECTION INTRAVENOUS; SUBCUTANEOUS at 14:38

## 2024-03-03 RX ADMIN — PRIMIDONE 375 MG: 250 TABLET ORAL at 22:10

## 2024-03-03 RX ADMIN — POTASSIUM BICARBONATE 40 MEQ: 782 TABLET, EFFERVESCENT ORAL at 22:10

## 2024-03-03 RX ADMIN — POTASSIUM BICARBONATE 40 MEQ: 782 TABLET, EFFERVESCENT ORAL at 09:09

## 2024-03-03 RX ADMIN — MECLIZINE 25 MG: 12.5 TABLET ORAL at 09:09

## 2024-03-03 RX ADMIN — SODIUM CHLORIDE, PRESERVATIVE FREE 10 ML: 5 INJECTION INTRAVENOUS at 09:10

## 2024-03-03 RX ADMIN — TAMSULOSIN HYDROCHLORIDE 0.4 MG: 0.4 CAPSULE ORAL at 22:24

## 2024-03-03 RX ADMIN — LACOSAMIDE 150 MG: 100 TABLET, FILM COATED ORAL at 22:11

## 2024-03-03 RX ADMIN — FINASTERIDE 5 MG: 5 TABLET, FILM COATED ORAL at 22:10

## 2024-03-03 RX ADMIN — LAMOTRIGINE 400 MG: 100 TABLET ORAL at 09:22

## 2024-03-03 RX ADMIN — LAMOTRIGINE 600 MG: 100 TABLET ORAL at 22:09

## 2024-03-03 RX ADMIN — PANTOPRAZOLE SODIUM 40 MG: 40 TABLET, DELAYED RELEASE ORAL at 05:04

## 2024-03-03 RX ADMIN — HEPARIN SODIUM 5000 UNITS: 5000 INJECTION INTRAVENOUS; SUBCUTANEOUS at 22:09

## 2024-03-03 RX ADMIN — LACOSAMIDE 150 MG: 100 TABLET, FILM COATED ORAL at 09:09

## 2024-03-03 RX ADMIN — LEVETIRACETAM 1500 MG: 750 TABLET, FILM COATED ORAL at 09:09

## 2024-03-03 RX ADMIN — LEVETIRACETAM 1500 MG: 750 TABLET, FILM COATED ORAL at 22:10

## 2024-03-03 NOTE — PLAN OF CARE
Pt has been resting in bed w/o any complaints of pain overnight. VSS. Pt has been tolerating clear liquid diet and took all pills whole by mouth. PICC line was used for lab draws per order. Pt has been in NSR on monitor. No episodes of bradycardia overnight. Surgery rounded and updated writer on plan of care. Pt's diet will be advanced to soft diet today. Pt's potassium was 3.3 this AM. Writer reached out to on call NP. PRN IV replacement orders received. Surgery put in scheduled 40 mEq potassium BID orders. Writer clarified with on call NP if she still wants the PRN IV replacement given or just give the scheduled. Writer was told to just give scheduled. Pt will receive Effer-K BID today. Bed is locked in the lowest position and call light is in reach.    Problem: Discharge Planning  Goal: Discharge to home or other facility with appropriate resources  3/2/2024 2341 by Tammy Rojas, RN  Outcome: Progressing     Problem: Respiratory - Adult  Goal: Achieves optimal ventilation and oxygenation  3/2/2024 2341 by Tammy Rojas, RN  Outcome: Progressing     Problem: Safety - Adult  Goal: Free from fall injury  3/2/2024 2341 by Tammy Rojas, RN  Outcome: Progressing     Problem: Skin/Tissue Integrity  Goal: Absence of new skin breakdown  Description: 1.  Monitor for areas of redness and/or skin breakdown  2.  Assess vascular access sites hourly  3.  Every 4-6 hours minimum:  Change oxygen saturation probe site  4.  Every 4-6 hours:  If on nasal continuous positive airway pressure, respiratory therapy assess nares and determine need for appliance change or resting period.  3/2/2024 2341 by Tammy Rojas, RN  Outcome: Progressing     Problem: ABCDS Injury Assessment  Goal: Absence of physical injury  3/2/2024 2341 by Tammy Rojas, RN  Outcome: Progressing

## 2024-03-03 NOTE — PLAN OF CARE
Pt has been resting comfortably in bed for most of the day sleeping, but waking up for meals and med pass. Pt is oriented to self. Potassium was replaced orally with 40mg of effer-K as the potassium level at 0511 today was 3.3. Pt still remains intermittently incontinent, with great output. No bowel movement as of yet today. Pt ate all of his lunch with no complications. Bed is locked and in lowest position with call light in reach. Safety maintained, all questions and concerns addressed.    Problem: Discharge Planning  Goal: Discharge to home or other facility with appropriate resources  Outcome: Progressing  Flowsheets (Taken 3/3/2024 0800)  Discharge to home or other facility with appropriate resources: Identify barriers to discharge with patient and caregiver     Problem: Respiratory - Adult  Goal: Achieves optimal ventilation and oxygenation  Outcome: Progressing  Flowsheets (Taken 3/3/2024 0800)  Achieves optimal ventilation and oxygenation: Assess for changes in respiratory status     Problem: Safety - Adult  Goal: Free from fall injury  Outcome: Progressing     Problem: Skin/Tissue Integrity  Goal: Absence of new skin breakdown  Description: 1.  Monitor for areas of redness and/or skin breakdown  2.  Assess vascular access sites hourly  3.  Every 4-6 hours minimum:  Change oxygen saturation probe site  4.  Every 4-6 hours:  If on nasal continuous positive airway pressure, respiratory therapy assess nares and determine need for appliance change or resting period.  Outcome: Progressing

## 2024-03-03 NOTE — PLAN OF CARE
Pt has been resting comfortably in his bed for most of the day. After receiving suppository pt did have a large bowel movement. All IV meds switched to oral. Continuous meds dc'd. Clear liquid diet continued throughout the day. Pt take med whole with no issues. Bed is locked and in the lowest position with the call light in reach. All questions and concerns have been addressed.   Problem: Discharge Planning  Goal: Discharge to home or other facility with appropriate resources  Outcome: Progressing  Flowsheets (Taken 3/2/2024 0800)  Discharge to home or other facility with appropriate resources: Identify barriers to discharge with patient and caregiver     Problem: Respiratory - Adult  Goal: Achieves optimal ventilation and oxygenation  Outcome: Progressing  Flowsheets (Taken 3/2/2024 0800)  Achieves optimal ventilation and oxygenation: Assess for changes in respiratory status     Problem: Safety - Adult  Goal: Free from fall injury  Outcome: Progressing     Problem: Skin/Tissue Integrity  Goal: Absence of new skin breakdown  Description: 1.  Monitor for areas of redness and/or skin breakdown  2.  Assess vascular access sites hourly  3.  Every 4-6 hours minimum:  Change oxygen saturation probe site  4.  Every 4-6 hours:  If on nasal continuous positive airway pressure, respiratory therapy assess nares and determine need for appliance change or resting period.  Outcome: Progressing     Problem: ABCDS Injury Assessment  Goal: Absence of physical injury  Outcome: Progressing

## 2024-03-04 VITALS
TEMPERATURE: 97.9 F | OXYGEN SATURATION: 94 % | HEART RATE: 87 BPM | DIASTOLIC BLOOD PRESSURE: 57 MMHG | RESPIRATION RATE: 17 BRPM | SYSTOLIC BLOOD PRESSURE: 109 MMHG | BODY MASS INDEX: 22.9 KG/M2 | WEIGHT: 159.6 LBS

## 2024-03-04 LAB
ANION GAP SERPL CALCULATED.3IONS-SCNC: 5 MMOL/L (ref 9–17)
BUN SERPL-MCNC: 5 MG/DL (ref 8–23)
BUN/CREAT SERPL: 6 (ref 9–20)
CALCIUM SERPL-MCNC: 8.2 MG/DL (ref 8.6–10.4)
CHLORIDE SERPL-SCNC: 109 MMOL/L (ref 98–107)
CO2 SERPL-SCNC: 28 MMOL/L (ref 20–31)
CREAT SERPL-MCNC: 0.9 MG/DL (ref 0.7–1.2)
EKG ATRIAL RATE: 41 BPM
EKG P AXIS: 80 DEGREES
EKG P-R INTERVAL: 164 MS
EKG Q-T INTERVAL: 522 MS
EKG QRS DURATION: 88 MS
EKG QTC CALCULATION (BAZETT): 430 MS
EKG R AXIS: -17 DEGREES
EKG T AXIS: 16 DEGREES
EKG VENTRICULAR RATE: 41 BPM
GFR SERPL CREATININE-BSD FRML MDRD: >60 ML/MIN/1.73M2
GLUCOSE SERPL-MCNC: 105 MG/DL (ref 70–99)
MAGNESIUM SERPL-MCNC: 1.4 MG/DL (ref 1.6–2.6)
POTASSIUM SERPL-SCNC: 4.6 MMOL/L (ref 3.7–5.3)
SODIUM SERPL-SCNC: 142 MMOL/L (ref 135–144)

## 2024-03-04 PROCEDURE — 97530 THERAPEUTIC ACTIVITIES: CPT

## 2024-03-04 PROCEDURE — 6370000000 HC RX 637 (ALT 250 FOR IP): Performed by: NURSE PRACTITIONER

## 2024-03-04 PROCEDURE — 6370000000 HC RX 637 (ALT 250 FOR IP)

## 2024-03-04 PROCEDURE — 99232 SBSQ HOSP IP/OBS MODERATE 35: CPT | Performed by: INTERNAL MEDICINE

## 2024-03-04 PROCEDURE — 6370000000 HC RX 637 (ALT 250 FOR IP): Performed by: INTERNAL MEDICINE

## 2024-03-04 PROCEDURE — 6360000002 HC RX W HCPCS: Performed by: NURSE PRACTITIONER

## 2024-03-04 PROCEDURE — 83735 ASSAY OF MAGNESIUM: CPT

## 2024-03-04 PROCEDURE — 80048 BASIC METABOLIC PNL TOTAL CA: CPT

## 2024-03-04 PROCEDURE — 6360000002 HC RX W HCPCS: Performed by: INTERNAL MEDICINE

## 2024-03-04 PROCEDURE — 97110 THERAPEUTIC EXERCISES: CPT

## 2024-03-04 PROCEDURE — 2580000003 HC RX 258: Performed by: NURSE PRACTITIONER

## 2024-03-04 RX ORDER — BISACODYL 10 MG
10 SUPPOSITORY, RECTAL RECTAL ONCE
Status: COMPLETED | OUTPATIENT
Start: 2024-03-04 | End: 2024-03-04

## 2024-03-04 RX ORDER — SENNA AND DOCUSATE SODIUM 50; 8.6 MG/1; MG/1
2 TABLET, FILM COATED ORAL 2 TIMES DAILY
Status: DISCONTINUED | OUTPATIENT
Start: 2024-03-04 | End: 2024-03-04

## 2024-03-04 RX ORDER — SENNA AND DOCUSATE SODIUM 50; 8.6 MG/1; MG/1
2 TABLET, FILM COATED ORAL DAILY
Qty: 60 TABLET | Refills: 3 | Status: SHIPPED | OUTPATIENT
Start: 2024-03-05 | End: 2024-07-03

## 2024-03-04 RX ORDER — POLYETHYLENE GLYCOL 3350 17 G/17G
17 POWDER, FOR SOLUTION ORAL DAILY
Status: DISCONTINUED | OUTPATIENT
Start: 2024-03-04 | End: 2024-03-04

## 2024-03-04 RX ORDER — SENNA AND DOCUSATE SODIUM 50; 8.6 MG/1; MG/1
2 TABLET, FILM COATED ORAL DAILY
Status: DISCONTINUED | OUTPATIENT
Start: 2024-03-04 | End: 2024-03-04 | Stop reason: HOSPADM

## 2024-03-04 RX ORDER — LANOLIN ALCOHOL/MO/W.PET/CERES
400 CREAM (GRAM) TOPICAL DAILY
Status: DISCONTINUED | OUTPATIENT
Start: 2024-03-04 | End: 2024-03-04 | Stop reason: HOSPADM

## 2024-03-04 RX ADMIN — LEVETIRACETAM 1500 MG: 750 TABLET, FILM COATED ORAL at 08:37

## 2024-03-04 RX ADMIN — MAGNESIUM SULFATE HEPTAHYDRATE 2000 MG: 40 INJECTION, SOLUTION INTRAVENOUS at 11:39

## 2024-03-04 RX ADMIN — POTASSIUM BICARBONATE 40 MEQ: 782 TABLET, EFFERVESCENT ORAL at 08:37

## 2024-03-04 RX ADMIN — SODIUM CHLORIDE: 9 INJECTION, SOLUTION INTRAVENOUS at 11:38

## 2024-03-04 RX ADMIN — DOCUSATE SODIUM 50 MG AND SENNOSIDES 8.6 MG 2 TABLET: 8.6; 5 TABLET, FILM COATED ORAL at 08:36

## 2024-03-04 RX ADMIN — PRIMIDONE 250 MG: 250 TABLET ORAL at 08:37

## 2024-03-04 RX ADMIN — HEPARIN SODIUM 5000 UNITS: 5000 INJECTION INTRAVENOUS; SUBCUTANEOUS at 06:28

## 2024-03-04 RX ADMIN — LACOSAMIDE 150 MG: 100 TABLET, FILM COATED ORAL at 08:37

## 2024-03-04 RX ADMIN — BISACODYL 10 MG: 10 SUPPOSITORY RECTAL at 08:37

## 2024-03-04 RX ADMIN — Medication 400 MG: at 14:38

## 2024-03-04 RX ADMIN — TAMSULOSIN HYDROCHLORIDE 0.4 MG: 0.4 CAPSULE ORAL at 08:37

## 2024-03-04 RX ADMIN — LAMOTRIGINE 400 MG: 100 TABLET ORAL at 08:36

## 2024-03-04 RX ADMIN — SODIUM CHLORIDE, PRESERVATIVE FREE 10 ML: 5 INJECTION INTRAVENOUS at 08:38

## 2024-03-04 RX ADMIN — ALTEPLASE 1 MG: 2.2 INJECTION, POWDER, LYOPHILIZED, FOR SOLUTION INTRAVENOUS at 09:31

## 2024-03-04 RX ADMIN — PANTOPRAZOLE SODIUM 40 MG: 40 TABLET, DELAYED RELEASE ORAL at 06:28

## 2024-03-04 RX ADMIN — HEPARIN SODIUM 5000 UNITS: 5000 INJECTION INTRAVENOUS; SUBCUTANEOUS at 14:38

## 2024-03-04 RX ADMIN — MECLIZINE 25 MG: 12.5 TABLET ORAL at 08:37

## 2024-03-04 NOTE — PROGRESS NOTES
[facility]  Office: [phone]  Ayden Trejo DO, Samir Yousif, DO, Vijay Ramirez DO, Elder Leigh, DO, Jeremias Martin MD, Herminia Slaughter MD, Partha Snow MD, Adela Mcdonough MD,  Patrick Pacheco MD, Sandhya Fontaine MD, Iris Collazo MD,  Evert Perez DO, Galo Hampton MD, Abel Dietz MD, Robert Trejo DO, Luz Ellis MD,  Tim Mcwilliams DO, Delisa Huff MD, Gayle Guevara MD, Pearl Davis MD, Tyler Banuelos MD,  Adama Johnson MD, Craig Bradford MD, Babmi Bowen MD, Dusty Landis MD, Juan Oliveira MD, Carrie Thakur MD, Krishna Lux DO, Aniceto Roth DO, Betzaida Campbell MD,  Fernando Burt MD, Shirley Waterhouse, CNP,  Regina Kern, CNP, Epi Henry, CNP,  Yuliya Juan, DNP, Era Campos, CNP, Lu Ziegler, CNP, Dalila Canales CNP, Zee Crystal, CNP, Gwen Duran, CNP, Avelina Sharp, PA-C, Laxmi David, PA-C, Ondian Leslie, CNP, Silvia Steward, CNP, Janice Burger, CNP, Alison Mendoza, CNS, Shy Corbin, CNP, Saba Marr, CNP, Tracy Schwab, CNP         [facility]   IN-PATIENT SERVICE   [facility]    Progress Note    2/29/2024    8:07 AM    Name:   Jeremy King  MRN:     [id number]     Acct:      [id number]   Room:   [facility]9/1009-02   Day:  3  Admit Date:  2/26/2024 10:45 PM    PCP:   Sukumar Larose MD  Code Status:  Full Code    Subjective:     C/C: Abdominal pain    Interval History Status: not changed.     Patient is resting in bed, NG tube is currently clamped.  Denies any complaints of chest pain, shortness of breath, nausea or vomiting, fevers chills, abdominal pain or other acute complaints.    Brief History:     This is a 68-year-old male with history of prior bowel obstructions, just released from [facility] few days ago.  He returns as a transfer from [place] was initially evaluated for recurrent episodes of abdominal pain, had an NG tube placed at the outside facility with apparently 1500 mL of 
[facility]  Office: [phone]  Ayden Trejo DO, Samir Yousif, DO, Vijay Ramirez DO, Elder Leigh, DO, Jeremias Martin MD, Herminia Slaughter MD, Partha Snow MD, Adela Mcdonough MD,  Patrick Pacheco MD, Sandhya Fontaine MD, Iris Collazo MD,  Evert Perez DO, Galo Hampton MD, Abel Dietz MD, Robert Trejo DO, Luz Ellis MD,  Tim Mcwilliams DO, Delisa Huff MD, Gayle Guevara MD, Pearl Davis MD, Tyler Banuelos MD,  Adama Johnson MD, Craig Bradford MD, Bambi Bowen MD, Dusty Landis MD, Juan Oliveira MD, Carrie Thakur MD, Krishna Lux DO, Aniceto Roth DO, Betzaida Campbell MD,  Fernando Burt MD, Shirley Waterhouse, CNP,  Regina Kern, CNP, Epi Henry, CNP,  Yuliya Juan, DNP, Era Campos, CNP, Lu Ziegler, CNP, Dalila Canales CNP, Zee Crystal, CNP, Gwen Duran, CNP, Avelina Sharp, PA-C, Laxmi David, PA-C, Ondina Leslie, CNP, Silvia Steward, CNP, Janice Burger, CNP, Alison Mendoza, CNS, Shy Corbin, CNP, Saba Marr, CNP, Tracy Schwab, CNP         [facility]   IN-PATIENT SERVICE   [facility]    Progress Note    2/28/2024    8:22 AM    Name:   Jeremy King  MRN:     [id number]     Acct:      [id number]   Room:   [facility]9/1009-02   Day:  2  Admit Date:  2/26/2024 10:45 PM    PCP:   Sukumar Larose MD  Code Status:  Full Code    Subjective:     C/C: Abdominal pain    Interval History Status: not changed.     Patient is resting in bed, NG tube in place.  Denies any complaints of chest pain, shortness of breath, nausea vomiting, fever chills or acute complaints    Brief History:     This is a 68-year-old male with history of prior bowel obstructions, just released from [facility] few days ago.  He returns as a transfer from [place] was initially evaluated for recurrent episodes of abdominal pain, had an NG tube placed at the outside facility with apparently 1500 mL of output immediately.    Review of Systems: 
General Surgery:  Daily Progress Note                    PATIENT NAME: Jeremy King     TODAY'S DATE: 2/28/2024, 5:34 AM  CC:  \"Fine\"    SUBJECTIVE:     Pt seen and examined at bedside.  Pt has NG tube in place with little out in the suction cannister. Pt denies abdominal pain, nausea or vomiting. He is unable to report whether he is passing gas or has had a bowel movement. Blood pressure soft overnight with systolic in the 90s. Afebrile.    OBJECTIVE:   VITALS:  /61   Pulse 62   Temp 97.7 °F (36.5 °C) (Oral)   Resp 16   SpO2 96%      INTAKE/OUTPUT:      Intake/Output Summary (Last 24 hours) at 2/28/2024 0534  Last data filed at 2/28/2024 0054  Gross per 24 hour   Intake 2867.02 ml   Output --   Net 2867.02 ml       PHYSICAL EXAM:  General Appearance: awake, alert, oriented, in no acute distress  HEENT:  Normocephalic, atraumatic, mucus membranes moist   NGT in place  Heart: Heart sounds are normal.  Regular rate and rhythm without murmur, gallop or rub.  Lungs: Normal expansion.  Clear to auscultation.  No rales, rhonchi, or wheezing.  Abdomen: Soft, non-tender to palpation, non-distended   Extremities: No cyanosis, pitting edema, rashes noted.    Skin: Skin color, texture, turgor normal. No rashes or lesions.    Data:  CBC with Differential:    Lab Results   Component Value Date/Time    WBC 5.0 02/28/2024 03:10 AM    RBC 2.47 02/28/2024 03:10 AM    RBC 3.43 08/29/2011 11:48 AM    HGB 8.1 02/28/2024 03:10 AM    HCT 26.2 02/28/2024 03:10 AM     02/28/2024 03:10 AM     08/29/2011 11:48 AM    .1 02/28/2024 03:10 AM    MCH 32.8 02/28/2024 03:10 AM    MCHC 30.9 02/28/2024 03:10 AM    RDW 12.8 02/28/2024 03:10 AM    LYMPHOPCT 30 02/28/2024 03:10 AM    MONOPCT 7 02/28/2024 03:10 AM    BASOPCT 1 02/28/2024 03:10 AM    MONOSABS 0.37 02/28/2024 03:10 AM    LYMPHSABS 1.49 02/28/2024 03:10 AM    EOSABS 0.26 02/28/2024 03:10 AM    BASOSABS 0.03 02/28/2024 03:10 AM    DIFFTYPE NOT REPORTED 
General Surgery:  Daily Progress Note                    PATIENT NAME: Jeremy King     TODAY'S DATE: 2/29/2024, 5:32 AM  CC:  \"Fine\"    SUBJECTIVE:     Pt seen and examined at bedside.  Pt has NG tube in place with little out in the suction cannister. Pt denies abdominal pain, nausea or vomiting. He is passing gas but has not had a bowel movement. Pt worked with PT/OT yesterday. KUB yesterday had some continued distention of small bowel but improving.    R.O.S.  No fevers or chills   No acute vision or hearing changes   No substernal chest pain or palpitations   No acute shortness of breath or productive cough   No nausea or vomiting, denies abdominal pain, continues to pass flatus but no bowel movements   No calf tenderness or asymmetric edema    OBJECTIVE:   VITALS:  BP (!) 96/55   Pulse (!) 44   Temp 97.7 °F (36.5 °C) (Oral)   Resp 17   Wt 72.2 kg (159 lb 3.2 oz)   SpO2 97%   BMI 22.84 kg/m²      INTAKE/OUTPUT:      Intake/Output Summary (Last 24 hours) at 2/29/2024 0532  Last data filed at 2/29/2024 0238  Gross per 24 hour   Intake 3426.82 ml   Output 1400 ml   Net 2026.82 ml         PHYSICAL EXAM:  General Appearance: awake, alert, oriented, in no acute distress  HEENT:  Normocephalic, atraumatic, mucus membranes moist   NGT in place  Heart: Heart sounds are normal.  Regular rate and rhythm without murmur, gallop or rub.  Lungs: Normal expansion.  Clear to auscultation.  No rales, rhonchi, or wheezing.  Abdomen: Soft, non-tender to palpation, non-distended   Extremities: No cyanosis, pitting edema, rashes noted.    Skin: Skin color, texture, turgor normal. No rashes or lesions.    Data:  CBC with Differential:    Lab Results   Component Value Date/Time    WBC 4.8 02/29/2024 04:15 AM    RBC 2.44 02/29/2024 04:15 AM    RBC 3.43 08/29/2011 11:48 AM    HGB 8.1 02/29/2024 04:15 AM    HCT 25.3 02/29/2024 04:15 AM     02/29/2024 04:15 AM     08/29/2011 11:48 AM    .7 02/29/2024 04:15 AM    
General Surgery:  Daily Progress Note                    PATIENT NAME: Jeremy King     TODAY'S DATE: 3/1/2024, 5:39 AM  CC:  \"Fine\"    SUBJECTIVE:     Pt seen and examined at bedside. Pt denies abdominal pain, nausea or vomiting. He is passing gas and  had a bowel movement last night. Pt worked with PT/OT yesterday. Passed clamp trial on 2/29/24 and NG tube was removed. Tolerated Sips of clears    R.O.S.  No fevers or chills   No acute vision or hearing changes   No substernal chest pain or palpitations   No acute shortness of breath or productive cough   No nausea or vomiting, denies abdominal pain, continues to pass flatus but no bowel movements   No calf tenderness or asymmetric edema    OBJECTIVE:   VITALS:  /62   Pulse 51   Temp 97.7 °F (36.5 °C)   Resp 16   Wt 72.2 kg (159 lb 3.2 oz)   SpO2 97%   BMI 22.84 kg/m²      INTAKE/OUTPUT:      Intake/Output Summary (Last 24 hours) at 3/1/2024 0539  Last data filed at 3/1/2024 0221  Gross per 24 hour   Intake 1028.89 ml   Output 2500 ml   Net -1471.11 ml         PHYSICAL EXAM:  General Appearance: awake, alert, oriented, in no acute distress  HEENT:  Normocephalic, atraumatic, mucus membranes moist   NGT in place  Heart: Heart sounds are normal.  Regular rate and rhythm without murmur, gallop or rub.  Lungs: Normal expansion.  Clear to auscultation.  No rales, rhonchi, or wheezing.  Abdomen: Soft, non-tender to palpation, non-distended   Extremities: No cyanosis, pitting edema, rashes noted.    Skin: Skin color, texture, turgor normal. No rashes or lesions.    Data:  CBC with Differential:    Lab Results   Component Value Date/Time    WBC 4.8 02/29/2024 04:15 AM    RBC 2.44 02/29/2024 04:15 AM    RBC 3.43 08/29/2011 11:48 AM    HGB 8.1 02/29/2024 04:15 AM    HCT 25.3 02/29/2024 04:15 AM     02/29/2024 04:15 AM     08/29/2011 11:48 AM    .7 02/29/2024 04:15 AM    MCH 33.2 02/29/2024 04:15 AM    MCHC 32.0 02/29/2024 04:15 AM    RDW 
[facility]  Office: [phone]  Ayden Trejo DO, Samir Yousif, DO, Vijay Ramirez DO, Elder Leigh, DO, Jeremias Martin MD, Herminia Slaughter MD, Partha Snow MD, Adela Mcdonough MD,  Patrick Pacheco MD, Sandhya Fontaine MD, Iris Collazo MD,  Evert Perez DO, Galo Hampton MD, Abel Dietz MD, Robert Trejo DO, Luz Ellis MD,  Tim Mcwilliams DO, Delisa Huff MD, Gayle Guevara MD, Pearl Davis MD, Tyler Banuelos MD,  Adama Johnson MD, Craig Bradford MD, Bambi Bowen MD, Dusty Landis MD, Juan Oliveira MD, Carrie Thakur MD, Krishna Lux DO, Aniceto Roth DO, Betzaida Campbell MD,  Fernando Burt MD, Shirley Waterhouse, CNP,  Regina Kern, CNP, Epi Henry, CNP,  Yuliya Juan, DNP, Era Campos, CNP, Lu Ziegler, CNP, Dalila Canales CNP, Zee Crystal, CNP, Gwen Duran, CNP, Avelina Sharp, PA-C, Laxmi David, PA-C, Ondina Leslie, CNP, Silvia Steward, CNP, Janice Burger, CNP, Alison Mendoza, CNS, Shy Corbin, CNP, Saba Marr, CNP, Tracy Schwab, CNP         [facility]   IN-PATIENT SERVICE   [facility]    Progress Note    3/3/2024    8:43 AM    Name:   Jeremy King  MRN:     [id number]     Acct:      [id number]   Room:   [facility]9/1009-02   Day:  6  Admit Date:  2/26/2024 10:45 PM    PCP:   Sukumar Larose MD  Code Status:  Full Code    Subjective:     C/C: Abdominal pain    Interval History Status: Improved.     Patient is sleeping, easily aroused.  Denies any complaints of chest pain, shortness of breath, nausea or vomiting, abdominal pain or other acute complaints.    Brief History:     This is a 68-year-old male with history of prior bowel obstructions, just released from [facility] few days ago.  He returns as a transfer from [place] was initially evaluated for recurrent episodes of abdominal pain, had an NG tube placed at the outside facility with apparently 1500 mL of output immediately.    Review of Systems: 
NG tube clamped per order. No complaints of N/V or pain in abd from patient. Patient educated on need to let staff know if symptoms occur. Care ongoing.  
Occupational Therapy  DATE: 2024    NAME: Jeremy King  MRN: [id number]   : 1955    Patient not seen this date for Occupational Therapy due to:    [x] Cancel by RN or physician due to: Hold per RN Tia, as pt has been hypotensive today. OT to to continue to follow.    [] Hemodialysis    [] Critical Lab Value Level     [] Blood transfusion in progress    [] Acute or unstable cardiovascular status   _MAP < 55 or more than >115  _HR < 40 or > 130    [] Acute or unstable pulmonary status   -FiO2 > 60%   _RR < 5 or >40    _O2 sats < 85%    [] Strict Bedrest    [] Off Unit for surgery or procedure    [] Off Unit for testing       [] Pending imaging to R/O fracture    [] Refusal by Patient      [] Other      [] OT being discontinued at this time. Patient independent. No further needs.     [] OT being discontinued at this time as the patient has been transferred to hospice care. No further needs.      Lona Jaime, OT   
Occupational Therapy  Facility/Department: [facility] PROGRESSIVE CARE  Occupational Therapy Daily Treatment Note    Name: Jeremy King  : 1955  MRN: [id number]  Date of Service: 2024    Discharge Recommendations:  Patient would benefit from continued therapy after discharge.Return to group home with assist.       RN reports patient is medically stable for therapy treatment this date.    Chart reviewed prior to treatment and patient is agreeable for therapy.  All lines intact and patient positioned comfortably at end of treatment.  All patient needs addressed prior to ending therapy session.        Patient Diagnosis(es): There were no encounter diagnoses.  Past Medical History:  has a past medical history of Arthritis, BPH (benign prostatic hyperplasia), Dysphagia, GERD (gastroesophageal reflux disease), Hearing loss, HLD (hyperlipidemia), Insomnia, MR (mental retardation), Periorbital cellulitis, SBO (small bowel obstruction) ([facility]), Seizures (HCC), Ventral hernia, and Vitamin D deficiency.  Past Surgical History:  has a past surgical history that includes Abdomen surgery (, ); laparoscopy (14); laparotomy (14); cast application (Left); Hydrocele surgery (2016); Vagus nerve stimulator insertion; and Vagus nerve stimulator insertion (10/11/2016).       Assessment   Performance deficits / Impairments: Decreased functional mobility ;Decreased ADL status;Decreased strength;Decreased safe awareness;Decreased cognition;Decreased endurance;Decreased balance;Decreased posture;Decreased coordination  Assessment: Pt tolerated stand pivot transfer from EOB <> chair this date with handheld assist. Pt is w/c bound at group home, unable to tolerate further mobility this date. Pt ADLs, transfers and mobility are limited by cognitive impairments impacting impulsivity, safety awareness and direction following for good safety. Skilled OT services recommended to increase overall safety with ADLs and 
Occupational Therapy  Facility/Department: [facility] PROGRESSIVE CARE  Rehabilitation Occupational Therapy Daily Treatment Note    Date: 3/1/24  Patient Name: Jeremy King       Room: 1009/1009-02  MRN: [id number]  Account: [id number]   : 1955  (68 y.o.) Gender: male      HOANG Camacho reports patient is medically stable for therapy treatment this date. Chart reviewed prior to treatment and patient is agreeable for therapy.  All lines intact and patient positioned comfortably at end of treatment.  All patient needs addressed prior to ending therapy session.      Pt currently functioning below baseline.  Recommend daily inpatient skilled therapy at time of discharge to maximize long term outcomes and prevent re-admission. Please refer to AM-PAC score for current level of function.               Past Medical History:  has a past medical history of Arthritis, BPH (benign prostatic hyperplasia), Dysphagia, GERD (gastroesophageal reflux disease), Hearing loss, HLD (hyperlipidemia), Insomnia, MR (mental retardation), Periorbital cellulitis, SBO (small bowel obstruction) (HCC), Seizures (HCC), Ventral hernia, and Vitamin D deficiency.  Past Surgical History:   has a past surgical history that includes Abdomen surgery (, ); laparoscopy (14); laparotomy (14); cast application (Left); Hydrocele surgery (2016); Vagus nerve stimulator insertion; and Vagus nerve stimulator insertion (10/11/2016).    Restrictions  Restrictions/Precautions: Fall Risk, General Precautions, Up as Tolerated  Other position/activity restrictions: LUE IV    Subjective  Subjective: Pt resting in bed upon arrival agreeable to therapy. HOANG Camacho reporting pt needed to have a BM and therapy staff agreeded to assisting.  Restrictions/Precautions: Fall Risk;General Precautions;Up as Tolerated             Objective     Cognition  Overall Cognitive Status: Exceptions  Following Commands: Inconsistently follows commands  Attention Span: 
Occupational Therapy  Facility/Department: [facility] PROGRESSIVE CARE  Rehabilitation Occupational Therapy Daily Treatment Note    Date: 3/2/24  Patient Name: Jeremy King       Room: 1009/1009-02  MRN: [id number]  Account: [id number]   : 1955  (68 y.o.) Gender: male   Past Medical History:  has a past medical history of Arthritis, BPH (benign prostatic hyperplasia), Dysphagia, GERD (gastroesophageal reflux disease), Hearing loss, HLD (hyperlipidemia), Insomnia, MR (mental retardation), Periorbital cellulitis, SBO (small bowel obstruction) (HCC), Seizures (HCC), Ventral hernia, and Vitamin D deficiency.  Past Surgical History:   has a past surgical history that includes Abdomen surgery (, ); laparoscopy (14); laparotomy (14); cast application (Left); Hydrocele surgery (2016); Vagus nerve stimulator insertion; and Vagus nerve stimulator insertion (10/11/2016).    Restrictions  Restrictions/Precautions: Fall Risk, General Precautions, Up as Tolerated  Other position/activity restrictions: LUE IV  Required Braces or Orthoses?: No    Subjective  Subjective: Pt. resting in bed and agreeable for treatment.  Restrictions/Precautions: Fall Risk;General Precautions;Up as Tolerated  Objective     Cognition  Overall Cognitive Status: Exceptions  Following Commands: Inconsistently follows commands  Attention Span: Difficulty attending to directions;Difficulty dividing attention  Memory: Decreased recall of biographical Information;Decreased recall of recent events;Decreased recall of precautions  Safety Judgement: Decreased awareness of need for assistance;Decreased awareness of need for safety  Problem Solving: Assistance required to generate solutions;Assistance required to implement solutions;Decreased awareness of errors;Assistance required to correct errors made  Insights: Decreased awareness of deficits  Initiation: Requires cues for some  Sequencing: Requires cues for some  Cognition Comment: N[place] 
Occupational Therapy  Facility[facility] PROGRESSIVE CARE  Rehabilitation Occupational Therapy Daily Treatment Note    Date: 3/4/24  Patient Name: Jeremy King       Room: 1009/1009-02  MRN: [id number]  Account: [id number]   : 1955  (68 y.o.) Gender: male      RN Chela reports patient is medically stable for therapy treatment this date. Chart reviewed prior to treatment and patient is agreeable for therapy.  All lines intact and patient positioned comfortably at end of treatment.  All patient needs addressed prior to ending therapy session.      Pt currently functioning below baseline.  Recommend daily inpatient skilled therapy at time of discharge to maximize long term outcomes and prevent re-admission. Please refer to AM-PAC score for current level of function.               Past Medical History:  has a past medical history of Arthritis, BPH (benign prostatic hyperplasia), Dysphagia, GERD (gastroesophageal reflux disease), Hearing loss, HLD (hyperlipidemia), Insomnia, MR (mental retardation), Periorbital cellulitis, SBO (small bowel obstruction) (HCC), Seizures (HCC), Ventral hernia, and Vitamin D deficiency.  Past Surgical History:   has a past surgical history that includes Abdomen surgery (, ); laparoscopy (14); laparotomy (14); cast application (Left); Hydrocele surgery (2016); Vagus nerve stimulator insertion; and Vagus nerve stimulator insertion (10/11/2016).    Restrictions  Restrictions/Precautions: Fall Risk, General Precautions, Up as Tolerated  Other position/activity restrictions: LUE IV  Required Braces or Orthoses?: No    Subjective  Subjective: Pt. resting in bed and agreeable for treatment.  Restrictions/Precautions: Fall Risk;General Precautions;Up as Tolerated             Objective     Cognition  Overall Cognitive Status: Exceptions  Following Commands: Inconsistently follows commands  Attention Span: Difficulty attending to directions;Difficulty dividing 
Physical Therapy  DATE: 2024    NAME: Jeremy King  MRN: [id number]   : 1955    Patient not seen this date for Physical Therapy due to:      [x] Cancel by RN or physician due to: RN Tia States patient with Low BP today. Will continue to follow    [] Hemodialysis    [] Critical Lab Value Level     [] Blood transfusion in progress    [] Acute or unstable cardiovascular status   _MAP < 55 or more than >115  _HR < 40 or > 130    [] Acute or unstable pulmonary status   -FiO2 > 60%   _RR < 5 or >40    _O2 sats < 85%    [] Strict Bedrest    [] Off Unit for surgery or procedure    [] Off Unit for testing       [] Pending imaging to R/O fracture    [] Refusal by Patient      [] Other      [] PT being discontinued at this time. Patient independent. No further needs.     [] PT being discontinued at this time as the patient has been transferred to hospice care. No further needs.      Precious King, PTA     
Physical Therapy  Facility/Department: [facility] PROGRESSIVE CARE  Daily Treatment Note  NAME: Jeremy King  : 1955  MRN: [id number]    Date of Service: 3/4/2024    Discharge Recommendations:  Patient would benefit from continued therapy after discharge    Pt currently functioning below baseline.  Recommend daily inpatient skilled therapy at time of discharge to maximize long term outcomes and prevent re-admission. Please refer to AM-PAC score for current level of function.     Patient Diagnosis(es): There were no encounter diagnoses.    Assessment   Assessment: Patient required Max x 2 A for standing pivot transfers into recliner with not device. Poor safety and would recommend continuing transfers with ROXANA or mir terrazas. Patient would benefit from continued skilled PT services.  Activity Tolerance: Treatment limited secondary to decreased cognition     Plan    Physical Therapy Plan  General Plan: 5-7 times per week  Current Treatment Recommendations: Strengthening;ROM;Balance training;Transfer training;Endurance training;Functional mobility training;Patient/Caregiver education & training;Therapeutic activities;Wheelchair mobility training     Restrictions  Restrictions/Precautions  Restrictions/Precautions: Fall Risk, General Precautions, Up as Tolerated  Required Braces or Orthoses?: No  Position Activity Restriction  Other position/activity restrictions: LUE IV     Subjective    Subjective  Subjective: Patient resting in bed upon arrival and agreeable for PT treatment.  Orientation  Overall Orientation Status: Impaired  Orientation Level: Oriented to person  Cognition  Overall Cognitive Status: Exceptions  Following Commands: Inconsistently follows commands  Attention Span: Difficulty attending to directions;Difficulty dividing attention  Memory: Decreased recall of biographical Information;Decreased recall of recent events;Decreased recall of precautions  Safety Judgement: Decreased awareness of need for 
Physical Therapy  Facility/Department: [facility] PROGRESSIVE CARE  Daily Treatment Note  NAME: Jeremy King  : 1955  MRN: [id number]    Date of Service: 2024    Discharge Recommendations:    Pt currently functioning below baseline.  Recommend daily inpatient skilled therapy at time of discharge to maximize long term outcomes and prevent re-admission. Please refer to AM-PAC score for current level of function.( Or return to group home with care )            Assessment   Assessment: Transfer training today. Pt. did well and appeared to transfer as he does in his facility. Needed mod A x 2. Will continue to practice with pt.   Added W/C mobility goal.  Activity Tolerance: Treatment limited secondary to decreased cognition   NEXT SESSION: Transfer Pt. To W/C and see if he can propel himself. Will be nice for him to get out in hallway if possible. CoTx for safety.     Plan    Physical Therapy Plan  General Plan: 5-7 times per week  Specific Instructions for Next Treatment: try sit to stand with RW; progress to stand pivot transfer; continue to ask pt. how he transfers (sometimes he could answer more clearly about things in his group home, like using a w/c)  Current Treatment Recommendations: Strengthening;ROM;Balance training;Transfer training;Endurance training;Functional mobility training;Patient/Caregiver education & training;Therapeutic activities;Wheelchair mobility training     Restrictions  Restrictions/Precautions  Restrictions/Precautions: Fall Risk, General Precautions, Up as Tolerated  Position Activity Restriction  Other position/activity restrictions: NG to suction, LUE IV     Subjective    Subjective  Subjective: Pt. states today is a good day.  Pain: no c/o pain  Orientation  Overall Orientation Status: Impaired  Orientation Level: Oriented to person  Cognition  Overall Cognitive Status: Exceptions  Following Commands: Inconsistently follows commands  Attention Span: Difficulty attending to 
Physical Therapy  Facility/Department: [facility] PROGRESSIVE CARE  Physical Therapy Initial Assessment    Name: Jeremy King  : 1955  MRN: [id number]  Date of Service: 2024    Discharge Recommendations:   Continue therapy        The patient was discharged from our facility on  after reporting admission related to small bowel obstruction.  He  had an NGT placed. During that admission there is also concern that he was having some seizure activity and neurology adjusted his dose of Keppra. His symptoms resolved with bowel rest.  According to notes he had return of bowel function and was tolerating p.o. intake was discharged.  He was taken back to the ER today because of decreased p.o. intake according to the intake notes.  The patient is unable to provide any information.   CT today showed high grade sbo with zone of transition at surgical anastomosis left  mid abdomen. Pneumatosis intestinalis within the small bowel in the lower abdomen and upper pelvis.       Patient Diagnosis(es): There were no encounter diagnoses.  Past Medical History:  has a past medical history of Arthritis, BPH (benign prostatic hyperplasia), Dysphagia, GERD (gastroesophageal reflux disease), Hearing loss, HLD (hyperlipidemia), Insomnia, MR (mental retardation), Periorbital cellulitis, SBO (small bowel obstruction) (HCC), Seizures (HCC), Ventral hernia, and Vitamin D deficiency.  Past Surgical History:  has a past surgical history that includes Abdomen surgery (, ); laparoscopy (14); laparotomy (14); cast application (Left); Hydrocele surgery (2016); Vagus nerve stimulator insertion; and Vagus nerve stimulator insertion (10/11/2016).    Assessment   Body Structures, Functions, Activity Limitations Requiring Skilled Therapeutic Intervention: Decreased functional mobility ;Decreased endurance;Decreased balance;Decreased safe awareness;Decreased cognition  Assessment: Exact details are unclear, but it appears that 
Pt admitted to room 1009 from [facility]. Admission documentation complete, vitals taken and telemetry placed. Pt oriented to room and unit routine, including hourly rounding. Call light in reach and safety maintained. Seizure precautions in place. Will continue to provide support and education.    
Pt discharged, transport here for pt  All belongings accounted for  Ivs and cardiac monitors removed  Paperwork given to transport    
Pt's legal guardian, Esther Denny, called writer for an update on pt's status. Phone call was returned and writer spoke with pt's legal guardian. All questions and concerns addressed.   
Surgical Progress Note    Chief complaint: No acute events overnight.  The patient has been tolerating GI soft diet throughout the day.  He had  no bowel movements recorded yesterday. He continues to pass flatus.  He continues to deny any abdominal pain.  He has not had any fevers.  He denies any nausea or vomiting. Working with PT/OT.    Patient doing fairly well    Vitals:    03/04/24 0255   BP: 94/60   Pulse: 85   Resp: 16   Temp: 99.1 °F (37.3 °C)   SpO2: 96%        Review of Systems - History obtained from chart review and the patient  General ROS: negative for - chills or fever  Psychological ROS: negative for - hallucinations, hostility, or irritability  Ophthalmic ROS: negative for - dry eyes or eye pain  ENT ROS: negative for - epistaxis or oral lesions  Respiratory ROS: no cough, shortness of breath, or wheezing  Cardiovascular ROS: no chest pain or dyspnea on exertion  Gastrointestinal ROS: negative for - abdominal pain or nausea/vomiting  Genito-Urinary ROS: no dysuria, trouble voiding, or hematuria  Musculoskeletal ROS: negative for - joint swelling or muscle pain    Exam: General Appearance: alert and oriented to person, place and time and in no acute distress  Skin: warm and dry, no rash or erythema  Head: normocephalic and atraumatic  Eyes: pupils equal, round, and reactive to light, extraocular eye movements intact, conjunctivae normal  ENT: hearing grossly normal bilaterally  Neck: neck supple and non tender without mass, no thyromegaly or thyroid nodules, no cervical lymphadenopathy   Pulmonary/Chest: clear to auscultation bilaterally- no wheezes, rales or rhonchi, normal air movement, no respiratory distress  Cardiovascular: normal rate, normal S1 and S2, and no gallops  Abdomen: soft, non-tender, non-distended, normal bowel sounds, no masses or organomegaly  Breast: breasts appear normal, no suspicious masses, no skin or nipple changes or axillary nodes  Extremities: no cyanosis and no 
Surgical Progress Note    Chief complaint: No acute events overnight.  The patient has reportedly tolerating clear liquid throughout the day but really has not drank much overnight.  He had 2 small bowel movements recorded yesterday according to the nurses but nothing overnight as well.  He continues to pass flatus.  He continues to deny any abdominal pain.  His white blood cell count is normal.  He has not had any fevers.  He denies any nausea or vomiting.    Patient doing fairly well    Vitals:    03/02/24 0430   BP:    Pulse: 58   Resp:    Temp:    SpO2:         Review of Systems - History obtained from chart review and the patient  General ROS: negative for - chills or fever  Psychological ROS: negative for - hallucinations, hostility, or irritability  Ophthalmic ROS: negative for - dry eyes or eye pain  ENT ROS: negative for - epistaxis or oral lesions  Respiratory ROS: no cough, shortness of breath, or wheezing  Cardiovascular ROS: no chest pain or dyspnea on exertion  Gastrointestinal ROS: negative for - abdominal pain or nausea/vomiting  Genito-Urinary ROS: no dysuria, trouble voiding, or hematuria  Musculoskeletal ROS: negative for - joint swelling or muscle pain    Exam: General Appearance: alert and oriented to person, place and time and in no acute distress  Skin: warm and dry, no rash or erythema  Head: normocephalic and atraumatic  Eyes: pupils equal, round, and reactive to light, extraocular eye movements intact, conjunctivae normal  ENT: hearing grossly normal bilaterally  Neck: neck supple and non tender without mass, no thyromegaly or thyroid nodules, no cervical lymphadenopathy   Pulmonary/Chest: clear to auscultation bilaterally- no wheezes, rales or rhonchi, normal air movement, no respiratory distress  Cardiovascular: normal rate, normal S1 and S2, and no gallops  Abdomen: soft, non-tender, non-distended, normal bowel sounds, no masses or organomegaly  Breast: breasts appear normal, no 
Surgical Progress Note    Chief complaint: No acute events overnight.  The patient has reportedly tolerating clear liquid throughout the day.  He had 1 large bowel movements recorded yesterday after suppository was given.  He continues to pass flatus.  He continues to deny any abdominal pain.  He has not had any fevers.  He denies any nausea or vomiting. Working with PT/OT.    Patient doing fairly well    Vitals:    03/03/24 0430   BP: 127/68   Pulse: 65   Resp: 18   Temp: 98 °F (36.7 °C)   SpO2: 98%        Review of Systems - History obtained from chart review and the patient  General ROS: negative for - chills or fever  Psychological ROS: negative for - hallucinations, hostility, or irritability  Ophthalmic ROS: negative for - dry eyes or eye pain  ENT ROS: negative for - epistaxis or oral lesions  Respiratory ROS: no cough, shortness of breath, or wheezing  Cardiovascular ROS: no chest pain or dyspnea on exertion  Gastrointestinal ROS: negative for - abdominal pain or nausea/vomiting  Genito-Urinary ROS: no dysuria, trouble voiding, or hematuria  Musculoskeletal ROS: negative for - joint swelling or muscle pain    Exam: General Appearance: alert and oriented to person, place and time and in no acute distress  Skin: warm and dry, no rash or erythema  Head: normocephalic and atraumatic  Eyes: pupils equal, round, and reactive to light, extraocular eye movements intact, conjunctivae normal  ENT: hearing grossly normal bilaterally  Neck: neck supple and non tender without mass, no thyromegaly or thyroid nodules, no cervical lymphadenopathy   Pulmonary/Chest: clear to auscultation bilaterally- no wheezes, rales or rhonchi, normal air movement, no respiratory distress  Cardiovascular: normal rate, normal S1 and S2, and no gallops  Abdomen: soft, non-tender, non-distended, normal bowel sounds, no masses or organomegaly  Breast: breasts appear normal, no suspicious masses, no skin or nipple changes or axillary 
Writer called Ivelisse the caregiver and gave her an update on pt. Transport to  at 5 pm. Care ongoing.   
Writer spoke with sister, Esther, regarding pt status and POC, all questions answered.      Cardiology s/o this am with the assumption they would want no intervention. Esther stated this is not true, if something needs to be done they would like it done. Writer reached out to Cardiology Dr. Mendoza to explain this and he stated they will still see him in the morning. Writer added the group back to the treatment team.     One question Esther had is if the VNS would interfere with a pacemaker if necessary or vice versa.   
deficiency.    Social History:   reports that he has never smoked. He does not have any smokeless tobacco history on file. He reports that he does not drink alcohol and does not use drugs.     Family History:   Family History   Problem Relation Age of Onset    High Blood Pressure Mother     Thyroid Cancer Mother     Other Mother         Myeloma    Heart Disease Mother         2 Stents    Arthritis Mother         2 Back surgeries    Lung Cancer Father     Heart Disease Brother         Defibrilator    Heart Attack Maternal Grandfather     Diabetes Paternal Grandmother        Vitals:  /61   Pulse 85   Temp 97.9 °F (36.6 °C) (Oral)   Resp 18   Wt 72.4 kg (159 lb 9.6 oz)   SpO2 96%   BMI 22.90 kg/m²   Temp (24hrs), Av.3 °F (36.8 °C), Min:97.7 °F (36.5 °C), Max:99.1 °F (37.3 °C)    No results for input(s): \"POCGLU\" in the last 72 hours.    I/O (24Hr):    Intake/Output Summary (Last 24 hours) at 3/4/2024 0821  Last data filed at 3/3/2024 2258  Gross per 24 hour   Intake --   Output 1100 ml   Net -1100 ml         Labs:  Hematology:  Recent Labs     24  0533   WBC 5.1   RBC 2.96*   HGB 9.7*   HCT 30.3*   .4   MCH 32.8   MCHC 32.0   RDW 12.8      MPV 9.9     Chemistry:  Recent Labs     24  0533 24  0511 24  0640    143 142   K 3.7 3.3* 4.6   * 111* 109*   CO2 23 25 28   GLUCOSE 97 92 105*   BUN 2* <2* 5*   CREATININE 0.7 0.8 0.9   MG 1.3*  --  1.4*   ANIONGAP 6* 7* 5*   LABGLOM >60 >60 >60   CALCIUM 7.9* 8.3* 8.2*     No results for input(s): \"PROT\", \"LABALBU\", \"LABA1C\", \"U8QXUIE\", \"L4IUQPU\", \"FT4\", \"TSH\", \"AST\", \"ALT\", \"LDH\", \"GGT\", \"ALKPHOS\", \"LABGGT\", \"BILITOT\", \"BILIDIR\", \"AMMONIA\", \"AMYLASE\", \"LIPASE\", \"LACTATE\", \"CHOL\", \"HDL\", \"LDLCHOLESTEROL\", \"CHOLHDLRATIO\", \"TRIG\", \"VLDL\", \"JLB77HU\", \"PHENYTOIN\", \"PHENYF\", \"URICACID\", \"POCGLU\" in the last 72 hours.    ABG:No results found for: \"POCPH\", \"PHART\", \"PH\", \"POCPCO2\", \"OZT4OVZ\", \"PCO2\", \"POCPO2\", \"PO2ART\", 
transfer  Activity: Within Room  Activity Comments: Patient unsteady  Additional Factors: Set-up;Verbal cues;Hand placement cues  Assistance Level: Maximum assistance;Requires x 2 assistance  Gait Deviations: Unsteady gait    Neuromuscular Education  Neuromuscular Education: Yes  Lower extremity;Sitting;Standing    PT Exercises  A/AROM Exercises: Seated BRYCE LE AROM to tolerance including marches, LAQ, hip abd/add 1 set x10 reps.  Circulation/Endurance Exercises: AP and ankle circles to tolerance  Pressure Relief Exercises: seated lateral WS      ASSESSMENT/PROGRESS TOWARDS GOALS     [facility] Inpatient Mobility Raw Score 12   [facility] Inpatient T-Scale Score 35.33   Mobility Inpatient CMS 0-100% Score 68.66   Mobility Inpatient CMS G-Code Modifier CL       Assessment  Assessment: Patient with progression in tolerance to activity and transfers this date with multiple attempts at sit to stands with RW to promote to pivot transfer this date.  Patient is a MOD. MAX x2 with RW for pivot transfer to recliner. Patient positioned into comfort in high backed chair upon completion of treatment to promote carryover into w/c goal.  Patient would benefit from continued skilled PT treatment to maximize return to PLOF  Activity Tolerance: Treatment limited secondary to decreased cognition    Goals  Patient Goals   Patient Goals : did not state  Short Term Goals  Time Frame for Short Term Goals: 12 visits:  Short Term Goal 1: Pt. to perform sit to stand with RW, mod A x 2- GOAL MET  Short Term Goal 2: Pt. to transfer bed to chair, sit/ stand pivot, mod A x1  Short Term Goal 3: Pt. to tolerate ther ex. in supine and seated positions and functional mob. training for 25min. daily  Short Term Goal 4: Pt. to demonstrate good dynamic sitting balance  Short Term Goal 5: Pt. to manuever w/c x 25 ft. using UEs/ LEs as able    PLAN OF CARE/SAFETY  Physical Therapy Plan  General Plan: 5-7 times per week  Safety Devices  Type of Devices: Nurse 
  *  --  108* 114*   CO2 22  --  19* 23   GLUCOSE 87  --  69* 101*   BUN 19  --  11 5*   CREATININE 0.8  --  0.7 0.8   MG  --  1.5*  --   --    ANIONGAP 4*  --  13 6*   LABGLOM >60  --  >60 >60   CALCIUM 7.9*  --  7.9* 8.0*   TROPHS  --  12  --   --      No results for input(s): \"PROT\", \"LABALBU\", \"LABA1C\", \"R2DHPFP\", \"Z4PCLTL\", \"FT4\", \"TSH\", \"AST\", \"ALT\", \"LDH\", \"GGT\", \"ALKPHOS\", \"LABGGT\", \"BILITOT\", \"BILIDIR\", \"AMMONIA\", \"AMYLASE\", \"LIPASE\", \"LACTATE\", \"CHOL\", \"HDL\", \"LDLCHOLESTEROL\", \"CHOLHDLRATIO\", \"TRIG\", \"VLDL\", \"IUE46PS\", \"PHENYTOIN\", \"PHENYF\", \"URICACID\", \"POCGLU\" in the last 72 hours.    ABG:No results found for: \"POCPH\", \"PHART\", \"PH\", \"POCPCO2\", \"DXK9DKB\", \"PCO2\", \"POCPO2\", \"PO2ART\", \"PO2\", \"POCHCO3\", \"CWT4XEJ\", \"HCO3\", \"NBEA\", \"PBEA\", \"BEART\", \"BE\", \"THGBART\", \"THB\", \"XUY6IHT\", \"VANR8ULJ\", \"Y7IDZEPZ\", \"O2SAT\", \"FIO2\"  Lab Results   Component Value Date/Time    SPECIAL NOT REPORTED 10/03/2019 06:15 AM     Lab Results   Component Value Date/Time    CULTURE NO GROWTH 5 DAYS 12/10/2023 10:25 PM       Radiology:  XR ABDOMEN (2 VIEWS)    Result Date: 2/27/2024  1. Multiple dilated small bowel loops measuring up to 6.3 cm demonstrating interval increase in distension compared with the prior study consistent with small-bowel obstruction. 2. Mild left basilar atelectasis and mild elevation of the left hemidiaphragm. 3. NG tube distal tip likely in the body of the stomach. The findings were sent to the Radiology Results Communication Center at 7:03 am on 2/27/2024 to be communicated to a licensed caregiver.     XR ABDOMEN (KUB) (SINGLE AP VIEW)    Result Date: 2/22/2024  Nonobstructive bowel gas pattern.  Enteric tube remains in place.     XR CHEST PORTABLE    Result Date: 2/21/2024  Enteric tube in satisfactory position.  No acute process in the lungs.     XR ABDOMEN FOR NG/OG/NE TUBE PLACEMENT    Result Date: 2/21/2024  1. NG tube extends into the upper stomach. 2. Moderate amount of gas and stool 
& training;Therapeutic activities;Wheelchair mobility training  Safety Devices  Type of Devices: Nurse notified;Call light within reach;All fall risk precautions in place;Patient at risk for falls;Gait belt;Chair alarm in place;Left in chair    EDUCATION  Education  Education Given To: Patient  Education Provided: Mobility Training;Transfer Training  Education Method: Demonstration;Verbal  Barriers to Learning: Cognition  Education Outcome: Verbalized understanding;Continued education needed        Therapy Time   Individual Concurrent Group Co-treatment   Time In       0949   Time Out       1028   Minutes       39     Co-treatment with OT warranted secondary to decreased safety and independence requiring 2 skilled therapy professionals to address individual discipline's goals. PT addressing weight shifting prior to transfers, transfer training, and postural control in sitting.     Precious King PTA, 03/01/24 at 12:52 PM           
--  7.9* 8.0* 7.9*   TROPHS 12  --   --   --      No results for input(s): \"PROT\", \"LABALBU\", \"LABA1C\", \"Y4MZQGR\", \"Y9BRTHB\", \"FT4\", \"TSH\", \"AST\", \"ALT\", \"LDH\", \"GGT\", \"ALKPHOS\", \"LABGGT\", \"BILITOT\", \"BILIDIR\", \"AMMONIA\", \"AMYLASE\", \"LIPASE\", \"LACTATE\", \"CHOL\", \"HDL\", \"LDLCHOLESTEROL\", \"CHOLHDLRATIO\", \"TRIG\", \"VLDL\", \"JAC16DM\", \"PHENYTOIN\", \"PHENYF\", \"URICACID\", \"POCGLU\" in the last 72 hours.    ABG:No results found for: \"POCPH\", \"PHART\", \"PH\", \"POCPCO2\", \"DBZ3QDR\", \"PCO2\", \"POCPO2\", \"PO2ART\", \"PO2\", \"POCHCO3\", \"GCZ6WMZ\", \"HCO3\", \"NBEA\", \"PBEA\", \"BEART\", \"BE\", \"THGBART\", \"THB\", \"QWX1VET\", \"VPJX0SBG\", \"K2ARVAXI\", \"O2SAT\", \"FIO2\"  Lab Results   Component Value Date/Time    SPECIAL NOT REPORTED 10/03/2019 06:15 AM     Lab Results   Component Value Date/Time    CULTURE NO GROWTH 5 DAYS 12/10/2023 10:25 PM       Radiology:  XR ABDOMEN (2 VIEWS)    Result Date: 2/27/2024  1. Multiple dilated small bowel loops measuring up to 6.3 cm demonstrating interval increase in distension compared with the prior study consistent with small-bowel obstruction. 2. Mild left basilar atelectasis and mild elevation of the left hemidiaphragm. 3. NG tube distal tip likely in the body of the stomach. The findings were sent to the Radiology Results Communication Center at 7:03 am on 2/27/2024 to be communicated to a licensed caregiver.     XR ABDOMEN (KUB) (SINGLE AP VIEW)    Result Date: 2/22/2024  Nonobstructive bowel gas pattern.  Enteric tube remains in place.     XR CHEST PORTABLE    Result Date: 2/21/2024  Enteric tube in satisfactory position.  No acute process in the lungs.     XR ABDOMEN FOR NG/OG/NE TUBE PLACEMENT    Result Date: 2/21/2024  1. NG tube extends into the upper stomach. 2. Moderate amount of gas and stool scattered in the proximal and mid colon. Moderate amount of gas can be identified in some loops of small bowel mainly in the left side of abdomen.       Physical Examination:        General appearance:  alert, 
A/D for donning gripper socks.  Toileting: Dependent/Total;Maximum assistance    Functional Mobility: Other (Comment)  Functional Mobility Skilled Clinical Factors: pt stood in [place] with mod A x 2; pt stands impulsively and relies on bed for support. Pt is able to march in place somewhat in SS, but not safe to trial steps with RW. Pt is focused on getting back to bed.    Additional Comments: All ADLs limited by cognition with max cues for all initiation/sequencing. Pt is also limited by dec safety awareness and impulsivity with standing.  Skin Care: Chlorhexidine solution        Bed mobility  Supine to Sit: Minimal assistance;Moderate assistance  Sit to Supine: Minimal assistance    Transfers  Sit to stand: Moderate assistance;2 Person assistance  Stand to sit: 2 Person assistance;Moderate assistance  Transfer Comments: with Madhavi Ny; pt is limited by cognition/dec. safety awareness. Pt needing max cues to initiate all tasks. High fall risk. Bed alarm applied    Vision  Vision:  (unknown; pt not able to state)  Hearing  Hearing: Within functional limits    Cognition  Overall Cognitive Status: Exceptions  Following Commands: Follows one step commands with repetition  Attention Span: Difficulty attending to directions  Memory: Decreased recall of biographical Information  Safety Judgement: Decreased awareness of need for assistance  Problem Solving: Assistance required to generate solutions;Assistance required to implement solutions;Decreased awareness of errors;Assistance required to correct errors made  Insights: Decreased awareness of deficits  Initiation: Requires cues for some  Sequencing: Requires cues for some  Orientation  Overall Orientation Status: Impaired  Orientation Level: Oriented to person  Perception  Overall Perceptual Status: WFL               Education Given To: Patient  Education Provided: Role of Therapy;Plan of Care;Home Exercise Program;Precautions;ADL Adaptive Strategies;Transfer 
\"O2SAT\", \"FIO2\"  Lab Results   Component Value Date/Time    SPECIAL NOT REPORTED 10/03/2019 06:15 AM     Lab Results   Component Value Date/Time    CULTURE NO GROWTH 5 DAYS 12/10/2023 10:25 PM       Radiology:  XR ABDOMEN (2 VIEWS)    Result Date: 2/27/2024  1. Multiple dilated small bowel loops measuring up to 6.3 cm demonstrating interval increase in distension compared with the prior study consistent with small-bowel obstruction. 2. Mild left basilar atelectasis and mild elevation of the left hemidiaphragm. 3. NG tube distal tip likely in the body of the stomach. The findings were sent to the Radiology Results Communication Center at 7:03 am on 2/27/2024 to be communicated to a licensed caregiver.     XR ABDOMEN (KUB) (SINGLE AP VIEW)    Result Date: 2/22/2024  Nonobstructive bowel gas pattern.  Enteric tube remains in place.     XR CHEST PORTABLE    Result Date: 2/21/2024  Enteric tube in satisfactory position.  No acute process in the lungs.     XR ABDOMEN FOR NG/OG/NE TUBE PLACEMENT    Result Date: 2/21/2024  1. NG tube extends into the upper stomach. 2. Moderate amount of gas and stool scattered in the proximal and mid colon. Moderate amount of gas can be identified in some loops of small bowel mainly in the left side of abdomen.       Physical Examination:        General appearance:  alert, cooperative and no distress  Mental Status:  oriented to person, place and time and normal affect  Lungs:  clear to auscultation bilaterally, normal effort  Heart:  regular rate and rhythm, no murmur  Abdomen:  soft, nontender, nondistended, normal bowel sounds, no masses, hepatomegaly, splenomegaly  Extremities:  no edema, redness, tenderness in the calves  Skin:  no gross lesions, rashes, induration    Assessment:        Hospital Problems             Last Modified POA    * (Principal) Small bowel obstruction (HCC)  2/26/2024 Yes    MR (mental retardation), severe (Chronic) 2/27/2024 Yes    Overview Signed 6/10/2019

## 2024-03-04 NOTE — PLAN OF CARE
Pt remains safe and free from falls thus far in shift  Remains on RA with no respiratory distress  PICC line was occluded, alteplase ordered per attending  675 ml urine output  Worked with therapy this afternoon and sat up in the chair   Discharge today to [facility] home, transport at 5pm  Pt had a bowel movement, gave suppository per surgery  2g mag given for replacement of 1.4 this am  Call light in reach  Bed locked and lowest position  Bed alarm on for safety  Care ongoing  Problem: Discharge Planning  Goal: Discharge to home or other facility with appropriate resources  3/4/2024 1051 by Chela Dunn RN  Outcome: Progressing     Problem: Respiratory - Adult  Goal: Achieves optimal ventilation and oxygenation  3/4/2024 1051 by Chela Dunn RN  Outcome: Progressing     Problem: Safety - Adult  Goal: Free from fall injury  3/4/2024 1051 by Chela Dunn RN  Outcome: Progressing     Problem: Skin/Tissue Integrity  Goal: Absence of new skin breakdown  Description: 1.  Monitor for areas of redness and/or skin breakdown  2.  Assess vascular access sites hourly  3.  Every 4-6 hours minimum:  Change oxygen saturation probe site  4.  Every 4-6 hours:  If on nasal continuous positive airway pressure, respiratory therapy assess nares and determine need for appliance change or resting period.  3/4/2024 1051 by Chela Dunn RN  Outcome: Progressing     Problem: ABCDS Injury Assessment  Goal: Absence of physical injury  3/4/2024 1051 by Chela Dunn, RN  Outcome: Progressing

## 2024-03-04 NOTE — PLAN OF CARE
Pt had an uneventful night. Per day shift RN pt has been very lethargic all day and did not eat dinner tray. Upon assessment, writer offered dinner and pt consumed 100% of tray. Pt sat up in bed and held conversation before going to bed. No complaints of pain. Pt had multiple wet briefs throughout the night. Vagal stimulator magnet at bedside. No seizure like activity. GI rounded this morning, pt is ok to go after he has a BM.    Standard safety measures in place. Call light within reach. Bed in locked and lowest position. Seizure precautions in place. Will continue to monitor.  Problem: Discharge Planning  Goal: Discharge to home or other facility with appropriate resources  3/4/2024 0529 by Shawn Hart RN  Outcome: Progressing     Problem: Respiratory - Adult  Goal: Achieves optimal ventilation and oxygenation  3/4/2024 0529 by Shawn Hart RN  Outcome: Progressing     Problem: Safety - Adult  Goal: Free from fall injury  3/4/2024 0529 by Shawn Hart RN  Outcome: Progressing     Problem: Skin/Tissue Integrity  Goal: Absence of new skin breakdown  Description: 1.  Monitor for areas of redness and/or skin breakdown  2.  Assess vascular access sites hourly  3.  Every 4-6 hours minimum:  Change oxygen saturation probe site  4.  Every 4-6 hours:  If on nasal continuous positive airway pressure, respiratory therapy assess nares and determine need for appliance change or resting period.  3/4/2024 0529 by Shawn Hart, RN  Outcome: Progressing     Problem: ABCDS Injury Assessment  Goal: Absence of physical injury  Outcome: Progressing

## 2024-03-04 NOTE — DISCHARGE SUMMARY
[facility]  Office: [phone]  Ayden Trejo DO, Samir Yousif DO, Vijay Ramirez DO, Elder Leigh DO, Jeremias Martin MD, Herminia Slaughter MD, Partha Snow MD, Adela Mcdonough MD,  Patrick Pacheco MD, Sandhya Fontaine MD, Iris Collazo MD,  Evert Perez DO, Galo Hampton MD, Abel Dietz MD, Robert Trejo DO, Luz Ellis MD,  Tim Mcwilliams DO, Delisa Huff MD, Gayle Guevara MD, Pearl Davis MD, Tyler Banuelos MD,  Adama Johnson MD, Craig Bradford MD, Bambi Bowen MD, Dusty Landis MD, Juan Oliveira MD, Carrie Thakur MD, Krishna Lux DO, Aniceto Roth DO, Betzaida Campbell MD,  Fernando Burt MD, Shirley Waterhouse, CNP,  Regina Kern, CNP, Epi Henry, CNP,  Yuliya Juan, DNP, Era Campos, CNP, Lu Ziegler, CNP, Dalila Canales CNP, Zee Crystal, CNP, Gwen Duran, CNP, Avelina Sharp, PA-C, Laxmi David PA-C, Ondina Leslie, CNP, Silvia Steward, CNP, Janice Burger, CNP, Alison Mendoza, CNS, Shy Corbin, CNP, Saba Marr, CNP, Tracy Schwab, CNP         [facility]   IN-PATIENT SERVICE   [facility]    Discharge Summary     Patient ID: Jeremy King  :  1955   MRN: [id number]     ACCOUNT:  [id number]   Patient's PCP: Sukumar Larose MD  Admit Date: 2024   Discharge Date: 3/4/2024     Length of Stay: 7  Code Status:  Full Code  Admitting Physician: Vijay Ramirez DO  Discharge Physician: Vijay Ramirez DO     Active Discharge Diagnoses:     Hospital Problem Lists:  Principal Problem:    Small bowel obstruction (HCC)   Active Problems:    MR (mental retardation), severe    Epileptic seizures (HCC)    Dysphagia    ESTHER (acute kidney injury) (HCC)    Pneumatosis intestinalis  Resolved Problems:    * No resolved hospital problems. *      Admission Condition:  fair     Discharged Condition: stable    Hospital Stay:     Hospital Course:  Jeremy King is a 68 y.o. male who was admitted for the management of

## 2024-03-04 NOTE — CARE COORDINATION
02/28/24 1146   Readmission Assessment   Number of Days since last admission? 1-7 days   Previous Disposition Other (comment)  (group home)   Who is being Interviewed Unable to Complete  (chart)   What was the patient's/caregiver's perception as to why they think they needed to return back to the hospital? Other (Comment)  (transfer due to sbo)   Did you visit your Primary Care Physician after you left the hospital, before you returned this time? No   Why weren't you able to visit your PCP? Did not have an appointment   Did you see a specialist, such as Cardiac, Pulmonary, Orthopedic Physician, etc. after you left the hospital? No   Who advised the patient to return to the hospital? Other Nurse (Navigator, CTN)   Does the patient report anything that got in the way of taking their medications? No   In our efforts to provide the best possible care to you and others like you, can you think of anything that we could have done to help you after you left the hospital the first time, so that you might not have needed to return so soon? Other (Comment)  (return to group home)       
DC Planning    CHART REVIEW     Pt started on CLD with slow advancement. Plan for pt to return to group home [place].  Will need to ok with legal guardian Esther Denny and call Ivelisse at group home so they can transport [phone].   
Discharge Planning    Dr Ramirez plan on suppository and then anticipates BM and then pt can be discharged and  requests 3pm transport.    Phone call to Ivelisse at [phone] and message left with request for transport time of 3pm.    Phone call to pt's guardian Pat at [phone] and updated her that planning on discharge at 3pm.  Discussed Medicare rights and can appeal discharge but she is not interersted in that.  She will also contact Ivelisse regarding transportation home.    Addendum at 9967  Ivelisse returned phone call and states they do not have any available openings for transport and she ask that the hospital set that up.  MIC Alvarado will follow up.    Faxed After visit summary and PT notes to Ivelisse at fax [phone] at her request.    Nurse Chela updated on 5pm  time  
Discharge planning     Patient admitted sbo, was just discharged from here 2/25 for same diagnosis. gen surgery did see today. NPO>> NG clamp trial today. If little output after 6 hours ok to remove.       From [facility]. He has 24 hour care available to him. Per che on last admission patient one assist with gait belt.     POA is sister Rush Denny. Upon discharge  call to che from [facility] to arrange transportation. Phone is [phone]  
[facility] will transport patient to group home today at 5PM. Updated guardian, Esther Denny. She is agreeable with dc plans. Antonina  
9 /24    Family can provide assistance at DC: No  Would you like Case Management to discuss the discharge plan with any other family members/significant others, and if so, who? Yes (Sister and brother.)  Plans to Return to Present Housing: Unknown at present  Other Identified Issues/Barriers to RETURNING to current housing: medical condition  Potential Assistance needed at discharge: Extended Care Placement            Potential DME:    Patient expects to discharge to: [facility]  Plan for transportation at discharge: Family    Financial    Payor: [facility] MEDICARE / Plan: UNITEDHEALTHCARE DUAL COMPLETE / Product Type: *No Product type* /     Does insurance require precert for SNF: Yes    Potential assistance Purchasing Medications: No  Meds-to-Beds request: Yes      [id number] - [place], [place] - [address] -  [phone] - F [phone]  [address] [place] [address]  Phone: [phone] Fax: [phone]    [facility] [address] [address] - P [phone] - F [phone]  [address] [place] [address]  Phone: [phone] Fax: [phone]      Notes:    Factors facilitating achievement of predicted outcomes: Family support, Caregiver support, Cooperative, and Pleasant    Barriers to discharge: Pain, Decreased endurance, and Medical complications    Additional Case Management Notes: SBO. Patient is MRDD and lives at [facility]. Has nursing and aide services around the clock. ([facility]). Pt. Is w/c bound and is complete care 24/7.  Ivelisse- caregiver at [facility] - [phone].Legal guardian - Esther  [phone]. Gen surg consulted. NPO with NG placed. Pt. Is FC.     The Plan for Transition of Care is related to the following treatment goals of Small bowel obstruction (HCC) [K56.609]    IF APPLICABLE: The Patient and/or patient representative Jeremy and his family were provided with a choice of provider and agrees with the discharge plan. Freedom of choice list with basic

## 2024-03-04 NOTE — DISCHARGE INSTR - COC
Continuity of Care Form    Patient Name: Jeremy King   :  1955  MRN:  [id number]    Admit date:  2024  Discharge date:  3/4/24    Code Status Order: Full Code   Advance Directives:     Admitting Physician:  Vijay Ramirez DO  PCP: Sukumar Larose MD    Discharging Nurse: [facility] Unit/Room#: 1009/1009-02  Discharging Unit Phone Number: [phone]    Emergency Contact:   Extended Emergency Contact Information  Primary Emergency Contact: DenishaEsther   [facility]  Home Phone: [phone]  Relation: Legal Guardian  Secondary Emergency Contact: Samir King  Home Phone: [phone]  Relation: Brother/Sister    Past Surgical History:  Past Surgical History:   Procedure Laterality Date    ABDOMEN SURGERY  ,     Bowel Obstruction X2    CAST APPLICATION Left     Lt. Ankle , X3    HYDROCELE EXCISION  2016    LAPAROSCOPY  14    with BERNARD    LAPAROTOMY  14    with partial cecectomy    VAGAL NERVE STIMULATION      placed, then replaced    VAGAL NERVE STIMULATION  10/11/2016    replaced generator battery       Immunization History:   Immunization History   Administered Date(s) Administered    COVID-19, PFIZER PURPLE top, DILUTE for use, (age 12 y+), 30mcg/0.3mL 2021, 2021, 2022    TDaP, ADACEL (age 10y-64y), BOOSTRIX (age 10y+), IM, 0.5mL 2013       Active Problems:  Patient Active Problem List   Diagnosis Code    Small bowel obstruction (HCC)  K56.609    Seizures ([facility]) Chronic R56.9    MR (mental retardation), severe F72    Epilepsy ([facility]) G40.909    SBO (small bowel obstruction) ([facility]) K56.609    Mild malnutrition ([facility]) E44.1    LEELEE (obstructive sleep apnea) G47.33    Epileptic seizures ([facility]) G40.909    Intellectual disability F79    S/P placement of VNS (vagus nerve stimulation) device Z96.89    Dysphagia R13.10    Elevated international normalized ratio (INR) R79.1    Nausea and vomiting R11.2    Seizure disorder ([facility])

## 2024-03-05 NOTE — ADT AUTH CERT
Patient Demographics            Name    Patient ID    SSN    Gender Identity    Birth Date      Jeremy King [id number]  Male [birth year] (68 yrs)           Address    Phone    Email    Employer            [address] [place] [address] [phone] (H)   [phone] (M) -- DISABLED            County    Race    Occupation    Emp Status            [place] (non-) -- Disabled            Reg Status    PCP    Date Last Verified    Next Review Date            Verified Sukumar Larose MD   [phone] 02/20/24 03/21/24            Admission Date    Discharge Date    Admitting Provider                  02/26/24 03/04/24 Vijay Ramirez,              Marital Status    Adventism                        Single Non-Voodoo              Emergency Contact 1    Emergency Contact 2      Esther Denny (L)   [place]   [phone] (H) Samir King (6)   [phone] (H)           Physician Progress Notes Notes from 03/02/24 through 03/05/24          Progress Notes by Luis Mccabe MD at 3/4/2024  4:50 AM      Author: Luis Mccabe MD Service: General Surgery Author Type: Physician   Filed: 3/4/2024  2:55 PM Date of Service: 3/4/2024  4:50 AM Status: Signed   : Luis Mccabe MD (Physician)   Related Notes: Original Note by Kristian Crabtree MD (Resident) filed at 3/4/2024  5:08 AM                  Surgical Progress Note         Chief complaint: No acute events overnight.  The patient has been tolerating GI soft diet throughout the day.  He had  no bowel movements recorded yesterday. He continues to pass flatus.  He continues to deny any abdominal pain.  He has not had any fevers.  He denies any nausea or vomiting. Working with PT/OT.         Patient doing fairly well               Vitals:             03/04/24 0255       BP:     94/60       Pulse:     85       Resp:     16       Temp:     99.1 °F (37.3 °C)       SpO2:     96%

## 2024-03-07 ENCOUNTER — HOSPITAL ENCOUNTER (EMERGENCY)
Age: 69
Discharge: HOME | End: 2024-03-07
Payer: MEDICARE

## 2024-03-07 VITALS
SYSTOLIC BLOOD PRESSURE: 92 MMHG | HEART RATE: 85 BPM | RESPIRATION RATE: 18 BRPM | TEMPERATURE: 97.52 F | DIASTOLIC BLOOD PRESSURE: 58 MMHG | OXYGEN SATURATION: 99 %

## 2024-03-07 VITALS — BODY MASS INDEX: 32.3 KG/M2

## 2024-03-07 DIAGNOSIS — Z79.899: ICD-10-CM

## 2024-03-07 DIAGNOSIS — E78.5: ICD-10-CM

## 2024-03-07 DIAGNOSIS — K21.9: ICD-10-CM

## 2024-03-07 DIAGNOSIS — R62.50: ICD-10-CM

## 2024-03-07 DIAGNOSIS — L30.9: ICD-10-CM

## 2024-03-07 DIAGNOSIS — G47.00: ICD-10-CM

## 2024-03-07 DIAGNOSIS — N13.8: ICD-10-CM

## 2024-03-07 DIAGNOSIS — H91.90: ICD-10-CM
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
## 2024-03-07 DIAGNOSIS — G40.909: ICD-10-CM

## 2024-03-07 DIAGNOSIS — S16.1XXA: Primary | ICD-10-CM

## 2024-03-07 DIAGNOSIS — N40.1: ICD-10-CM

## 2024-03-07 DIAGNOSIS — K59.89: ICD-10-CM

## 2024-03-07 DIAGNOSIS — Z79.4: ICD-10-CM

## 2024-03-07 DIAGNOSIS — F71: ICD-10-CM

## 2024-03-07 DIAGNOSIS — M19.90: ICD-10-CM

## 2024-03-07 PROCEDURE — 99284 EMERGENCY DEPT VISIT MOD MDM: CPT

## 2024-03-07 PROCEDURE — 72125 CT NECK SPINE W/O DYE: CPT

## 2024-03-22 ENCOUNTER — HOSPITAL ENCOUNTER (EMERGENCY)
Age: 69
Discharge: HOME | End: 2024-03-22
Payer: MEDICARE

## 2024-03-22 VITALS
TEMPERATURE: 98 F | DIASTOLIC BLOOD PRESSURE: 57 MMHG | SYSTOLIC BLOOD PRESSURE: 117 MMHG | OXYGEN SATURATION: 98 % | RESPIRATION RATE: 18 BRPM | HEART RATE: 89 BPM

## 2024-03-22 DIAGNOSIS — L30.9: ICD-10-CM

## 2024-03-22 DIAGNOSIS — N39.43: ICD-10-CM

## 2024-03-22 DIAGNOSIS — H40.9: ICD-10-CM

## 2024-03-22 DIAGNOSIS — N40.1: ICD-10-CM

## 2024-03-22 DIAGNOSIS — K59.89: ICD-10-CM

## 2024-03-22 DIAGNOSIS — G40.909: ICD-10-CM

## 2024-03-22 DIAGNOSIS — E55.9: ICD-10-CM

## 2024-03-22 DIAGNOSIS — Z04.3: Primary | ICD-10-CM

## 2024-03-22 DIAGNOSIS — F71: ICD-10-CM

## 2024-03-22 DIAGNOSIS — R62.50: ICD-10-CM

## 2024-03-22 DIAGNOSIS — G47.00: ICD-10-CM

## 2024-03-22 DIAGNOSIS — K21.9: ICD-10-CM

## 2024-03-22 DIAGNOSIS — H91.90: ICD-10-CM

## 2024-03-22 DIAGNOSIS — R35.1: ICD-10-CM

## 2024-03-22 DIAGNOSIS — E78.5: ICD-10-CM

## 2024-03-22 DIAGNOSIS — Z79.899: ICD-10-CM

## 2024-03-22 DIAGNOSIS — M19.90: ICD-10-CM

## 2024-03-22 DIAGNOSIS — N13.8: ICD-10-CM

## 2024-03-22 PROCEDURE — 73562 X-RAY EXAM OF KNEE 3: CPT

## 2024-03-22 PROCEDURE — 99284 EMERGENCY DEPT VISIT MOD MDM: CPT

## 2024-03-22 PROCEDURE — 70450 CT HEAD/BRAIN W/O DYE: CPT

## 2024-04-08 ENCOUNTER — HOSPITAL ENCOUNTER (INPATIENT)
Age: 69
LOS: 3 days | Discharge: HOME HEALTH | DRG: 872 | End: 2024-04-11
Admitting: STUDENT IN AN ORGANIZED HEALTH CARE EDUCATION/TRAINING PROGRAM
Payer: MEDICARE

## 2024-04-08 ENCOUNTER — APPOINTMENT (OUTPATIENT)
Dept: GENERAL RADIOLOGY | Age: 69
DRG: 872 | End: 2024-04-08
Payer: MEDICARE

## 2024-04-08 ENCOUNTER — APPOINTMENT (OUTPATIENT)
Dept: CT IMAGING | Age: 69
DRG: 872 | End: 2024-04-08
Payer: MEDICARE

## 2024-04-08 DIAGNOSIS — R11.2 NAUSEA AND VOMITING, UNSPECIFIED VOMITING TYPE: ICD-10-CM

## 2024-04-08 DIAGNOSIS — R56.9 SEIZURE-LIKE ACTIVITY (HCC): ICD-10-CM

## 2024-04-08 DIAGNOSIS — R56.9 SEIZURES (HCC): Chronic | ICD-10-CM

## 2024-04-08 DIAGNOSIS — R11.2 INTRACTABLE NAUSEA AND VOMITING: ICD-10-CM

## 2024-04-08 DIAGNOSIS — F72 SEVERE INTELLECTUAL DISABILITY: Chronic | ICD-10-CM

## 2024-04-08 DIAGNOSIS — K63.89 PNEUMATOSIS INTESTINALIS: ICD-10-CM

## 2024-04-08 DIAGNOSIS — R79.1 ELEVATED INTERNATIONAL NORMALIZED RATIO (INR): ICD-10-CM

## 2024-04-08 DIAGNOSIS — K56.609 SMALL BOWEL OBSTRUCTION (HCC): ICD-10-CM

## 2024-04-08 DIAGNOSIS — Z96.89 S/P PLACEMENT OF VNS (VAGUS NERVE STIMULATION) DEVICE: ICD-10-CM

## 2024-04-08 DIAGNOSIS — G40.909 SEIZURE DISORDER (HCC): ICD-10-CM

## 2024-04-08 DIAGNOSIS — G47.33 OSA (OBSTRUCTIVE SLEEP APNEA): ICD-10-CM

## 2024-04-08 DIAGNOSIS — K56.7 ILEUS (HCC): Primary | ICD-10-CM

## 2024-04-08 DIAGNOSIS — G40.909 NONINTRACTABLE EPILEPSY WITHOUT STATUS EPILEPTICUS, UNSPECIFIED EPILEPSY TYPE (HCC): ICD-10-CM

## 2024-04-08 DIAGNOSIS — R13.10 DYSPHAGIA, UNSPECIFIED TYPE: ICD-10-CM

## 2024-04-08 DIAGNOSIS — K56.600 PARTIAL SMALL BOWEL OBSTRUCTION (HCC): ICD-10-CM

## 2024-04-08 DIAGNOSIS — N17.9 AKI (ACUTE KIDNEY INJURY) (HCC): ICD-10-CM

## 2024-04-08 DIAGNOSIS — A41.9 SEPSIS, DUE TO UNSPECIFIED ORGANISM, UNSPECIFIED WHETHER ACUTE ORGAN DYSFUNCTION PRESENT (HCC): ICD-10-CM

## 2024-04-08 DIAGNOSIS — F79 INTELLECTUAL DISABILITY: ICD-10-CM

## 2024-04-08 DIAGNOSIS — K56.609 SBO (SMALL BOWEL OBSTRUCTION) (HCC): ICD-10-CM

## 2024-04-08 LAB
ALBUMIN SERPL-MCNC: 4.7 G/DL (ref 3.5–5.2)
ALBUMIN/GLOB SERPL: 1.1 {RATIO} (ref 1–2.5)
ALP SERPL-CCNC: 167 U/L (ref 40–129)
ALT SERPL-CCNC: 18 U/L (ref 5–41)
ANION GAP SERPL CALCULATED.3IONS-SCNC: 15 MMOL/L (ref 9–17)
AST SERPL-CCNC: 18 U/L
BACTERIA URNS QL MICRO: ABNORMAL
BASOPHILS # BLD: 0.04 K/UL (ref 0–0.2)
BASOPHILS NFR BLD: 0 % (ref 0–2)
BILIRUB SERPL-MCNC: 0.5 MG/DL (ref 0.3–1.2)
BILIRUB UR QL STRIP: NEGATIVE
BUN SERPL-MCNC: 46 MG/DL (ref 8–23)
BUN/CREAT SERPL: 29 (ref 9–20)
CALCIUM SERPL-MCNC: 10.1 MG/DL (ref 8.6–10.4)
CHLORIDE SERPL-SCNC: 102 MMOL/L (ref 98–107)
CLARITY UR: CLEAR
CO2 SERPL-SCNC: 23 MMOL/L (ref 20–31)
COLOR UR: YELLOW
CREAT SERPL-MCNC: 1.6 MG/DL (ref 0.7–1.2)
EOSINOPHIL # BLD: <0.03 K/UL (ref 0–0.44)
EOSINOPHILS RELATIVE PERCENT: 0 % (ref 1–4)
EPI CELLS #/AREA URNS HPF: ABNORMAL /HPF (ref 0–5)
ERYTHROCYTE [DISTWIDTH] IN BLOOD BY AUTOMATED COUNT: 12.4 % (ref 11.8–14.4)
GFR SERPL CREATININE-BSD FRML MDRD: 47 ML/MIN/1.73M2
GLUCOSE SERPL-MCNC: 134 MG/DL (ref 70–99)
GLUCOSE UR STRIP-MCNC: NEGATIVE MG/DL
HCT VFR BLD AUTO: 48.1 % (ref 40.7–50.3)
HGB BLD-MCNC: 16 G/DL (ref 13–17)
HGB UR QL STRIP.AUTO: NEGATIVE
IMM GRANULOCYTES # BLD AUTO: 0.07 K/UL (ref 0–0.3)
IMM GRANULOCYTES NFR BLD: 0 %
KETONES UR STRIP-MCNC: NEGATIVE MG/DL
LACTATE BLDV-SCNC: 2.3 MMOL/L (ref 0.5–1.9)
LACTATE BLDV-SCNC: 2.5 MMOL/L (ref 0.5–1.9)
LEUKOCYTE ESTERASE UR QL STRIP: NEGATIVE
LIPASE SERPL-CCNC: 26 U/L (ref 13–60)
LYMPHOCYTES NFR BLD: 0.92 K/UL (ref 1.1–3.7)
LYMPHOCYTES RELATIVE PERCENT: 6 % (ref 24–43)
MCH RBC QN AUTO: 33.5 PG (ref 25.2–33.5)
MCHC RBC AUTO-ENTMCNC: 33.3 G/DL (ref 28.4–34.8)
MCV RBC AUTO: 100.6 FL (ref 82.6–102.9)
MONOCYTES NFR BLD: 0.85 K/UL (ref 0.1–1.2)
MONOCYTES NFR BLD: 5 % (ref 3–12)
MUCOUS THREADS URNS QL MICRO: ABNORMAL
NEUTROPHILS NFR BLD: 89 % (ref 36–65)
NEUTS SEG NFR BLD: 14.78 K/UL (ref 1.5–8.1)
NITRITE UR QL STRIP: NEGATIVE
NRBC BLD-RTO: 0 PER 100 WBC
PH UR STRIP: 6 [PH] (ref 5–9)
PLATELET # BLD AUTO: 312 K/UL (ref 138–453)
PMV BLD AUTO: 9.1 FL (ref 8.1–13.5)
POTASSIUM SERPL-SCNC: 4.7 MMOL/L (ref 3.7–5.3)
PROT SERPL-MCNC: 9.1 G/DL (ref 6.4–8.3)
PROT UR STRIP-MCNC: NEGATIVE MG/DL
RBC # BLD AUTO: 4.78 M/UL (ref 4.21–5.77)
RBC #/AREA URNS HPF: ABNORMAL /HPF (ref 0–2)
SODIUM SERPL-SCNC: 140 MMOL/L (ref 135–144)
SP GR UR STRIP: 1.01 (ref 1.01–1.02)
TROPONIN I SERPL HS-MCNC: 17 NG/L (ref 0–22)
UROBILINOGEN UR STRIP-ACNC: NORMAL EU/DL (ref 0–1)
WBC #/AREA URNS HPF: ABNORMAL /HPF (ref 0–5)
WBC OTHER # BLD: 16.7 K/UL (ref 3.5–11.3)

## 2024-04-08 PROCEDURE — 2580000003 HC RX 258: Performed by: STUDENT IN AN ORGANIZED HEALTH CARE EDUCATION/TRAINING PROGRAM

## 2024-04-08 PROCEDURE — 85025 COMPLETE CBC W/AUTO DIFF WBC: CPT

## 2024-04-08 PROCEDURE — 36569 INSJ PICC 5 YR+ W/O IMAGING: CPT

## 2024-04-08 PROCEDURE — 96365 THER/PROPH/DIAG IV INF INIT: CPT

## 2024-04-08 PROCEDURE — 94761 N-INVAS EAR/PLS OXIMETRY MLT: CPT

## 2024-04-08 PROCEDURE — 2580000003 HC RX 258

## 2024-04-08 PROCEDURE — 6370000000 HC RX 637 (ALT 250 FOR IP)

## 2024-04-08 PROCEDURE — 83605 ASSAY OF LACTIC ACID: CPT

## 2024-04-08 PROCEDURE — 71045 X-RAY EXAM CHEST 1 VIEW: CPT

## 2024-04-08 PROCEDURE — 84484 ASSAY OF TROPONIN QUANT: CPT

## 2024-04-08 PROCEDURE — 6360000002 HC RX W HCPCS: Performed by: STUDENT IN AN ORGANIZED HEALTH CARE EDUCATION/TRAINING PROGRAM

## 2024-04-08 PROCEDURE — 87086 URINE CULTURE/COLONY COUNT: CPT

## 2024-04-08 PROCEDURE — 93005 ELECTROCARDIOGRAM TRACING: CPT

## 2024-04-08 PROCEDURE — 99285 EMERGENCY DEPT VISIT HI MDM: CPT

## 2024-04-08 PROCEDURE — 87040 BLOOD CULTURE FOR BACTERIA: CPT

## 2024-04-08 PROCEDURE — 83690 ASSAY OF LIPASE: CPT

## 2024-04-08 PROCEDURE — 6360000004 HC RX CONTRAST MEDICATION

## 2024-04-08 PROCEDURE — 6360000002 HC RX W HCPCS

## 2024-04-08 PROCEDURE — 81001 URINALYSIS AUTO W/SCOPE: CPT

## 2024-04-08 PROCEDURE — 36415 COLL VENOUS BLD VENIPUNCTURE: CPT

## 2024-04-08 PROCEDURE — 74177 CT ABD & PELVIS W/CONTRAST: CPT

## 2024-04-08 PROCEDURE — 1200000000 HC SEMI PRIVATE

## 2024-04-08 PROCEDURE — 80053 COMPREHEN METABOLIC PANEL: CPT

## 2024-04-08 PROCEDURE — 2500000003 HC RX 250 WO HCPCS: Performed by: STUDENT IN AN ORGANIZED HEALTH CARE EDUCATION/TRAINING PROGRAM

## 2024-04-08 RX ORDER — SODIUM CHLORIDE 0.9 % (FLUSH) 0.9 %
10 SYRINGE (ML) INJECTION EVERY 12 HOURS SCHEDULED
Status: DISCONTINUED | OUTPATIENT
Start: 2024-04-08 | End: 2024-04-08 | Stop reason: SDUPTHER

## 2024-04-08 RX ORDER — FINASTERIDE 5 MG/1
5 TABLET, FILM COATED ORAL NIGHTLY
Status: DISCONTINUED | OUTPATIENT
Start: 2024-04-08 | End: 2024-04-11 | Stop reason: HOSPADM

## 2024-04-08 RX ORDER — ONDANSETRON 2 MG/ML
4 INJECTION INTRAMUSCULAR; INTRAVENOUS EVERY 6 HOURS PRN
Status: DISCONTINUED | OUTPATIENT
Start: 2024-04-08 | End: 2024-04-11 | Stop reason: HOSPADM

## 2024-04-08 RX ORDER — LACOSAMIDE 50 MG/1
150 TABLET ORAL 2 TIMES DAILY
Status: DISCONTINUED | OUTPATIENT
Start: 2024-04-08 | End: 2024-04-11 | Stop reason: HOSPADM

## 2024-04-08 RX ORDER — METRONIDAZOLE 500 MG/100ML
500 INJECTION, SOLUTION INTRAVENOUS ONCE
Status: COMPLETED | OUTPATIENT
Start: 2024-04-08 | End: 2024-04-08

## 2024-04-08 RX ORDER — POTASSIUM CHLORIDE 20 MEQ/1
40 TABLET, EXTENDED RELEASE ORAL PRN
Status: DISCONTINUED | OUTPATIENT
Start: 2024-04-08 | End: 2024-04-11

## 2024-04-08 RX ORDER — SODIUM CHLORIDE 9 MG/ML
25 INJECTION, SOLUTION INTRAVENOUS PRN
Status: DISCONTINUED | OUTPATIENT
Start: 2024-04-08 | End: 2024-04-11 | Stop reason: HOSPADM

## 2024-04-08 RX ORDER — POTASSIUM CHLORIDE 7.45 MG/ML
10 INJECTION INTRAVENOUS PRN
Status: DISCONTINUED | OUTPATIENT
Start: 2024-04-08 | End: 2024-04-11

## 2024-04-08 RX ORDER — MAGNESIUM SULFATE IN WATER 40 MG/ML
2000 INJECTION, SOLUTION INTRAVENOUS PRN
Status: DISCONTINUED | OUTPATIENT
Start: 2024-04-08 | End: 2024-04-11 | Stop reason: HOSPADM

## 2024-04-08 RX ORDER — TAMSULOSIN HYDROCHLORIDE 0.4 MG/1
0.4 CAPSULE ORAL 2 TIMES DAILY
Status: DISCONTINUED | OUTPATIENT
Start: 2024-04-08 | End: 2024-04-11 | Stop reason: HOSPADM

## 2024-04-08 RX ORDER — CERAMIDES 1,3,6-II
CREAM (GRAM) TOPICAL
COMMUNITY

## 2024-04-08 RX ORDER — ONDANSETRON 4 MG/1
4 TABLET, ORALLY DISINTEGRATING ORAL EVERY 8 HOURS PRN
Status: DISCONTINUED | OUTPATIENT
Start: 2024-04-08 | End: 2024-04-11 | Stop reason: HOSPADM

## 2024-04-08 RX ORDER — 0.9 % SODIUM CHLORIDE 0.9 %
30 INTRAVENOUS SOLUTION INTRAVENOUS ONCE
Status: COMPLETED | OUTPATIENT
Start: 2024-04-08 | End: 2024-04-08

## 2024-04-08 RX ORDER — VITAMIN B COMPLEX
1000 TABLET ORAL DAILY
Status: DISCONTINUED | OUTPATIENT
Start: 2024-04-09 | End: 2024-04-11 | Stop reason: HOSPADM

## 2024-04-08 RX ORDER — SODIUM CHLORIDE 0.9 % (FLUSH) 0.9 %
5-40 SYRINGE (ML) INJECTION PRN
Status: DISCONTINUED | OUTPATIENT
Start: 2024-04-08 | End: 2024-04-11 | Stop reason: HOSPADM

## 2024-04-08 RX ORDER — PRIMIDONE 250 MG/1
250 TABLET ORAL EVERY MORNING
Status: DISCONTINUED | OUTPATIENT
Start: 2024-04-09 | End: 2024-04-11 | Stop reason: HOSPADM

## 2024-04-08 RX ORDER — LANOLIN ALCOHOL/MO/W.PET/CERES
3 CREAM (GRAM) TOPICAL NIGHTLY PRN
Status: DISCONTINUED | OUTPATIENT
Start: 2024-04-08 | End: 2024-04-11 | Stop reason: HOSPADM

## 2024-04-08 RX ORDER — ACETAMINOPHEN 325 MG/1
650 TABLET ORAL EVERY 6 HOURS PRN
Status: DISCONTINUED | OUTPATIENT
Start: 2024-04-08 | End: 2024-04-11 | Stop reason: HOSPADM

## 2024-04-08 RX ORDER — LANOLIN ALCOHOL/MO/W.PET/CERES
1000 CREAM (GRAM) TOPICAL DAILY
COMMUNITY

## 2024-04-08 RX ORDER — CALCIUM CARBONATE 500 MG/1
1 TABLET, CHEWABLE ORAL 2 TIMES DAILY
Status: DISCONTINUED | OUTPATIENT
Start: 2024-04-08 | End: 2024-04-11 | Stop reason: HOSPADM

## 2024-04-08 RX ORDER — LEVETIRACETAM 500 MG/1
2000 TABLET ORAL NIGHTLY
Status: DISCONTINUED | OUTPATIENT
Start: 2024-04-08 | End: 2024-04-11 | Stop reason: HOSPADM

## 2024-04-08 RX ORDER — METRONIDAZOLE 500 MG/100ML
500 INJECTION, SOLUTION INTRAVENOUS EVERY 8 HOURS
Status: DISCONTINUED | OUTPATIENT
Start: 2024-04-08 | End: 2024-04-11

## 2024-04-08 RX ORDER — ALBUTEROL SULFATE 90 UG/1
2 AEROSOL, METERED RESPIRATORY (INHALATION) EVERY 4 HOURS PRN
Status: DISCONTINUED | OUTPATIENT
Start: 2024-04-08 | End: 2024-04-11 | Stop reason: HOSPADM

## 2024-04-08 RX ORDER — SODIUM CHLORIDE 0.9 % (FLUSH) 0.9 %
5-40 SYRINGE (ML) INJECTION EVERY 12 HOURS SCHEDULED
Status: DISCONTINUED | OUTPATIENT
Start: 2024-04-08 | End: 2024-04-08 | Stop reason: SDUPTHER

## 2024-04-08 RX ORDER — MECLIZINE HYDROCHLORIDE 25 MG/1
25 TABLET ORAL DAILY
Status: DISCONTINUED | OUTPATIENT
Start: 2024-04-09 | End: 2024-04-11 | Stop reason: HOSPADM

## 2024-04-08 RX ORDER — ENOXAPARIN SODIUM 100 MG/ML
40 INJECTION SUBCUTANEOUS DAILY
Status: DISCONTINUED | OUTPATIENT
Start: 2024-04-08 | End: 2024-04-11 | Stop reason: HOSPADM

## 2024-04-08 RX ORDER — LANOLIN ALCOHOL/MO/W.PET/CERES
1000 CREAM (GRAM) TOPICAL DAILY
Status: DISCONTINUED | OUTPATIENT
Start: 2024-04-09 | End: 2024-04-11 | Stop reason: HOSPADM

## 2024-04-08 RX ORDER — SODIUM CHLORIDE 9 MG/ML
INJECTION, SOLUTION INTRAVENOUS PRN
Status: DISCONTINUED | OUTPATIENT
Start: 2024-04-08 | End: 2024-04-11 | Stop reason: HOSPADM

## 2024-04-08 RX ORDER — DIAZEPAM 10 MG/2G
20 GEL RECTAL PRN
Status: DISCONTINUED | OUTPATIENT
Start: 2024-04-08 | End: 2024-04-11 | Stop reason: HOSPADM

## 2024-04-08 RX ORDER — OXYBUTYNIN CHLORIDE 5 MG/1
10 TABLET, EXTENDED RELEASE ORAL DAILY
Status: DISCONTINUED | OUTPATIENT
Start: 2024-04-09 | End: 2024-04-11 | Stop reason: HOSPADM

## 2024-04-08 RX ORDER — SODIUM CHLORIDE 0.9 % (FLUSH) 0.9 %
5-40 SYRINGE (ML) INJECTION EVERY 12 HOURS SCHEDULED
Status: DISCONTINUED | OUTPATIENT
Start: 2024-04-08 | End: 2024-04-11 | Stop reason: HOSPADM

## 2024-04-08 RX ORDER — SODIUM CHLORIDE 0.9 % (FLUSH) 0.9 %
10 SYRINGE (ML) INJECTION PRN
Status: DISCONTINUED | OUTPATIENT
Start: 2024-04-08 | End: 2024-04-11 | Stop reason: HOSPADM

## 2024-04-08 RX ORDER — LEVETIRACETAM 500 MG/1
1500 TABLET ORAL DAILY
Status: DISCONTINUED | OUTPATIENT
Start: 2024-04-09 | End: 2024-04-11 | Stop reason: HOSPADM

## 2024-04-08 RX ORDER — LATANOPROST 50 UG/ML
1 SOLUTION/ DROPS OPHTHALMIC NIGHTLY
Status: DISCONTINUED | OUTPATIENT
Start: 2024-04-08 | End: 2024-04-11 | Stop reason: HOSPADM

## 2024-04-08 RX ORDER — POLYETHYLENE GLYCOL 3350 17 G/17G
17 POWDER, FOR SOLUTION ORAL DAILY PRN
Status: DISCONTINUED | OUTPATIENT
Start: 2024-04-08 | End: 2024-04-11 | Stop reason: HOSPADM

## 2024-04-08 RX ORDER — MINERAL OIL AND WHITE PETROLATUM 30; 940 MG/G; MG/G
OINTMENT OPHTHALMIC NIGHTLY
COMMUNITY

## 2024-04-08 RX ORDER — HYDROXYZINE HYDROCHLORIDE 25 MG/1
25 TABLET, FILM COATED ORAL EVERY 12 HOURS PRN
Status: DISCONTINUED | OUTPATIENT
Start: 2024-04-08 | End: 2024-04-11 | Stop reason: HOSPADM

## 2024-04-08 RX ORDER — LIDOCAINE HYDROCHLORIDE 10 MG/ML
5 INJECTION, SOLUTION INFILTRATION; PERINEURAL ONCE
Status: COMPLETED | OUTPATIENT
Start: 2024-04-08 | End: 2024-04-08

## 2024-04-08 RX ORDER — LAMOTRIGINE 100 MG/1
400 TABLET ORAL EVERY MORNING
Status: DISCONTINUED | OUTPATIENT
Start: 2024-04-09 | End: 2024-04-11 | Stop reason: HOSPADM

## 2024-04-08 RX ORDER — PANTOPRAZOLE SODIUM 40 MG/1
40 TABLET, DELAYED RELEASE ORAL
Status: DISCONTINUED | OUTPATIENT
Start: 2024-04-09 | End: 2024-04-11 | Stop reason: HOSPADM

## 2024-04-08 RX ORDER — ACETAMINOPHEN 325 MG/1
650 TABLET ORAL ONCE
Status: COMPLETED | OUTPATIENT
Start: 2024-04-08 | End: 2024-04-08

## 2024-04-08 RX ORDER — LANOLIN ALCOHOL/MO/W.PET/CERES
400 CREAM (GRAM) TOPICAL DAILY
Status: DISCONTINUED | OUTPATIENT
Start: 2024-04-09 | End: 2024-04-11 | Stop reason: HOSPADM

## 2024-04-08 RX ORDER — LAMOTRIGINE 100 MG/1
600 TABLET ORAL NIGHTLY
Status: DISCONTINUED | OUTPATIENT
Start: 2024-04-08 | End: 2024-04-11 | Stop reason: HOSPADM

## 2024-04-08 RX ORDER — ACETAMINOPHEN 650 MG/1
650 SUPPOSITORY RECTAL EVERY 6 HOURS PRN
Status: DISCONTINUED | OUTPATIENT
Start: 2024-04-08 | End: 2024-04-11 | Stop reason: HOSPADM

## 2024-04-08 RX ORDER — LORAZEPAM 0.5 MG/1
0.5 TABLET ORAL EVERY 8 HOURS PRN
Status: DISCONTINUED | OUTPATIENT
Start: 2024-04-08 | End: 2024-04-11 | Stop reason: HOSPADM

## 2024-04-08 RX ORDER — DEXTROSE AND SODIUM CHLORIDE 5; .45 G/100ML; G/100ML
INJECTION, SOLUTION INTRAVENOUS CONTINUOUS
Status: ACTIVE | OUTPATIENT
Start: 2024-04-08 | End: 2024-04-10

## 2024-04-08 RX ORDER — PRIMIDONE 250 MG/1
375 TABLET ORAL NIGHTLY
Status: DISCONTINUED | OUTPATIENT
Start: 2024-04-08 | End: 2024-04-11 | Stop reason: HOSPADM

## 2024-04-08 RX ADMIN — SODIUM CHLORIDE 2190 ML: 9 INJECTION, SOLUTION INTRAVENOUS at 12:17

## 2024-04-08 RX ADMIN — SODIUM CHLORIDE, PRESERVATIVE FREE 10 ML: 5 INJECTION INTRAVENOUS at 20:11

## 2024-04-08 RX ADMIN — METRONIDAZOLE 500 MG: 500 INJECTION, SOLUTION INTRAVENOUS at 13:42

## 2024-04-08 RX ADMIN — SODIUM CHLORIDE, PRESERVATIVE FREE 10 ML: 5 INJECTION INTRAVENOUS at 13:43

## 2024-04-08 RX ADMIN — LIDOCAINE HYDROCHLORIDE 5 ML: 10 INJECTION, SOLUTION INFILTRATION; PERINEURAL at 20:11

## 2024-04-08 RX ADMIN — IOPAMIDOL 75 ML: 755 INJECTION, SOLUTION INTRAVENOUS at 12:26

## 2024-04-08 RX ADMIN — METRONIDAZOLE 500 MG: 500 INJECTION, SOLUTION INTRAVENOUS at 20:11

## 2024-04-08 RX ADMIN — ACETAMINOPHEN 650 MG: 325 TABLET ORAL at 13:41

## 2024-04-08 RX ADMIN — ENOXAPARIN SODIUM 40 MG: 100 INJECTION SUBCUTANEOUS at 16:27

## 2024-04-08 RX ADMIN — DEXTROSE AND SODIUM CHLORIDE: 5; 450 INJECTION, SOLUTION INTRAVENOUS at 15:24

## 2024-04-08 RX ADMIN — CEFTRIAXONE SODIUM 2000 MG: 2 INJECTION, POWDER, FOR SOLUTION INTRAMUSCULAR; INTRAVENOUS at 12:41

## 2024-04-08 ASSESSMENT — PAIN - FUNCTIONAL ASSESSMENT: PAIN_FUNCTIONAL_ASSESSMENT: NONE - DENIES PAIN

## 2024-04-08 ASSESSMENT — ENCOUNTER SYMPTOMS
NAUSEA: 1
WHEEZING: 0
CHEST TIGHTNESS: 0
VOMITING: 0
CONSTIPATION: 1
BACK PAIN: 0
SHORTNESS OF BREATH: 0
COLOR CHANGE: 0
DIARRHEA: 1
ABDOMINAL PAIN: 1

## 2024-04-08 NOTE — PLAN OF CARE
Problem: Discharge Planning  Goal: Discharge to home or other facility with appropriate resources  Outcome: Progressing  Flowsheets (Taken 4/8/2024 1943)  Discharge to home or other facility with appropriate resources:   Identify barriers to discharge with patient and caregiver   Refer to discharge planning if patient needs post-hospital services based on physician order or complex needs related to functional status, cognitive ability or social support system     Problem: Safety - Adult  Goal: Free from fall injury  Outcome: Progressing  Flowsheets (Taken 4/8/2024 1943)  Free From Fall Injury: Instruct family/caregiver on patient safety     Problem: Chronic Conditions and Co-morbidities  Goal: Patient's chronic conditions and co-morbidity symptoms are monitored and maintained or improved  Outcome: Progressing  Flowsheets (Taken 4/8/2024 1943)  Care Plan - Patient's Chronic Conditions and Co-Morbidity Symptoms are Monitored and Maintained or Improved:   Monitor and assess patient's chronic conditions and comorbid symptoms for stability, deterioration, or improvement   Collaborate with multidisciplinary team to address chronic and comorbid conditions and prevent exacerbation or deterioration     Problem: Skin/Tissue Integrity  Goal: Absence of new skin breakdown  Description: 1.  Monitor for areas of redness and/or skin breakdown  2.  Assess vascular access sites hourly  3.  Every 4-6 hours minimum:  Change oxygen saturation probe site  4.  Every 4-6 hours:  If on nasal continuous positive airway pressure, respiratory therapy assess nares and determine need for appliance change or resting period.  Outcome: Progressing     Problem: ABCDS Injury Assessment  Goal: Absence of physical injury  Outcome: Progressing  Flowsheets (Taken 4/8/2024 1943)  Absence of Physical Injury: Implement safety measures based on patient assessment

## 2024-04-08 NOTE — PLAN OF CARE
Spoke with dispatcher at [facility]. Information relayed. Will be calling on call staff. Given return number for ETA. Tammy Minor RN

## 2024-04-08 NOTE — PROGRESS NOTES
Patient arrived to [facility] room 301 at this time. Vitals and assessment complete. See flowsheets for details. Vitals are WNL. Patient is resting in bed. No distress noted. Patient denies pain. Breathing is regular and  unlabored. Abdomen is soft, round, and nontender. Bowel sounds are active x4. Patient denies N/V/D at this time. Patient denies further needs. Writer instructed on use of the call light. Call light is within reach. Care ongoing.

## 2024-04-08 NOTE — ED PROVIDER NOTES
[facility] SUR[facility]  Emergency Department Encounter  Emergency Medicine Attending     Pt Name:Jeremy King  MRN: [id number]  Birthdate 1955  Date of evaluation: 4/8/24  PCP:  Sukumar Larose MD  Note Started: 10:02 AM EDT      CHIEF COMPLAINT       Chief Complaint   Patient presents with    Emesis     Frequent vomiting, diarrhea, staff believes he has bowel obs.       HISTORY OF PRESENT ILLNESS  (Location/Symptom, Timing/Onset, Context/Setting, Quality, Duration, Modifying Factors, Severity.)      Jeremy King is a 68 y.o. male who presents with history of MR, seizures, SBO for evaluation of possible bowel obstruction.  Presenting from group home and has been experiencing loose stools over the last day.  Patient does have history of multiple bowel obstructions requiring bowel resection in the past.  They state that episodes were similar to what he is currently experiencing when he had bowel obstructions.  They deny any hematemesis but patient has been experiencing vomiting.  No chest pain or shortness of breath.    PAST MEDICAL / SURGICAL / SOCIAL / FAMILY HISTORY      has a past medical history of Arthritis, BPH (benign prostatic hyperplasia), Dysphagia, GERD (gastroesophageal reflux disease), Hearing loss, HLD (hyperlipidemia), Insomnia, MR (mental retardation), Periorbital cellulitis, SBO (small bowel obstruction) (HCC), Seizures (HCC), Ventral hernia, and Vitamin D deficiency.     has a past surgical history that includes Abdomen surgery (2013, 2014); laparoscopy (8/17/14); laparotomy (8/17/14); cast application (Left); Hydrocele surgery (08/2016); Vagus nerve stimulator insertion; and Vagus nerve stimulator insertion (10/11/2016).    Social History     Socioeconomic History    Marital status: Single     Spouse name: Not on file    Number of children: 0    Years of education: Not on file    Highest education level: Not on file   Occupational History    Not on file   Tobacco Use    Smoking status:  dizziness, weakness, light-headedness and numbness.       PHYSICAL EXAM      INITIAL VITALS:   /71   Pulse 97   Temp 97.7 °F (36.5 °C) (Temporal)   Resp 16   Wt 74.8 kg (165 lb)   SpO2 96%   BMI 23.68 kg/m²     Physical Exam  Vitals and nursing note reviewed.   Constitutional:       General: He is not in acute distress.     Appearance: Normal appearance. He is not ill-appearing.   HENT:      Head: Normocephalic and atraumatic.      Nose: Nose normal.      Mouth/Throat:      Mouth: Mucous membranes are moist.   Eyes:      Pupils: Pupils are equal, round, and reactive to light.   Cardiovascular:      Rate and Rhythm: Regular rhythm. Tachycardia present.      Pulses: Normal pulses.      Heart sounds: Normal heart sounds. No murmur heard.  Pulmonary:      Effort: Pulmonary effort is normal. No respiratory distress.      Breath sounds: Normal breath sounds. No wheezing.   Abdominal:      General: Abdomen is flat.      Palpations: Abdomen is soft.   Musculoskeletal:         General: Normal range of motion.      Cervical back: Normal range of motion.   Skin:     General: Skin is warm and dry.      Capillary Refill: Capillary refill takes less than 2 seconds.   Neurological:      General: No focal deficit present.      Mental Status: He is alert and oriented to person, place, and time. Mental status is at baseline.      GCS: GCS eye subscore is 4. GCS verbal subscore is 5. GCS motor subscore is 6.      Cranial Nerves: No cranial nerve deficit.      Sensory: No sensory deficit.      Motor: No weakness.           DDX/DIAGNOSTIC RESULTS / EMERGENCY DEPARTMENT COURSE / MDM     Medical Decision Making  68-year-old male presents for evaluation of vomiting.  Vitals reviewed and interpreted by me initially showing tachycardia and hypertension and hypothermia.  On exam patient is in no distress.  Abdomen soft nontender.  History of SBO.  Labs and imaging ordered and reviewed.  Patient has leukocytosis at 16.7.  Mildly  normal...

## 2024-04-08 NOTE — PROGRESS NOTES
Pt is resting in bed. Seizure pads applied for precautions. Pt is alert, no complaints of pain. 22g piv to right foot assessed, no s/s of infiltrate. Helmet in place. VS and assessment complete. Bed alarm on and  call light in reach.

## 2024-04-09 PROBLEM — E44.1 MILD MALNUTRITION (HCC): Status: RESOLVED | Noted: 2023-07-05 | Resolved: 2024-04-09

## 2024-04-09 PROBLEM — K56.7 ILEUS (HCC): Status: ACTIVE | Noted: 2024-04-09

## 2024-04-09 LAB
ALBUMIN SERPL-MCNC: 3.4 G/DL (ref 3.5–5.2)
ALBUMIN/GLOB SERPL: 1.2 {RATIO} (ref 1–2.5)
ALP SERPL-CCNC: 103 U/L (ref 40–129)
ALT SERPL-CCNC: 11 U/L (ref 5–41)
ANION GAP SERPL CALCULATED.3IONS-SCNC: 8 MMOL/L (ref 9–17)
AST SERPL-CCNC: 9 U/L
BASOPHILS # BLD: 0.04 K/UL (ref 0–0.2)
BASOPHILS NFR BLD: 0 % (ref 0–2)
BILIRUB SERPL-MCNC: 0.2 MG/DL (ref 0.3–1.2)
BUN SERPL-MCNC: 36 MG/DL (ref 8–23)
BUN/CREAT SERPL: 40 (ref 9–20)
CALCIUM SERPL-MCNC: 8.2 MG/DL (ref 8.6–10.4)
CHLORIDE SERPL-SCNC: 112 MMOL/L (ref 98–107)
CO2 SERPL-SCNC: 22 MMOL/L (ref 20–31)
CREAT SERPL-MCNC: 0.9 MG/DL (ref 0.7–1.2)
EKG ATRIAL RATE: 103 BPM
EKG P AXIS: 37 DEGREES
EKG P-R INTERVAL: 178 MS
EKG Q-T INTERVAL: 340 MS
EKG QRS DURATION: 86 MS
EKG QTC CALCULATION (BAZETT): 445 MS
EKG R AXIS: 0 DEGREES
EKG T AXIS: 102 DEGREES
EKG VENTRICULAR RATE: 103 BPM
EOSINOPHIL # BLD: 0.24 K/UL (ref 0–0.44)
EOSINOPHILS RELATIVE PERCENT: 3 % (ref 1–4)
ERYTHROCYTE [DISTWIDTH] IN BLOOD BY AUTOMATED COUNT: 12.7 % (ref 11.8–14.4)
GFR SERPL CREATININE-BSD FRML MDRD: >90 ML/MIN/1.73M2
GLUCOSE BLD-MCNC: 80 MG/DL (ref 74–100)
GLUCOSE SERPL-MCNC: 90 MG/DL (ref 70–99)
HCT VFR BLD AUTO: 33.1 % (ref 40.7–50.3)
HGB BLD-MCNC: 10.8 G/DL (ref 13–17)
IMM GRANULOCYTES # BLD AUTO: <0.03 K/UL (ref 0–0.3)
IMM GRANULOCYTES NFR BLD: 0 %
LACTATE BLDV-SCNC: 0.6 MMOL/L (ref 0.5–2.2)
LYMPHOCYTES NFR BLD: 2.02 K/UL (ref 1.1–3.7)
LYMPHOCYTES RELATIVE PERCENT: 22 % (ref 24–43)
MCH RBC QN AUTO: 33.2 PG (ref 25.2–33.5)
MCHC RBC AUTO-ENTMCNC: 32.6 G/DL (ref 28.4–34.8)
MCV RBC AUTO: 101.8 FL (ref 82.6–102.9)
MONOCYTES NFR BLD: 0.59 K/UL (ref 0.1–1.2)
MONOCYTES NFR BLD: 7 % (ref 3–12)
NEUTROPHILS NFR BLD: 68 % (ref 36–65)
NEUTS SEG NFR BLD: 6.14 K/UL (ref 1.5–8.1)
NRBC BLD-RTO: 0 PER 100 WBC
PLATELET # BLD AUTO: 199 K/UL (ref 138–453)
PMV BLD AUTO: 9.2 FL (ref 8.1–13.5)
POTASSIUM SERPL-SCNC: 3.9 MMOL/L (ref 3.7–5.3)
PROT SERPL-MCNC: 6.3 G/DL (ref 6.4–8.3)
RBC # BLD AUTO: 3.25 M/UL (ref 4.21–5.77)
SODIUM SERPL-SCNC: 142 MMOL/L (ref 135–144)
WBC OTHER # BLD: 9.1 K/UL (ref 3.5–11.3)

## 2024-04-09 PROCEDURE — 2580000003 HC RX 258: Performed by: NURSE PRACTITIONER

## 2024-04-09 PROCEDURE — 6360000002 HC RX W HCPCS: Performed by: STUDENT IN AN ORGANIZED HEALTH CARE EDUCATION/TRAINING PROGRAM

## 2024-04-09 PROCEDURE — A4216 STERILE WATER/SALINE, 10 ML: HCPCS | Performed by: NURSE PRACTITIONER

## 2024-04-09 PROCEDURE — 6360000002 HC RX W HCPCS: Performed by: NURSE PRACTITIONER

## 2024-04-09 PROCEDURE — 83605 ASSAY OF LACTIC ACID: CPT

## 2024-04-09 PROCEDURE — 51798 US URINE CAPACITY MEASURE: CPT

## 2024-04-09 PROCEDURE — 82947 ASSAY GLUCOSE BLOOD QUANT: CPT

## 2024-04-09 PROCEDURE — 99232 SBSQ HOSP IP/OBS MODERATE 35: CPT | Performed by: SURGERY

## 2024-04-09 PROCEDURE — 94761 N-INVAS EAR/PLS OXIMETRY MLT: CPT

## 2024-04-09 PROCEDURE — 36415 COLL VENOUS BLD VENIPUNCTURE: CPT

## 2024-04-09 PROCEDURE — C9254 INJECTION, LACOSAMIDE: HCPCS | Performed by: NURSE PRACTITIONER

## 2024-04-09 PROCEDURE — 6370000000 HC RX 637 (ALT 250 FOR IP)

## 2024-04-09 PROCEDURE — 97166 OT EVAL MOD COMPLEX 45 MIN: CPT

## 2024-04-09 PROCEDURE — 93010 ELECTROCARDIOGRAM REPORT: CPT | Performed by: FAMILY MEDICINE

## 2024-04-09 PROCEDURE — 80053 COMPREHEN METABOLIC PANEL: CPT

## 2024-04-09 PROCEDURE — 94664 DEMO&/EVAL PT USE INHALER: CPT

## 2024-04-09 PROCEDURE — C9113 INJ PANTOPRAZOLE SODIUM, VIA: HCPCS | Performed by: NURSE PRACTITIONER

## 2024-04-09 PROCEDURE — 85025 COMPLETE CBC W/AUTO DIFF WBC: CPT

## 2024-04-09 PROCEDURE — 2580000003 HC RX 258: Performed by: STUDENT IN AN ORGANIZED HEALTH CARE EDUCATION/TRAINING PROGRAM

## 2024-04-09 PROCEDURE — 1200000000 HC SEMI PRIVATE

## 2024-04-09 RX ORDER — LEVETIRACETAM 15 MG/ML
1500 INJECTION INTRAVASCULAR 2 TIMES DAILY
Status: DISCONTINUED | OUTPATIENT
Start: 2024-04-09 | End: 2024-04-11

## 2024-04-09 RX ORDER — LACOSAMIDE 10 MG/ML
150 INJECTION, SOLUTION INTRAVENOUS 2 TIMES DAILY
Status: DISCONTINUED | OUTPATIENT
Start: 2024-04-09 | End: 2024-04-11

## 2024-04-09 RX ORDER — 0.9 % SODIUM CHLORIDE 0.9 %
500 INTRAVENOUS SOLUTION INTRAVENOUS ONCE
Status: COMPLETED | OUTPATIENT
Start: 2024-04-09 | End: 2024-04-09

## 2024-04-09 RX ADMIN — CEFTRIAXONE SODIUM 2000 MG: 2 INJECTION, POWDER, FOR SOLUTION INTRAMUSCULAR; INTRAVENOUS at 12:02

## 2024-04-09 RX ADMIN — LEVETIRACETAM 1500 MG: 15 INJECTION INTRAVENOUS at 10:04

## 2024-04-09 RX ADMIN — LATANOPROST 1 DROP: 50 SOLUTION/ DROPS OPHTHALMIC at 20:23

## 2024-04-09 RX ADMIN — LACOSAMIDE 150 MG: 10 INJECTION INTRAVENOUS at 09:52

## 2024-04-09 RX ADMIN — METRONIDAZOLE 500 MG: 500 INJECTION, SOLUTION INTRAVENOUS at 05:15

## 2024-04-09 RX ADMIN — METRONIDAZOLE 500 MG: 500 INJECTION, SOLUTION INTRAVENOUS at 20:23

## 2024-04-09 RX ADMIN — METRONIDAZOLE 500 MG: 500 INJECTION, SOLUTION INTRAVENOUS at 14:10

## 2024-04-09 RX ADMIN — PANTOPRAZOLE SODIUM 40 MG: 40 INJECTION, POWDER, FOR SOLUTION INTRAVENOUS at 09:46

## 2024-04-09 RX ADMIN — SODIUM CHLORIDE, PRESERVATIVE FREE 10 ML: 5 INJECTION INTRAVENOUS at 10:07

## 2024-04-09 RX ADMIN — DEXTROSE AND SODIUM CHLORIDE: 5; 450 INJECTION, SOLUTION INTRAVENOUS at 12:55

## 2024-04-09 RX ADMIN — LEVETIRACETAM 1500 MG: 15 INJECTION INTRAVENOUS at 21:26

## 2024-04-09 RX ADMIN — ENOXAPARIN SODIUM 40 MG: 100 INJECTION SUBCUTANEOUS at 10:04

## 2024-04-09 RX ADMIN — LACOSAMIDE 150 MG: 10 INJECTION INTRAVENOUS at 21:52

## 2024-04-09 RX ADMIN — SODIUM CHLORIDE 500 ML: 9 INJECTION, SOLUTION INTRAVENOUS at 14:59

## 2024-04-09 RX ADMIN — SODIUM CHLORIDE, PRESERVATIVE FREE 10 ML: 5 INJECTION INTRAVENOUS at 21:50

## 2024-04-09 NOTE — CARE COORDINATION
Case Management Assessment  Initial Evaluation    Date/Time of Evaluation: 4/9/2024 2:07 PM  Assessment Completed by: PITER Blanco    If patient is discharged prior to next notation, then this note serves as note for discharge by case management.    Patient Name: Jeremy King                   YOB: 1955  Diagnosis: Ileus (HCC) [K56.7]  Partial small bowel obstruction (HCC) [K56.600]  Intractable nausea and vomiting [R11.2]                   Date / Time: 4/8/2024  9:10 AM    Patient Admission Status: Inpatient   Readmission Risk (Low < 19, Mod (19-27), High > 27): Readmission Risk Score: 27.3    Current PCP: Sukumar Larose MD  PCP verified by CM? Yes    Chart Reviewed: Yes      History Provided by: Child/Family  Patient Orientation: Alert and Oriented, Person, Other (see comment) (Diagnosis of severe MR noted.  Spoke with sister/legal guardian to complete assessment;)    Patient Cognition: Alert (Severe MR)    Hospitalization in the last 30 days (Readmission):  No    If yes, Readmission Assessment in  Navigator will be completed.    Advance Directives:      Code Status: Full Code   Patient's Primary Decision Maker is: Legal Next of Kin (Patient has Legal Guardian who is his sister;)    Primary Decision Maker: Esther Denny - Legal Guardian, Legal Guardian - [phone]    Discharge Planning:    Patient lives with: Other (Comment) (Other group home residents;) Type of Home: Group Home  Primary Care Giver: Other (Comment) (Group Home providers;)  Patient Support Systems include: Home Care Staff   Current Financial resources: Medicaid, Medicare  Current community resources: Institutional Placement (Group Home MR)  Current services prior to admission: Durable Medical Equipment            Current DME: Wheelchair, Shower Chair            Type of Home Care services:  OT, PT, Aide Services    ADLS  Prior functional level: Assistance with the following:, Bathing, Dressing,

## 2024-04-09 NOTE — PROGRESS NOTES
Progress Note    SUBJECTIVE:    Patient seen for f/u of Intractable nausea and vomiting.  He resting in bed no distress. Says his belly does hurt     ROS:   Constitutional: negative  for fevers, and negative for chills.  Respiratory: negative for shortness of breath, negative for cough, and negative for wheezing  Cardiovascular: negative for chest pain, and negative for palpitations  Gastrointestinal: positive for abdominal pain, positive for nausea,positive for vomiting, positive for diarrhea, and negative for constipation     All other systems were reviewed with the patient and are negative unless otherwise stated in HPI      OBJECTIVE:      Vitals:   Vitals:    04/09/24 0737   BP: (!) 103/57   Pulse: 79   Resp: 24   Temp: 97.5 °F (36.4 °C)   SpO2: 99%     Weight - Scale: 74.9 kg (165 lb 2 oz)   Height: 177.8 cm (5' 10\")     Weight  Wt Readings from Last 3 Encounters:   04/09/24 74.9 kg (165 lb 2 oz)   03/03/24 72.4 kg (159 lb 9.6 oz)   02/25/24 73.3 kg (161 lb 9.6 oz)     Body mass index is 23.69 kg/m².    24HR INTAKE/OUTPUT:      Intake/Output Summary (Last 24 hours) at 4/9/2024 0827  Last data filed at 4/9/2024 0751  Gross per 24 hour   Intake 2043.6 ml   Output 700 ml   Net 1343.6 ml     -----------------------------------------------------------------  Exam:    GEN:    Awake, alert and oriented to person and place only.   EYES:  EOMI, pupils equal   NECK: Supple. No lymphadenopathy.  No carotid bruit  CVS:    regular rate and rhythm, no audible murmur  PULM:  CTA, no wheezes, rales or rhonchi, no acute respiratory distress  ABD:    Bowels sounds normal.  Abdomen is soft.  No distention.   generalized  tenderness to palpation.   EXT:   no edema bilaterally .  No calf tenderness.   NEURO: Moves all extremities.  Motor and sensory are grossly intact  SKIN:  No rashes.  No skin lesions.    -----------------------------------------------------------------    Diagnostic Data:      Complete Blood Count:   Recent  Labs     04/08/24  1015 04/09/24  0510   WBC 16.7* 9.1   RBC 4.78 3.25*   HGB 16.0 10.8*   HCT 48.1 33.1*   .6 101.8   MCH 33.5 33.2   MCHC 33.3 32.6   RDW 12.4 12.7    199   MPV 9.1 9.2        Last 3 Blood Glucose:   Recent Labs     04/08/24  1015 04/09/24  0510   GLUCOSE 134* 90        Comprehensive Metabolic Profile:   Recent Labs     04/08/24  1015 04/09/24  0510    142   K 4.7 3.9    112*   CO2 23 22   BUN 46* 36*   CREATININE 1.6* 0.9   GLUCOSE 134* 90   CALCIUM 10.1 8.2*   PROT 9.1* 6.3*   LABALBU 4.7 3.4*   BILITOT 0.5 0.2*   ALKPHOS 167* 103   AST 18 9   ALT 18 11        Urinalysis:   Lab Results   Component Value Date/Time    NITRU NEGATIVE 04/08/2024 03:28 PM    COLORU Yellow 04/08/2024 03:28 PM    PHUR 6.0 04/08/2024 03:28 PM    WBCUA 0 TO 2 04/08/2024 03:28 PM    RBCUA None 04/08/2024 03:28 PM    MUCUS TRACE 04/08/2024 03:28 PM    TRICHOMONAS NOT REPORTED 10/03/2019 06:15 AM    YEAST NOT REPORTED 10/03/2019 06:15 AM    BACTERIA TRACE 04/08/2024 03:28 PM    SPECGRAV 1.015 04/08/2024 03:28 PM    LEUKOCYTESUR NEGATIVE 04/08/2024 03:28 PM    UROBILINOGEN Normal 04/08/2024 03:28 PM    BILIRUBINUR NEGATIVE 04/08/2024 03:28 PM    BILIRUBINUR NEGATIVE 08/29/2011 12:33 PM    GLUCOSEU NEGATIVE 04/08/2024 03:28 PM    GLUCOSEU NEGATIVE 08/29/2011 12:33 PM    KETUA NEGATIVE 04/08/2024 03:28 PM    AMORPHOUS 2+ 04/10/2023 11:10 AM       HgBA1c:  No results found for: \"LABA1C\"    Lactic Acid:   Lab Results   Component Value Date/Time    LACTA 0.6 04/09/2024 05:10 AM    LACTA 1.3 07/01/2023 07:35 PM    LACTA 1.3 04/19/2023 12:30 PM            Radiology/Imaging:  CT ABDOMEN PELVIS W IV CONTRAST Additional Contrast? None   Final Result   1. Small bowel distension without focal transition point identified.  There   is also a mild amount of liquid stool in the colon.  These findings are most   suggestive of ileus and diarrheal process versus partial obstructing process.   2. Additional chronic and

## 2024-04-09 NOTE — PROGRESS NOTES
Pt incontinent of stool and urine, requested bed pan and urinal with results. Hygiene care performed. Bed alarm on and call light in reach.

## 2024-04-09 NOTE — PROGRESS NOTES
Flushed red and gray lines with 10 mL of NS before each medication administration, then an additional 10 mL for red and gray lines after administration.Care ongoing, call light within reach.

## 2024-04-09 NOTE — CONSULTS
Consulted regard possible bowel obstruction.    Patient is significantly mentally impaired. Admitted with vomiting and diarrhea.  CT showed possible SBO. He has history of SBO including previous surgery.    Currently, he is saying he feels fine, but I am not sure he is reliable.     Past Medical History:   Diagnosis Date    Arthritis     BPH (benign prostatic hyperplasia)     Dysphagia     GERD (gastroesophageal reflux disease)     Hearing loss     HLD (hyperlipidemia)     Insomnia     MR (mental retardation)     Periorbital cellulitis     SBO (small bowel obstruction) (HCC)     Seizures (HCC)     Epilepsy    Ventral hernia     Vitamin D deficiency      Past Surgical History:   Procedure Laterality Date    ABDOMEN SURGERY  2013, 2014    Bowel Obstruction X2    CAST APPLICATION Left     Lt. Ankle , X3    HYDROCELE EXCISION  08/2016    LAPAROSCOPY  8/17/14    with BERNARD    LAPAROTOMY  8/17/14    with partial cecectomy    VAGAL NERVE STIMULATION      placed, then replaced    VAGAL NERVE STIMULATION  10/11/2016    replaced generator battery     Current Facility-Administered Medications   Medication Dose Route Frequency Provider Last Rate Last Admin    lacosamide (VIMPAT) injection 150 mg  150 mg IntraVENous BID Anne Quevedo APRN - CNP   150 mg at 04/09/24 0952    levETIRAcetam (KEPPRA) 1500 mg/100 mL IVPB  1,500 mg IntraVENous BID Anne Quevedo APRN - CNP   Stopped at 04/09/24 1059    pantoprazole (PROTONIX) 40 mg in sodium chloride (PF) 0.9 % 10 mL injection  40 mg IntraVENous Daily Anne Quevedo APRN - CNP   40 mg at 04/09/24 0946    sodium chloride flush 0.9 % injection 5-40 mL  5-40 mL IntraVENous PRN Taran Lawson, DO        0.9 % sodium chloride infusion   IntraVENous PRN Taran Lawson,         sodium chloride flush 0.9 % injection 10 mL  10 mL IntraVENous PRN Jj Arevalo MD   10 mL at 04/09/24 1007    0.9 % sodium chloride infusion   IntraVENous PRN Jj Arevalo MD   Attention and Perception: He is inattentive.         Behavior: Behavior is cooperative.         Cognition and Memory: Cognition is impaired.       IMP/PLAN  1) Possible SBO   - He was clearly very dehydrated on admission  - He did have some distension of his small bowel on CT.  But it is important to remember that as he has had his cecum removed including his ileocecal valve, his small bowel will never look completely normal    - As best as I can tell there to no sign of surgical emergency.  - Recheck films tomorrow, and if they don't low bad, can try him on clear liquids.

## 2024-04-09 NOTE — PLAN OF CARE
Problem: Discharge Planning  Goal: Discharge to home or other facility with appropriate resources  Outcome: Progressing     Problem: Safety - Adult  Goal: Free from fall injury  Outcome: Progressing     Problem: Chronic Conditions and Co-morbidities  Goal: Patient's chronic conditions and co-morbidity symptoms are monitored and maintained or improved  Outcome: Progressing     Problem: Pain  Goal: Verbalizes/displays adequate comfort level or baseline comfort level  Outcome: Progressing     Problem: Skin/Tissue Integrity  Goal: Absence of new skin breakdown  Description: 1.  Monitor for areas of redness and/or skin breakdown  2.  Assess vascular access sites hourly  3.  Every 4-6 hours minimum:  Change oxygen saturation probe site  4.  Every 4-6 hours:  If on nasal continuous positive airway pressure, respiratory therapy assess nares and determine need for appliance change or resting period.  Outcome: Progressing     Problem: ABCDS Injury Assessment  Goal: Absence of physical injury  Outcome: Progressing     Problem: Nutrition Deficit:  Goal: Optimize nutritional status  4/9/2024 1851 by Zee Ramirez, RN  Outcome: Progressing

## 2024-04-09 NOTE — PROCEDURES
Picc placement note:  Dynamic Access RN procedure    Consent signed and obtained by proceduralist, from Esther Denny (sister POA). See consent form in paper chart.    Prescribed IV Therapy = TPN, electrolytes, flagyl  Peripheral ultrasound assessment done. Plan for right brachial vein insertion.   CVR measurement = 25 % (Linear CVR is preferred to be less than 45%).  Product type: Bard 5 fr TL lumen Power PICC.  History/Labs/Allergies Reviewed  Placed By: Sean Blevins - RN (Dynamic Access)  Time out Performed using Two Identifiers  Lot # [id number]  Expiration date = 04/30/2024  Trimmed at 38 cm total  External catheter length 0 cm  Number of attempts 1  Special equipment used- Bard 3cg tip confirmation system, ultrasound, and micro-introducer (MST) technique   Catheter securement = adhesive 3M securement device  Dressing applied= Tegaderm CHG  Lidocaine administered intradermally conc.1%, approx 1 ml (Lidocaine Lot# - [id number] and Exp date - 07/01/2025 )    RN aware picc placed with ECG technology and is confirmed in the distal 1/3 SVC. Picc is immediately released for use. Rn aware new iv tubing required.       PICC education:     [ x ] Discussed with patient/Family or POA prior to procedure.  Risks and Benefits along with reason for procedure were discussed and teaching was reinforced with an education handout on line  insertion. [facility] FAQ Catheter Associated Blood Stream Infections and [facility] [id number] REV. 7/13 Nursing and Booklet left at bedside or in chart. Patient (Family or POA) acknowledged understanding of information taught and agreed to procedure.      [  ] Was not discussed with patient/family or POA due to pts medical status at time of procedure. pts family or POA not available to discuss line education. [facility] FAQ Catheter Associated Blood Stream Infections and [facility] [id number] REV. 7/13 Nursing and Booklet left at bedside or in chart.

## 2024-04-09 NOTE — PROGRESS NOTES
Spoke with Aicha (nurse) at [facility] regarding patient's urine elimination habits - stated that he voids several times per day before and after meals. Will notify Anne for further orders.

## 2024-04-09 NOTE — PROGRESS NOTES
Comprehensive Nutrition Assessment    Type and Reason for Visit:  Initial, Positive Nutrition Screen    Nutrition Recommendations/Plan:   Monitor NPO duration     Malnutrition Assessment:  Malnutrition Status:  At risk for malnutrition (Comment) (04/09/24 0645)    Context:  Acute Illness     Findings of the 6 clinical characteristics of malnutrition:  Energy Intake:  Mild decrease in energy intake (Comment) (pta last couple days)  Weight Loss:  No significant weight loss     Body Fat Loss:  No significant body fat loss     Muscle Mass Loss:  No significant muscle mass loss    Fluid Accumulation:  Mild Extremities   Strength:  Not Performed    Nutrition Assessment:    Inadequate nutrient intakes r/t altered GI status, AEB ileus with n/v/d pta and NPO status currently. Not currently triggering for malnutrition as had in past. Weight has declined more since last hospitalization but none clinically singnificant for any defined timeframe. R/o C-Diff noted. Multiple diet orders noted at group home, including Pureed and honey thicks, M/S with nectar thicks, and avoidance of greasy foods, popcorn. No attendants present to clarify and Jeremy is asleep. Noted use of B12, vit D and Mg++ as routine supplements.    Nutrition Related Findings:    watery bm. non pitting to +1 BLE edema. Wound Type: None       Current Nutrition Intake & Therapies:    Average Meal Intake: NPO  Average Supplements Intake: NPO  Diet NPO Exceptions are: Sips of Water with Meds    Anthropometric Measures:  Height: 177.8 cm (5' 10\")  Ideal Body Weight (IBW): 166 lbs (75 kg)    Admission Body Weight: 74.8 kg (165 lb)  Current Body Weight: 74.9 kg (165 lb 2 oz), 99.5 % IBW. Weight Source: Bed Scale  Current BMI (kg/m2): 23.7  Usual Body Weight: 83 kg (182 lb 15.7 oz) (a year ago. 159.6# a month ago)  % Weight Change (Calculated): -9.8  Weight Adjustment For: No Adjustment                 BMI Categories: Normal Weight (BMI 18.5-24.9)    Estimated Daily

## 2024-04-09 NOTE — PROGRESS NOTES
Lab called and informed of new orders. Pt c/o nausea, denies any abd pain but declines medications at this time.

## 2024-04-09 NOTE — RT PROTOCOL NOTE
RESPIRATORY ASSESSMENT PROTOCOL                                                                                              Patient Name: Jeremy King Room#: 0301/0301-01 : 1955     Admitting diagnosis: Ileus (HCC) [K56.7]  Partial small bowel obstruction (HCC) [K56.600]  Intractable nausea and vomiting [R11.2]       Medical History:   Past Medical History:   Diagnosis Date    Arthritis     BPH (benign prostatic hyperplasia)     Dysphagia     GERD (gastroesophageal reflux disease)     Hearing loss     HLD (hyperlipidemia)     Insomnia     MR (mental retardation)     Periorbital cellulitis     SBO (small bowel obstruction) (HCC)     Seizures (HCC)     Epilepsy    Ventral hernia     Vitamin D deficiency        PATIENT ASSESSMENT    LABORATORY DATA  Hematology:   Lab Results   Component Value Date/Time    WBC 9.1 2024 05:10 AM    RBC 3.25 2024 05:10 AM    RBC 3.43 2011 11:48 AM    HGB 10.8 2024 05:10 AM    HCT 33.1 2024 05:10 AM     2024 05:10 AM     2011 11:48 AM     Chemistry:  No results found for: \"PHART\", \"QEY6KHU\", \"PO2ART\", \"G0KGAHKF\", \"DCB0CGX\", \"PBEA\", \"NBEA\"    VITALS  Pulse: 99   Respirations: 20  BP: 114/73  SpO2: 96 % O2 Device: None (Room air)  Temp: 98.2 °F (36.8 °C)    SKIN COLOR  [x] Normal  [] Pale  [] Dusky  [] Cyanotic    RESPIRATORY PATTERN  [x] Normal  [] Dyspnea  [] Cheyne-Regalado  [] Kussmaul  [] Biots    AMBULATORY  [x] Yes  [] No  [] With Assistance    PEAK FLOW  Predicted:     Personal Best:            Patient Acuity 0 1 2 3 4 Score   Level of Consciousness (LOC) [x]  Alert & Oriented or Pt normal LOC []  Confused;follows directions []  Confused & uncooper-ative []  Obtunded []  Comatose 0   Respiratory Rate  (RR) [x]  Reg. rate & pattern. 12 - 20 bpm  []  Increased RR. Greater than 20 bpm   []  SOB w/ exertion or RR greater than 24 bpm []  Access- ory muscle use at rest. Abn.  resp. []  SOB at rest.   0   Bilateral Breath  Albuterol PRN q4 hrs.   Breath-Actuated Neb if BBS Acuity = 4, and pt. can use MP.  Notify physician if condition deteriorates. MDI THERAPY with  2 actuations of [physician-ordered bronchodilator(s)] via spacer TID Albuterol and PRN q4 hrs.   If unable to utilize MDI: HHN [physician-ordered bronchodilator(s)] TID and Albuterol PRN q4 hrs.   Notify physician if condition deteriorates. MDI THERAPY with  [physician-ordered bronchodilator(s)] via spacer TID PRN.   If unable to utilize MDI: HHN [physician-ordered bronchodilator(s)] TID PRN.   Notify physician if condition deteriorates.         If Acuity Level is 2, 3, or 4 in any of the following:    [] COUGH     [] SURGICAL HISTORY (SURG HX)  [] CHEST XRAY (CXR)    Goal: Improvement in sputum mobilization in patients with ineffective airway clearance. Reverse atelectasis.    [] Bronchopulmonary Hygiene Protocol      Total Acuity:   14-28  []  Secondary Assessment in 24 hrs Total Acuity:  9-13  []  Secondary Assessment in 24 hrs Total Acuity:  4-8  []  Secondary Assessment in 24 hrs Total Acuity:  0-3  []  Secondary Assessment in 48 hrs   METANEB QID with [physician-ordered bronchodilator(s)] if CXR Acuity = 4; otherwise:  PD&P, Oscillatory Therapy, or Vest QID & PRN AND PEP QID & PRN  NT Sxn PRN for ineffective cough  METANEB QID with [physician-ordered bronchodilator(s)] if CXR Acuity = 4; otherwise:  PD&P, Oscillatory Therapy or Vest QID & PRN AND PEP QID & PRN  NT Sxn PRN for ineffective cough  PD&P, Oscillatory Therapy, or Vest TID & PRN AND PEP TID & PRN   Instruct patient to self-perform IS q1hr WA       If Acuity Level is 2 or above in the following:    [] PULMONARY HISTORY (PULM HX)    Goal: Assist patient in quitting smoking to slow or stop the progression of lung disease.    [x] Smoking Cessation Protocol    SMOKING CESSATION EDUCATION provided according to policy RT_201: (fady with an X)  ____Yes    ___x_ No     ____ NA    Smoking Cessation Booklet

## 2024-04-09 NOTE — PROGRESS NOTES
[facility]  Inpatient/Observation/Outpatient Rehabilitation    Date: 2024  Patient Name: Jeremy King       [x] Inpatient Acute/Observation       []  Outpatient  : 1955       [] Pt no showed for scheduled appointment    [x] Pt refused/declined therapy at this time due to:  Pt stated he would like to sleep and would not open eyes .         [] Pt cancelled due to:  [] No Reason Given   [] Sick/ill   [] Other:    [] Evaluation held by RN/Provider due to:    [] High Heart Rate   [] High Blood Pressure   [] Orthopedic Consult   [] Hgb < 7   [] Other:    [] Pt does not require skilled services due to:      Therapist/Assistant will attempt to see this patient, at our earliest opportunity.       Ashly Flower, OT Date: 2024

## 2024-04-09 NOTE — PROGRESS NOTES
Pt has spent most of the day in bed with his eyes closed; wakes up easily and is mostly cooperative; alcazar catheter placed for urine retention - 575 mL out upon insertion; rolls very quickly in bed when asked to move; brief changed/bath wipes used; care ongoing, call light within reach.   05-May-2018 13:17

## 2024-04-09 NOTE — PROCEDURES
Patient shift assessment and vitals completed at this time as charted. Patient is resting in bed and is cooperative. Patient has alcazar in place that is draining. Patient denies pain and states no needs, call light is in reach, bed alarm on, plan of care ongoing.

## 2024-04-09 NOTE — H&P
History and Physical    Patient:  Jeremy King  MRN: [id number]    Chief Complaint: Emesis    History Obtained From:  electronic medical record, reason patient could not give history: MRDD    PCP: Sukumar Larose MD    History of Present Illness:   The patient is a 68 y.o. male who presents with vomiting and diarrhea.  Patient was in a group home and has had loose stools for 1 day.  He has a history of bowel obstructions requiring bowel resection in the past.  Staff felt that the symptoms he is having are similar to when he had other bowel obstructions in the past.  He denies any abdominal pain, chest pain, or shortness of breath.  Past medical history includes MR, seizures, sleep apnea, vagus nerve stimulator, hyperlipidemia, arthritis and ventral hernia.  WBC 16.7 with a left shift of 14.78, BUN 46, creatinine 1.6 with a baseline of 0.7-0.8, lactic 2.3 and 2.5, alk phos 167.  CT of the abdomen pelvis shows small bowel distention without focal transition point identified, mild amount of liquid stool in colon, likely an ileus and diarrheal process versus a partial obstructing process.  General surgery was consulted in the emergency department.  Patient currently denies any pain or nausea.    Past Medical History:        Diagnosis Date    Arthritis     BPH (benign prostatic hyperplasia)     Dysphagia     GERD (gastroesophageal reflux disease)     Hearing loss     HLD (hyperlipidemia)     Insomnia     MR (mental retardation)     Periorbital cellulitis     SBO (small bowel obstruction) (HCC)     Seizures (HCC)     Epilepsy    Ventral hernia     Vitamin D deficiency        Past Surgical History:        Procedure Laterality Date    ABDOMEN SURGERY  2013, 2014    Bowel Obstruction X2    CAST APPLICATION Left     Lt. Ankle , X3    HYDROCELE EXCISION  08/2016    LAPAROSCOPY  8/17/14    with BERNARD    LAPAROTOMY  8/17/14    with partial cecectomy    VAGAL NERVE STIMULATION      placed, then replaced    VAGAL NERVE  vomiting  Active Problems:    MR (mental retardation), severe    Epilepsy ([facility])    ESTHER (acute kidney injury) ([facility])    Sepsis ([facility])  Resolved Problems:    * No resolved hospital problems. *      Patient Active Problem List    Diagnosis Date Noted    Intractable nausea and vomiting 04/08/2024    ESTHER (acute kidney injury) ([facility]) 02/27/2024    Sepsis ([facility]) 02/27/2024    Pneumatosis intestinalis 02/27/2024    Seizure-like activity ([facility]) 02/22/2024    Nausea and vomiting 12/11/2023    Seizure disorder ([facility]) 12/11/2023    S/P placement of VNS (vagus nerve stimulation) device 12/10/2023    Dysphagia 12/10/2023    Elevated international normalized ratio (INR) 12/10/2023    Intellectual disability 12/09/2023    Mild malnutrition ([facility]) 07/05/2023    SBO (small bowel obstruction) ([facility]) 04/19/2023    LEELEE (obstructive sleep apnea) 08/09/2017    Epilepsy ([facility]) 10/11/2016    Epileptic seizures ([facility]) 10/11/2016    MR (mental retardation), severe 08/18/2014    Small bowel obstruction ([facility])  08/16/2014    Seizures ([facility]) Chronic 08/16/2014       Plan:     MEDICAL DECISION MAKING:    Primary Problem(s): Intractable nausea and vomiting  Differential diagnoses: SBO, ileus  Condition is a chronic illness with exacerbation, progression or side effects of treatment  Condition is stable  Treatment plan:   Consult general surgery  IV fluids  N.p.o.  CBC, CMP  PT OT  Telemetry monitoring  Imaging: no further imaging studies ordered today  Medications: Zofran as needed  Medication Monitoring / High Risk Medications: none      Sepsis of unknown etiology  Condition is stable  Treatment plan:   Blood cultures pending  Urine culture pending  CBC, CMP, trend lactic  PT OT  Telemetry monitoring  Imaging: no further imaging studies ordered today  Medications:   Received 30 ml/kg bolus in ED  IV fluids  Rocephin and Flagyl      Seizures  Condition is a chronic stable condition  Treatment plan: Seizure precautions  Imaging: no further imaging studies

## 2024-04-09 NOTE — PROGRESS NOTES
Writer to bedside to complete morning assessment. Upon entry to room, pt lying in bed with eyes closed, helmet in place, seizure precautions in place, respirations even and unlabored while on room air. Vitals obtained and assessment completed, see flow sheet for details. Pt was cooperative, but insisted that I \"hurry up and stop asking so many questions.\" Pt denies needs from writer at this time. Call light in reach. Care ongoing.

## 2024-04-09 NOTE — PROGRESS NOTES
Occupational Therapy  Facility/Department: [facility] MED SURG  Occupational Therapy Initial Assessment    Name: Jeremy King  : 1955  MRN: [id number]  Date of Service: 2024    Discharge Recommendations:  Continue to assess pending progress          Patient Diagnosis(es): The primary encounter diagnosis was Ileus (HCC). A diagnosis of Partial small bowel obstruction (HCC) was also pertinent to this visit.  Past Medical History:  has a past medical history of Arthritis, BPH (benign prostatic hyperplasia), Dysphagia, GERD (gastroesophageal reflux disease), Hearing loss, HLD (hyperlipidemia), Insomnia, MR (mental retardation), Periorbital cellulitis, SBO (small bowel obstruction) (HCC), Seizures (HCC), Ventral hernia, and Vitamin D deficiency.  Past Surgical History:  has a past surgical history that includes Abdomen surgery (, ); laparoscopy (14); laparotomy (14); cast application (Left); Hydrocele surgery (2016); Vagus nerve stimulator insertion; and Vagus nerve stimulator insertion (10/11/2016).    Treatment Diagnosis: Weakness      Assessment   Performance deficits / Impairments: Decreased functional mobility ;Decreased endurance;Decreased ADL status;Decreased balance;Decreased ROM;Decreased strength;Decreased safe awareness;Decreased coordination  Assessment: 67 y/o M admitted to [facility] for Ileus. Patient presents with generalized weakness and deconditioning, decreased balance, seated and standing, requiring increased need for asssist during ADL and transfers. Patient would benefit from OT services to address to ensure safe return to group home.  Treatment Diagnosis: Weakness  Prognosis: Good  Decision Making: Medium Complexity  REQUIRES OT FOLLOW-UP: Yes        Plan   Occupational Therapy Plan  Times Per Day: Once a day  Days Per Week: 7 Days  Current Treatment Recommendations: Strengthening, Balance training, Functional mobility training, Endurance training, Patient/Caregiver education  transfers.       Therapy Time   Individual Concurrent Group Co-treatment   Time In 1450         Time Out 1505         Minutes 15                 Sarah Sanchez, OTR/L

## 2024-04-10 ENCOUNTER — APPOINTMENT (OUTPATIENT)
Dept: GENERAL RADIOLOGY | Age: 69
DRG: 872 | End: 2024-04-10
Payer: MEDICARE

## 2024-04-10 LAB
ALBUMIN SERPL-MCNC: 3.3 G/DL (ref 3.5–5.2)
ALBUMIN/GLOB SERPL: 1.4 {RATIO} (ref 1–2.5)
ALP SERPL-CCNC: 90 U/L (ref 40–129)
ALT SERPL-CCNC: 8 U/L (ref 5–41)
ANION GAP SERPL CALCULATED.3IONS-SCNC: 10 MMOL/L (ref 9–17)
AST SERPL-CCNC: 8 U/L
BASOPHILS # BLD: 0.04 K/UL (ref 0–0.2)
BASOPHILS NFR BLD: 1 % (ref 0–2)
BILIRUB SERPL-MCNC: 0.2 MG/DL (ref 0.3–1.2)
BUN SERPL-MCNC: 20 MG/DL (ref 8–23)
BUN/CREAT SERPL: 29 (ref 9–20)
CALCIUM SERPL-MCNC: 8.4 MG/DL (ref 8.6–10.4)
CHLORIDE SERPL-SCNC: 112 MMOL/L (ref 98–107)
CO2 SERPL-SCNC: 22 MMOL/L (ref 20–31)
CREAT SERPL-MCNC: 0.7 MG/DL (ref 0.7–1.2)
EOSINOPHIL # BLD: 0.26 K/UL (ref 0–0.44)
EOSINOPHILS RELATIVE PERCENT: 4 % (ref 1–4)
ERYTHROCYTE [DISTWIDTH] IN BLOOD BY AUTOMATED COUNT: 12.1 % (ref 11.8–14.4)
GFR SERPL CREATININE-BSD FRML MDRD: >90 ML/MIN/1.73M2
GLUCOSE SERPL-MCNC: 81 MG/DL (ref 70–99)
HCT VFR BLD AUTO: 31.5 % (ref 40.7–50.3)
HGB BLD-MCNC: 10.2 G/DL (ref 13–17)
IMM GRANULOCYTES # BLD AUTO: <0.03 K/UL (ref 0–0.3)
IMM GRANULOCYTES NFR BLD: 0 %
LYMPHOCYTES NFR BLD: 1.55 K/UL (ref 1.1–3.7)
LYMPHOCYTES RELATIVE PERCENT: 24 % (ref 24–43)
MCH RBC QN AUTO: 33.3 PG (ref 25.2–33.5)
MCHC RBC AUTO-ENTMCNC: 32.4 G/DL (ref 28.4–34.8)
MCV RBC AUTO: 102.9 FL (ref 82.6–102.9)
MICROORGANISM SPEC CULT: NORMAL
MONOCYTES NFR BLD: 0.5 K/UL (ref 0.1–1.2)
MONOCYTES NFR BLD: 8 % (ref 3–12)
NEUTROPHILS NFR BLD: 63 % (ref 36–65)
NEUTS SEG NFR BLD: 4.02 K/UL (ref 1.5–8.1)
NRBC BLD-RTO: 0 PER 100 WBC
PLATELET # BLD AUTO: 169 K/UL (ref 138–453)
PMV BLD AUTO: 9.2 FL (ref 8.1–13.5)
POTASSIUM SERPL-SCNC: 4.1 MMOL/L (ref 3.7–5.3)
PROT SERPL-MCNC: 5.7 G/DL (ref 6.4–8.3)
RBC # BLD AUTO: 3.06 M/UL (ref 4.21–5.77)
SODIUM SERPL-SCNC: 144 MMOL/L (ref 135–144)
SPECIMEN DESCRIPTION: NORMAL
WBC OTHER # BLD: 6.4 K/UL (ref 3.5–11.3)

## 2024-04-10 PROCEDURE — 6360000002 HC RX W HCPCS: Performed by: NURSE PRACTITIONER

## 2024-04-10 PROCEDURE — 80053 COMPREHEN METABOLIC PANEL: CPT

## 2024-04-10 PROCEDURE — 36415 COLL VENOUS BLD VENIPUNCTURE: CPT

## 2024-04-10 PROCEDURE — 51702 INSERT TEMP BLADDER CATH: CPT

## 2024-04-10 PROCEDURE — A4216 STERILE WATER/SALINE, 10 ML: HCPCS | Performed by: NURSE PRACTITIONER

## 2024-04-10 PROCEDURE — C9254 INJECTION, LACOSAMIDE: HCPCS | Performed by: NURSE PRACTITIONER

## 2024-04-10 PROCEDURE — 2580000003 HC RX 258: Performed by: NURSE PRACTITIONER

## 2024-04-10 PROCEDURE — 97110 THERAPEUTIC EXERCISES: CPT

## 2024-04-10 PROCEDURE — 6370000000 HC RX 637 (ALT 250 FOR IP): Performed by: NURSE PRACTITIONER

## 2024-04-10 PROCEDURE — 94761 N-INVAS EAR/PLS OXIMETRY MLT: CPT

## 2024-04-10 PROCEDURE — 1200000000 HC SEMI PRIVATE

## 2024-04-10 PROCEDURE — 74022 RADEX COMPL AQT ABD SERIES: CPT

## 2024-04-10 PROCEDURE — 6360000002 HC RX W HCPCS: Performed by: STUDENT IN AN ORGANIZED HEALTH CARE EDUCATION/TRAINING PROGRAM

## 2024-04-10 PROCEDURE — C9113 INJ PANTOPRAZOLE SODIUM, VIA: HCPCS | Performed by: NURSE PRACTITIONER

## 2024-04-10 PROCEDURE — 74250 X-RAY XM SM INT 1CNTRST STD: CPT

## 2024-04-10 PROCEDURE — 2580000003 HC RX 258: Performed by: STUDENT IN AN ORGANIZED HEALTH CARE EDUCATION/TRAINING PROGRAM

## 2024-04-10 PROCEDURE — 85025 COMPLETE CBC W/AUTO DIFF WBC: CPT

## 2024-04-10 PROCEDURE — 6360000004 HC RX CONTRAST MEDICATION: Performed by: SURGERY

## 2024-04-10 RX ORDER — BISACODYL 10 MG
10 SUPPOSITORY, RECTAL RECTAL ONCE
Status: COMPLETED | OUTPATIENT
Start: 2024-04-10 | End: 2024-04-10

## 2024-04-10 RX ADMIN — SODIUM CHLORIDE, PRESERVATIVE FREE 10 ML: 5 INJECTION INTRAVENOUS at 20:49

## 2024-04-10 RX ADMIN — LEVETIRACETAM 1500 MG: 15 INJECTION INTRAVENOUS at 20:57

## 2024-04-10 RX ADMIN — METRONIDAZOLE 500 MG: 500 INJECTION, SOLUTION INTRAVENOUS at 21:19

## 2024-04-10 RX ADMIN — ONDANSETRON 4 MG: 2 INJECTION INTRAMUSCULAR; INTRAVENOUS at 14:30

## 2024-04-10 RX ADMIN — LACOSAMIDE 150 MG: 10 INJECTION INTRAVENOUS at 20:49

## 2024-04-10 RX ADMIN — CEFTRIAXONE SODIUM 2000 MG: 2 INJECTION, POWDER, FOR SOLUTION INTRAMUSCULAR; INTRAVENOUS at 13:19

## 2024-04-10 RX ADMIN — SODIUM CHLORIDE, PRESERVATIVE FREE 10 ML: 5 INJECTION INTRAVENOUS at 10:18

## 2024-04-10 RX ADMIN — BISACODYL 10 MG: 10 SUPPOSITORY RECTAL at 13:11

## 2024-04-10 RX ADMIN — ENOXAPARIN SODIUM 40 MG: 100 INJECTION SUBCUTANEOUS at 09:30

## 2024-04-10 RX ADMIN — METRONIDAZOLE 500 MG: 500 INJECTION, SOLUTION INTRAVENOUS at 14:27

## 2024-04-10 RX ADMIN — DEXTROSE AND SODIUM CHLORIDE: 5; 450 INJECTION, SOLUTION INTRAVENOUS at 03:33

## 2024-04-10 RX ADMIN — PANTOPRAZOLE SODIUM 40 MG: 40 INJECTION, POWDER, FOR SOLUTION INTRAVENOUS at 09:30

## 2024-04-10 RX ADMIN — LEVETIRACETAM 1500 MG: 15 INJECTION INTRAVENOUS at 10:17

## 2024-04-10 RX ADMIN — METRONIDAZOLE 500 MG: 500 INJECTION, SOLUTION INTRAVENOUS at 05:11

## 2024-04-10 RX ADMIN — LATANOPROST 1 DROP: 50 SOLUTION/ DROPS OPHTHALMIC at 20:49

## 2024-04-10 RX ADMIN — DIATRIZOATE MEGLUMINE AND DIATRIZOATE SODIUM 120 ML: 660; 100 LIQUID ORAL; RECTAL at 11:15

## 2024-04-10 RX ADMIN — LACOSAMIDE 150 MG: 10 INJECTION INTRAVENOUS at 10:17

## 2024-04-10 NOTE — PLAN OF CARE
Problem: Discharge Planning  Goal: Discharge to home or other facility with appropriate resources  Outcome: Progressing     Problem: Safety - Adult  Goal: Free from fall injury  Outcome: Progressing     Problem: Chronic Conditions and Co-morbidities  Goal: Patient's chronic conditions and co-morbidity symptoms are monitored and maintained or improved  Outcome: Progressing     Problem: Pain  Goal: Verbalizes/displays adequate comfort level or baseline comfort level  Outcome: Progressing     Problem: Skin/Tissue Integrity  Goal: Absence of new skin breakdown  Description: 1.  Monitor for areas of redness and/or skin breakdown  2.  Assess vascular access sites hourly  3.  Every 4-6 hours minimum:  Change oxygen saturation probe site  4.  Every 4-6 hours:  If on nasal continuous positive airway pressure, respiratory therapy assess nares and determine need for appliance change or resting period.  Outcome: Progressing     Problem: ABCDS Injury Assessment  Goal: Absence of physical injury  Outcome: Progressing     Problem: Nutrition Deficit:  Goal: Optimize nutritional status  Outcome: Progressing

## 2024-04-10 NOTE — PROGRESS NOTES
Staff from [facility] called for update. Condition was informed, episode at approx 1430 was described and the staff member stated that this is very typical of his postictal state. Will continue to monitor.

## 2024-04-10 NOTE — PROGRESS NOTES
Pt has MRDD, is oriented to self, situation and place. Pt is calm and cooperative, wearing helmet d/t frequent seizure activity. No ssx of seizures at this time. Pt remains NPO. Assmt and VS completed as charted, see flow sheet. IV fluids continue as ordered, see MAR. Pt resting in bed, call light within reach, denies pain, denies further needs. Will continue to monitor.

## 2024-04-10 NOTE — PROGRESS NOTES
Progress Note    SUBJECTIVE:    Patient seen for f/u of Intractable nausea and vomiting.  He resting in bed no distress. No complaints    ROS:   Constitutional: negative  for fevers, and negative for chills.  Respiratory: negative for shortness of breath, negative for cough, and negative for wheezing  Cardiovascular: negative for chest pain, and negative for palpitations  Gastrointestinal: positive for abdominal pain, positive for nausea,positive for vomiting, positive for diarrhea, and negative for constipation     All other systems were reviewed with the patient and are negative unless otherwise stated in HPI      OBJECTIVE:      Vitals:   Vitals:    04/09/24 1814   BP: 111/66   Pulse: 67   Resp: 21   Temp: 97.4 °F (36.3 °C)   SpO2: 97%     Weight - Scale: 77.1 kg (170 lb) (with seizure pads on bed)   Height: 177.8 cm (5' 10\")     Weight  Wt Readings from Last 3 Encounters:   04/10/24 77.1 kg (170 lb)   03/03/24 72.4 kg (159 lb 9.6 oz)   02/25/24 73.3 kg (161 lb 9.6 oz)     Body mass index is 24.39 kg/m².    24HR INTAKE/OUTPUT:      Intake/Output Summary (Last 24 hours) at 4/10/2024 0703  Last data filed at 4/10/2024 0514  Gross per 24 hour   Intake 88.6 ml   Output 1385 ml   Net -1296.4 ml     -----------------------------------------------------------------  Exam:    GEN:    Awake, alert and oriented to person and place only.   EYES:  EOMI, pupils equal   NECK: Supple. No lymphadenopathy.  No carotid bruit  CVS:    regular rate and rhythm, no audible murmur  PULM:  CTA, no wheezes, rales or rhonchi, no acute respiratory distress  ABD:    Bowels sounds normal.  Abdomen is soft.  No distention.   generalized  tenderness to palpation.   EXT:   no edema bilaterally .  No calf tenderness.   NEURO: Moves all extremities.  Motor and sensory are grossly intact  SKIN:  No rashes.  No skin lesions.    -----------------------------------------------------------------    Diagnostic Data:      Complete Blood Count:   Recent  Advance Care Plan is present, Code Status is documented, or surrogate decision maker is listed in the patient's medical record  [If \"yes\", STOP HERE]     [] The patient's Advance Care Plan is NOT present because:    []  I confirmed today that the patient does not wish or was not able to name a   surrogate decision maker or provide and advance care plan.    [] Hospice care is currently being provided or has been provided within the   calendar year.    []  I did NOT confirm today the presence of an Advance Care Plan or surrogate   decision maker documented within the patient's medical record.   [DOES NOT SATISFY [facility] PERFORMANCE]    Anne Quevedo, TIFFANIE - CNP , TIFFANIE, NP-C  Hospitalist Medicine        4/10/2024, 7:03 AM

## 2024-04-10 NOTE — PROGRESS NOTES
Occupational Therapy  Facility/Department: [facility] MED SURG  Daily Treatment Note  NAME: Jeremy King  : 1955  MRN: [id number]    Date of Service: 4/10/2024    Discharge Recommendations:  Continue to assess pending progress         Patient Diagnosis(es): The primary encounter diagnosis was Ileus (HCC). A diagnosis of Partial small bowel obstruction (HCC) was also pertinent to this visit.     Assessment    Activity Tolerance: Patient tolerated treatment well  Discharge Recommendations: Continue to assess pending progress      Plan   Occupational Therapy Plan  Times Per Day: Once a day  Days Per Week: 7 Days  Current Treatment Recommendations: Strengthening;Balance training;Functional mobility training;Endurance training;Patient/Caregiver education & training;Equipment evaluation, education, & procurement;Safety education & training;Self-Care / ADL     Restrictions   General, fall risk    Subjective   Subjective  Subjective: Pt. resting in bed and agreeable to BUE therex.  Pain: No indication of pain throughout session.           Objective    Vitals         Pt declined self care at this time.      OT Exercises  Exercise Treatment: BUE therex to increase strength/endurance for ease of fxl tasks. Red digiflex x 20 yellow felx abr bends x 15 and 1# free weight x 10-20 reps x all available planes while supin HOB raised. Pt demo'd decreased strength/coordination in LUE throughout and d/c'd therex after one unit citing fatigue.     Safety Devices  Type of Devices: All fall risk precautions in place;Call light within reach;Bed alarm in place;Left in bed;Nurse notified     Patient Education  Education Given To: Patient  Education Provided: Role of Therapy;Plan of Care;Home Exercise Program  Education Method: Verbal  Barriers to Learning: Cognition  Education Outcome: Demonstrated understanding    Goals  Short Term Goals  Time Frame for Short Term Goals: 21 visits  Short Term Goal 1: Patient to tolerate sitting EOB x

## 2024-04-10 NOTE — DISCHARGE INSTR - COC
Continuity of Care Form    Patient Name: Jeremy King   :  1955  MRN:  [id number]    Admit date:  2024  Discharge date:  ***    Code Status Order: Full Code   Advance Directives:     Admitting Physician:  Jj Arevalo MD  PCP: Sukumar Larose MD    Discharging Nurse: ***  Discharging Hospital Unit/Room#: 0301/0301-01  Discharging Unit Phone Number: ***    Emergency Contact:   Extended Emergency Contact Information  Primary Emergency Contact: Esther Denny   [facility]  Home Phone: [phone]  Relation: Legal Guardian  Secondary Emergency Contact: Samir King  Home Phone: [phone]  Relation: Brother/Sister    Past Surgical History:  Past Surgical History:   Procedure Laterality Date    ABDOMEN SURGERY  ,     Bowel Obstruction X2    CAST APPLICATION Left     Lt. Ankle , X3    HYDROCELE EXCISION  2016    LAPAROSCOPY  14    with BERNARD    LAPAROTOMY  14    with partial cecectomy    VAGAL NERVE STIMULATION      placed, then replaced    VAGAL NERVE STIMULATION  10/11/2016    replaced generator battery       Immunization History:   Immunization History   Administered Date(s) Administered    COVID-19, PFIZER PURPLE top, DILUTE for use, (age 12 y+), 30mcg/0.3mL 2021, 2021, 2022    TDaP, ADACEL (age 10y-64y), BOOSTRIX (age 10y+), IM, 0.5mL 2013       Active Problems:  Patient Active Problem List   Diagnosis Code    Small bowel obstruction (HCC)  K56.609    Seizures (HCC) Chronic R56.9    MR (mental retardation), severe F72    Epilepsy (HCC) G40.909    SBO (small bowel obstruction) (HCC) K56.609    LEELEE (obstructive sleep apnea) G47.33    Epileptic seizures ([facility]) G40.909    Intellectual disability F79    S/P placement of VNS (vagus nerve stimulation) device Z96.89    Dysphagia R13.10    Elevated international normalized ratio (INR) R79.1    Nausea and vomiting R11.2    Seizure disorder ([facility]) G40.909    Seizure-like activity ([facility]) R56.9    ESTHER

## 2024-04-10 NOTE — PROGRESS NOTES
Writer entered room to find pt not acting himself, putting thumb in mouth, moaning, staring off, not answering questions appropriately. Activity lasted approx 3 minutes, placed magnet on chest, as instructed, and pt eventually came around to himself. MARCELINO Ramirez IV admin as Aicha RN, stated she was told by group home that pt will put fingers in mouth when he is not feeling well or has upset stomach. Pt resting in bed, call light with in reach. Will continue to monitor.   ELIJAH Rodríguez, informed of above.

## 2024-04-11 ENCOUNTER — TELEPHONE (OUTPATIENT)
Dept: PRIMARY CARE CLINIC | Age: 69
End: 2024-04-11

## 2024-04-11 VITALS
OXYGEN SATURATION: 98 % | DIASTOLIC BLOOD PRESSURE: 82 MMHG | TEMPERATURE: 97.9 F | RESPIRATION RATE: 18 BRPM | HEART RATE: 72 BPM | HEIGHT: 70 IN | BODY MASS INDEX: 24.51 KG/M2 | WEIGHT: 171.2 LBS | SYSTOLIC BLOOD PRESSURE: 141 MMHG

## 2024-04-11 LAB
ALBUMIN SERPL-MCNC: 3.4 G/DL (ref 3.5–5.2)
ALBUMIN/GLOB SERPL: 1.2 {RATIO} (ref 1–2.5)
ALP SERPL-CCNC: 93 U/L (ref 40–129)
ALT SERPL-CCNC: 9 U/L (ref 5–41)
ANION GAP SERPL CALCULATED.3IONS-SCNC: 9 MMOL/L (ref 9–17)
AST SERPL-CCNC: 10 U/L
BASOPHILS # BLD: 0.03 K/UL (ref 0–0.2)
BASOPHILS NFR BLD: 0 % (ref 0–2)
BILIRUB SERPL-MCNC: 0.2 MG/DL (ref 0.3–1.2)
BUN SERPL-MCNC: 12 MG/DL (ref 8–23)
BUN/CREAT SERPL: 20 (ref 9–20)
CALCIUM SERPL-MCNC: 8.2 MG/DL (ref 8.6–10.4)
CHLORIDE SERPL-SCNC: 109 MMOL/L (ref 98–107)
CO2 SERPL-SCNC: 22 MMOL/L (ref 20–31)
CREAT SERPL-MCNC: 0.6 MG/DL (ref 0.7–1.2)
EOSINOPHIL # BLD: 0.18 K/UL (ref 0–0.44)
EOSINOPHILS RELATIVE PERCENT: 3 % (ref 1–4)
ERYTHROCYTE [DISTWIDTH] IN BLOOD BY AUTOMATED COUNT: 11.9 % (ref 11.8–14.4)
GFR SERPL CREATININE-BSD FRML MDRD: >90 ML/MIN/1.73M2
GLUCOSE SERPL-MCNC: 90 MG/DL (ref 70–99)
HCT VFR BLD AUTO: 31.3 % (ref 40.7–50.3)
HGB BLD-MCNC: 10.6 G/DL (ref 13–17)
IMM GRANULOCYTES # BLD AUTO: <0.03 K/UL (ref 0–0.3)
IMM GRANULOCYTES NFR BLD: 0 %
LYMPHOCYTES NFR BLD: 2.13 K/UL (ref 1.1–3.7)
LYMPHOCYTES RELATIVE PERCENT: 31 % (ref 24–43)
MAGNESIUM SERPL-MCNC: 1.3 MG/DL (ref 1.6–2.6)
MAGNESIUM SERPL-MCNC: 2.4 MG/DL (ref 1.6–2.6)
MCH RBC QN AUTO: 33.5 PG (ref 25.2–33.5)
MCHC RBC AUTO-ENTMCNC: 33.9 G/DL (ref 28.4–34.8)
MCV RBC AUTO: 99.1 FL (ref 82.6–102.9)
MONOCYTES NFR BLD: 0.57 K/UL (ref 0.1–1.2)
MONOCYTES NFR BLD: 8 % (ref 3–12)
NEUTROPHILS NFR BLD: 58 % (ref 36–65)
NEUTS SEG NFR BLD: 4.02 K/UL (ref 1.5–8.1)
NRBC BLD-RTO: 0 PER 100 WBC
PLATELET # BLD AUTO: 179 K/UL (ref 138–453)
PMV BLD AUTO: 9.2 FL (ref 8.1–13.5)
POTASSIUM SERPL-SCNC: 3.2 MMOL/L (ref 3.7–5.3)
POTASSIUM SERPL-SCNC: 3.3 MMOL/L (ref 3.7–5.3)
PROT SERPL-MCNC: 6.3 G/DL (ref 6.4–8.3)
RBC # BLD AUTO: 3.16 M/UL (ref 4.21–5.77)
SODIUM SERPL-SCNC: 140 MMOL/L (ref 135–144)
WBC OTHER # BLD: 6.9 K/UL (ref 3.5–11.3)

## 2024-04-11 PROCEDURE — 85025 COMPLETE CBC W/AUTO DIFF WBC: CPT

## 2024-04-11 PROCEDURE — 2580000003 HC RX 258: Performed by: STUDENT IN AN ORGANIZED HEALTH CARE EDUCATION/TRAINING PROGRAM

## 2024-04-11 PROCEDURE — 97110 THERAPEUTIC EXERCISES: CPT

## 2024-04-11 PROCEDURE — 02HV33Z INSERTION OF INFUSION DEVICE INTO SUPERIOR VENA CAVA, PERCUTANEOUS APPROACH: ICD-10-PCS | Performed by: STUDENT IN AN ORGANIZED HEALTH CARE EDUCATION/TRAINING PROGRAM

## 2024-04-11 PROCEDURE — 6360000002 HC RX W HCPCS: Performed by: STUDENT IN AN ORGANIZED HEALTH CARE EDUCATION/TRAINING PROGRAM

## 2024-04-11 PROCEDURE — 83735 ASSAY OF MAGNESIUM: CPT

## 2024-04-11 PROCEDURE — 6370000000 HC RX 637 (ALT 250 FOR IP): Performed by: NURSE PRACTITIONER

## 2024-04-11 PROCEDURE — 84132 ASSAY OF SERUM POTASSIUM: CPT

## 2024-04-11 PROCEDURE — 80053 COMPREHEN METABOLIC PANEL: CPT

## 2024-04-11 PROCEDURE — 97162 PT EVAL MOD COMPLEX 30 MIN: CPT

## 2024-04-11 PROCEDURE — 94761 N-INVAS EAR/PLS OXIMETRY MLT: CPT

## 2024-04-11 PROCEDURE — 51702 INSERT TEMP BLADDER CATH: CPT

## 2024-04-11 PROCEDURE — 97530 THERAPEUTIC ACTIVITIES: CPT

## 2024-04-11 PROCEDURE — 6370000000 HC RX 637 (ALT 250 FOR IP)

## 2024-04-11 RX ORDER — POTASSIUM CHLORIDE 20 MEQ/1
40 TABLET, EXTENDED RELEASE ORAL ONCE
Status: COMPLETED | OUTPATIENT
Start: 2024-04-11 | End: 2024-04-11

## 2024-04-11 RX ORDER — POTASSIUM CHLORIDE 20 MEQ/1
20 TABLET, EXTENDED RELEASE ORAL ONCE
Status: COMPLETED | OUTPATIENT
Start: 2024-04-11 | End: 2024-04-11

## 2024-04-11 RX ORDER — POTASSIUM CHLORIDE 20 MEQ/1
20 TABLET, EXTENDED RELEASE ORAL DAILY
Qty: 5 TABLET | Refills: 0 | Status: SHIPPED | OUTPATIENT
Start: 2024-04-11 | End: 2024-04-16

## 2024-04-11 RX ADMIN — TAMSULOSIN HYDROCHLORIDE 0.4 MG: 0.4 CAPSULE ORAL at 08:36

## 2024-04-11 RX ADMIN — Medication 400 MG: at 08:36

## 2024-04-11 RX ADMIN — PRIMIDONE 250 MG: 250 TABLET ORAL at 08:36

## 2024-04-11 RX ADMIN — POTASSIUM CHLORIDE 40 MEQ: 1500 TABLET, EXTENDED RELEASE ORAL at 14:50

## 2024-04-11 RX ADMIN — CYANOCOBALAMIN TAB 1000 MCG 1000 MCG: 1000 TAB at 08:36

## 2024-04-11 RX ADMIN — MAGNESIUM SULFATE HEPTAHYDRATE 2000 MG: 40 INJECTION, SOLUTION INTRAVENOUS at 10:17

## 2024-04-11 RX ADMIN — MAGNESIUM SULFATE HEPTAHYDRATE 2000 MG: 40 INJECTION, SOLUTION INTRAVENOUS at 08:42

## 2024-04-11 RX ADMIN — METRONIDAZOLE 500 MG: 500 INJECTION, SOLUTION INTRAVENOUS at 05:29

## 2024-04-11 RX ADMIN — Medication 1000 UNITS: at 08:36

## 2024-04-11 RX ADMIN — ANTACID TABLETS 500 MG: 500 TABLET, CHEWABLE ORAL at 08:36

## 2024-04-11 RX ADMIN — POTASSIUM CHLORIDE 40 MEQ: 1500 TABLET, EXTENDED RELEASE ORAL at 10:12

## 2024-04-11 RX ADMIN — LACOSAMIDE 150 MG: 50 TABLET, FILM COATED ORAL at 08:36

## 2024-04-11 RX ADMIN — ENOXAPARIN SODIUM 40 MG: 100 INJECTION SUBCUTANEOUS at 08:37

## 2024-04-11 RX ADMIN — POTASSIUM CHLORIDE 20 MEQ: 1500 TABLET, EXTENDED RELEASE ORAL at 12:21

## 2024-04-11 RX ADMIN — OXYBUTYNIN CHLORIDE 10 MG: 5 TABLET, EXTENDED RELEASE ORAL at 08:36

## 2024-04-11 RX ADMIN — PANTOPRAZOLE SODIUM 40 MG: 40 TABLET, DELAYED RELEASE ORAL at 08:36

## 2024-04-11 RX ADMIN — LEVETIRACETAM 1500 MG: 500 TABLET, FILM COATED ORAL at 08:36

## 2024-04-11 RX ADMIN — MECLIZINE HYDROCHLORIDE 25 MG: 25 TABLET ORAL at 08:36

## 2024-04-11 RX ADMIN — SODIUM CHLORIDE, PRESERVATIVE FREE 10 ML: 5 INJECTION INTRAVENOUS at 08:37

## 2024-04-11 RX ADMIN — LAMOTRIGINE 400 MG: 100 TABLET ORAL at 08:37

## 2024-04-11 NOTE — FLOWSHEET NOTE
PICC IV removed. Pressure dressing applied. Changed wet brief and dressed in clothes. In own wheelchair. To front entrance per w/c for discharge home.

## 2024-04-11 NOTE — PLAN OF CARE
Problem: Discharge Planning  Goal: Discharge to home or other facility with appropriate resources  4/10/2024 2237 by Zee Ramirez RN  Outcome: Progressing     Problem: Safety - Adult  Goal: Free from fall injury  4/10/2024 2237 by Zee Ramirez RN  Outcome: Progressing     Problem: Chronic Conditions and Co-morbidities  Goal: Patient's chronic conditions and co-morbidity symptoms are monitored and maintained or improved  4/10/2024 2237 by Zee Ramirez RN  Outcome: Progressing     Problem: Pain  Goal: Verbalizes/displays adequate comfort level or baseline comfort level  4/10/2024 2237 by Zee Ramirez RN  Outcome: Progressing     Problem: Skin/Tissue Integrity  Goal: Absence of new skin breakdown  Description: 1.  Monitor for areas of redness and/or skin breakdown  2.  Assess vascular access sites hourly  3.  Every 4-6 hours minimum:  Change oxygen saturation probe site  4.  Every 4-6 hours:  If on nasal continuous positive airway pressure, respiratory therapy assess nares and determine need for appliance change or resting period.  4/10/2024 2237 by Zee Ramirez RN  Outcome: Progressing     Problem: ABCDS Injury Assessment  Goal: Absence of physical injury  4/10/2024 2237 by Zee Ramirez RN  Outcome: Progressing     Problem: Nutrition Deficit:  Goal: Optimize nutritional status  4/10/2024 2237 by Zee Ramirez RN  Outcome: Progressing

## 2024-04-11 NOTE — PLAN OF CARE
Problem: Discharge Planning  Goal: Discharge to home or other facility with appropriate resources  4/11/2024 1533 by Justyna Escoto RN  Outcome: Adequate for Discharge  4/11/2024 1058 by Justyna Escoto RN  Outcome: Progressing     Problem: Safety - Adult  Goal: Free from fall injury  4/11/2024 1533 by Justyna Escoto RN  Outcome: Adequate for Discharge  4/11/2024 1058 by Justyna Escoto RN  Outcome: Progressing     Problem: Chronic Conditions and Co-morbidities  Goal: Patient's chronic conditions and co-morbidity symptoms are monitored and maintained or improved  4/11/2024 1533 by Justyna Escoto RN  Outcome: Adequate for Discharge  4/11/2024 1058 by Justyna Escoto RN  Outcome: Progressing     Problem: Pain  Goal: Verbalizes/displays adequate comfort level or baseline comfort level  4/11/2024 1533 by Justyna Escoto RN  Outcome: Adequate for Discharge  4/11/2024 1058 by Justyna Escoto RN  Outcome: Progressing     Problem: Skin/Tissue Integrity  Goal: Absence of new skin breakdown  4/11/2024 1533 by Justyna Escoto RN  Outcome: Adequate for Discharge  4/11/2024 1058 by Justyna Escoto RN  Outcome: Progressing     Problem: ABCDS Injury Assessment  Goal: Absence of physical injury  4/11/2024 1533 by Justyna Escoto RN  Outcome: Adequate for Discharge  4/11/2024 1058 by Justyna Escoto RN  Outcome: Progressing     Problem: Nutrition Deficit:  Goal: Optimize nutritional status  4/11/2024 1533 by Justyna Escoto RN  Outcome: Adequate for Discharge  4/11/2024 1058 by Justyna Escoto RN  Outcome: Progressing

## 2024-04-11 NOTE — FLOWSHEET NOTE
Kasey young D/C'd as ordered. Involuntary of stool in brief. To BSC with 2 assist. Had an additional liquid brown BM. Alisa care given and brief applied.call light within reach. Chair alarm on for safety

## 2024-04-11 NOTE — PROGRESS NOTES
Occupational Therapy  Facility/Department: [facility] MED SURG  Daily Treatment Note  NAME: Jeremy King  : 1955  MRN: [id number]    Date of Service: 2024    Discharge Recommendations:  Continue to assess pending progress         Patient Diagnosis(es): The primary encounter diagnosis was Ileus (HCC). A diagnosis of Partial small bowel obstruction (HCC) was also pertinent to this visit.     Assessment    Activity Tolerance: Patient limited by fatigue  Discharge Recommendations: Continue to assess pending progress      Plan   Occupational Therapy Plan  Times Per Day: Once a day  Days Per Week: 7 Days  Current Treatment Recommendations: Strengthening;Balance training;Functional mobility training;Endurance training;Patient/Caregiver education & training;Equipment evaluation, education, & procurement;Safety education & training;Self-Care / ADL     Restrictions   General, fall risk    Subjective   Subjective  Subjective: Pt. resting in bed and agreeable to BUE therex.  Pain: No indication of pain throughout session.         Objective    Vitals              OT Exercises  Exercise Treatment: BUE therex to increase strength/endurance for ease of fxl tasks. Red digiflex x 20 yellow flex bar bends x 20 and 1# free weight x 20 reps x all available planes while supine HOB raised. Pt demo'd decreased strength/coordination in LUE throughout and d/c'd therex after one unit citing fatigue.     Safety Devices  Type of Devices: All fall risk precautions in place;Bed alarm in place;Call light within reach;Left in bed     Patient Education  Education Given To: Patient  Education Provided: Role of Therapy;Plan of Care;Home Exercise Program  Education Method: Verbal  Barriers to Learning: Cognition  Education Outcome: Demonstrated understanding    Goals  Short Term Goals  Time Frame for Short Term Goals: 21 visits  Short Term Goal 1: Patient to tolerate sitting EOB x 10 minutes while engaging in functional tasks of choice s LOB

## 2024-04-11 NOTE — PROGRESS NOTES
Physical Therapy  Facility/Department: [facility] MED SURG  Physical Therapy Initial Assessment    Name: Jeremy King  : 1955  MRN: [id number]  Date of Service: 2024    Discharge Recommendations:  Continue to assess pending progress, 24 hour supervision or assist          Patient Diagnosis(es): The primary encounter diagnosis was Ileus (HCC). A diagnosis of Partial small bowel obstruction (HCC) was also pertinent to this visit.  Past Medical History:  has a past medical history of Arthritis, BPH (benign prostatic hyperplasia), Dysphagia, GERD (gastroesophageal reflux disease), Hearing loss, HLD (hyperlipidemia), Insomnia, MR (mental retardation), Periorbital cellulitis, SBO (small bowel obstruction) (HCC), Seizures (HCC), Ventral hernia, and Vitamin D deficiency.  Past Surgical History:  has a past surgical history that includes Abdomen surgery (, ); laparoscopy (14); laparotomy (14); cast application (Left); Hydrocele surgery (2016); Vagus nerve stimulator insertion; and Vagus nerve stimulator insertion (10/11/2016).    Assessment   Assessment: Patient is 68 year old male with dx of ileus who presents with sig hx of MRDD and seizures, decreased B LE strength, decreased functional mobility and endurance and decreased safety and balance during transfers and ambulation and would benefit from physical therapy to address all concerns and safely return to [facility].  Treatment Diagnosis: Difficulty walking  Specific Instructions for Next Treatment: Once daily on weekends  Therapy Prognosis: Good;Fair  Decision Making: Medium Complexity  Requires PT Follow-Up: Yes  Activity Tolerance  Activity Tolerance: Patient limited by fatigue;Treatment limited secondary to decreased cognition     Plan   Physical Therapy Plan  General Plan: 2 times a day 7 days a week  Specific Instructions for Next Treatment: Once daily on weekends  Current Treatment Recommendations: Strengthening, ROM, Balance training,  Functional mobility training, Transfer training, Neuromuscular re-education, Gait training, Home exercise program, Safety education & training, Patient/Caregiver education & training, Manual, Endurance training, Therapeutic activities  Safety Devices  Type of Devices: All nadir prominences offloaded, Patient at risk for falls, All fall risk precautions in place, Left in chair, Call light within reach, Chair alarm in place, Nurse notified     Restrictions  Restrictions/Precautions  Restrictions/Precautions: Contact Precautions, General Precautions, Seizure     Subjective   General  Chart Reviewed: Yes  Patient assessed for rehabilitation services?: Yes  Response To Previous Treatment: Not applicable  Family / Caregiver Present: No  Referring Practitioner: Dr. Mickey MD  Referral Date : 04/10/24  Diagnosis: Ileus, K56.7  Follows Commands: Impaired  Subjective  Subjective: Pt follows one step commands with increased time and repetition         Social/Functional History  Social/Functional History  Type of Home: House  Home Layout: One level  ADL Assistance: Needs assistance  Homemaking Assistance: Needs assistance  Ambulation Assistance: Needs assistance  Transfer Assistance: Needs assistance  Additional Comments: Per chart, patient lives in group home. Per intake RN, patient was receiving in home therapy services d/t recent extended stay in hospital. Per staff at group home, patient would ambulate a few steps with cane, transfer with gait belt. Requires assist c ADL.  Vision/Hearing  Vision  Vision: Within Functional Limits  Hearing  Hearing: Within functional limits    Cognition   Orientation  Orientation Level: Oriented to person     Objective   Temp: 97.7 °F (36.5 °C)  Pulse: 60  Heart Rate Source: Monitor;Apical  Respirations: 18  SpO2: 97 %  O2 Device: None (Room air)  BP: (!) 146/59  MAP (Calculated): 88  BP Location: Left upper arm  BP Method: Automatic        Gross Assessment  AROM: Within functional

## 2024-04-11 NOTE — TELEPHONE ENCOUNTER
Anne left without putting in an order for [facility] for PT OT and skilled nursing. Wonderinf if you could please put that order in as patient is being dishcarged.

## 2024-04-11 NOTE — PROGRESS NOTES
Physical Therapy  Facility/Department: [facility] MED SURG  Daily Treatment Note  NAME: Jeremy King  : 1955  MRN: [id number]    Date of Service: 2024    Discharge Recommendations:  Continue to assess pending progress, 24 hour supervision or assist     Patient Diagnosis(es): The primary encounter diagnosis was Ileus (HCC). A diagnosis of Partial small bowel obstruction (HCC) was also pertinent to this visit.    Assessment   Assessment: Pt. completed reclined seated therex ankle pumps, SLR and glute sets x15. Pt. stood at chair ~1-2 minutes for josh care and MinAx1. No LOB noted. Transfers:MinAx2. Pt. took 3-5 steps with HHAx2 and MinAx2. Pt. performed bridge x1 to fix padding on bed.  Activity Tolerance: Patient limited by fatigue;Treatment limited secondary to decreased cognition     Plan    Physical Therapy Plan  General Plan: 2 times a day 7 days a week  Specific Instructions for Next Treatment: Once daily on weekends  Current Treatment Recommendations: Strengthening;ROM;Balance training;Functional mobility training;Transfer training;Neuromuscular re-education;Gait training;Home exercise program;Safety education & training;Patient/Caregiver education & training;Manual;Endurance training;Therapeutic activities     Restrictions  Restrictions/Precautions  Restrictions/Precautions: Contact Precautions, General Precautions, Seizure     Subjective    Subjective  Subjective: Pt. in chair upon arrival, agreeable to therapy at this time  Pain: denies  Orientation  Orientation Level: Oriented to person     Objective   Bed Mobility Training  Bed Mobility Training: Yes  Overall Level of Assistance: Minimum assistance;Assist X2  Interventions: Tactile cues;Verbal cues;Visual cues  Sit to Supine: Minimum assistance;Assist X2  Transfer Training  Transfer Training: Yes  Overall Level of Assistance: Minimum assistance;Assist X2  Interventions: Safety awareness training;Tactile cues;Verbal cues  Sit to Stand: Minimum

## 2024-04-11 NOTE — DISCHARGE SUMMARY
Discharge Summary    Jeremy King  :  1955  MRN:  [id number]    Admit date:  2024      Discharge date: 2024     Admitting Physician:  Jj Arevalo MD    Discharge Diagnoses:    Principal Problem:    Intractable nausea and vomiting  Active Problems:    MR (mental retardation), severe    Epilepsy (HCC)    ESTHER (acute kidney injury) (HCC)    Sepsis (HCC)    Ileus (HCC)  Resolved Problems:    * No resolved hospital problems. *      Hospital Course:   Jeremy King is a 68 y.o. male admitted with intractable nausea vomiting.  He presented from the [facility] home with nausea vomiting diarrhea.  Symptom onset 1 day.  He does have history of obstructions as well as bowel resection in the past.  He does have history of MR, seizures with vagus nerve stimulator, hyperlipidemia, arthritis and ventral hernia.  During patient's initial workup WBC count was 16.7 with a left shift.  BUN was 46 and creatinine of 1.6.  Patient's baseline creatinine is 0.7-0.8.  Lactic acid was elevated at 2.3 and 2.5.  CT abdomen and pelvis showed small bowel distention without focal transition point identified, mild amount of liquid stool in the colon likely an ileus and diarrhea process versus partial obstructing process.  Patient was admitted General surgery was consulted labs monitored electrolytes replaced.  Patient was hydrated well with fluids and required Parks catheter for better monitoring of output.  Parks catheter removed and patient is voiding well.  Patient did complete small bowel follow-through and has been having multiple liquid stools.  Diet was advanced and tolerating well.  Electrolytes replaced.  Plan will be to discharge home today I will ask that he gets repeat labs next week and follow-up with primary care.  I also will continue him on Klor-Con 20 mill equivalents daily for 5 days.    Consultants:   Dr. Kelly, general surgery    Procedures: none    Complications: none    Discharge Condition: fair    Exam:  GEN:     LAMICTAL     * lamoTRIgine 200 MG tablet  Commonly known as: LAMICTAL     latanoprost 0.005 % ophthalmic solution  Commonly known as: XALATAN     * levETIRAcetam 1000 MG tablet  Commonly known as: KEPPRA  Take 2 tablets by mouth nightly     * levETIRAcetam 750 MG tablet  Commonly known as: KEPPRA  Take 2 tablets by mouth daily     linaclotide 145 MCG capsule  Commonly known as: LINZESS  Take 1 capsule by mouth every morning (before breakfast)     LORazepam 0.5 MG tablet  Commonly known as: ATIVAN     magnesium oxide 400 (240 Mg) MG tablet  Commonly known as: MAG-OX     meclizine 25 MG tablet  Commonly known as: ANTIVERT     melatonin 3 MG Tabs tablet     ondansetron 4 MG disintegrating tablet  Commonly known as: ZOFRAN-ODT     oxyBUTYnin 10 MG extended release tablet  Commonly known as: DITROPAN-XL     pantoprazole 40 MG tablet  Commonly known as: PROTONIX  Take 1 tablet by mouth every morning (before breakfast)     * primidone 250 MG tablet  Commonly known as: MYSOLINE     * primidone 250 MG tablet  Commonly known as: MYSOLINE     ProAir  (90 Base) MCG/ACT inhaler  Generic drug: albuterol sulfate HFA     sennosides-docusate sodium 8.6-50 MG tablet  Commonly known as: SENOKOT-S  Take 2 tablets by mouth daily     Systane Nighttime Oint     tamsulosin 0.4 MG capsule  Commonly known as: FLOMAX     vitamin B-12 1000 MCG tablet  Commonly known as: CYANOCOBALAMIN     Vitamin D 25 MCG (1000 UT) Tabs tablet  Commonly known as: CHOLECALCIFEROL           * This list has 6 medication(s) that are the same as other medications prescribed for you. Read the directions carefully, and ask your doctor or other care provider to review them with you.                STOP taking these medications      polyethyl glycol-propyl glycol 0.4-0.3 % 0.4-0.3 % ophthalmic solution  Commonly known as: SYSTANE               Where to Get Your Medications        These medications were sent to [facility], [facility] - [place], [place] - [address]

## 2024-04-11 NOTE — PROGRESS NOTES
Patient shift assessment and vitals completed at this time as charted. Patient assisted to use the commode x3 assist with gate belt. Patient was incontinent of stool and also had a bowel movement as well. Patient assisted back to bed. Patient states no further needs, call light is in reach, bed alarm is on, plan of care is ongoing.

## 2024-04-11 NOTE — PLAN OF CARE
Problem: Discharge Planning  Goal: Discharge to home or other facility with appropriate resources  4/11/2024 1058 by Justyna Escoto RN  Outcome: Progressing  4/10/2024 2237 by Zee Ramirez RN  Outcome: Progressing     Problem: Safety - Adult  Goal: Free from fall injury  4/11/2024 1058 by Justyna Escoto RN  Outcome: Progressing  4/10/2024 2237 by Zee Ramirez RN  Outcome: Progressing     Problem: Chronic Conditions and Co-morbidities  Goal: Patient's chronic conditions and co-morbidity symptoms are monitored and maintained or improved  4/11/2024 1058 by Justyna Escoto RN  Outcome: Progressing  4/10/2024 2237 by Zee Ramirez RN  Outcome: Progressing     Problem: Pain  Goal: Verbalizes/displays adequate comfort level or baseline comfort level  4/11/2024 1058 by Justyna Escoto RN  Outcome: Progressing  4/10/2024 2237 by Zee Ramirez RN  Outcome: Progressing     Problem: Skin/Tissue Integrity  Goal: Absence of new skin breakdown  4/11/2024 1058 by Justyna Escoto RN  Outcome: Progressing  4/10/2024 2237 by Zee Ramirez RN  Outcome: Progressing     Problem: ABCDS Injury Assessment  Goal: Absence of physical injury  4/11/2024 1058 by Justyna Escoto RN  Outcome: Progressing  4/10/2024 2237 by Zee Ramirez RN  Outcome: Progressing     Problem: Nutrition Deficit:  Goal: Optimize nutritional status  4/11/2024 1058 by Justyna Escoto RN  Outcome: Progressing  4/10/2024 2237 by Zee Ramirez RN  Outcome: Progressing

## 2024-04-11 NOTE — PROGRESS NOTES
Progress Note    SUBJECTIVE:    Patient seen for f/u of Intractable nausea and vomiting.  He resting in bed no distress. No complaints.  Talkative.  Nurses report several stools through the night.    ROS:   Constitutional: negative  for fevers, and negative for chills.  Respiratory: negative for shortness of breath, negative for cough, and negative for wheezing  Cardiovascular: negative for chest pain, and negative for palpitations  Gastrointestinal: Negative for abdominal pain, negative for nausea,positive for vomiting, positive for diarrhea, and negative for constipation     All other systems were reviewed with the patient and are negative unless otherwise stated in HPI      OBJECTIVE:      Vitals:   Vitals:    04/10/24 1830   BP: (!) 140/83   Pulse: 88   Resp: 18   Temp: 97.8 °F (36.6 °C)   SpO2: 94%     Weight - Scale: 77.7 kg (171 lb 3.2 oz)   Height: 177.8 cm (5' 10\")     Weight  Wt Readings from Last 3 Encounters:   04/11/24 77.7 kg (171 lb 3.2 oz)   03/03/24 72.4 kg (159 lb 9.6 oz)   02/25/24 73.3 kg (161 lb 9.6 oz)     Body mass index is 24.56 kg/m².    24HR INTAKE/OUTPUT:      Intake/Output Summary (Last 24 hours) at 4/11/2024 0644  Last data filed at 4/11/2024 0625  Gross per 24 hour   Intake 240 ml   Output 1600 ml   Net -1360 ml     -----------------------------------------------------------------  Exam:    GEN:    Awake, alert and oriented to person and place only.   EYES:  EOMI, pupils equal   NECK: Supple. No lymphadenopathy.  No carotid bruit  CVS:    regular rate and rhythm, no audible murmur  PULM:  CTA, no wheezes, rales or rhonchi, no acute respiratory distress  ABD:    Bowels sounds normal.  Abdomen is soft.  No distention.   No  tenderness to palpation.   EXT:   no edema bilaterally .  No calf tenderness.   NEURO: Moves all extremities.  Motor and sensory are grossly intact  SKIN:  No rashes.  No skin lesions.    -----------------------------------------------------------------    Diagnostic

## 2024-04-11 NOTE — FLOWSHEET NOTE
[facility] home in [place] called for report. [facility] nurse not on duty currently. Staff given update and made aware of patient returning to their facility by [facility] transport.

## 2024-04-11 NOTE — TELEPHONE ENCOUNTER
Hello, I signed for this, thank you.  Electronically signed by Jj Arevalo MD on 4/11/2024 at 3:07 PM

## 2024-04-11 NOTE — PROGRESS NOTES
Acknowledge pt discharge back to group home this date. SW called to group home and notified of pt discharge and requested transportation. Group home called back and they are unable to transport. SW called to guardian to inform of discharge and she is unable to transport as pt cannot get in her vehicle. SW called to [facility] and they are available at 330 pm. Nursing notified of time and will do their best to get him ready. SW completed packet and placed orders in with discharge summary and AVS. MIC called to [facility] and notified of pt return to group home and they will pull their orders from the system to resume care. Guardian notified of discharge time and way. No further discharge needs anticipated. Tammy DOVERW 4/11/2024

## 2024-04-11 NOTE — FLOWSHEET NOTE
Awake, resting in bed . Vitals checked and assessment completed. Denies abdominal pain. No nausea or vomiting. Dr Arevalo in to see patient.

## 2024-04-17 ENCOUNTER — HOSPITAL ENCOUNTER (EMERGENCY)
Age: 69
Discharge: ANOTHER HOSPITAL | End: 2024-04-17
Payer: MEDICARE

## 2024-04-17 ENCOUNTER — HOSPITAL ENCOUNTER (INPATIENT)
Age: 69
LOS: 2 days | Discharge: OTHER INSTITUTION | End: 2024-04-19
Attending: STUDENT IN AN ORGANIZED HEALTH CARE EDUCATION/TRAINING PROGRAM | Admitting: STUDENT IN AN ORGANIZED HEALTH CARE EDUCATION/TRAINING PROGRAM
Payer: MEDICARE

## 2024-04-17 VITALS — OXYGEN SATURATION: 16 % | HEART RATE: 92 BPM | DIASTOLIC BLOOD PRESSURE: 72 MMHG | SYSTOLIC BLOOD PRESSURE: 118 MMHG

## 2024-04-17 VITALS — DIASTOLIC BLOOD PRESSURE: 86 MMHG | SYSTOLIC BLOOD PRESSURE: 122 MMHG | HEART RATE: 89 BPM | OXYGEN SATURATION: 98 %

## 2024-04-17 VITALS
OXYGEN SATURATION: 95 % | TEMPERATURE: 97.88 F | HEART RATE: 95 BPM | SYSTOLIC BLOOD PRESSURE: 120 MMHG | DIASTOLIC BLOOD PRESSURE: 81 MMHG

## 2024-04-17 VITALS — BODY MASS INDEX: 29.8 KG/M2

## 2024-04-17 VITALS — OXYGEN SATURATION: 98 %

## 2024-04-17 DIAGNOSIS — M19.90: ICD-10-CM

## 2024-04-17 DIAGNOSIS — N40.1: ICD-10-CM

## 2024-04-17 DIAGNOSIS — G40.909: ICD-10-CM

## 2024-04-17 DIAGNOSIS — F71: ICD-10-CM

## 2024-04-17 DIAGNOSIS — Z79.899: ICD-10-CM

## 2024-04-17 DIAGNOSIS — K46.9: ICD-10-CM

## 2024-04-17 DIAGNOSIS — H40.9: ICD-10-CM

## 2024-04-17 DIAGNOSIS — H91.90: ICD-10-CM

## 2024-04-17 DIAGNOSIS — N13.8: ICD-10-CM

## 2024-04-17 DIAGNOSIS — R35.1: ICD-10-CM

## 2024-04-17 DIAGNOSIS — R62.50: ICD-10-CM

## 2024-04-17 DIAGNOSIS — K21.9: ICD-10-CM

## 2024-04-17 DIAGNOSIS — K56.609: Primary | ICD-10-CM

## 2024-04-17 DIAGNOSIS — E78.5: ICD-10-CM

## 2024-04-17 LAB
ADD MANUAL DIFF: NO
ALANINE AMINOTRANSFERASE: 25 U/L (ref 16–63)
ALBUMIN GLOBULIN RATIO: 1.1
ALBUMIN LEVEL: 4.1 G/DL (ref 3.4–5)
ALKALINE PHOSPHATASE: 138 U/L (ref 46–116)
AMYLASE: 122 U/L (ref 25–115)
ANION GAP: 15.5
ASPARTATE AMINO TRANSFERASE: 16 U/L (ref 15–37)
BLOOD UREA NITROGEN: 28 MG/DL (ref 7–18)
CALCIUM: 10.1 MG/DL (ref 8.5–10.1)
CARBON DIOXIDE: 27.3 MMOL/L (ref 21–32)
CHLORIDE: 106 MMOL/L (ref 98–107)
ESTIMATED GFR (AFRICAN AMERICA: >60 (ref 60–?)
ESTIMATED GFR (NON-AFRICAN AME: >60 (ref 60–?)
GLOBULIN: 3.9 G/DL
GLUCOSE: 115 MG/DL (ref 74–106)
HEMATOCRIT: 41 % (ref 42–54)
HEMOGLOBIN: 13.2 G/DL (ref 14–18)
IMMATURE GRANULOCYTES ABS AUTO: 0.04 10^3/UL (ref 0–0.03)
IMMATURE GRANULOCYTES PCT AUTO: 0.3 % (ref 0–0.5)
LIPASE: 42 U/L (ref 16–77)
LYMPHOCYTES  ABSOLUTE AUTO: 1.4 10^3/UL (ref 1.2–3.8)
MCV RBC: 102.5 FL (ref 80–94)
MEAN CORPUSCULAR HEMOGLOBIN: 33 PG (ref 25.9–34)
MEAN CORPUSCULAR HGB CONC: 32.2 G/DL (ref 29.9–35.2)
PLATELET COUNT: 297 10^3/UL (ref 150–450)
POTASSIUM: 3.8 MMOL/L (ref 3.5–5.1)
RED BLOOD COUNT: 4 10^6/UL (ref 4.7–6.1)
SODIUM: 145 MMOL/L (ref 136–145)
TOTAL PROTEIN: 8 G/DL (ref 6.4–8.2)
WHITE BLOOD COUNT: 12.6 10^3/UL (ref 4–11)

## 2024-04-17 PROCEDURE — 99223 1ST HOSP IP/OBS HIGH 75: CPT | Performed by: STUDENT IN AN ORGANIZED HEALTH CARE EDUCATION/TRAINING PROGRAM

## 2024-04-17 PROCEDURE — 99285 EMERGENCY DEPT VISIT HI MDM: CPT

## 2024-04-17 PROCEDURE — 80048 BASIC METABOLIC PNL TOTAL CA: CPT

## 2024-04-17 PROCEDURE — 96360 HYDRATION IV INFUSION INIT: CPT

## 2024-04-17 PROCEDURE — 85025 COMPLETE CBC W/AUTO DIFF WBC: CPT

## 2024-04-17 PROCEDURE — 80076 HEPATIC FUNCTION PANEL: CPT

## 2024-04-17 PROCEDURE — 96361 HYDRATE IV INFUSION ADD-ON: CPT

## 2024-04-17 PROCEDURE — 1200000000 HC SEMI PRIVATE

## 2024-04-17 PROCEDURE — 36415 COLL VENOUS BLD VENIPUNCTURE: CPT

## 2024-04-17 PROCEDURE — 74177 CT ABD & PELVIS W/CONTRAST: CPT

## 2024-04-17 PROCEDURE — 82150 ASSAY OF AMYLASE: CPT

## 2024-04-17 PROCEDURE — 83690 ASSAY OF LIPASE: CPT

## 2024-04-17 RX ORDER — POTASSIUM CHLORIDE 7.45 MG/ML
10 INJECTION INTRAVENOUS PRN
Status: DISCONTINUED | OUTPATIENT
Start: 2024-04-17 | End: 2024-04-19 | Stop reason: HOSPADM

## 2024-04-17 RX ORDER — ACETAMINOPHEN 650 MG/1
650 SUPPOSITORY RECTAL EVERY 6 HOURS PRN
Status: DISCONTINUED | OUTPATIENT
Start: 2024-04-17 | End: 2024-04-19 | Stop reason: HOSPADM

## 2024-04-17 RX ORDER — SODIUM CHLORIDE 9 MG/ML
INJECTION, SOLUTION INTRAVENOUS PRN
Status: DISCONTINUED | OUTPATIENT
Start: 2024-04-17 | End: 2024-04-19 | Stop reason: HOSPADM

## 2024-04-17 RX ORDER — ENOXAPARIN SODIUM 100 MG/ML
40 INJECTION SUBCUTANEOUS DAILY
Status: DISCONTINUED | OUTPATIENT
Start: 2024-04-18 | End: 2024-04-19 | Stop reason: HOSPADM

## 2024-04-17 RX ORDER — POLYETHYLENE GLYCOL 3350 17 G/17G
17 POWDER, FOR SOLUTION ORAL DAILY PRN
Status: DISCONTINUED | OUTPATIENT
Start: 2024-04-17 | End: 2024-04-19 | Stop reason: HOSPADM

## 2024-04-17 RX ORDER — ONDANSETRON 2 MG/ML
4 INJECTION INTRAMUSCULAR; INTRAVENOUS EVERY 6 HOURS PRN
Status: DISCONTINUED | OUTPATIENT
Start: 2024-04-17 | End: 2024-04-19 | Stop reason: HOSPADM

## 2024-04-17 RX ORDER — ONDANSETRON 4 MG/1
4 TABLET, ORALLY DISINTEGRATING ORAL EVERY 8 HOURS PRN
Status: DISCONTINUED | OUTPATIENT
Start: 2024-04-17 | End: 2024-04-19 | Stop reason: HOSPADM

## 2024-04-17 RX ORDER — POTASSIUM CHLORIDE 20 MEQ/1
40 TABLET, EXTENDED RELEASE ORAL PRN
Status: DISCONTINUED | OUTPATIENT
Start: 2024-04-17 | End: 2024-04-19 | Stop reason: HOSPADM

## 2024-04-17 RX ORDER — SODIUM CHLORIDE 0.9 % (FLUSH) 0.9 %
5-40 SYRINGE (ML) INJECTION EVERY 12 HOURS SCHEDULED
Status: DISCONTINUED | OUTPATIENT
Start: 2024-04-18 | End: 2024-04-19 | Stop reason: HOSPADM

## 2024-04-17 RX ORDER — SODIUM CHLORIDE 0.9 % (FLUSH) 0.9 %
5-40 SYRINGE (ML) INJECTION PRN
Status: DISCONTINUED | OUTPATIENT
Start: 2024-04-17 | End: 2024-04-19 | Stop reason: HOSPADM

## 2024-04-17 RX ORDER — ACETAMINOPHEN 325 MG/1
650 TABLET ORAL EVERY 6 HOURS PRN
Status: DISCONTINUED | OUTPATIENT
Start: 2024-04-17 | End: 2024-04-19 | Stop reason: HOSPADM

## 2024-04-17 RX ORDER — MAGNESIUM SULFATE IN WATER 40 MG/ML
2000 INJECTION, SOLUTION INTRAVENOUS PRN
Status: DISCONTINUED | OUTPATIENT
Start: 2024-04-17 | End: 2024-04-19 | Stop reason: HOSPADM

## 2024-04-18 ENCOUNTER — APPOINTMENT (OUTPATIENT)
Dept: GENERAL RADIOLOGY | Age: 69
End: 2024-04-18
Attending: STUDENT IN AN ORGANIZED HEALTH CARE EDUCATION/TRAINING PROGRAM
Payer: MEDICARE

## 2024-04-18 LAB
ALBUMIN SERPL-MCNC: 4.2 G/DL (ref 3.5–5.2)
ALP SERPL-CCNC: 133 U/L (ref 40–129)
ALT SERPL-CCNC: 17 U/L (ref 5–41)
ANION GAP SERPL CALCULATED.3IONS-SCNC: 14 MMOL/L (ref 9–17)
AST SERPL-CCNC: 16 U/L
BASOPHILS # BLD: 0.07 K/UL (ref 0–0.2)
BASOPHILS NFR BLD: 1 % (ref 0–2)
BILIRUB SERPL-MCNC: 0.3 MG/DL (ref 0.3–1.2)
BUN SERPL-MCNC: 30 MG/DL (ref 8–23)
BUN/CREAT SERPL: 33 (ref 9–20)
CALCIUM SERPL-MCNC: 9.7 MG/DL (ref 8.6–10.4)
CHLORIDE SERPL-SCNC: 110 MMOL/L (ref 98–107)
CO2 SERPL-SCNC: 21 MMOL/L (ref 20–31)
CREAT SERPL-MCNC: 0.9 MG/DL (ref 0.7–1.2)
EOSINOPHIL # BLD: 0.28 K/UL (ref 0–0.44)
EOSINOPHILS RELATIVE PERCENT: 3 % (ref 1–4)
ERYTHROCYTE [DISTWIDTH] IN BLOOD BY AUTOMATED COUNT: 12.7 % (ref 11.8–14.4)
GFR SERPL CREATININE-BSD FRML MDRD: >90 ML/MIN/1.73M2
GLUCOSE SERPL-MCNC: 95 MG/DL (ref 70–99)
HCT VFR BLD AUTO: 42.6 % (ref 40.7–50.3)
HGB BLD-MCNC: 13.2 G/DL (ref 13–17)
IMM GRANULOCYTES # BLD AUTO: 0.03 K/UL (ref 0–0.3)
IMM GRANULOCYTES NFR BLD: 0 %
LYMPHOCYTES NFR BLD: 2.02 K/UL (ref 1.1–3.7)
LYMPHOCYTES RELATIVE PERCENT: 20 % (ref 24–43)
MCH RBC QN AUTO: 32.7 PG (ref 25.2–33.5)
MCHC RBC AUTO-ENTMCNC: 31 G/DL (ref 28.4–34.8)
MCV RBC AUTO: 105.4 FL (ref 82.6–102.9)
MONOCYTES NFR BLD: 0.83 K/UL (ref 0.1–1.2)
MONOCYTES NFR BLD: 8 % (ref 3–12)
NEUTROPHILS NFR BLD: 68 % (ref 36–65)
NEUTS SEG NFR BLD: 6.73 K/UL (ref 1.5–8.1)
NRBC BLD-RTO: 0 PER 100 WBC
PHOSPHATE SERPL-MCNC: 4.1 MG/DL (ref 2.5–4.5)
PLATELET # BLD AUTO: 289 K/UL (ref 138–453)
PMV BLD AUTO: 9.3 FL (ref 8.1–13.5)
POTASSIUM SERPL-SCNC: 4.3 MMOL/L (ref 3.7–5.3)
PROT SERPL-MCNC: 7.7 G/DL (ref 6.4–8.3)
RBC # BLD AUTO: 4.04 M/UL (ref 4.21–5.77)
RBC # BLD: ABNORMAL 10*6/UL
SODIUM SERPL-SCNC: 145 MMOL/L (ref 135–144)
WBC OTHER # BLD: 10 K/UL (ref 3.5–11.3)

## 2024-04-18 PROCEDURE — 74250 X-RAY XM SM INT 1CNTRST STD: CPT

## 2024-04-18 PROCEDURE — 36415 COLL VENOUS BLD VENIPUNCTURE: CPT

## 2024-04-18 PROCEDURE — 99232 SBSQ HOSP IP/OBS MODERATE 35: CPT | Performed by: STUDENT IN AN ORGANIZED HEALTH CARE EDUCATION/TRAINING PROGRAM

## 2024-04-18 PROCEDURE — 97535 SELF CARE MNGMENT TRAINING: CPT

## 2024-04-18 PROCEDURE — 80053 COMPREHEN METABOLIC PANEL: CPT

## 2024-04-18 PROCEDURE — 97167 OT EVAL HIGH COMPLEX 60 MIN: CPT

## 2024-04-18 PROCEDURE — 2580000003 HC RX 258: Performed by: STUDENT IN AN ORGANIZED HEALTH CARE EDUCATION/TRAINING PROGRAM

## 2024-04-18 PROCEDURE — 6360000002 HC RX W HCPCS: Performed by: STUDENT IN AN ORGANIZED HEALTH CARE EDUCATION/TRAINING PROGRAM

## 2024-04-18 PROCEDURE — 6370000000 HC RX 637 (ALT 250 FOR IP): Performed by: STUDENT IN AN ORGANIZED HEALTH CARE EDUCATION/TRAINING PROGRAM

## 2024-04-18 PROCEDURE — 84100 ASSAY OF PHOSPHORUS: CPT

## 2024-04-18 PROCEDURE — 97530 THERAPEUTIC ACTIVITIES: CPT

## 2024-04-18 PROCEDURE — 6360000004 HC RX CONTRAST MEDICATION: Performed by: SURGERY

## 2024-04-18 PROCEDURE — 85025 COMPLETE CBC W/AUTO DIFF WBC: CPT

## 2024-04-18 PROCEDURE — 97162 PT EVAL MOD COMPLEX 30 MIN: CPT

## 2024-04-18 PROCEDURE — 1200000000 HC SEMI PRIVATE

## 2024-04-18 RX ORDER — MECLIZINE HCL 12.5 MG/1
25 TABLET ORAL DAILY
Status: DISCONTINUED | OUTPATIENT
Start: 2024-04-18 | End: 2024-04-19 | Stop reason: HOSPADM

## 2024-04-18 RX ORDER — DIAZEPAM 20 MG/4G
20 GEL RECTAL PRN
Status: DISCONTINUED | OUTPATIENT
Start: 2024-04-18 | End: 2024-04-19 | Stop reason: HOSPADM

## 2024-04-18 RX ORDER — HYDROXYZINE HYDROCHLORIDE 25 MG/1
25 TABLET, FILM COATED ORAL EVERY 12 HOURS PRN
Status: DISCONTINUED | OUTPATIENT
Start: 2024-04-18 | End: 2024-04-19 | Stop reason: HOSPADM

## 2024-04-18 RX ORDER — PRIMIDONE 250 MG/1
250 TABLET ORAL EVERY MORNING
Status: DISCONTINUED | OUTPATIENT
Start: 2024-04-18 | End: 2024-04-19 | Stop reason: HOSPADM

## 2024-04-18 RX ORDER — FINASTERIDE 5 MG/1
5 TABLET, FILM COATED ORAL NIGHTLY
Status: DISCONTINUED | OUTPATIENT
Start: 2024-04-18 | End: 2024-04-19 | Stop reason: HOSPADM

## 2024-04-18 RX ORDER — LAMOTRIGINE 100 MG/1
600 TABLET ORAL NIGHTLY
Status: DISCONTINUED | OUTPATIENT
Start: 2024-04-18 | End: 2024-04-19 | Stop reason: HOSPADM

## 2024-04-18 RX ORDER — OXYBUTYNIN CHLORIDE 10 MG/1
10 TABLET, EXTENDED RELEASE ORAL DAILY
Status: DISCONTINUED | OUTPATIENT
Start: 2024-04-18 | End: 2024-04-19 | Stop reason: HOSPADM

## 2024-04-18 RX ORDER — ONDANSETRON 4 MG/1
4 TABLET, ORALLY DISINTEGRATING ORAL EVERY 6 HOURS PRN
Status: DISCONTINUED | OUTPATIENT
Start: 2024-04-18 | End: 2024-04-18 | Stop reason: SDUPTHER

## 2024-04-18 RX ORDER — ALBUTEROL SULFATE 90 UG/1
2 AEROSOL, METERED RESPIRATORY (INHALATION) EVERY 4 HOURS PRN
Status: DISCONTINUED | OUTPATIENT
Start: 2024-04-18 | End: 2024-04-19 | Stop reason: HOSPADM

## 2024-04-18 RX ORDER — PRIMIDONE 250 MG/1
375 TABLET ORAL NIGHTLY
Status: DISCONTINUED | OUTPATIENT
Start: 2024-04-18 | End: 2024-04-19 | Stop reason: HOSPADM

## 2024-04-18 RX ORDER — LEVETIRACETAM 750 MG/1
1500 TABLET ORAL DAILY
Status: DISCONTINUED | OUTPATIENT
Start: 2024-04-18 | End: 2024-04-19 | Stop reason: HOSPADM

## 2024-04-18 RX ORDER — LAMOTRIGINE 100 MG/1
400 TABLET ORAL EVERY MORNING
Status: DISCONTINUED | OUTPATIENT
Start: 2024-04-18 | End: 2024-04-19 | Stop reason: HOSPADM

## 2024-04-18 RX ORDER — PANTOPRAZOLE SODIUM 40 MG/1
40 TABLET, DELAYED RELEASE ORAL
Status: DISCONTINUED | OUTPATIENT
Start: 2024-04-18 | End: 2024-04-19 | Stop reason: HOSPADM

## 2024-04-18 RX ADMIN — LACOSAMIDE 150 MG: 100 TABLET, FILM COATED ORAL at 20:50

## 2024-04-18 RX ADMIN — SODIUM CHLORIDE, PRESERVATIVE FREE 10 ML: 5 INJECTION INTRAVENOUS at 20:53

## 2024-04-18 RX ADMIN — PRIMIDONE 250 MG: 250 TABLET ORAL at 12:55

## 2024-04-18 RX ADMIN — PRIMIDONE 375 MG: 250 TABLET ORAL at 20:51

## 2024-04-18 RX ADMIN — ENOXAPARIN SODIUM 40 MG: 100 INJECTION SUBCUTANEOUS at 12:56

## 2024-04-18 RX ADMIN — DIATRIZOATE MEGLUMINE AND DIATRIZOATE SODIUM 240 ML: 660; 100 LIQUID ORAL; RECTAL at 08:25

## 2024-04-18 RX ADMIN — LACOSAMIDE 150 MG: 100 TABLET, FILM COATED ORAL at 12:53

## 2024-04-18 RX ADMIN — LAMOTRIGINE 600 MG: 100 TABLET ORAL at 20:50

## 2024-04-18 RX ADMIN — FINASTERIDE 5 MG: 5 TABLET, FILM COATED ORAL at 20:51

## 2024-04-18 RX ADMIN — OXYBUTYNIN CHLORIDE 10 MG: 10 TABLET, EXTENDED RELEASE ORAL at 12:54

## 2024-04-18 RX ADMIN — LEVETIRACETAM 2000 MG: 750 TABLET, FILM COATED ORAL at 20:50

## 2024-04-18 RX ADMIN — LEVETIRACETAM 1500 MG: 750 TABLET, FILM COATED ORAL at 12:55

## 2024-04-18 RX ADMIN — MECLIZINE HCL 12.5 MG 25 MG: 12.5 TABLET ORAL at 12:54

## 2024-04-18 RX ADMIN — LAMOTRIGINE 400 MG: 100 TABLET ORAL at 12:54

## 2024-04-18 RX ADMIN — SODIUM CHLORIDE, PRESERVATIVE FREE 10 ML: 5 INJECTION INTRAVENOUS at 13:14

## 2024-04-18 NOTE — PROGRESS NOTES
Discharge was written by physician. Called [facility] with number provided for . No answer. Message left.

## 2024-04-18 NOTE — PROGRESS NOTES
Physical Therapy  Facility/Department: [facility] MED SURG  Physical Therapy Initial Assessment    Name: Jeremy King  : 1955  MRN: [id number]  Date of Service: 2024    Per H&P:68 y.o. Non- / non  male who presents with No chief complaint on file.   and is admitted to the hospital for the management of Small bowel obstruction (HCC).  68-year-old male with a past medical history of MR, seizure disorder, and recurrent SBO presents to the emergency department for intractable nausea and vomiting and abdominal pain.  Patient was recently discharged on  for similar presentation requiring general surgery consultation.  Patient is from a group home.  Patient has history of SBO requiring bowel resection in the past.  He also has a history of seizures on several medications including vagus nerve stimulator.  Patient denies chest pain, fevers, sick contacts, or recent travel.      HOANG Feliciano reports patient is medically stable for therapy treatment this date.    Chart reviewed prior to treatment and patient is agreeable for therapy.  All lines intact and patient positioned comfortably at end of treatment.  All patient needs addressed prior to ending therapy session.     Discharge Recommendations:  Patient would benefit from continued therapy after discharge   Pt currently functioning below baseline.  Recommend daily inpatient skilled therapy at time of discharge to maximize long term outcomes and prevent re-admission. Please refer to AM-PAC score for current level of function.   PT Equipment Recommendations  Equipment Needed: No      Patient Diagnosis(es): There were no encounter diagnoses.  Past Medical History:  has a past medical history of Arthritis, BPH (benign prostatic hyperplasia), Dysphagia, GERD (gastroesophageal reflux disease), Hearing loss, HLD (hyperlipidemia), Insomnia, MR (mental retardation), Periorbital cellulitis, SBO (small bowel obstruction) (HCC), Seizures (HCC), Ventral

## 2024-04-18 NOTE — PROGRESS NOTES
[] Medication Reconciliation was completed and the patient's home medication list was verified. The Med List Status has been marked \"Complete\". The following sources were used to assist with Medication Reconciliation:    [] Patient had a list of medications which was transcribed into the EHR.    [] Patient provided bottles of their medications    [] Home medications reviewed and confirmed with     [] Contacted patient's pharmacy to confirm home medications    [] Contacted patient's physician office to confirm home medications    [x] Medical Records from another facility and/or Care Everywhere were reviewed    OR    [] There are one or more home medications that need clarification before Medication Reconciliation can be completed. The Med List Status has been marked as In Progress. To assist with Home Medication Reconciliation the following actions have been taken:    [] Pharmacy medication reconciliation service requested. (Note: This can be done by sending a Perfect Serve message to [facility] Pharmacist or by phoning [phone].)    [] Family requested to bring medications into the hospital    [] Family requested to call hospital with medication list    [] Message left with physician office    [] Request for medical records made to     [] Other

## 2024-04-18 NOTE — PLAN OF CARE
Problem: Discharge Planning  Goal: Discharge to home or other facility with appropriate resources  Outcome: Progressing  Flowsheets (Taken 4/17/2024 8248 by Lilian Crockett RN)  Discharge to home or other facility with appropriate resources: Identify barriers to discharge with patient and caregiver     Problem: Safety - Adult  Goal: Free from fall injury  Outcome: Progressing     Problem: Skin/Tissue Integrity  Goal: Absence of new skin breakdown  Description: 1.  Monitor for areas of redness and/or skin breakdown  2.  Assess vascular access sites hourly  3.  Every 4-6 hours minimum:  Change oxygen saturation probe site  4.  Every 4-6 hours:  If on nasal continuous positive airway pressure, respiratory therapy assess nares and determine need for appliance change or resting period.  Outcome: Progressing

## 2024-04-18 NOTE — CONSULTS
INTERNAL MEDICINE HISTORY AND PHYSICAL EXAMINATION      Patient:  Jeremy King  MRN: [id number]    [facility] Care Physician: Sukumar Larose MD    Chief Complaint :    Came from a group home due to abdominal distention nausea and vomiting.  He was diagnosed on CT with a small bowel obstruction.  He had just been discharged from here about a week ago for similar symptoms that resolved.  He has had multiple operations for small bowel obstruction in the past but these have been years ago.  He did get a small bowel follow-through on this admission and the contrast did reach the colon by 4 hours.  Okay to pull his NG tube and try him on a low residue meaning low fiber diet.  If he tolerates this would discharge him to home on a low fiber diet for at least 2 weeks.  Follow-up with us as needed.    HPI :      The patient is a 68 y.o. male recurrent partial small bowel obstruction    Past Medical History :          Diagnosis Date    Arthritis     BPH (benign prostatic hyperplasia)     Dysphagia     GERD (gastroesophageal reflux disease)     Hearing loss     HLD (hyperlipidemia)     Insomnia     MR (mental retardation)     Periorbital cellulitis     SBO (small bowel obstruction) (HCC)     Seizures (HCC)     Epilepsy    Ventral hernia     Vitamin D deficiency        Past Surgical History :          Procedure Laterality Date    ABDOMEN SURGERY  2013, 2014    Bowel Obstruction X2    CAST APPLICATION Left     Lt. Ankle , X3    HYDROCELE EXCISION  08/2016    LAPAROSCOPY  8/17/14    with BERNARD    LAPAROTOMY  8/17/14    with partial cecectomy    VAGAL NERVE STIMULATION      placed, then replaced    VAGAL NERVE STIMULATION  10/11/2016    replaced generator battery       Allergies :      Allergies   Allergen Reactions    Penicillins Hives, Shortness Of Breath and Swelling    Sodium Hypochlorite Hives and Itching    Clindamycin Rash         Home Meds :      Prior to Admission medications    Medication Sig  normal.  Does have a small hernia at the top of his midline incision.  No peritoneal signs.  Abdomen is soft and 9 tender to palpation.  Extremities: no pedal edema; no calf tenderness  Skin: No obvious skin rashes   Neurologic: Seems oriented. No obvious abnormality of cranial nerves, motor system noted        Current Medications :      Scheduled Meds:   finasteride  5 mg Oral Nightly    lacosamide  150 mg Oral BID    lamoTRIgine  400 mg Oral QAM    lamoTRIgine  600 mg Oral Nightly    levETIRAcetam  2,000 mg Oral Nightly    levETIRAcetam  1,500 mg Oral Daily    linaclotide  145 mcg Oral QAM AC    meclizine  25 mg Oral Daily    oxyBUTYnin  10 mg Oral Daily    pantoprazole  40 mg Oral QAM AC    primidone  250 mg Oral QAM    primidone  375 mg Oral Nightly    sodium chloride flush  5-40 mL IntraVENous 2 times per day    enoxaparin  40 mg SubCUTAneous Daily     Continuous Infusions:   sodium chloride       PRN Meds:albuterol sulfate HFA, diazePAM, hydrOXYzine HCl, diatrizoate meglumine-sodium, sodium chloride flush, sodium chloride, potassium chloride **OR** potassium alternative oral replacement **OR** potassium chloride, magnesium sulfate, ondansetron **OR** ondansetron, polyethylene glycol, acetaminophen **OR** acetaminophen      Labs :      CBC:   Recent Labs     04/18/24  0545   WBC 10.0   HGB 13.2        BMP:    Recent Labs     04/18/24  0545   *   K 4.3   *   CO2 21   BUN 30*   CREATININE 0.9   GLUCOSE 95     Hepatic:   Recent Labs     04/18/24  0545   ALKPHOS 133*   ALT 17   AST 16   PROT 7.7   BILITOT 0.3     CARDIAC ENZYMES:No results for input(s): \"CKTOTAL\", \"CKMB\", \"CKMBINDEX\", \"TROPONINI\" in the last 72 hours.  PT/INR: No results for input(s): \"PROTIME\", \"INR\" in the last 72 hours.  APTT:No results for input(s): \"APTT\" in the last 72 hours.  LIVER PROFILE:  Recent Labs     04/18/24  0545   AST 16   ALT 17     Labs:  General Labs:        Urinalysis:   Color, UA   Date Value Ref Range

## 2024-04-18 NOTE — CARE COORDINATION
AM-PAC:   /24    Family can provide assistance at DC: No  Would you like Case Management to discuss the discharge plan with any other family members/significant others, and if so, who? Yes (CLINTONAGNES Esther)  Plans to Return to Present Housing: Unknown at present  Other Identified Issues/Barriers to RETURNING to current housing: medical condition  Potential Assistance needed at discharge: N/A            Potential DME:    Patient expects to discharge to: retirement  Plan for transportation at discharge: Other (see comment) ([facility]isse from [employer] can transport.)    Financial    Payor: [facility] MEDICARE / Plan: [employer] DUAL COMPLETE / Product Type: *No Product type* /     Does insurance require precert for SNF: Yes    Potential assistance Purchasing Medications: No  Meds-to-Beds request:        [id number] - [place], [place] - [address] -  [phone] - F [phone]  [address] [place] [address]  Phone: [phone] Fax: [phone]    [place], [place] - [address] - P [phone] - F [phone]  [facility] [address]  Phone: [phone] Fax: [phone]      Notes:    Factors facilitating achievement of predicted outcomes: Family support, Caregiver support, Cooperative, and Pleasant    Barriers to discharge: Cognitive deficit, Limited safety awareness, Decreased motivation, Decreased endurance, and Medical complications    Additional Case Management Notes: SBO. Pt. Is MRDD and lives in [facility]. ANIL needs signed. Current with [facility] and has 24/7 care. W/c bound. Readmit for same thing. POAGNES is Esther. [facility] is from group home and transports pt. [phone]. Gen surg consulted.     The Plan for Transition of Care is related to the following treatment goals of Small bowel obstruction (HCC) [K56.609]  SBO (small bowel obstruction) (HCC) [K56.609]    IF APPLICABLE: The Patient and/or patient representative Jeremy and his family were provided with a  choice of provider and agrees with the discharge plan. Freedom of choice list with basic dialogue that supports the patient's individualized plan of care/goals and shares the quality data associated with the providers was provided to: Patient Representative   Patient Representative Name: Sister - CHANDRIKA Nath     The Patient and/or Patient Representative Agree with the Discharge Plan? Yes    APRIL COATES RN  Case Management Department  Ph: [phone] Fax: [phone]

## 2024-04-18 NOTE — DISCHARGE INSTRUCTIONS
Follow-up outpatient with PCP and general surgery.  Patient encouraged to increase fluid intake.  Continue medications as prescribed.  Avoid constipation

## 2024-04-18 NOTE — PROGRESS NOTES
(8/17/14); laparotomy (8/17/14); cast application (Left); Hydrocele surgery (08/2016); Vagus nerve stimulator insertion; and Vagus nerve stimulator insertion (10/11/2016).    Assessment   Performance deficits / Impairments: Decreased functional mobility ;Decreased endurance;Decreased ADL status;Decreased coordination;Decreased posture;Decreased ROM;Decreased balance;Decreased strength;Decreased safe awareness;Decreased cognition;Decreased fine motor control;Decreased high-level IADLs  Assessment: Patient's current performance limited d/t cognition, activity tolerance, strength. Unsure of patient's prior level of function, has no family member or caregiver present to verify, however patient reports he typically transfers to wheelchair with one assist and completes UB ADL's with SUP. Patient requires MaxAx2 for standing at EOB; high fall risk. Skilled OT services indicated to improve strength, balance, activity tolerance, as well as I/safety awareness.  Prognosis: Good  Decision Making: High Complexity  REQUIRES OT FOLLOW-UP: Yes  Activity Tolerance  Activity Tolerance: Treatment limited secondary to decreased cognition        Plan   Occupational Therapy Plan  Times Per Week: 4-5x/week  Current Treatment Recommendations: Strengthening, ROM, Functional mobility training, Balance training, Endurance training, Wheelchair mobility training, Safety education & training, Patient/Caregiver education & training, Equipment evaluation, education, & procurement, Positioning, Self-Care / ADL     Restrictions  Restrictions/Precautions  Restrictions/Precautions: Fall Risk, Seizure, Bed Alarm, NPO  Position Activity Restriction  Other position/activity restrictions: NG Tube for suction; L UE IV; Seizure precautions; Patient IMPULSIVE; GAIT BELT; Up with assist    Subjective   General  Chart Reviewed: Yes  Patient assessed for rehabilitation services?: Yes  Additional Pertinent Hx: Arthritis, Gerd, dysphagia  Family / Caregiver  stimuli  Attention Span: Attends with cues to redirect  Memory: Appears intact  Safety Judgement: Decreased awareness of need for assistance;Decreased awareness of need for safety  Problem Solving: Assistance required to generate solutions;Assistance required to implement solutions  Initiation: Requires cues for all  Sequencing: Requires cues for all  Cognition Comment: Intermittently; patient required questions repeated.  Patient cognition/behaviors a barrier to therapy progression  Orientation  Orientation Level: Oriented to place;Oriented to person    Education  Education Given To: Patient  Education Provided: Role of Therapy;Plan of Care;Transfer Training;ADL Adaptive Strategies;Fall Prevention Strategies  Education Method: Verbal  Barriers to Learning: Cognition  Education Outcome: Continued education needed    AM-PAC - ADL  AM-PAC Daily Activity - Inpatient   How much help is needed for putting on and taking off regular lower body clothing?: Total  How much help is needed for bathing (which includes washing, rinsing, drying)?: Total  How much help is needed for toileting (which includes using toilet, bedpan, or urinal)?: Total  How much help is needed for putting on and taking off regular upper body clothing?: A Lot  How much help is needed for taking care of personal grooming?: A Little  How much help for eating meals?: A Little  AM-[facility] Inpatient Daily Activity Raw Score: 11  AM-PAC Inpatient ADL T-Scale Score : 29.04  ADL Inpatient CMS 0-100% Score: 70.42  ADL Inpatient CMS G-Code Modifier : CL    Goals  Short Term Goals  Time Frame for Short Term Goals: By discharge,  Short Term Goal 1: Patient will demonstrate ADL transfers to ModAx1 with AD/DME as needed with good safety/pacing for ADL completion.  Short Term Goal 2: Patient will demonstrate bed mobility tasks with use of rail as needed to MinAx1 with good safety.  Short Term Goal 3: Patient will demonstrate Elida with UB ADL's and Mod-MaxAx1 with LB ADL's

## 2024-04-18 NOTE — H&P
[facility]  Office: [phone]  Ayden Trejo DO, Samir Yousif DO, Vijay Ramirez DO, Elder Leigh DO, Jeremias Martin MD, Herminia Slaughter MD, Partha Snow MD, Adela Mcdonough MD,  Patrick Pacheco MD, Sandhya Fontaine MD, Iris Collazo MD,  Evert Perez DO, Galo Hampton MD, Abel Dietz MD, Robert Trejo DO, Luz Ellis MD,  Tim Mcwilliams DO, Delisa Huff MD, Gayle Guevara MD, Pearl Davis MD, Tyler Banuelos MD,  Adama Johnson MD, Craig Bradford MD, Bambi Bowen MD, Dusty Landis MD, Juan Oliveira MD, Carrie Thakur MD, Krishna Lux DO, Aniceto Roth DO, Betzaida Campbell MD,  Fernando Burt MD, Shirley Waterhouse, CNP,  Regina Kern CNP, Epi Henry, CNP,  Yuliya Juan, DNP, Era Campos, CNP, Lu Ziegler, CNP, Zee Crystal, CNP, Gwen Duran, CNP, Avelina Sharp, PA-C, Laxmi David PA-C, Ondina Leslie, CNP, Silvia Steward, CNP, Katie Hinson, CNP, Janice Burger, CNP, Dalila Canales, CNP, Alison Mendoza, CNS, Shy Corbin, CNP, Saba Marr CNP, Tracy Schwab, CNP         [facility]   IN-PATIENT SERVICE   [facility]    HISTORY AND PHYSICAL EXAMINATION            Date:   4/17/2024  Patient name:  Jeremy King  Date of admission:  4/17/2024 10:20 PM  MRN:   [id number]  Account:  [id number]  YOB: 1955  PCP:    Sukumar Larose MD  Room:   2019/2019-02  Code Status:    Full Code    Chief Complaint:     No chief complaint on file.  Abdominal    History Obtained From:     patient, electronic medical record    History of Present Illness:     Jeremy King is a 68 y.o. Non- / non  male who presents with No chief complaint on file.   and is admitted to the hospital for the management of Small bowel obstruction (HCC).    68-year-old male with a past medical history of MR, seizure disorder, and recurrent SBO presents to the emergency department for intractable nausea and vomiting and abdominal pain.  MD   calcium carbonate (TUMS) 500 MG chewable tablet Take 1 tablet by mouth 2 times daily    Fauzia Loera MD   carbamide peroxide (DEBROX) 6.5 % otic solution Place 4 drops into both ears every 30 days Apply cotton swabs to ears, after instilling drops.  Then irrigate with room temperature water the next morning 4/20/24   Fauzia Loera MD   oxybutynin (DITROPAN-XL) 10 MG extended release tablet Take 1 tablet by mouth daily    Fauzia Loera MD   hydrocortisone 2.5 % cream Apply topically 2 times daily Apply topically to areas on the face 2 times daily MONDAY THRU FRIDAY ONLY.  Take weekends off, repeat as needed for flares.    Fauzia Loera MD   latanoprost (XALATAN) 0.005 % ophthalmic solution Place 1 drop into both eyes nightly    Fauzia Loera MD   Vitamin D (CHOLECALCIFEROL) 25 MCG (1000 UT) TABS tablet Take 1 tablet by mouth daily    Fauzia Loera MD   primidone (MYSOLINE) 250 MG tablet Take 1 tablet by mouth every morning    Fauzia Loera MD   lacosamide (VIMPAT) 150 MG TABS tablet Take 1 tablet by mouth 2 times daily.    Fauzia Loera MD   finasteride (PROSCAR) 5 MG tablet Take 1 tablet by mouth nightly    Fauzia Loera MD   tamsulosin (FLOMAX) 0.4 MG capsule Take 1 capsule by mouth 2 times daily    Fauzia Loera MD        Allergies:     Penicillins, Sodium hypochlorite, and Clindamycin    Social History:     Tobacco:    reports that he has never smoked. He does not have any smokeless tobacco history on file.  Alcohol:      reports no history of alcohol use.  Drug Use:  reports no history of drug use.    Family History:     Family History   Problem Relation Age of Onset    High Blood Pressure Mother     Thyroid Cancer Mother     Other Mother         Myeloma    Heart Disease Mother         2 Stents    Arthritis Mother         2 Back surgeries    Lung Cancer Father     Heart Disease Brother         Defibrilator    Heart Attack

## 2024-04-18 NOTE — DISCHARGE INSTR - COC
Continuity of Care Form    Patient Name: Jeremy King   :  1955  MRN:  [id number]    Admit date:  2024  Discharge date:  2024    Code Status Order: Full Code   Advance Directives:     Admitting Physician:  Juan Oliveira MD  PCP: Sukumar Larose MD    Discharging Nurse: Ananya Beckford  Discharging Hospital Unit/Room#:   Discharging Unit Phone Number: [phone]    Emergency Contact:   Extended Emergency Contact Information  Primary Emergency Contact: Esther Denny   [facility]  Home Phone: [phone]  Relation: Legal Guardian  Secondary Emergency Contact: Samir King  Home Phone: [phone]  Relation: Brother/Sister    Past Surgical History:  Past Surgical History:   Procedure Laterality Date    ABDOMEN SURGERY  ,     Bowel Obstruction X2    CAST APPLICATION Left     Lt. Ankle , X3    HYDROCELE EXCISION  2016    LAPAROSCOPY  14    with BERNARD    LAPAROTOMY  14    with partial cecectomy    VAGAL NERVE STIMULATION      placed, then replaced    VAGAL NERVE STIMULATION  10/11/2016    replaced generator battery       Immunization History:   Immunization History   Administered Date(s) Administered    COVID-19, PFIZER PURPLE top, DILUTE for use, (age 12 y+), 30mcg/0.3mL 2021, 2021, 2022    TDaP, ADACEL (age 10y-64y), BOOSTRIX (age 10y+), IM, 0.5mL 2013       Active Problems:  Patient Active Problem List   Diagnosis Code    Small bowel obstruction (HCC)  K56.609    Seizures ([facility]) Chronic R56.9    MR (mental retardation), severe F72    Epilepsy (HCC) G40.909    SBO (small bowel obstruction) ([facility]) K56.609    LEELEE (obstructive sleep apnea) G47.33    Epileptic seizures ([facility]) G40.909    Intellectual disability F79    S/P placement of VNS (vagus nerve stimulation) device Z96.89    Dysphagia R13.10    Elevated international normalized ratio (INR) R79.1    Nausea and vomiting R11.2    Seizure disorder (HCC) G40.909

## 2024-04-18 NOTE — PROGRESS NOTES
Adm to room 2019; instructed on call light; bed controls; phone; safety; medications; hospital routine/ rounding

## 2024-04-18 NOTE — PROGRESS NOTES
End Of Shift Note  [facility]  Summary of shift: Pt has been calm and cooperative. Compliant with meds and tx. Denied pain or further need. Call light in reach. Checked on for safety.    Vitals:    Vitals:    04/17/24 2210 04/18/24 0758 04/18/24 1116 04/18/24 1528   BP: 139/80 101/79 119/81 114/71   Pulse: 96 80 82 (!) 101   Resp: 16 17 20 18   Temp: 98.6 °F (37 °C) 98.2 °F (36.8 °C) 97.7 °F (36.5 °C) 97.9 °F (36.6 °C)   TempSrc: Oral Axillary  Oral   SpO2: 93% 96% 95% 95%   Weight: 72.2 kg (159 lb 3.2 oz)      Height: 1.778 m (5' 10\")           I&O: No intake or output data in the 24 hours ending 04/18/24 1807    Resp Status: RA    Ventilator Settings:     / / /     Critical Care IV infusions:   sodium chloride          LDA:   Peripheral IV 04/17/24 Left Antecubital (Active)   Number of days: 0       NG/OG/NJ/NE Tube Nasogastric Right nostril (Active)   Number of days: 0

## 2024-04-18 NOTE — DISCHARGE SUMMARY
MD  [address] [place] [id number] [phone]    Follow up in 1 week(s)      Sukumar Larose MD  [address] [place] [address] [phone]    Follow up in 1 week(s)  Posthospital follow-up visit, concern for SBO.  Ruled out       Requiring Further Evaluation/Follow Up POST HOSPITALIZATION/Incidental Findings: None    Diet: regular diet    Activity: As tolerated    Instructions to Patient: Take medications as prescribed    Discharge Medications:      Medication List        CONTINUE taking these medications      benzonatate 100 MG capsule  Commonly known as: TESSALON     calcium carbonate 500 MG chewable tablet  Commonly known as: TUMS     cerave Crea     Debrox 6.5 % otic solution  Generic drug: carbamide peroxide  Start taking on: April 20, 2024     diazePAM 20 MG Gel  Commonly known as: DIASTAT     finasteride 5 MG tablet  Commonly known as: PROSCAR     hydrocortisone 2.5 % cream     hydrOXYzine HCl 25 MG tablet  Commonly known as: ATARAX     lacosamide 150 MG Tabs tablet  Commonly known as: VIMPAT     * lamoTRIgine 200 MG tablet  Commonly known as: LAMICTAL     * lamoTRIgine 200 MG tablet  Commonly known as: LAMICTAL     latanoprost 0.005 % ophthalmic solution  Commonly known as: XALATAN     * levETIRAcetam 1000 MG tablet  Commonly known as: KEPPRA  Take 2 tablets by mouth nightly     * levETIRAcetam 750 MG tablet  Commonly known as: KEPPRA  Take 2 tablets by mouth daily     linaclotide 145 MCG capsule  Commonly known as: LINZESS  Take 1 capsule by mouth every morning (before breakfast)     LORazepam 0.5 MG tablet  Commonly known as: ATIVAN     magnesium oxide 400 (240 Mg) MG tablet  Commonly known as: MAG-OX     meclizine 25 MG tablet  Commonly known as: ANTIVERT     melatonin 3 MG Tabs tablet     ondansetron 4 MG disintegrating tablet  Commonly known as: ZOFRAN-ODT     oxyBUTYnin 10 MG extended release tablet  Commonly known as: DITROPAN-XL     pantoprazole 40 MG tablet  Commonly

## 2024-04-19 VITALS
HEART RATE: 86 BPM | BODY MASS INDEX: 22.79 KG/M2 | TEMPERATURE: 98.1 F | WEIGHT: 159.2 LBS | HEIGHT: 70 IN | SYSTOLIC BLOOD PRESSURE: 118 MMHG | OXYGEN SATURATION: 98 % | RESPIRATION RATE: 15 BRPM | DIASTOLIC BLOOD PRESSURE: 67 MMHG

## 2024-04-19 PROCEDURE — 2580000003 HC RX 258: Performed by: STUDENT IN AN ORGANIZED HEALTH CARE EDUCATION/TRAINING PROGRAM

## 2024-04-19 PROCEDURE — 99232 SBSQ HOSP IP/OBS MODERATE 35: CPT | Performed by: INTERNAL MEDICINE

## 2024-04-19 PROCEDURE — 6370000000 HC RX 637 (ALT 250 FOR IP): Performed by: STUDENT IN AN ORGANIZED HEALTH CARE EDUCATION/TRAINING PROGRAM

## 2024-04-19 PROCEDURE — 97530 THERAPEUTIC ACTIVITIES: CPT

## 2024-04-19 PROCEDURE — 97535 SELF CARE MNGMENT TRAINING: CPT

## 2024-04-19 PROCEDURE — 6360000002 HC RX W HCPCS: Performed by: STUDENT IN AN ORGANIZED HEALTH CARE EDUCATION/TRAINING PROGRAM

## 2024-04-19 RX ADMIN — LINACLOTIDE 145 MCG: 145 CAPSULE, GELATIN COATED ORAL at 05:38

## 2024-04-19 RX ADMIN — PANTOPRAZOLE SODIUM 40 MG: 40 TABLET, DELAYED RELEASE ORAL at 05:38

## 2024-04-19 RX ADMIN — LAMOTRIGINE 400 MG: 100 TABLET ORAL at 09:50

## 2024-04-19 RX ADMIN — ENOXAPARIN SODIUM 40 MG: 100 INJECTION SUBCUTANEOUS at 09:50

## 2024-04-19 RX ADMIN — OXYBUTYNIN CHLORIDE 10 MG: 10 TABLET, EXTENDED RELEASE ORAL at 09:50

## 2024-04-19 RX ADMIN — LEVETIRACETAM 1500 MG: 750 TABLET, FILM COATED ORAL at 09:50

## 2024-04-19 RX ADMIN — PRIMIDONE 250 MG: 250 TABLET ORAL at 09:50

## 2024-04-19 RX ADMIN — LACOSAMIDE 150 MG: 100 TABLET, FILM COATED ORAL at 10:43

## 2024-04-19 RX ADMIN — MECLIZINE HCL 12.5 MG 25 MG: 12.5 TABLET ORAL at 09:50

## 2024-04-19 RX ADMIN — SODIUM CHLORIDE, PRESERVATIVE FREE 10 ML: 5 INJECTION INTRAVENOUS at 09:52

## 2024-04-19 NOTE — PROGRESS NOTES
End Of Shift Note  [facility]  Summary of shift: Patient came in with abdominal distention and nausea/vomiting. He had a recent admission earlier this month for similar symptoms that had resolved. General surgery performed a small bowel follow-through and contrast passed through dilated loops of small bowel and into colon in RUQ by four hours. Patient is oriented to self, place, situation, disoriented to time. Takes pills whole with water, larger pills cut in half. Patient to return to [facility] in [place] on a low fiber diet for two weeks. Patient has discharge order in but stayed another night because there was no one available at the [facility] in [place].     Contact Ivelisse Burger ([phone]) to arrange transport back to [facility] in [place]. Esther Denny is patient's sister and legal guardian. ANIL still needing physician signature prior to departure.     Vitals:    Vitals:    04/18/24 0758 04/18/24 1116 04/18/24 1528 04/18/24 2003   BP: 101/79 119/81 114/71 118/74   Pulse: 80 82 (!) 101 97   Resp: 17 20 18 16   Temp: 98.2 °F (36.8 °C) 97.7 °F (36.5 °C) 97.9 °F (36.6 °C) 99 °F (37.2 °C)   TempSrc: Axillary  Oral Oral   SpO2: 96% 95% 95% 94%   Weight:       Height:            I&O: No intake or output data in the 24 hours ending 04/19/24 0627    Resp Status: Room air    Ventilator Settings:     / / /     Critical Care IV infusions:   sodium chloride          LDA:   Peripheral IV 04/17/24 Left Antecubital (Active)   Number of days: 1

## 2024-04-19 NOTE — PROGRESS NOTES
°F (36.6 °C), Max:99 °F (37.2 °C)    No results for input(s): \"POCGLU\" in the last 72 hours.    I/O (24Hr):  No intake or output data in the 24 hours ending 04/19/24 1519    Labs:  Hematology:  Recent Labs     04/18/24  0545   WBC 10.0   RBC 4.04*   HGB 13.2   HCT 42.6   .4*   MCH 32.7   MCHC 31.0   RDW 12.7      MPV 9.3     Chemistry:  Recent Labs     04/18/24  0545   *   K 4.3   *   CO2 21   GLUCOSE 95   BUN 30*   CREATININE 0.9   ANIONGAP 14   LABGLOM >90   CALCIUM 9.7   PHOS 4.1     Recent Labs     04/18/24  0545   PROT 7.7   AST 16   ALT 17   ALKPHOS 133*   BILITOT 0.3     ABG:No results found for: \"POCPH\", \"PHART\", \"PH\", \"POCPCO2\", \"VAQ7AAO\", \"PCO2\", \"POCPO2\", \"PO2ART\", \"PO2\", \"POCHCO3\", \"MEK6UWM\", \"HCO3\", \"NBEA\", \"PBEA\", \"BEART\", \"BE\", \"THGBART\", \"THB\", \"NPU4XTT\", \"UYKR5BKW\", \"U3OWVAMI\", \"O2SAT\", \"FIO2\"  Lab Results   Component Value Date/Time    SPECIAL 6ML LFOOT 04/08/2024 11:50 AM     Lab Results   Component Value Date/Time    CULTURE NO SIGNIFICANT GROWTH 04/08/2024 03:28 PM       Radiology:  FL SMALL BOWEL FOLLOW THROUGH ONLY    Result Date: 4/18/2024  Contrast passage through dilated loops of small bowel and into the colon in the right upper quadrant by 4 hours.       Physical Examination:        General appearance:  alert, cooperative and no distress  Mental Status:  oriented to person, place and time and normal affect  Lungs:  clear to auscultation bilaterally, normal effort  Heart:  regular rate and rhythm, no murmur  Abdomen:  soft, nontender, nondistended, normal bowel sounds, no masses, hepatomegaly, splenomegaly  Extremities:  no edema, redness, tenderness in the calves  Skin:  no gross lesions, rashes, induration    Assessment:        Hospital Problems             Last Modified POA    * (Principal) SBO (small bowel obstruction) (HCC) 4/19/2024 Yes    LEELEE (obstructive sleep apnea) 4/19/2024 Yes    Intellectual disability 4/19/2024 Yes    Overview Signed 12/9/2023  5:51

## 2024-04-19 NOTE — PROGRESS NOTES
Pt discharged to [facility] in stable condition with belongings  Discharge instructions given  Pt denies having any further questions at this time  Locked up home medication(s)/personal items given to patient at discharge  Patient/family state they have everything they were admitted with.

## 2024-04-19 NOTE — PROGRESS NOTES
Physical Therapy  Facility/Department: [facility]  Daily Treatment Note  NAME: Jeremy King  : 1955  MRN: [id number]    Date of Service: 2024    Discharge Recommendations:  Patient would benefit from continued therapy after discharge   PT Equipment Recommendations  Equipment Needed: No    Patient Diagnosis(es): There were no encounter diagnoses.    Assessment   Assessment: Patient roll LT<>RT x 2 reps with MIN-MOD x 2 A for breif change x 2 time d/t loose BM. Patient able to stand from EOB with MOD x 2 HHA x 2 reps and take 5 steps x 3 (5 steps forward, then retro, then to HOB). Able to perform sit to supine with bed alarm and comfortable. RN notified of treatment progress and patient mobility. Patient would benefit from continued skilled PT services.  Activity Tolerance: Patient tolerated treatment well;Treatment limited secondary to decreased cognition  Equipment Needed: No     Plan    Physical Therapy Plan  General Plan: 5-7 times per week  Current Treatment Recommendations: Strengthening;Balance training;Functional mobility training;Transfer training;Home exercise program;Patient/Caregiver education & training;Safety education & training;Therapeutic activities     Restrictions  Restrictions/Precautions  Restrictions/Precautions: Fall Risk, Seizure, Bed Alarm  Position Activity Restriction  Other position/activity restrictions: L UE IV; Seizure precautions; Patient IMPULSIVE; GAIT BELT; Up with assist     Subjective    Subjective  Subjective: Upon arrival patient resting flat in bed and agreeable for PT treatment. HOANG Sousa reported patient medically stable for PT treatment.  Orientation  Orientation Level: Oriented to place;Oriented to person  Cognition  Overall Cognitive Status: Exceptions  Arousal/Alertness: Delayed responses to stimuli;Appropriate responses to stimuli  Following Commands: Follows one step commands with increased time  Attention Span: Attends with cues to redirect  Memory:  spirometer usage\" ,and \"PT POC. Pt demonstrated FAIR carryover  Pt requires continued reinforcement of education.   Education Method: Verbal  Barriers to Learning: Cognition  Education Outcome: Continued education needed    AM-PAC - Mobility    AM-PAC Basic Mobility - Inpatient   How much help is needed turning from your back to your side while in a flat bed without using bedrails?: A Lot  How much help is needed moving from lying on your back to sitting on the side of a flat bed without using bedrails?: A Lot  How much help is needed moving to and from a bed to a chair?: A Lot  How much help is needed standing up from a chair using your arms?: A Lot  How much help is needed walking in hospital room?: Total  How much help is needed climbing 3-5 steps with a railing?: Total  AM-PAC Inpatient Mobility Raw Score : 10  AM-PAC Inpatient T-Scale Score : 32.29  Mobility Inpatient CMS 0-100% Score: 76.75  Mobility Inpatient CMS G-Code Modifier : CL         Therapy Time   Individual Concurrent Group Co-treatment   Time In       1012   Time Out       1036   Minutes       24       Co-treatment with OT warranted secondary to decreased safety and independence requiring 2 skilled therapy professionals to address individual discipline's goals. PT addressing pre gait trunk strengthening, weight shifting prior to transfers, transfer training, and postural control in sitting/standing with HHA.     Crystal Barrientos, PTA

## 2024-04-19 NOTE — PROGRESS NOTES
Subjective    Comfortable in bed.  Denies abdominal pain.  Per nursing did tolerate diet last night.  Per documentation he did have bowel movement after the Gastrografin.    Objective    Alert  Chest symmetric excursion  Abdomen soft and nontender  Extremities no palpable deformity    Assessment and plan  Recurrent partial small bowel obstruction.  Would defer exploration as he has had multiple explorations although they have been years ago.  If he has 4 episodes in a year or obviously if he has a complete bowel obstruction then would proceed with surgery.  Otherwise nonoperative management is best.

## 2024-04-19 NOTE — CARE COORDINATION
CM called and spoke with Ivelisse from [facility] and notified her that patient has discharge orders to return today. Ivelisse states that she will not have anyone available to  patient and she requests that stretcher transportation be arranged back. CM called and spoke with patient's legal guardian Esther Denny and she verbalizes being agreeable with patient discharging back to his group [place] today. 12p transportation request faxed to [facility].    [id number]. CM updated Ivelisse from [facility].    RN to call report to [phone]    Discharge Report    [facility]  Clinical Case Management Department  Written by: Brenna Gray RN    Patient Name: Jeremy KRAFT Fernando  Attending Provider: Vijay Ramirez DO  Admit Date: 2024 10:20 PM  MRN: [id number]  Account: [id number]                     : 1955  Discharge Date: 24      Disposition: [facility]    Brenna Gray RN

## 2024-04-19 NOTE — PLAN OF CARE
Problem: Discharge Planning  Goal: Discharge to home or other facility with appropriate resources  4/19/2024 1059 by Ananya Beckford RN  Outcome: Adequate for Discharge  4/19/2024 0549 by Hilary Rhodes RN  Outcome: Progressing     Problem: Safety - Adult  Goal: Free from fall injury  4/19/2024 1059 by Ananya Beckford RN  Outcome: Adequate for Discharge  4/19/2024 0549 by Hilary Rhodes RN  Outcome: Progressing     Problem: Skin/Tissue Integrity  Goal: Absence of new skin breakdown  Description: 1.  Monitor for areas of redness and/or skin breakdown  2.  Assess vascular access sites hourly  3.  Every 4-6 hours minimum:  Change oxygen saturation probe site  4.  Every 4-6 hours:  If on nasal continuous positive airway pressure, respiratory therapy assess nares and determine need for appliance change or resting period.  4/19/2024 1059 by Ananya Beckford RN  Outcome: Adequate for Discharge  4/19/2024 0549 by Hilary Rhodes RN  Outcome: Progressing

## 2024-04-19 NOTE — PROGRESS NOTES
SPIRITUAL CARE DEPARTMENT [facility]  PROGRESS NOTE    Room # 2019/2019-02   Name: Jeremy iKng            Adventism: Unknown     Reason for visit: Routine    I visited the patient.    Admit Date & Time: 4/17/2024 10:20 PM    Assessment:  Jeremy King is a 68 y.o. male in the hospital because he has medical concern. Upon entering the room patient at rest in bed and watching TV.      Intervention:  I introduced myself and my title as  I offered space for patient  to express feelings, needs, and concerns and provided a ministry presence.  had met patient on previous stay and offered companionship and emotional support.    Outcome:  Patient at rest.    Plan:  Chaplains will remain available to offer spiritual and emotional support as needed.    Electronically signed by Chaplain Alon, on 4/18/2024 at 8:42 PM.  Spiritual Care Department  [facility]     04/18/24 2037   Encounter Summary   Service Provided For: Patient   Referral/Consult From: [facility]   Support System Home care staff;Family members   Last Encounter  04/18/24   Complexity of Encounter Low   Begin Time 1630   End Time  1640   Total Time Calculated 10 min   Spiritual/Emotional needs   Type Spiritual Support   Assessment/Intervention/Outcome   Assessment Calm;Compromised coping;Impaired resilience   Intervention Nurtured Hope;Sustaining Presence/Ministry of presence   Outcome Comfort;Connection/Belonging;Receptive

## 2024-05-18 ENCOUNTER — APPOINTMENT (OUTPATIENT)
Dept: CT IMAGING | Age: 69
End: 2024-05-18
Attending: EMERGENCY MEDICINE
Payer: MEDICARE

## 2024-05-18 ENCOUNTER — APPOINTMENT (OUTPATIENT)
Dept: CT IMAGING | Age: 69
End: 2024-05-18
Payer: MEDICARE

## 2024-05-18 ENCOUNTER — APPOINTMENT (OUTPATIENT)
Dept: GENERAL RADIOLOGY | Age: 69
End: 2024-05-18
Payer: MEDICARE

## 2024-05-18 ENCOUNTER — HOSPITAL ENCOUNTER (OUTPATIENT)
Age: 69
Setting detail: OBSERVATION
Discharge: HOME | End: 2024-05-21
Attending: EMERGENCY MEDICINE | Admitting: INTERNAL MEDICINE
Payer: MEDICARE

## 2024-05-18 DIAGNOSIS — K56.609 SMALL BOWEL OBSTRUCTION (HCC): Primary | ICD-10-CM

## 2024-05-18 LAB
ALBUMIN SERPL-MCNC: 4.6 G/DL (ref 3.5–5.2)
ALBUMIN/GLOB SERPL: 1.3 {RATIO} (ref 1–2.5)
ALP SERPL-CCNC: 144 U/L (ref 40–129)
ALT SERPL-CCNC: 17 U/L (ref 5–41)
ANION GAP SERPL CALCULATED.3IONS-SCNC: 13 MMOL/L (ref 9–17)
AST SERPL-CCNC: 14 U/L
BASOPHILS # BLD: 0.04 K/UL (ref 0–0.2)
BASOPHILS NFR BLD: 0 % (ref 0–2)
BILIRUB SERPL-MCNC: 0.2 MG/DL (ref 0.3–1.2)
BUN SERPL-MCNC: 29 MG/DL (ref 8–23)
BUN/CREAT SERPL: 24 (ref 9–20)
CALCIUM SERPL-MCNC: 10 MG/DL (ref 8.6–10.4)
CHLORIDE SERPL-SCNC: 102 MMOL/L (ref 98–107)
CO2 SERPL-SCNC: 24 MMOL/L (ref 20–31)
CREAT SERPL-MCNC: 1.2 MG/DL (ref 0.7–1.2)
EOSINOPHIL # BLD: 0.23 K/UL (ref 0–0.44)
EOSINOPHILS RELATIVE PERCENT: 2 % (ref 1–4)
ERYTHROCYTE [DISTWIDTH] IN BLOOD BY AUTOMATED COUNT: 12.6 % (ref 11.8–14.4)
GFR, ESTIMATED: 66 ML/MIN/1.73M2
GLUCOSE SERPL-MCNC: 111 MG/DL (ref 70–99)
HCT VFR BLD AUTO: 43.5 % (ref 40.7–50.3)
HGB BLD-MCNC: 14 G/DL (ref 13–17)
IMM GRANULOCYTES # BLD AUTO: 0.04 K/UL (ref 0–0.3)
IMM GRANULOCYTES NFR BLD: 0 %
LIPASE SERPL-CCNC: 31 U/L (ref 13–60)
LYMPHOCYTES NFR BLD: 1.26 K/UL (ref 1.1–3.7)
LYMPHOCYTES RELATIVE PERCENT: 10 % (ref 24–43)
MCH RBC QN AUTO: 32.9 PG (ref 25.2–33.5)
MCHC RBC AUTO-ENTMCNC: 32.2 G/DL (ref 28.4–34.8)
MCV RBC AUTO: 102.1 FL (ref 82.6–102.9)
MONOCYTES NFR BLD: 0.91 K/UL (ref 0.1–1.2)
MONOCYTES NFR BLD: 7 % (ref 3–12)
NEUTROPHILS NFR BLD: 81 % (ref 36–65)
NEUTS SEG NFR BLD: 10.79 K/UL (ref 1.5–8.1)
NRBC BLD-RTO: 0 PER 100 WBC
PLATELET # BLD AUTO: 260 K/UL (ref 138–453)
PMV BLD AUTO: 9 FL (ref 8.1–13.5)
POTASSIUM SERPL-SCNC: 4.3 MMOL/L (ref 3.7–5.3)
PROT SERPL-MCNC: 8.2 G/DL (ref 6.4–8.3)
RBC # BLD AUTO: 4.26 M/UL (ref 4.21–5.77)
SODIUM SERPL-SCNC: 139 MMOL/L (ref 135–144)
WBC OTHER # BLD: 13.3 K/UL (ref 3.5–11.3)

## 2024-05-18 PROCEDURE — G0378 HOSPITAL OBSERVATION PER HR: HCPCS

## 2024-05-18 PROCEDURE — 99285 EMERGENCY DEPT VISIT HI MDM: CPT

## 2024-05-18 PROCEDURE — 74018 RADEX ABDOMEN 1 VIEW: CPT

## 2024-05-18 PROCEDURE — 80053 COMPREHEN METABOLIC PANEL: CPT

## 2024-05-18 PROCEDURE — 96365 THER/PROPH/DIAG IV INF INIT: CPT

## 2024-05-18 PROCEDURE — 74176 CT ABD & PELVIS W/O CONTRAST: CPT

## 2024-05-18 PROCEDURE — 85025 COMPLETE CBC W/AUTO DIFF WBC: CPT

## 2024-05-18 PROCEDURE — 2580000003 HC RX 258: Performed by: INTERNAL MEDICINE

## 2024-05-18 PROCEDURE — 6360000002 HC RX W HCPCS: Performed by: INTERNAL MEDICINE

## 2024-05-18 PROCEDURE — 94761 N-INVAS EAR/PLS OXIMETRY MLT: CPT

## 2024-05-18 PROCEDURE — 83690 ASSAY OF LIPASE: CPT

## 2024-05-18 RX ORDER — LEVETIRACETAM 15 MG/ML
1500 INJECTION INTRAVASCULAR EVERY MORNING
Status: DISCONTINUED | OUTPATIENT
Start: 2024-05-19 | End: 2024-05-21 | Stop reason: HOSPADM

## 2024-05-18 RX ORDER — ONDANSETRON 4 MG/1
4 TABLET, ORALLY DISINTEGRATING ORAL EVERY 8 HOURS PRN
Status: DISCONTINUED | OUTPATIENT
Start: 2024-05-18 | End: 2024-05-21 | Stop reason: HOSPADM

## 2024-05-18 RX ORDER — MAGNESIUM SULFATE IN WATER 40 MG/ML
2000 INJECTION, SOLUTION INTRAVENOUS PRN
Status: DISCONTINUED | OUTPATIENT
Start: 2024-05-18 | End: 2024-05-21 | Stop reason: HOSPADM

## 2024-05-18 RX ORDER — ACETAMINOPHEN 650 MG/1
650 SUPPOSITORY RECTAL EVERY 6 HOURS PRN
Status: DISCONTINUED | OUTPATIENT
Start: 2024-05-18 | End: 2024-05-21 | Stop reason: HOSPADM

## 2024-05-18 RX ORDER — SODIUM CHLORIDE 9 MG/ML
INJECTION, SOLUTION INTRAVENOUS CONTINUOUS
Status: ACTIVE | OUTPATIENT
Start: 2024-05-18 | End: 2024-05-18

## 2024-05-18 RX ORDER — POLYETHYLENE GLYCOL 3350 17 G/17G
17 POWDER, FOR SOLUTION ORAL DAILY PRN
Status: DISCONTINUED | OUTPATIENT
Start: 2024-05-18 | End: 2024-05-21 | Stop reason: HOSPADM

## 2024-05-18 RX ORDER — POTASSIUM CHLORIDE 7.45 MG/ML
10 INJECTION INTRAVENOUS PRN
Status: DISCONTINUED | OUTPATIENT
Start: 2024-05-18 | End: 2024-05-21 | Stop reason: HOSPADM

## 2024-05-18 RX ORDER — SODIUM CHLORIDE 0.9 % (FLUSH) 0.9 %
5-40 SYRINGE (ML) INJECTION EVERY 12 HOURS SCHEDULED
Status: DISCONTINUED | OUTPATIENT
Start: 2024-05-18 | End: 2024-05-21 | Stop reason: HOSPADM

## 2024-05-18 RX ORDER — ONDANSETRON 2 MG/ML
4 INJECTION INTRAMUSCULAR; INTRAVENOUS EVERY 6 HOURS PRN
Status: DISCONTINUED | OUTPATIENT
Start: 2024-05-18 | End: 2024-05-21 | Stop reason: HOSPADM

## 2024-05-18 RX ORDER — SODIUM CHLORIDE 9 MG/ML
INJECTION, SOLUTION INTRAVENOUS PRN
Status: DISCONTINUED | OUTPATIENT
Start: 2024-05-18 | End: 2024-05-21 | Stop reason: HOSPADM

## 2024-05-18 RX ORDER — ACETAMINOPHEN 325 MG/1
650 TABLET ORAL EVERY 6 HOURS PRN
Status: DISCONTINUED | OUTPATIENT
Start: 2024-05-18 | End: 2024-05-21 | Stop reason: HOSPADM

## 2024-05-18 RX ORDER — POTASSIUM CHLORIDE 20 MEQ/1
40 TABLET, EXTENDED RELEASE ORAL PRN
Status: DISCONTINUED | OUTPATIENT
Start: 2024-05-18 | End: 2024-05-21 | Stop reason: HOSPADM

## 2024-05-18 RX ORDER — SODIUM CHLORIDE 0.9 % (FLUSH) 0.9 %
10 SYRINGE (ML) INJECTION PRN
Status: DISCONTINUED | OUTPATIENT
Start: 2024-05-18 | End: 2024-05-21 | Stop reason: HOSPADM

## 2024-05-18 RX ADMIN — SODIUM CHLORIDE: 9 INJECTION, SOLUTION INTRAVENOUS at 20:45

## 2024-05-18 RX ADMIN — LEVETIRACETAM 2000 MG: 100 INJECTION, SOLUTION INTRAVENOUS at 22:01

## 2024-05-18 ASSESSMENT — PAIN - FUNCTIONAL ASSESSMENT: PAIN_FUNCTIONAL_ASSESSMENT: NONE - DENIES PAIN

## 2024-05-18 NOTE — ED NOTES
Writer attempted IV start, labs were obtained but IV infiltrated upon flushing. Dressing applied. Patient tolerated well, Dr Minor aware.

## 2024-05-18 NOTE — ED PROVIDER NOTES
[facility] ED  EMERGENCY DEPARTMENT ENCOUNTER      Pt Name: Jeremy King  MRN: [id number]  Birthdate 1955  Date of evaluation: 5/18/2024  Provider: Mone Minor DO    CHIEF COMPLAINT       Chief Complaint   Patient presents with    Emesis     Patient sent from [facility] for possible bowel obstruction. Per staff patient has been having loose stools as well as having complaints of nausea and emesis today. Past Hx of bowel obstruction         HISTORY OF PRESENT ILLNESS   (Location/Symptom, Timing/Onset, Context/Setting, Quality, Duration, Modifying Factors, Severity)  Note limiting factors.   Jeremy King is a 68 y.o. male who presents to the emergency department with 1 episode of diarrhea and 1 episode of emesis that was reported by the nurses aide over there.  Patient himself does not complain of any thing at this time.  He is MRDD so history is obtained by the [facility].  Per staff the patient has a history of bowel obstructions so they sent him to the emergency department to rule out a bowel obstruction.  We do not know if the patient had anything to eat or drink today.  Currently he seems to be in no distress and denies any pain anywhere.  Unknown last bowel movement.    REVIEW OF SYSTEMS    (2-9 systems for level 4, 10 or more for level 5)     Review of Systems   Constitutional:  Negative for fatigue and fever.   HENT:  Negative for congestion and sore throat.    Eyes:  Negative for visual disturbance.   Respiratory:  Negative for shortness of breath.    Cardiovascular:  Negative for chest pain and palpitations.   Gastrointestinal:  Positive for diarrhea and vomiting. Negative for abdominal distention and abdominal pain.   Genitourinary:  Negative for dysuria.   Musculoskeletal:  Negative for back pain.   Skin:  Negative for rash.   Psychiatric/Behavioral:  Negative for suicidal ideas.        Except as noted above the remainder of the review of systems was reviewed and

## 2024-05-18 NOTE — ED NOTES
Dr. Minor aware of delay in blood work and scans due to difficulty gaining IV access. This RN attempted x2, Radha RN attempted x2, Kamryn RN attempted x2 and now Shauna BOLTON attempting to get blood work and IV

## 2024-05-18 NOTE — ED NOTES
Apple from PICC team at bedside to place midline. Patient now vomiting, and sticking his fingers down his throat making himself vomit. After patient was finished patient cleaned up and new gown provided. NG remains intact

## 2024-05-18 NOTE — ED NOTES
Per recommendation from radiologist, ng tube advanced another 10 cm. Per Dr. Stevens patient placed on low intermittent suction at this time

## 2024-05-19 LAB
ALBUMIN SERPL-MCNC: 4 G/DL (ref 3.5–5.2)
ALBUMIN/GLOB SERPL: 1.3 {RATIO} (ref 1–2.5)
ALP SERPL-CCNC: 123 U/L (ref 40–129)
ALT SERPL-CCNC: 15 U/L (ref 5–41)
ANION GAP SERPL CALCULATED.3IONS-SCNC: 12 MMOL/L (ref 9–17)
AST SERPL-CCNC: 12 U/L
BASOPHILS # BLD: 0.03 K/UL (ref 0–0.2)
BASOPHILS NFR BLD: 0 % (ref 0–2)
BILIRUB SERPL-MCNC: 0.3 MG/DL (ref 0.3–1.2)
BUN SERPL-MCNC: 31 MG/DL (ref 8–23)
BUN/CREAT SERPL: 31 (ref 9–20)
CALCIUM SERPL-MCNC: 8.9 MG/DL (ref 8.6–10.4)
CHLORIDE SERPL-SCNC: 109 MMOL/L (ref 98–107)
CO2 SERPL-SCNC: 23 MMOL/L (ref 20–31)
CREAT SERPL-MCNC: 1 MG/DL (ref 0.7–1.2)
EOSINOPHIL # BLD: 0.16 K/UL (ref 0–0.44)
EOSINOPHILS RELATIVE PERCENT: 2 % (ref 1–4)
ERYTHROCYTE [DISTWIDTH] IN BLOOD BY AUTOMATED COUNT: 12.5 % (ref 11.8–14.4)
GFR, ESTIMATED: 82 ML/MIN/1.73M2
GLUCOSE SERPL-MCNC: 99 MG/DL (ref 70–99)
HCT VFR BLD AUTO: 38.1 % (ref 40.7–50.3)
HGB BLD-MCNC: 12.2 G/DL (ref 13–17)
IMM GRANULOCYTES # BLD AUTO: <0.03 K/UL (ref 0–0.3)
IMM GRANULOCYTES NFR BLD: 0 %
LYMPHOCYTES NFR BLD: 1.72 K/UL (ref 1.1–3.7)
LYMPHOCYTES RELATIVE PERCENT: 26 % (ref 24–43)
MCH RBC QN AUTO: 32.4 PG (ref 25.2–33.5)
MCHC RBC AUTO-ENTMCNC: 32 G/DL (ref 28.4–34.8)
MCV RBC AUTO: 101.1 FL (ref 82.6–102.9)
MONOCYTES NFR BLD: 0.57 K/UL (ref 0.1–1.2)
MONOCYTES NFR BLD: 9 % (ref 3–12)
NEUTROPHILS NFR BLD: 63 % (ref 36–65)
NEUTS SEG NFR BLD: 4.25 K/UL (ref 1.5–8.1)
NRBC BLD-RTO: 0 PER 100 WBC
PLATELET # BLD AUTO: 217 K/UL (ref 138–453)
PMV BLD AUTO: 9 FL (ref 8.1–13.5)
POTASSIUM SERPL-SCNC: 4 MMOL/L (ref 3.7–5.3)
PROT SERPL-MCNC: 7.2 G/DL (ref 6.4–8.3)
RBC # BLD AUTO: 3.77 M/UL (ref 4.21–5.77)
SODIUM SERPL-SCNC: 144 MMOL/L (ref 135–144)
WBC OTHER # BLD: 6.7 K/UL (ref 3.5–11.3)

## 2024-05-19 PROCEDURE — 51798 US URINE CAPACITY MEASURE: CPT

## 2024-05-19 PROCEDURE — 85025 COMPLETE CBC W/AUTO DIFF WBC: CPT

## 2024-05-19 PROCEDURE — 2580000003 HC RX 258: Performed by: INTERNAL MEDICINE

## 2024-05-19 PROCEDURE — 99223 1ST HOSP IP/OBS HIGH 75: CPT | Performed by: SPECIALIST

## 2024-05-19 PROCEDURE — 80053 COMPREHEN METABOLIC PANEL: CPT

## 2024-05-19 PROCEDURE — G0378 HOSPITAL OBSERVATION PER HR: HCPCS

## 2024-05-19 PROCEDURE — 36415 COLL VENOUS BLD VENIPUNCTURE: CPT

## 2024-05-19 PROCEDURE — 94761 N-INVAS EAR/PLS OXIMETRY MLT: CPT

## 2024-05-19 PROCEDURE — 6360000002 HC RX W HCPCS: Performed by: INTERNAL MEDICINE

## 2024-05-19 PROCEDURE — 96366 THER/PROPH/DIAG IV INF ADDON: CPT

## 2024-05-19 RX ORDER — SODIUM CHLORIDE 9 MG/ML
INJECTION, SOLUTION INTRAVENOUS CONTINUOUS
Status: DISCONTINUED | OUTPATIENT
Start: 2024-05-19 | End: 2024-05-21 | Stop reason: HOSPADM

## 2024-05-19 RX ORDER — SODIUM CHLORIDE 9 MG/ML
INJECTION, SOLUTION INTRAVENOUS CONTINUOUS
Status: DISCONTINUED | OUTPATIENT
Start: 2024-05-19 | End: 2024-05-19

## 2024-05-19 RX ADMIN — LEVETIRACETAM 2000 MG: 100 INJECTION, SOLUTION INTRAVENOUS at 20:34

## 2024-05-19 RX ADMIN — LEVETIRACETAM 1500 MG: 15 INJECTION INTRAVENOUS at 08:35

## 2024-05-19 RX ADMIN — SODIUM CHLORIDE: 9 INJECTION, SOLUTION INTRAVENOUS at 07:46

## 2024-05-19 NOTE — CONSULTS
alert  Vital signs are stable he is afebrile  HEENT normocephalic atraumatic extract muscles intact pupils equal round reactive to light and accommodate oropharynx is clear neck supple without adenopathy he has a nasogastric tube in place draining bilious enteric contents  Chest clear to auscultation percussion  Cardiovascular regular rate and rhythm without murmur gallop  Abdomen is soft with minimal tenderness no guarding or rebound no palpable masses easily reducible ventral hernia no groin hernias rectal examination is deferred  Neurologically patient awake and alert D10 reflexes are 2+ and symmetric    Laboratories all are within normal limits review of the CT scan my interpretation is a bit different than the radiologist appears to me that the patient has a global adynamic ileus with both gastric small bowel and large bowel distention based upon the  film that was obtained prior to the formal CT slices    Assessment plan this is a patient who has had repeated admissions for complaints of bilious vomiting and diarrhea his physical examination and radiographic studies are most consistent with a global adynamic paralytic ileus is my presumption that this is due to medications and lack of mobility the present time we will use the nasogastric tube to decompress the stomach repeat abdominal films in the a.m. I suspect as in the past this ileus will resolve in the next 3 to 5 days at which point the patient's diet can be advanced and he can be subsequently discharged this patient needs a formal gastroenterology consult and follow-up otherwise he will continue to have these intermittent episodes and readmissions for what at this point does not appear to be a surgical issue

## 2024-05-19 NOTE — H&P
generator battery       Family History:       Problem Relation Age of Onset    High Blood Pressure Mother     Thyroid Cancer Mother     Other Mother         Myeloma    Heart Disease Mother         2 Stents    Arthritis Mother         2 Back surgeries    Lung Cancer Father     Heart Disease Brother         Defibrilator    Heart Attack Maternal Grandfather     Diabetes Paternal Grandmother        Social History:   TOBACCO:   reports that he has never smoked. He does not have any smokeless tobacco history on file.  ETOH:   reports no history of alcohol use.      Allergies:  Penicillins, Sodium hypochlorite, and Clindamycin    Medications Prior to Admission:    Prior to Admission medications    Medication Sig Start Date End Date Taking? Authorizing Provider   potassium chloride (KLOR-CON M) 20 MEQ extended release tablet Take 1 tablet by mouth daily for 5 days 4/11/24 4/16/24  Anne Quevedo APRN - CNP   vitamin B-12 (CYANOCOBALAMIN) 1000 MCG tablet Take 1 tablet by mouth daily    Fauzia Loera MD   White Petrolatum-Mineral Oil (SYSTANE NIGHTTIME) OINT Place into both eyes nightly Apply thin layer to bilateral eyelids at bedtime  Patient not taking: Reported on 5/18/2024    Fauzia Loera MD   Emollient (CERAVE) CREA Apply topically Apply topically to dry skin 2-3 times a day    Fauzia Loera MD   sennosides-docusate sodium (SENOKOT-S) 8.6-50 MG tablet Take 2 tablets by mouth daily 3/5/24 7/3/24  Vijay Ramirez DO   linaclotide (LINZESS) 145 MCG capsule Take 1 capsule by mouth every morning (before breakfast) 2/26/24   Vijay Ramirez DO   hydrOXYzine HCl (ATARAX) 25 MG tablet Take 1 tablet by mouth every 12 hours as needed for Itching    Fauzia Loera MD   LORazepam (ATIVAN) 0.5 MG tablet Take 1 tablet by mouth every 8 hours as needed (seizure activity).    Fauzia Loera MD   melatonin 3 MG TABS tablet Take 1 tablet by mouth nightly as needed (insomnia)

## 2024-05-19 NOTE — PLAN OF CARE
Problem: Discharge Planning  Goal: Discharge to home or other facility with appropriate resources  Outcome: Progressing     Problem: Safety - Adult  Goal: Free from fall injury  Outcome: Progressing     Problem: Skin/Tissue Integrity  Goal: Absence of new skin breakdown  Description: 1.  Monitor for areas of redness and/or skin breakdown  2.  Assess vascular access sites hourly  3.  Every 4-6 hours minimum:  Change oxygen saturation probe site  4.  Every 4-6 hours:  If on nasal continuous positive airway pressure, respiratory therapy assess nares and determine need for appliance change or resting period.  Outcome: Progressing     Problem: Gastrointestinal - Adult  Goal: Minimal or absence of nausea and vomiting  Outcome: Progressing  Flowsheets (Taken 5/18/2024 2329)  Minimal or absence of nausea and vomiting:   Administer IV fluids as ordered to ensure adequate hydration   Maintain NPO status until nausea and vomiting are resolved   Nasogastric tube to low intermittent suction as ordered   Administer ordered antiemetic medications as needed     Problem: Gastrointestinal - Adult  Goal: Maintains or returns to baseline bowel function  Outcome: Progressing  Flowsheets (Taken 5/18/2024 2322)  Maintains or returns to baseline bowel function:   Assess bowel function   Administer IV fluids as ordered to ensure adequate hydration   Administer ordered medications as needed

## 2024-05-20 ENCOUNTER — APPOINTMENT (OUTPATIENT)
Dept: GENERAL RADIOLOGY | Age: 69
End: 2024-05-20
Payer: MEDICARE

## 2024-05-20 LAB
ALBUMIN SERPL-MCNC: 3.6 G/DL (ref 3.5–5.2)
ALBUMIN/GLOB SERPL: 1.2 {RATIO} (ref 1–2.5)
ALP SERPL-CCNC: 111 U/L (ref 40–129)
ALT SERPL-CCNC: 12 U/L (ref 5–41)
ANION GAP SERPL CALCULATED.3IONS-SCNC: 11 MMOL/L (ref 9–17)
AST SERPL-CCNC: 10 U/L
BASOPHILS # BLD: 0.03 K/UL (ref 0–0.2)
BASOPHILS NFR BLD: 1 % (ref 0–2)
BILIRUB SERPL-MCNC: 0.3 MG/DL (ref 0.3–1.2)
BUN SERPL-MCNC: 25 MG/DL (ref 8–23)
BUN/CREAT SERPL: 36 (ref 9–20)
CALCIUM SERPL-MCNC: 8.6 MG/DL (ref 8.6–10.4)
CHLORIDE SERPL-SCNC: 107 MMOL/L (ref 98–107)
CO2 SERPL-SCNC: 24 MMOL/L (ref 20–31)
CREAT SERPL-MCNC: 0.7 MG/DL (ref 0.7–1.2)
EOSINOPHIL # BLD: 0.39 K/UL (ref 0–0.44)
EOSINOPHILS RELATIVE PERCENT: 7 % (ref 1–4)
ERYTHROCYTE [DISTWIDTH] IN BLOOD BY AUTOMATED COUNT: 12.4 % (ref 11.8–14.4)
GFR, ESTIMATED: >90 ML/MIN/1.73M2
GLUCOSE SERPL-MCNC: 75 MG/DL (ref 70–99)
HCT VFR BLD AUTO: 35.9 % (ref 40.7–50.3)
HGB BLD-MCNC: 11.5 G/DL (ref 13–17)
IMM GRANULOCYTES # BLD AUTO: <0.03 K/UL (ref 0–0.3)
IMM GRANULOCYTES NFR BLD: 0 %
LYMPHOCYTES NFR BLD: 1.52 K/UL (ref 1.1–3.7)
LYMPHOCYTES RELATIVE PERCENT: 26 % (ref 24–43)
MCH RBC QN AUTO: 33 PG (ref 25.2–33.5)
MCHC RBC AUTO-ENTMCNC: 32 G/DL (ref 28.4–34.8)
MCV RBC AUTO: 102.9 FL (ref 82.6–102.9)
MONOCYTES NFR BLD: 0.45 K/UL (ref 0.1–1.2)
MONOCYTES NFR BLD: 8 % (ref 3–12)
NEUTROPHILS NFR BLD: 58 % (ref 36–65)
NEUTS SEG NFR BLD: 3.54 K/UL (ref 1.5–8.1)
NRBC BLD-RTO: 0 PER 100 WBC
PLATELET # BLD AUTO: 147 K/UL (ref 138–453)
PMV BLD AUTO: 9 FL (ref 8.1–13.5)
POTASSIUM SERPL-SCNC: 3.8 MMOL/L (ref 3.7–5.3)
PROT SERPL-MCNC: 6.5 G/DL (ref 6.4–8.3)
RBC # BLD AUTO: 3.49 M/UL (ref 4.21–5.77)
SODIUM SERPL-SCNC: 142 MMOL/L (ref 135–144)
WBC OTHER # BLD: 5.9 K/UL (ref 3.5–11.3)

## 2024-05-20 PROCEDURE — 2500000003 HC RX 250 WO HCPCS: Performed by: NURSE PRACTITIONER

## 2024-05-20 PROCEDURE — 97110 THERAPEUTIC EXERCISES: CPT

## 2024-05-20 PROCEDURE — 2580000003 HC RX 258: Performed by: INTERNAL MEDICINE

## 2024-05-20 PROCEDURE — 36415 COLL VENOUS BLD VENIPUNCTURE: CPT

## 2024-05-20 PROCEDURE — 97535 SELF CARE MNGMENT TRAINING: CPT

## 2024-05-20 PROCEDURE — G0378 HOSPITAL OBSERVATION PER HR: HCPCS

## 2024-05-20 PROCEDURE — C9254 INJECTION, LACOSAMIDE: HCPCS | Performed by: NURSE PRACTITIONER

## 2024-05-20 PROCEDURE — 6370000000 HC RX 637 (ALT 250 FOR IP): Performed by: NURSE PRACTITIONER

## 2024-05-20 PROCEDURE — 74019 RADEX ABDOMEN 2 VIEWS: CPT

## 2024-05-20 PROCEDURE — 85025 COMPLETE CBC W/AUTO DIFF WBC: CPT

## 2024-05-20 PROCEDURE — 97166 OT EVAL MOD COMPLEX 45 MIN: CPT

## 2024-05-20 PROCEDURE — 96376 TX/PRO/DX INJ SAME DRUG ADON: CPT

## 2024-05-20 PROCEDURE — 6360000002 HC RX W HCPCS: Performed by: INTERNAL MEDICINE

## 2024-05-20 PROCEDURE — 94761 N-INVAS EAR/PLS OXIMETRY MLT: CPT

## 2024-05-20 PROCEDURE — 80053 COMPREHEN METABOLIC PANEL: CPT

## 2024-05-20 PROCEDURE — 2580000003 HC RX 258: Performed by: NURSE PRACTITIONER

## 2024-05-20 PROCEDURE — 96375 TX/PRO/DX INJ NEW DRUG ADDON: CPT

## 2024-05-20 PROCEDURE — A4216 STERILE WATER/SALINE, 10 ML: HCPCS | Performed by: NURSE PRACTITIONER

## 2024-05-20 PROCEDURE — 97530 THERAPEUTIC ACTIVITIES: CPT

## 2024-05-20 PROCEDURE — 6360000002 HC RX W HCPCS: Performed by: NURSE PRACTITIONER

## 2024-05-20 PROCEDURE — 51798 US URINE CAPACITY MEASURE: CPT

## 2024-05-20 PROCEDURE — 96366 THER/PROPH/DIAG IV INF ADDON: CPT

## 2024-05-20 PROCEDURE — 97162 PT EVAL MOD COMPLEX 30 MIN: CPT

## 2024-05-20 RX ORDER — BISACODYL 10 MG
10 SUPPOSITORY, RECTAL RECTAL DAILY
Status: DISCONTINUED | OUTPATIENT
Start: 2024-05-20 | End: 2024-05-21 | Stop reason: HOSPADM

## 2024-05-20 RX ORDER — LACOSAMIDE 10 MG/ML
150 INJECTION, SOLUTION INTRAVENOUS 2 TIMES DAILY
Status: DISCONTINUED | OUTPATIENT
Start: 2024-05-20 | End: 2024-05-21 | Stop reason: HOSPADM

## 2024-05-20 RX ADMIN — LACOSAMIDE 150 MG: 10 INJECTION INTRAVENOUS at 13:55

## 2024-05-20 RX ADMIN — FAMOTIDINE 20 MG: 10 INJECTION, SOLUTION INTRAVENOUS at 11:33

## 2024-05-20 RX ADMIN — SODIUM CHLORIDE: 9 INJECTION, SOLUTION INTRAVENOUS at 08:56

## 2024-05-20 RX ADMIN — BISACODYL 10 MG: 10 SUPPOSITORY RECTAL at 12:40

## 2024-05-20 RX ADMIN — LACOSAMIDE 150 MG: 10 INJECTION INTRAVENOUS at 20:08

## 2024-05-20 RX ADMIN — LEVETIRACETAM 1500 MG: 15 INJECTION INTRAVENOUS at 08:43

## 2024-05-20 RX ADMIN — SODIUM CHLORIDE: 9 INJECTION, SOLUTION INTRAVENOUS at 19:46

## 2024-05-20 RX ADMIN — LEVETIRACETAM 2000 MG: 100 INJECTION, SOLUTION INTRAVENOUS at 20:16

## 2024-05-20 RX ADMIN — FAMOTIDINE 20 MG: 10 INJECTION, SOLUTION INTRAVENOUS at 20:13

## 2024-05-20 NOTE — CARE COORDINATION
Case Management Assessment  Initial Evaluation    Date/Time of Evaluation: 5/20/2024 1:18 PM  Assessment Completed by: PITER Maria    If patient is discharged prior to next notation, then this note serves as note for discharge by case management.    Patient Name: Jeremy King                   YOB: 1955  Diagnosis: Small bowel obstruction (HCC) [K56.609]                   Date / Time: 5/18/2024  3:04 PM    Patient Admission Status: Observation   Readmission Risk (Low < 19, Mod (19-27), High > 27): Readmission Risk Score: 25.9    Current PCP: Sukumar Larose MD  PCP verified by CM? Yes    Chart Reviewed: Yes      History Provided by: Medical Record  Patient Orientation: Alert and Oriented    Patient Cognition: Alert    Hospitalization in the last 30 days (Readmission):  No    If yes, Readmission Assessment in CM Navigator will be completed.    Advance Directives:      Code Status: Full Code   Patient's Primary Decision Maker is: Named in Scanned ACP Document    Primary Decision Maker: Esther Denny - Legal Guardian, Legal Guardian - [phone]    Discharge Planning:    Patient lives with: Other (Comment) Type of Home: Group Home  Primary Care Giver: Other (Comment) (group home)  Patient Support Systems include: Family Members, /, Other (Comment) (group home)   Current Financial resources: Medicaid, Medicare  Current community resources: Other (Comment), Transportation (group home)  Current services prior to admission: Durable Medical Equipment, Extended Care Placement            Current DME: Wheelchair            Type of Home Care services:  Attendant, Aide Services, Nursing Services    ADLS  Prior functional level: Assistance with the following:, Bathing, Dressing, Toileting, Cooking, Housework, Shopping, Mobility  Current functional level: Assistance with the following:, Bathing, Toileting, Dressing, Mobility, Shopping, Housework, Cooking    PT

## 2024-05-20 NOTE — PLAN OF CARE
Problem: Discharge Planning  Goal: Discharge to home or other facility with appropriate resources  Outcome: Progressing     Problem: Safety - Adult  Goal: Free from fall injury  Outcome: Progressing     Problem: Skin/Tissue Integrity  Goal: Absence of new skin breakdown  Description: 1.  Monitor for areas of redness and/or skin breakdown  2.  Assess vascular access sites hourly  3.  Every 4-6 hours minimum:  Change oxygen saturation probe site  4.  Every 4-6 hours:  If on nasal continuous positive airway pressure, respiratory therapy assess nares and determine need for appliance change or resting period.  Outcome: Progressing     Problem: Gastrointestinal - Adult  Goal: Minimal or absence of nausea and vomiting  Outcome: Progressing  Goal: Maintains or returns to baseline bowel function  Outcome: Progressing

## 2024-05-21 ENCOUNTER — APPOINTMENT (OUTPATIENT)
Dept: GENERAL RADIOLOGY | Age: 69
End: 2024-05-21
Payer: MEDICARE

## 2024-05-21 VITALS
BODY MASS INDEX: 25.05 KG/M2 | DIASTOLIC BLOOD PRESSURE: 59 MMHG | HEART RATE: 89 BPM | HEIGHT: 70 IN | SYSTOLIC BLOOD PRESSURE: 122 MMHG | OXYGEN SATURATION: 98 % | WEIGHT: 175 LBS | TEMPERATURE: 97.6 F | RESPIRATION RATE: 20 BRPM

## 2024-05-21 LAB
ALBUMIN SERPL-MCNC: 3.4 G/DL (ref 3.5–5.2)
ALBUMIN/GLOB SERPL: 1.3 {RATIO} (ref 1–2.5)
ALP SERPL-CCNC: 100 U/L (ref 40–129)
ALT SERPL-CCNC: 11 U/L (ref 5–41)
ANION GAP SERPL CALCULATED.3IONS-SCNC: 10 MMOL/L (ref 9–17)
AST SERPL-CCNC: 11 U/L
BASOPHILS # BLD: <0.03 K/UL (ref 0–0.2)
BASOPHILS NFR BLD: 0 % (ref 0–2)
BILIRUB SERPL-MCNC: 0.3 MG/DL (ref 0.3–1.2)
BUN SERPL-MCNC: 17 MG/DL (ref 8–23)
BUN/CREAT SERPL: 28 (ref 9–20)
CALCIUM SERPL-MCNC: 8.3 MG/DL (ref 8.6–10.4)
CHLORIDE SERPL-SCNC: 109 MMOL/L (ref 98–107)
CO2 SERPL-SCNC: 22 MMOL/L (ref 20–31)
CREAT SERPL-MCNC: 0.6 MG/DL (ref 0.7–1.2)
EOSINOPHIL # BLD: 0.22 K/UL (ref 0–0.44)
EOSINOPHILS RELATIVE PERCENT: 3 % (ref 1–4)
ERYTHROCYTE [DISTWIDTH] IN BLOOD BY AUTOMATED COUNT: 12.1 % (ref 11.8–14.4)
GFR, ESTIMATED: >90 ML/MIN/1.73M2
GLUCOSE SERPL-MCNC: 81 MG/DL (ref 70–99)
HCT VFR BLD AUTO: 32.5 % (ref 40.7–50.3)
HGB BLD-MCNC: 10.6 G/DL (ref 13–17)
IMM GRANULOCYTES # BLD AUTO: <0.03 K/UL (ref 0–0.3)
IMM GRANULOCYTES NFR BLD: 0 %
LYMPHOCYTES NFR BLD: 1.42 K/UL (ref 1.1–3.7)
LYMPHOCYTES RELATIVE PERCENT: 22 % (ref 24–43)
MCH RBC QN AUTO: 32.9 PG (ref 25.2–33.5)
MCHC RBC AUTO-ENTMCNC: 32.6 G/DL (ref 28.4–34.8)
MCV RBC AUTO: 100.9 FL (ref 82.6–102.9)
MONOCYTES NFR BLD: 0.54 K/UL (ref 0.1–1.2)
MONOCYTES NFR BLD: 8 % (ref 3–12)
NEUTROPHILS NFR BLD: 67 % (ref 36–65)
NEUTS SEG NFR BLD: 4.2 K/UL (ref 1.5–8.1)
NRBC BLD-RTO: 0 PER 100 WBC
PLATELET # BLD AUTO: 144 K/UL (ref 138–453)
PMV BLD AUTO: 9.7 FL (ref 8.1–13.5)
POTASSIUM SERPL-SCNC: 3.8 MMOL/L (ref 3.7–5.3)
PROT SERPL-MCNC: 6.1 G/DL (ref 6.4–8.3)
RBC # BLD AUTO: 3.22 M/UL (ref 4.21–5.77)
SODIUM SERPL-SCNC: 141 MMOL/L (ref 135–144)
WBC OTHER # BLD: 6.4 K/UL (ref 3.5–11.3)

## 2024-05-21 PROCEDURE — 6360000002 HC RX W HCPCS: Performed by: NURSE PRACTITIONER

## 2024-05-21 PROCEDURE — 74019 RADEX ABDOMEN 2 VIEWS: CPT

## 2024-05-21 PROCEDURE — 6360000002 HC RX W HCPCS: Performed by: INTERNAL MEDICINE

## 2024-05-21 PROCEDURE — 85025 COMPLETE CBC W/AUTO DIFF WBC: CPT

## 2024-05-21 PROCEDURE — 6370000000 HC RX 637 (ALT 250 FOR IP): Performed by: NURSE PRACTITIONER

## 2024-05-21 PROCEDURE — 97530 THERAPEUTIC ACTIVITIES: CPT

## 2024-05-21 PROCEDURE — 96366 THER/PROPH/DIAG IV INF ADDON: CPT

## 2024-05-21 PROCEDURE — 94761 N-INVAS EAR/PLS OXIMETRY MLT: CPT

## 2024-05-21 PROCEDURE — G0378 HOSPITAL OBSERVATION PER HR: HCPCS

## 2024-05-21 PROCEDURE — 2580000003 HC RX 258: Performed by: INTERNAL MEDICINE

## 2024-05-21 PROCEDURE — 2500000003 HC RX 250 WO HCPCS: Performed by: NURSE PRACTITIONER

## 2024-05-21 PROCEDURE — 80053 COMPREHEN METABOLIC PANEL: CPT

## 2024-05-21 PROCEDURE — 97535 SELF CARE MNGMENT TRAINING: CPT

## 2024-05-21 PROCEDURE — A4216 STERILE WATER/SALINE, 10 ML: HCPCS | Performed by: NURSE PRACTITIONER

## 2024-05-21 PROCEDURE — 36415 COLL VENOUS BLD VENIPUNCTURE: CPT

## 2024-05-21 PROCEDURE — 96376 TX/PRO/DX INJ SAME DRUG ADON: CPT

## 2024-05-21 PROCEDURE — C9254 INJECTION, LACOSAMIDE: HCPCS | Performed by: NURSE PRACTITIONER

## 2024-05-21 PROCEDURE — 2580000003 HC RX 258: Performed by: NURSE PRACTITIONER

## 2024-05-21 RX ADMIN — FAMOTIDINE 20 MG: 10 INJECTION, SOLUTION INTRAVENOUS at 09:05

## 2024-05-21 RX ADMIN — SODIUM CHLORIDE: 9 INJECTION, SOLUTION INTRAVENOUS at 05:50

## 2024-05-21 RX ADMIN — LEVETIRACETAM 1500 MG: 15 INJECTION INTRAVENOUS at 09:12

## 2024-05-21 RX ADMIN — BISACODYL 10 MG: 10 SUPPOSITORY RECTAL at 09:05

## 2024-05-21 RX ADMIN — LACOSAMIDE 150 MG: 10 INJECTION INTRAVENOUS at 09:05

## 2024-05-21 NOTE — PLAN OF CARE
Problem: Discharge Planning  Goal: Discharge to home or other facility with appropriate resources  5/21/2024 1012 by Regis Mcconnell RN  Outcome: Progressing  Flowsheets (Taken 5/21/2024 0738)  Discharge to home or other facility with appropriate resources: Identify barriers to discharge with patient and caregiver  5/20/2024 2343 by Charlene Mccullough RN  Outcome: Progressing  Flowsheets (Taken 5/20/2024 2343)  Discharge to home or other facility with appropriate resources:   Identify barriers to discharge with patient and caregiver   Arrange for needed discharge resources and transportation as appropriate   Identify discharge learning needs (meds, wound care, etc)   Refer to discharge planning if patient needs post-hospital services based on physician order or complex needs related to functional status, cognitive ability or social support system     Problem: Safety - Adult  Goal: Free from fall injury  5/21/2024 1012 by Regis Mcconnell RN  Outcome: Progressing  5/20/2024 2343 by Charlene Mccullough RN  Outcome: Progressing  Flowsheets (Taken 5/20/2024 2343)  Free From Fall Injury:   Instruct family/caregiver on patient safety   Based on caregiver fall risk screen, instruct family/caregiver to ask for assistance with transferring infant if caregiver noted to have fall risk factors     Problem: Skin/Tissue Integrity  Goal: Absence of new skin breakdown  Description: 1.  Monitor for areas of redness and/or skin breakdown  2.  Assess vascular access sites hourly  3.  Every 4-6 hours minimum:  Change oxygen saturation probe site  4.  Every 4-6 hours:  If on nasal continuous positive airway pressure, respiratory therapy assess nares and determine need for appliance change or resting period.  5/21/2024 1012 by Regis Mcconnell RN  Outcome: Progressing  5/20/2024 2343 by Charlene Mccullough RN  Outcome: Progressing     Problem: Gastrointestinal - Adult  Goal: Minimal or absence of nausea and vomiting  5/21/2024

## 2024-05-21 NOTE — DISCHARGE INSTR - COC
Continuity of Care Form    Patient Name: Jeremy King   :  1955  MRN:  [id number]    Admit date:  2024  Discharge date:  ***    Code Status Order: Full Code   Advance Directives:     Admitting Physician:  Axel Roblero MD  PCP: Sukumar Larose MD    Discharging Nurse: ***  Discharging Hospital Unit/Room#: 0318/0318-01  Discharging Unit Phone Number: ***    Emergency Contact:   Extended Emergency Contact Information  Primary Emergency Contact: Esther Denny   [facility]  Home Phone: [phone]  Relation: Legal Guardian  Secondary Emergency Contact: Samir King  Home Phone: [phone]  Relation: Brother/Sister    Past Surgical History:  Past Surgical History:   Procedure Laterality Date    ABDOMEN SURGERY  ,     Bowel Obstruction X2    CAST APPLICATION Left     Lt. Ankle , X3    HYDROCELE EXCISION  2016    LAPAROSCOPY  15    with BERNARD    LAPAROTOMY  15    with partial cecectomy    VAGAL NERVE STIMULATION      placed, then replaced    VAGAL NERVE STIMULATION  10/11/2016    replaced generator battery       Immunization History:   Immunization History   Administered Date(s) Administered    COVID-19, PFIZER PURPLE top, DILUTE for use, (age 12 y+), 30mcg/0.3mL 2021, 2021, 2022    TDaP, ADACEL (age 10y-64y), BOOSTRIX (age 10y+), IM, 0.5mL 2013       Active Problems:  Patient Active Problem List   Diagnosis Code    Seizures ([facility]) Chronic R56.9    MR (mental retardation), severe F72    Epilepsy ([facility]) G40.909    SBO (small bowel obstruction) ([facility]) K56.609    LEELEE (obstructive sleep apnea) G47.33    Epileptic seizures ([facility]) G40.909    Intellectual disability F79    S/P placement of VNS (vagus nerve stimulation) device Z96.89    Dysphagia R13.10    Elevated international normalized ratio (INR) R79.1    Nausea and vomiting R11.2    Seizure disorder ([facility]) G40.909    Seizure-like activity ([facility]) R56.9    ESTHER (acute kidney injury) ([facility]) N17.9

## 2024-05-21 NOTE — PROGRESS NOTES
Comprehensive Nutrition Assessment    Type and Reason for Visit:  Initial    Nutrition Recommendations/Plan:   Advance diet as GI tolerated.   Monitor NPO duration      Malnutrition Assessment:  Malnutrition Status:  At risk for malnutrition (Comment) (05/20/24 1155)    Context:  Acute Illness     Findings of the 6 clinical characteristics of malnutrition:  Energy Intake:  Mild decrease in energy intake (Comment) (npo)  Weight Loss:  No significant weight loss     Body Fat Loss:  No significant body fat loss     Muscle Mass Loss:  No significant muscle mass loss    Fluid Accumulation:  Mild Extremities   Strength:  Not Performed    Nutrition Assessment:    Inadequate nutrient intakes r/t altered GI status, AEB SBO with NGT and NPO status. NGT currently clamped with patient having no GI complaints. Weight has been up and down and currently on upper end of range. Recommend initiate clear liquids and advance as GI tolerated. History of low vitamin D on supplement pta.    Nutrition Related Findings:    loose/soft bm. actvie b/s. trace BLE edema. clamped NGT. Wound Type: None       Current Nutrition Intake & Therapies:    Average Meal Intake: NPO  Average Supplements Intake: NPO  Diet NPO    Anthropometric Measures:  Height: 177.8 cm (5' 10\")  Ideal Body Weight (IBW): 166 lbs (75 kg)    Admission Body Weight: 72.6 kg (160 lb)  Current Body Weight: 78.7 kg (173 lb 8 oz), 104.5 % IBW. Weight Source: Bed Scale  Current BMI (kg/m2): 24.9  Usual Body Weight:  (159-171# in April)     Weight Adjustment For: No Adjustment                 BMI Categories: Normal Weight (BMI 18.5-24.9)    Estimated Daily Nutrient Needs:  Energy Requirements Based On: Kcal/kg  Weight Used for Energy Requirements: Current  Energy (kcal/day): 7982-1443 (20-25)  Weight Used for Protein Requirements: Current  Protein (g/day):  (1.2-1.4)  Method Used for Fluid Requirements: 1 ml/kcal  Fluid (ml/day): 2000    Nutrition Diagnosis:   Inadequate 
I informed Dr. Roblero of the patient's low output and bladder scan of 422 ml. Dr Roblero responded, \"noted.\"  
Midline placement note:   Dynamic Access RN    Prescribed IV Therapy = IV Keppra  Peripheral ultrasound assessment done. Plan for Right basilic vein insertion.   CVR measurement = 14 % (Linear CVR is preferred to be less than 45%).  Product type: Bard 4 fr midline  History/Labs/Allergies Reviewed  Placed By: JAGJIT Camarillo RN VIKTOR - RN (Dynamic Access)  Time out Performed using Two Identifiers  Lot # [id number]  Expiration date = 06-  Trimmed at 10 cm  External catheter length 0 cm  Number of attempts 1  Special equipment used - Ultrasound and an MST insertion technique  Catheter securement = 3M securement device  Dressing applied= Tegaderm CHG  Lidocaine administered intradermally conc.1%, approx 1ml. (Lot# [id number] and Exp date= 07-)  Device should have blood return, if no blood return assess for infiltration and/or call VAT for consult.  RN aware midline placed and is immediately released for use. No cxr required due to placement of a midline.     Midline education:   [ X ] Post care line insertion was discussed with patient/Family or POA prior to procedure.  Risks, benefits, alternatives, and reason for procedure were discussed and teaching was reinforced. An educational handout on post insertion line care and maintenance was left at bedside with patient or in chart. Patient (Family or POA) acknowledged understanding of information relayed.            Upload picture of vessel   
Midline placement note:   Dynamic Access RN    Prescribed IV Therapy = fluids  Peripheral ultrasound assessment done. Plan for left brachial vein insertion.   CVR measurement = 16 % (Linear CVR is preferred to be less than 45%).  Product type: Bard 4 fr midline  History/Labs/Allergies Reviewed  Placed By: JAGJIT Camarillo RN VIKTOR - RN (Dynamic Access)  Time out Performed using Two Identifiers  Lot # [id number]  Expiration date = 06-  Trimmed at 10 cm  External catheter length 0 cm  Number of attempts 1  Special equipment used - Ultrasound and an MST insertion technique  Catheter securement = 3M securement device  Dressing applied= Tegaderm CHG  Lidocaine administered intradermally conc.1%, approx 1ml. (Lot# [id number] and Exp date= 07-)  Device should have blood return, if no blood return assess for infiltration and/or call VAT for consult.  RN aware midline placed and is immediately released for use. No cxr required due to placement of a midline.     Midline education:   [X  ] Post care line insertion was discussed with patient/Family or POA prior to procedure.  Risks, benefits, alternatives, and reason for procedure were discussed and teaching was reinforced. An educational handout on post insertion line care and maintenance was left at bedside with patient or in chart. Patient (Family or POA) acknowledged understanding of information relayed.       
Occupational Therapy  Facility/Department: [facility] MED SURG  Daily Treatment Note  NAME: Jeremy King  : 1955  MRN: [id number]    Date of Service: 2024    Discharge Recommendations:  Continue to assess pending progress         Patient Diagnosis(es): The encounter diagnosis was Small bowel obstruction (HCC).     Assessment    Activity Tolerance: Patient tolerated treatment well  Discharge Recommendations: Continue to assess pending progress      Plan   Occupational Therapy Plan  Times Per Day: Once a day  Days Per Week: 7 Days  Current Treatment Recommendations: Strengthening;Balance training;Functional mobility training;Endurance training;Equipment evaluation, education, & procurement;Safety education & training;Patient/Caregiver education & training;Self-Care / ADL;Cognitive reorientation     Restrictions  Restrictions/Precautions  Restrictions/Precautions: Fall Risk;General Precautions;Seizure  Required Braces or Orthoses?: No  Position Activity Restriction  Other position/activity restrictions: Requires helmet d/t seizures    Subjective   Subjective  Subjective: Pt sitting up in bedside chair upon arrival. Pt agreed to participate in therapy session.  Pain: Pt had no complaints of pain.          Objective    Vitals           ADL  Functional Mobility: Minimal assistance  Functional Mobility Skilled Clinical Factors: Min A x 2 for safety to complete stand pivot transfer from recliner to bed.  Additional Comments: CGA to complete bed mob of sit to supine.  OT Exercises  Exercise Treatment: Pt completed BUE ther ex x 5 planes x 15 reps x 1 set to increase UE strength and endurance in order to ease completion of ADL tasks. Pt required RBs as needed secondary to fatigue.     Safety Devices  Type of Devices: All fall risk precautions in place;Call light within reach;Bed alarm in place;Left in bed;Nurse notified  Restraints  Restraints Initially in Place: No     Patient Education  Education Given To: 
Occupational Therapy  Facility/Department: [facility] MED SURG  Occupational Therapy Initial Assessment    Name: Jeremy King  : 1955  MRN: [id number]  Date of Service: 2024    Discharge Recommendations:  Continue to assess pending progress          Patient Diagnosis(es): The encounter diagnosis was Small bowel obstruction (HCC).  Past Medical History:  has a past medical history of Arthritis, BPH (benign prostatic hyperplasia), Dysphagia, GERD (gastroesophageal reflux disease), Hearing loss, HLD (hyperlipidemia), Insomnia, MR (mental retardation), Periorbital cellulitis, SBO (small bowel obstruction) (HCC), Seizures (HCC), Ventral hernia, and Vitamin D deficiency.  Past Surgical History:  has a past surgical history that includes Abdomen surgery (, ); laparoscopy (14); laparotomy (14); cast application (Left); Hydrocele surgery (2016); Vagus nerve stimulator insertion; and Vagus nerve stimulator insertion (10/11/2016).    Treatment Diagnosis: Weakness      Assessment   Performance deficits / Impairments: Decreased functional mobility ;Decreased endurance;Decreased ADL status;Decreased balance;Decreased strength;Decreased safe awareness;Decreased coordination  Assessment: 67 y/o M admitted to T for SBO. Patient c hx of MRDD. Patient currently requires increased need for assist during ADL due to weakness and deconditioning from hospitalization. Patient would benefit from OT services to address independence with self care for safe return to facility.  Treatment Diagnosis: Weakness  Prognosis: Fair  Decision Making: Medium Complexity  REQUIRES OT FOLLOW-UP: Yes        Plan   Occupational Therapy Plan  Times Per Day: Once a day  Days Per Week: 7 Days  Current Treatment Recommendations: Strengthening, Balance training, Functional mobility training, Endurance training, Equipment evaluation, education, & procurement, Safety education & training, Patient/Caregiver education & training, 
Occupational Therapy  Facility/Department: [facility] MED SURG  Daily Treatment Note  NAME: Jeremy King  : 1955  MRN: [id number]    Date of Service: 2024    Discharge Recommendations:  Continue to assess pending progress         Patient Diagnosis(es): The encounter diagnosis was Small bowel obstruction (HCC).     Assessment    Activity Tolerance: Patient limited by fatigue;Patient limited by endurance  Discharge Recommendations: Continue to assess pending progress      Plan   Occupational Therapy Plan  Times Per Day: Once a day  Days Per Week: 7 Days  Current Treatment Recommendations: Strengthening;Balance training;Functional mobility training;Endurance training;Equipment evaluation, education, & procurement;Safety education & training;Patient/Caregiver education & training;Self-Care / ADL;Cognitive reorientation     Restrictions  Restrictions/Precautions  Restrictions/Precautions: Fall Risk;General Precautions;Seizure  Position Activity Restriction  Other position/activity restrictions: Requires helmet d/t seizures    Subjective   Subjective  Subjective: Pt lying in bed upon arrival. Pt agreed to participate in therapy session.  Pain: Pt had no complaints of pain.             Objective    Vitals          ADL  Toileting: Dependent/Total  Toileting Skilled Clinical Factors: Dep to complete brief change in bed d/t incontinence.  Additional Comments: Min A to complete bed mob of rolling side to side.  Skin Care: Soap and water  OT Exercises  Exercise Treatment: Pt declined ther ex this date.     Safety Devices  Type of Devices: All fall risk precautions in place;Call light within reach;Bed alarm in place;Left in bed;Nurse notified     Patient Education  Education Given To: Patient  Education Provided: Plan of Care;Role of Therapy  Education Method: Verbal  Barriers to Learning: Cognition  Education Outcome: Continued education needed    Goals  Short Term Goals  Time Frame for Short Term Goals: 21 
Patient has still yet to have an wet brief for writer. Writer bladder scanned patient again at this time. Bladder vhtw=809. Writer notifed Porsche NAVA who stated to hold off on straight-cathing patient at this time and recheck again in a few hours.   
Patient has still yet to urinate, brief is dry. Patient denies needing to urine at this time.     Patient bladder scanned. Bladder scan =636.Writer notified Porsche NAVA.    Per order, patient straight-cathed at this time and got 625ml output.   
Patient is discharge back to the Group home .  The guardian aware of the discharge.  The group home will provide the transportation.  PITER Maria    
Patient vitals and assessment completed, see flowsheet. Patient alert and oriented to person and place, breathing normal. I was informed by the night nurse that there was no output in the NG canister and upon assessing the patient I noticed the intermittent suction was not working. I temporarily turned the suction to continuous and received 700ml of output of tan/orange output. I returned the suction to intermittent and it appears to be working. I will consistently make sure that it is working. Pt denies pain or any further needs, bed alarm on, seizure pads in place, call light within reach, will continue to monitor.     
Physical Therapy  Facility/Department: [facility] MED SURG  Daily Treatment Note  NAME: Jeremy King  : 1955  MRN: [id number]    Date of Service: 2024    Discharge Recommendations:  Continue to assess pending progress, 24 hour supervision or assist, Home with Home health PT        Patient Diagnosis(es): The encounter diagnosis was Small bowel obstruction (HCC).    Assessment   Assessment: Ther-ex: Supine BLE x15 in all available planes of motion with mod vc's for technique with poor carryover. Bed Mobs:  Min x1, rolling back and forth 4x to complete hygeine care with pt able to assist with holding self on side with one UE ~3 min each time.  Activity Tolerance: Patient tolerated treatment well     Plan    Physical Therapy Plan  General Plan: 2 times a day 7 days a week (1x/day on weekends and holidays)  Current Treatment Recommendations: Strengthening;ROM;Balance training;Functional mobility training;IADL training;ADL/Self-care training;Transfer training;Endurance training;Gait training;Stair training;Neuromuscular re-education;Manual;Pain management;Home exercise program;Return to work related activity;Safety education & training;Patient/Caregiver education & training;Modalities;Positioning;Therapeutic activities     Restrictions  Restrictions/Precautions  Restrictions/Precautions: Fall Risk, General Precautions, Seizure  Required Braces or Orthoses?: No  Position Activity Restriction  Other position/activity restrictions: Requires helmet d/t seizures     Subjective    Subjective  Subjective: Pt in bed upon arrival, agreeable to get cleaned up at this time  Pain: denies pain complaint  Orientation  Overall Orientation Status: Impaired     Objective     Bed Mobility Training  Bed Mobility Training: Yes  Overall Level of Assistance: Assist X1;Minimum assistance  Interventions: Verbal cues;Tactile cues  Rolling: Minimum assistance;Assist X1  Transfer Training  Transfer Training: No  Gait  Gait Training: No   
Physical Therapy  Facility/Department: [facility] MED SURG  Daily Treatment Note  NAME: Jeremy King  : 1955  MRN: [id number]    Date of Service: 2024    Discharge Recommendations:  Continue to assess pending progress, 24 hour supervision or assist, Home with Home health PT        Patient Diagnosis(es): The encounter diagnosis was Small bowel obstruction (HCC).    Assessment   Assessment: Ther-ex: Seated BLE x15 in all available planes of motion. Bed Mobs: CGA x1. Transfers Min x2.  Activity Tolerance: Patient tolerated treatment well  Equipment Needed: No     Plan    Physical Therapy Plan  General Plan: 2 times a day 7 days a week (1x/day on weekends and holidays)  Current Treatment Recommendations: Strengthening;ROM;Balance training;Functional mobility training;IADL training;ADL/Self-care training;Transfer training;Endurance training;Gait training;Stair training;Neuromuscular re-education;Manual;Pain management;Home exercise program;Return to work related activity;Safety education & training;Patient/Caregiver education & training;Modalities;Positioning;Therapeutic activities     Restrictions  Restrictions/Precautions  Restrictions/Precautions: Fall Risk, General Precautions, Seizure  Required Braces or Orthoses?: No  Position Activity Restriction  Other position/activity restrictions: Requires helmet d/t seizures     Subjective    Subjective  Subjective: Pt in chair upon arrival, agreeable to therapy at this time, states he is feeling good  Pain: deneis pain complaint  Orientation  Overall Orientation Status: Impaired  Orientation Level: Oriented to person  Cognition  Overall Cognitive Status: Exceptions  Arousal/Alertness: Appears intact  Following Commands: Follows one step commands with increased time  Attention Span: Appears intact  Memory: Decreased short term memory;Decreased long term memory  Safety Judgement: Decreased awareness of need for safety;Decreased awareness of need for assistance 
Progress Note    SUBJECTIVE:    Patient seen for f/u of Small bowel obstruction (HCC).  He resting in bed no distress. No pain.  Had 2 BM yesterday.  He follows direction     ROS:   Constitutional: negative  for fevers, and negative for chills.  Respiratory: negative for shortness of breath, negative for cough, and negative for wheezing  Cardiovascular: negative for chest pain, and negative for palpitations  Gastrointestinal: negative for abdominal pain, negative for nausea,negative for vomiting, negative for diarrhea, and negative for constipation     All other systems were reviewed with the patient and are negative unless otherwise stated in HPI      OBJECTIVE:      Vitals:   Vitals:    05/20/24 0655   BP: 133/73   Pulse: 93   Resp: 21   Temp: 97.5 °F (36.4 °C)   SpO2: 98%     Weight - Scale: 78.7 kg (173 lb 8 oz)   Height: 177.8 cm (5' 10\")     Weight  Wt Readings from Last 3 Encounters:   05/20/24 78.7 kg (173 lb 8 oz)   04/17/24 72.2 kg (159 lb 3.2 oz)   04/11/24 77.7 kg (171 lb 3.2 oz)     Body mass index is 24.89 kg/m².    24HR INTAKE/OUTPUT:      Intake/Output Summary (Last 24 hours) at 5/20/2024 1023  Last data filed at 5/20/2024 0655  Gross per 24 hour   Intake 2576.16 ml   Output 1525 ml   Net 1051.16 ml     -----------------------------------------------------------------  Exam:    GEN:    Awake, alert and oriented x3.   EYES:  EOMI, pupils equal   NECK: Supple. No lymphadenopathy.  No carotid bruit  CVS:    regular rate and rhythm, no audible murmur  PULM:  CTA, no wheezes, rales or rhonchi, no acute respiratory distress  ABD:    Bowels sounds hypoactive.  Abdomen is soft.  No distention.  no tenderness to palpation.   EXT:   no edema bilaterally .  No calf tenderness.   NEURO: Moves all extremities.  Motor and sensory are grossly intact  SKIN:  No rashes.  No skin lesions.    -----------------------------------------------------------------    Diagnostic Data:      Complete Blood Count:   Recent Labs 
Pt arrived to floor from ED at 2025, admitted by Dr. Roblero for SBO. Pt transferred to bed upon arrival. Pt incontinent of urine and stool. Pt brief changed and pt repositioned. Pt oriented to room and call light. VS and assessment as charted. Pt is oriented only to person and place. Denies any pain. NGT connected to low intermittent suctioning. MAR from [place] reviewed and admission navigator completed. IV fluids started. Writer was initially told by Leny BOLTON that Dr. Roblero did not order seizure medications. Writer clarified with Dr. Roblero and medications ordered. Will administer once verified by pharmacy. Pt denies any questions at this time. Call light within reach. Bed alarm in place for additional safety. Will continue to monitor.   
Spoke with Aicha at group home. Report was given at this time. All questions and concerns addressed at this time. Call light within reach, Will continue to monitor.  
Vitals and assessment done at this time. See flow sheet for more details. Pt resting in bed at this time. Pt denied any numbness or tingling, pain, and shortness of breath. Pt stated he was a little dizzy because he just got done doing his exercises. Pts lungs are expiratory grunting bilaterally. Pt oriented to self, place, and situation. Pt disoriented to time. Pt denied any further needs at this time. Call light within reach, will continue to monitor.   
Vitals and assessment were completed at this time. Writer walked patient through the medications that would be administered tonight and encouraged patient to express any needs.   Writer offered patient to use the urinal and he said he would try. Patient was only able to urinate 50.   Call light and bedside table remain within reach. Patient denies needs at this time. Care ongoing.     
Writer to bedside to complete morning assessment. Upon entry to room, pt in bed eyes closed, respirations even and unlabored while on room air. Vitals obtained and assessment completed, see flow sheet for details. Pt is oriented to self and place but disoriented to time and situation. Lung sounds are clear, diminished throughout. Abdominal sounds are present in all four quadrants. Pt denies any pain. Pt's NG is in place and draining. Pt updated on plan of care and whiteboard updated. Pt denies further needs from writer at this time. Call light in reach. Care ongoing.      
surrogate decision maker is listed in the patient's medical record  [If \"yes\", STOP HERE]     [] The patient's Advance Care Plan is NOT present because:    []  I confirmed today that the patient does not wish or was not able to name a   surrogate decision maker or provide and advance care plan.    [] Hospice care is currently being provided or has been provided within the   calendar year.    []  I did NOT confirm today the presence of an Advance Care Plan or surrogate   decision maker documented within the patient's medical record.   [DOES NOT SATISFY MIPS PERFORMANCE]    Anne Quevedo, TIFFANIE - CNP , TIFFANIE, NP-C  Hospitalist Medicine        5/21/2024, 8:40 AM

## 2024-05-21 NOTE — PLAN OF CARE
Problem: Discharge Planning  Goal: Discharge to home or other facility with appropriate resources  Outcome: Progressing  Flowsheets (Taken 5/20/2024 2343)  Discharge to home or other facility with appropriate resources:   Identify barriers to discharge with patient and caregiver   Arrange for needed discharge resources and transportation as appropriate   Identify discharge learning needs (meds, wound care, etc)   Refer to discharge planning if patient needs post-hospital services based on physician order or complex needs related to functional status, cognitive ability or social support system     Problem: Safety - Adult  Goal: Free from fall injury  Outcome: Progressing  Flowsheets (Taken 5/20/2024 2343)  Free From Fall Injury:   Instruct family/caregiver on patient safety   Based on caregiver fall risk screen, instruct family/caregiver to ask for assistance with transferring infant if caregiver noted to have fall risk factors     Problem: Skin/Tissue Integrity  Goal: Absence of new skin breakdown  Description: 1.  Monitor for areas of redness and/or skin breakdown  2.  Assess vascular access sites hourly  3.  Every 4-6 hours minimum:  Change oxygen saturation probe site  4.  Every 4-6 hours:  If on nasal continuous positive airway pressure, respiratory therapy assess nares and determine need for appliance change or resting period.  Outcome: Progressing     Problem: Gastrointestinal - Adult  Goal: Minimal or absence of nausea and vomiting  Outcome: Progressing  Flowsheets (Taken 5/20/2024 1945)  Minimal or absence of nausea and vomiting:   Administer IV fluids as ordered to ensure adequate hydration   Advance diet as tolerated, if ordered  Goal: Maintains or returns to baseline bowel function  Outcome: Progressing  Flowsheets (Taken 5/20/2024 1945)  Maintains or returns to baseline bowel function:   Assess bowel function   Administer IV fluids as ordered to ensure adequate hydration   Administer ordered

## 2024-05-21 NOTE — DISCHARGE SUMMARY
Discharge Summary    Jeremy King  :  1955  MRN:  [id number]    Admit date:  2024      Discharge date: 2024     Admitting Physician:  Axel Roblero MD    Discharge Diagnoses:    Principal Problem:    Small bowel obstruction (HCC)  Active Problems:    Seizures (HCC) Chronic    MR (mental retardation), severe  Resolved Problems:    * No resolved hospital problems. *      Hospital Course:   Jeremy King is a 68 y.o. male admitted with small bowel restriction.  He presented from facility with 1 episode of diarrhea and vomiting.  Patient does not complain of anything he does have MRDD so history is difficult to obtain.  Initial history was reportedly taken from [facility].  Patient does have history of bowel obstructions with admissions and care generally is conservative care.  Upon admission patient was in no distress.  He was found to have obstruction versus ileus.  General surgery was consulted NG was placed with large output initially.  Patient has been passing formed stools and diet was advanced and NG removed.  He is tolerating well he has had no nausea or vomiting.  He has had no signs of pain or distress.  He has had no seizure activity.  Plan will be to discharge back to facility today he will follow-up with primary care.    Consultants:   Dr. Kelly, general surgeon    Procedures: none    Complications: none    Discharge Condition: fair    Exam:  GEN:    Awake, alert and oriented x3.   EYES:   EOMI, pupils equal   NECK: Supple. No lymphadenopathy.  No carotid bruit  CVS:     regular rate and rhythm, no audible murmur  PULM:  CTA, no wheezes, rales or rhonchi, no acute respiratory distress  ABD:     Bowels sounds active.  Abdomen is soft.  No distention.  no tenderness to palpation.   EXT:     no edema bilaterally .  No calf tenderness.   NEURO: Moves all extremities.  Motor and sensory are grossly intact  SKIN:    No rashes.  No skin lesions    Significant Diagnostic Studies:

## 2024-07-05 NOTE — PROGRESS NOTES
Access Code: [email or web address]  URL: [email or web address]  Date: 07/05/2024  Prepared by: Mayela Forde    Exercises  - Hooklying Clamshell with Resistance  - 1 x daily - 3-4 x weekly - 1-2 sets - 10 reps - 10 sec hold  - Clamshell  - 1 x daily - 3-4 x weekly - 2-3 sets - 10 reps  - Sidelying Reverse Clamshell  - 1 x daily - 3-4 x weekly - 2-3 sets - 10 reps  - Sidelying Hip Abduction  - 1 x daily - 3-4 x weekly - 2-3 sets - 10 reps - 1-2 sec hold  - Supine Bridge  - 1 x daily - 3-4 x weekly - 2-3 sets - 10 reps - 5 sec hold  - Bridge with Hip Abduction and Resistance - Ground Touches  - 1 x daily - 3-4 x weekly - 2-3 sets - 10 reps - 5 sec hold  - Supine Bridge with Mini Swiss Ball Between Knees  - 1 x daily - 3-4 x weekly - 2-3 sets - 10 reps - 5 sec hold   [facility]  Office: [phone]  Ayden Trejo DO, Samir Yousif DO, Vijay Ramirez DO, Elder Leigh DO, Jeremias Martin MD, Herminia Slaughter MD, Partha Snow MD, Adela Mcdonough MD,  Patrick Pacehco MD, Sandhya Fontaine MD, Aniceto Roth DO, Iris Collazo MD,  Evert Perez DO, Galo Hampton MD, Abel Dietz MD, Robert Trejo DO, Luz Ellis MD,  Tim Mcwilliams DO, Delisa Huff MD, Gayle Guevara MD, Pearl Davis MD, Tyler Banuelos MD,  Adama Johnson MD, Craig Bradford MD, Bambi Bowen MD, Krishna Lux DO, Betzaida Campbell MD,  Fernando Burt MD, Shirley Waterhouse, CNP,  Regina Kern, CNP, Janice Burger, CNP, Epi Henry, CNP,  Yuliya Juan, DNP, Era Campos, CNP, Lu Ziegler, CNP, Dalila Canales, CNP, Zee Crystal, CNP, Gwen Duran, CNP, Hernán Veras PA-C, Alison Mendoza, CNS, Shy Corbin, CNP, Saba Marr, CNP         [facility]   IN-PATIENT SERVICE   [facility]    Progress Note    4/18/2024    6:21 PM    Name:   Jeremy King  MRN:     [id number]     Acct:      [id number]   Room:   2019/2019-02  IP Day:  1  Admit Date:  4/17/2024 10:20 PM    PCP:   Sukumar Larose MD  Code Status:  Full Code    Subjective:     Patient seen and examined, underwent small bowel follow-through this morning.  Remained afebrile, vitals within normal range.  NG was placed as per general surgery recommendations.  Lab work reviewed.    Discussed with RN to give him dinner orally and monitor if he is able to tolerate well.    Medications:     Allergies:    Allergies   Allergen Reactions    Penicillins Hives, Shortness Of Breath and Swelling    Sodium Hypochlorite Hives and Itching    Clindamycin Rash       Current Meds:   Scheduled Meds:    finasteride  5 mg Oral Nightly    lacosamide  150 mg Oral BID    lamoTRIgine  400 mg Oral QAM    lamoTRIgine  600 mg Oral Nightly    levETIRAcetam  2,000 mg Oral Nightly    levETIRAcetam  1,500 mg Oral  Daily    linaclotide  145 mcg Oral QAM AC    meclizine  25 mg Oral Daily    oxyBUTYnin  10 mg Oral Daily    pantoprazole  40 mg Oral QAM AC    primidone  250 mg Oral QAM    primidone  375 mg Oral Nightly    sodium chloride flush  5-40 mL IntraVENous 2 times per day    enoxaparin  40 mg SubCUTAneous Daily     Continuous Infusions:    sodium chloride       PRN Meds: albuterol sulfate HFA, diazePAM, hydrOXYzine HCl, diatrizoate meglumine-sodium, sodium chloride flush, sodium chloride, potassium chloride **OR** potassium alternative oral replacement **OR** potassium chloride, magnesium sulfate, ondansetron **OR** ondansetron, polyethylene glycol, acetaminophen **OR** acetaminophen    Data:     Vitals:  /71   Pulse (!) 101   Temp 97.9 °F (36.6 °C) (Oral)   Resp 18   Ht 1.778 m (5' 10\")   Wt 72.2 kg (159 lb 3.2 oz)   SpO2 95%   BMI 22.84 kg/m²   Temp (24hrs), Av.1 °F (36.7 °C), Min:97.7 °F (36.5 °C), Max:98.6 °F (37 °C)    No results for input(s): \"POCGLU\" in the last 72 hours.    I/O (24Hr):  No intake or output data in the 24 hours ending 24 1821    Labs:  Hematology:  Recent Labs     24  0545   WBC 10.0   RBC 4.04*   HGB 13.2   HCT 42.6   .4*   MCH 32.7   MCHC 31.0   RDW 12.7      MPV 9.3     Chemistry:  Recent Labs     24  0545   *   K 4.3   *   CO2 21   GLUCOSE 95   BUN 30*   CREATININE 0.9   ANIONGAP 14   LABGLOM >90   CALCIUM 9.7   PHOS 4.1     Recent Labs     24  0545   PROT 7.7   AST 16   ALT 17   ALKPHOS 133*   BILITOT 0.3     ABG:No results found for: \"POCPH\", \"PHART\", \"PH\", \"POCPCO2\", \"GDN4AIS\", \"PCO2\", \"POCPO2\", \"PO2ART\", \"PO2\", \"POCHCO3\", \"CZH2QFK\", \"HCO3\", \"NBEA\", \"PBEA\", \"BEART\", \"BE\", \"THGBART\", \"THB\", \"DUW3GVV\", \"OYWH9BDK\", \"Q5TDXUZO\", \"O2SAT\", \"FIO2\"  Lab Results   Component Value Date/Time    SPECIAL 6ML LFOOT 2024 11:50 AM     Lab Results   Component Value Date/Time    CULTURE NO SIGNIFICANT GROWTH 2024 03:28 PM

## 2024-11-30 ENCOUNTER — APPOINTMENT (OUTPATIENT)
Dept: CT IMAGING | Age: 69
DRG: 389 | End: 2024-11-30
Payer: MEDICARE

## 2024-11-30 ENCOUNTER — HOSPITAL ENCOUNTER (INPATIENT)
Age: 69
LOS: 4 days | Discharge: HOME | DRG: 389 | End: 2024-12-05
Attending: STUDENT IN AN ORGANIZED HEALTH CARE EDUCATION/TRAINING PROGRAM | Admitting: INTERNAL MEDICINE
Payer: MEDICARE

## 2024-11-30 DIAGNOSIS — R14.0 ABDOMINAL DISTENTION: Primary | ICD-10-CM

## 2024-11-30 LAB
ALBUMIN SERPL-MCNC: 4.2 G/DL (ref 3.5–5.2)
ALBUMIN/GLOB SERPL: 1.3 {RATIO} (ref 1–2.5)
ALP SERPL-CCNC: 165 U/L (ref 40–129)
ALT SERPL-CCNC: 17 U/L (ref 10–50)
ANION GAP SERPL CALCULATED.3IONS-SCNC: 10 MMOL/L (ref 9–16)
AST SERPL-CCNC: 18 U/L (ref 10–50)
BASOPHILS # BLD: 0.04 K/UL (ref 0–0.2)
BASOPHILS NFR BLD: 0 % (ref 0–2)
BILIRUB DIRECT SERPL-MCNC: <0.2 MG/DL (ref 0–0.3)
BILIRUB INDIRECT SERPL-MCNC: ABNORMAL MG/DL (ref 0–1)
BILIRUB SERPL-MCNC: <0.2 MG/DL (ref 0–1.2)
BUN SERPL-MCNC: 26 MG/DL (ref 8–23)
BUN/CREAT SERPL: 26 (ref 9–20)
CALCIUM SERPL-MCNC: 8.9 MG/DL (ref 8.6–10.4)
CHLORIDE SERPL-SCNC: 107 MMOL/L (ref 98–107)
CO2 SERPL-SCNC: 25 MMOL/L (ref 20–31)
CREAT SERPL-MCNC: 1 MG/DL (ref 0.7–1.2)
EOSINOPHIL # BLD: 0.34 K/UL (ref 0–0.44)
EOSINOPHILS RELATIVE PERCENT: 3 % (ref 1–4)
ERYTHROCYTE [DISTWIDTH] IN BLOOD BY AUTOMATED COUNT: 12.4 % (ref 11.8–14.4)
GFR, ESTIMATED: 83 ML/MIN/1.73M2
GLUCOSE SERPL-MCNC: 99 MG/DL (ref 74–99)
HCT VFR BLD AUTO: 38.4 % (ref 40.7–50.3)
HGB BLD-MCNC: 12.6 G/DL (ref 13–17)
IMM GRANULOCYTES # BLD AUTO: 0.03 K/UL (ref 0–0.3)
IMM GRANULOCYTES NFR BLD: 0 %
INR PPP: 1.2
LIPASE SERPL-CCNC: 94 U/L (ref 13–60)
LYMPHOCYTES NFR BLD: 1.72 K/UL (ref 1.1–3.7)
LYMPHOCYTES RELATIVE PERCENT: 16 % (ref 24–43)
MCH RBC QN AUTO: 33.2 PG (ref 25.2–33.5)
MCHC RBC AUTO-ENTMCNC: 32.8 G/DL (ref 28.4–34.8)
MCV RBC AUTO: 101.1 FL (ref 82.6–102.9)
MONOCYTES NFR BLD: 0.65 K/UL (ref 0.1–1.2)
MONOCYTES NFR BLD: 6 % (ref 3–12)
NEUTROPHILS NFR BLD: 75 % (ref 36–65)
NEUTS SEG NFR BLD: 8.2 K/UL (ref 1.5–8.1)
NRBC BLD-RTO: 0 PER 100 WBC
PARTIAL THROMBOPLASTIN TIME: 24.8 SEC (ref 26.8–34.8)
PLATELET # BLD AUTO: 245 K/UL (ref 138–453)
PMV BLD AUTO: 9.2 FL (ref 8.1–13.5)
POTASSIUM SERPL-SCNC: 4.1 MMOL/L (ref 3.7–5.3)
PROT SERPL-MCNC: 7.4 G/DL (ref 6.6–8.7)
PROTHROMBIN TIME: 14.4 SEC (ref 11.7–14.1)
RBC # BLD AUTO: 3.8 M/UL (ref 4.21–5.77)
SODIUM SERPL-SCNC: 142 MMOL/L (ref 136–145)
WBC OTHER # BLD: 11 K/UL (ref 3.5–11.3)

## 2024-11-30 PROCEDURE — 85610 PROTHROMBIN TIME: CPT

## 2024-11-30 PROCEDURE — 6360000004 HC RX CONTRAST MEDICATION: Performed by: STUDENT IN AN ORGANIZED HEALTH CARE EDUCATION/TRAINING PROGRAM

## 2024-11-30 PROCEDURE — 96374 THER/PROPH/DIAG INJ IV PUSH: CPT

## 2024-11-30 PROCEDURE — 85730 THROMBOPLASTIN TIME PARTIAL: CPT

## 2024-11-30 PROCEDURE — 74176 CT ABD & PELVIS W/O CONTRAST: CPT

## 2024-11-30 PROCEDURE — 83690 ASSAY OF LIPASE: CPT

## 2024-11-30 PROCEDURE — 6360000002 HC RX W HCPCS: Performed by: STUDENT IN AN ORGANIZED HEALTH CARE EDUCATION/TRAINING PROGRAM

## 2024-11-30 PROCEDURE — 80076 HEPATIC FUNCTION PANEL: CPT

## 2024-11-30 PROCEDURE — 85025 COMPLETE CBC W/AUTO DIFF WBC: CPT

## 2024-11-30 PROCEDURE — 99285 EMERGENCY DEPT VISIT HI MDM: CPT

## 2024-11-30 PROCEDURE — 80048 BASIC METABOLIC PNL TOTAL CA: CPT

## 2024-11-30 RX ORDER — IOPAMIDOL 755 MG/ML
75 INJECTION, SOLUTION INTRAVASCULAR
Status: DISCONTINUED | OUTPATIENT
Start: 2024-11-30 | End: 2024-11-30

## 2024-11-30 RX ORDER — ONDANSETRON 2 MG/ML
4 INJECTION INTRAMUSCULAR; INTRAVENOUS ONCE
Status: COMPLETED | OUTPATIENT
Start: 2024-11-30 | End: 2024-11-30

## 2024-11-30 RX ADMIN — ONDANSETRON 4 MG: 2 INJECTION, SOLUTION INTRAMUSCULAR; INTRAVENOUS at 22:01

## 2024-11-30 ASSESSMENT — PAIN - FUNCTIONAL ASSESSMENT: PAIN_FUNCTIONAL_ASSESSMENT: NONE - DENIES PAIN

## 2024-11-30 ASSESSMENT — LIFESTYLE VARIABLES
HOW OFTEN DO YOU HAVE A DRINK CONTAINING ALCOHOL: NEVER
HOW MANY STANDARD DRINKS CONTAINING ALCOHOL DO YOU HAVE ON A TYPICAL DAY: PATIENT DOES NOT DRINK

## 2024-12-01 ENCOUNTER — APPOINTMENT (OUTPATIENT)
Dept: GENERAL RADIOLOGY | Age: 69
DRG: 389 | End: 2024-12-01
Payer: MEDICARE

## 2024-12-01 ENCOUNTER — APPOINTMENT (OUTPATIENT)
Dept: GENERAL RADIOLOGY | Age: 69
DRG: 389 | End: 2024-12-01
Attending: INTERNAL MEDICINE
Payer: MEDICARE

## 2024-12-01 PROBLEM — R79.1 ELEVATED INTERNATIONAL NORMALIZED RATIO (INR): Status: RESOLVED | Noted: 2023-12-10 | Resolved: 2024-12-01

## 2024-12-01 PROBLEM — R11.2 NAUSEA AND VOMITING: Status: RESOLVED | Noted: 2023-12-11 | Resolved: 2024-12-01

## 2024-12-01 PROBLEM — K56.609 BOWEL OBSTRUCTION (HCC): Status: ACTIVE | Noted: 2024-12-01

## 2024-12-01 PROBLEM — K56.609 SMALL BOWEL OBSTRUCTION (HCC): Status: RESOLVED | Noted: 2024-05-18 | Resolved: 2024-12-01

## 2024-12-01 PROBLEM — K56.7 ILEUS (HCC): Status: RESOLVED | Noted: 2024-04-09 | Resolved: 2024-12-01

## 2024-12-01 PROBLEM — R56.9 SEIZURE-LIKE ACTIVITY (HCC): Status: RESOLVED | Noted: 2024-02-22 | Resolved: 2024-12-01

## 2024-12-01 PROBLEM — A41.9 SEPSIS (HCC): Status: RESOLVED | Noted: 2024-02-27 | Resolved: 2024-12-01

## 2024-12-01 PROBLEM — R11.2 INTRACTABLE NAUSEA AND VOMITING: Status: RESOLVED | Noted: 2024-04-08 | Resolved: 2024-12-01

## 2024-12-01 PROBLEM — N17.9 AKI (ACUTE KIDNEY INJURY) (HCC): Status: RESOLVED | Noted: 2024-02-27 | Resolved: 2024-12-01

## 2024-12-01 LAB
ANION GAP SERPL CALCULATED.3IONS-SCNC: 10 MMOL/L (ref 9–16)
BASOPHILS # BLD: 0.06 K/UL (ref 0–0.2)
BASOPHILS NFR BLD: 1 % (ref 0–2)
BUN SERPL-MCNC: 23 MG/DL (ref 8–23)
BUN/CREAT SERPL: 26 (ref 9–20)
CALCIUM SERPL-MCNC: 9.5 MG/DL (ref 8.6–10.4)
CHLORIDE SERPL-SCNC: 108 MMOL/L (ref 98–107)
CO2 SERPL-SCNC: 26 MMOL/L (ref 20–31)
CREAT SERPL-MCNC: 0.9 MG/DL (ref 0.7–1.2)
EOSINOPHIL # BLD: 0.34 K/UL (ref 0–0.44)
EOSINOPHILS RELATIVE PERCENT: 3 % (ref 1–4)
ERYTHROCYTE [DISTWIDTH] IN BLOOD BY AUTOMATED COUNT: 12.4 % (ref 11.8–14.4)
GFR, ESTIMATED: >90 ML/MIN/1.73M2
GLUCOSE SERPL-MCNC: 93 MG/DL (ref 74–99)
HCT VFR BLD AUTO: 39 % (ref 40.7–50.3)
HGB BLD-MCNC: 12.9 G/DL (ref 13–17)
IMM GRANULOCYTES # BLD AUTO: 0.03 K/UL (ref 0–0.3)
IMM GRANULOCYTES NFR BLD: 0 %
LYMPHOCYTES NFR BLD: 2.18 K/UL (ref 1.1–3.7)
LYMPHOCYTES RELATIVE PERCENT: 20 % (ref 24–43)
MCH RBC QN AUTO: 33.1 PG (ref 25.2–33.5)
MCHC RBC AUTO-ENTMCNC: 33.1 G/DL (ref 28.4–34.8)
MCV RBC AUTO: 100 FL (ref 82.6–102.9)
MONOCYTES NFR BLD: 0.76 K/UL (ref 0.1–1.2)
MONOCYTES NFR BLD: 7 % (ref 3–12)
NEUTROPHILS NFR BLD: 69 % (ref 36–65)
NEUTS SEG NFR BLD: 7.37 K/UL (ref 1.5–8.1)
NRBC BLD-RTO: 0 PER 100 WBC
PLATELET # BLD AUTO: 225 K/UL (ref 138–453)
PMV BLD AUTO: 8.7 FL (ref 8.1–13.5)
POTASSIUM SERPL-SCNC: 3.8 MMOL/L (ref 3.7–5.3)
RBC # BLD AUTO: 3.9 M/UL (ref 4.21–5.77)
SODIUM SERPL-SCNC: 144 MMOL/L (ref 136–145)
WBC OTHER # BLD: 10.7 K/UL (ref 3.5–11.3)

## 2024-12-01 PROCEDURE — 85025 COMPLETE CBC W/AUTO DIFF WBC: CPT

## 2024-12-01 PROCEDURE — 97162 PT EVAL MOD COMPLEX 30 MIN: CPT

## 2024-12-01 PROCEDURE — 02HV33Z INSERTION OF INFUSION DEVICE INTO SUPERIOR VENA CAVA, PERCUTANEOUS APPROACH: ICD-10-PCS | Performed by: STUDENT IN AN ORGANIZED HEALTH CARE EDUCATION/TRAINING PROGRAM

## 2024-12-01 PROCEDURE — 71045 X-RAY EXAM CHEST 1 VIEW: CPT

## 2024-12-01 PROCEDURE — 74018 RADEX ABDOMEN 1 VIEW: CPT

## 2024-12-01 PROCEDURE — 0DH67UZ INSERTION OF FEEDING DEVICE INTO STOMACH, VIA NATURAL OR ARTIFICIAL OPENING: ICD-10-PCS | Performed by: STUDENT IN AN ORGANIZED HEALTH CARE EDUCATION/TRAINING PROGRAM

## 2024-12-01 PROCEDURE — 99222 1ST HOSP IP/OBS MODERATE 55: CPT | Performed by: SURGERY

## 2024-12-01 PROCEDURE — 97165 OT EVAL LOW COMPLEX 30 MIN: CPT

## 2024-12-01 PROCEDURE — 2580000003 HC RX 258: Performed by: INTERNAL MEDICINE

## 2024-12-01 PROCEDURE — 36415 COLL VENOUS BLD VENIPUNCTURE: CPT

## 2024-12-01 PROCEDURE — 80048 BASIC METABOLIC PNL TOTAL CA: CPT

## 2024-12-01 PROCEDURE — 94761 N-INVAS EAR/PLS OXIMETRY MLT: CPT

## 2024-12-01 PROCEDURE — 6360000002 HC RX W HCPCS: Performed by: INTERNAL MEDICINE

## 2024-12-01 PROCEDURE — 93005 ELECTROCARDIOGRAM TRACING: CPT | Performed by: INTERNAL MEDICINE

## 2024-12-01 PROCEDURE — 1200000000 HC SEMI PRIVATE

## 2024-12-01 RX ORDER — ACETAMINOPHEN 325 MG/1
650 TABLET ORAL EVERY 6 HOURS PRN
Status: DISCONTINUED | OUTPATIENT
Start: 2024-12-01 | End: 2024-12-05 | Stop reason: HOSPADM

## 2024-12-01 RX ORDER — SODIUM CHLORIDE 0.9 % (FLUSH) 0.9 %
10 SYRINGE (ML) INJECTION PRN
Status: DISCONTINUED | OUTPATIENT
Start: 2024-12-01 | End: 2024-12-05 | Stop reason: HOSPADM

## 2024-12-01 RX ORDER — OLOPATADINE HYDROCHLORIDE 2 MG/ML
1 SOLUTION/ DROPS OPHTHALMIC DAILY
COMMUNITY

## 2024-12-01 RX ORDER — POTASSIUM CHLORIDE 1500 MG/1
40 TABLET, EXTENDED RELEASE ORAL PRN
Status: DISCONTINUED | OUTPATIENT
Start: 2024-12-01 | End: 2024-12-05 | Stop reason: HOSPADM

## 2024-12-01 RX ORDER — ENOXAPARIN SODIUM 100 MG/ML
40 INJECTION SUBCUTANEOUS DAILY
Status: DISCONTINUED | OUTPATIENT
Start: 2024-12-01 | End: 2024-12-05 | Stop reason: HOSPADM

## 2024-12-01 RX ORDER — FAMOTIDINE 20 MG/1
20 TABLET, FILM COATED ORAL 2 TIMES DAILY
Status: DISCONTINUED | OUTPATIENT
Start: 2024-12-01 | End: 2024-12-04

## 2024-12-01 RX ORDER — POTASSIUM CHLORIDE 7.45 MG/ML
10 INJECTION INTRAVENOUS PRN
Status: DISCONTINUED | OUTPATIENT
Start: 2024-12-01 | End: 2024-12-05 | Stop reason: HOSPADM

## 2024-12-01 RX ORDER — SODIUM CHLORIDE 9 MG/ML
INJECTION, SOLUTION INTRAVENOUS CONTINUOUS
Status: ACTIVE | OUTPATIENT
Start: 2024-12-01 | End: 2024-12-02

## 2024-12-01 RX ORDER — ACETAMINOPHEN 650 MG/1
650 SUPPOSITORY RECTAL EVERY 6 HOURS PRN
Status: DISCONTINUED | OUTPATIENT
Start: 2024-12-01 | End: 2024-12-05 | Stop reason: HOSPADM

## 2024-12-01 RX ORDER — POLYETHYLENE GLYCOL 3350 17 G/17G
17 POWDER, FOR SOLUTION ORAL DAILY PRN
Status: DISCONTINUED | OUTPATIENT
Start: 2024-12-01 | End: 2024-12-05 | Stop reason: HOSPADM

## 2024-12-01 RX ORDER — ONDANSETRON 2 MG/ML
4 INJECTION INTRAMUSCULAR; INTRAVENOUS EVERY 6 HOURS PRN
Status: DISCONTINUED | OUTPATIENT
Start: 2024-12-01 | End: 2024-12-05 | Stop reason: HOSPADM

## 2024-12-01 RX ORDER — SENNA AND DOCUSATE SODIUM 50; 8.6 MG/1; MG/1
2 TABLET, FILM COATED ORAL DAILY
COMMUNITY

## 2024-12-01 RX ORDER — SODIUM CHLORIDE 0.9 % (FLUSH) 0.9 %
10 SYRINGE (ML) INJECTION EVERY 12 HOURS SCHEDULED
Status: DISCONTINUED | OUTPATIENT
Start: 2024-12-01 | End: 2024-12-05 | Stop reason: HOSPADM

## 2024-12-01 RX ORDER — SODIUM CHLORIDE 9 MG/ML
INJECTION, SOLUTION INTRAVENOUS PRN
Status: DISCONTINUED | OUTPATIENT
Start: 2024-12-01 | End: 2024-12-05 | Stop reason: HOSPADM

## 2024-12-01 RX ORDER — ONDANSETRON 4 MG/1
4 TABLET, ORALLY DISINTEGRATING ORAL EVERY 8 HOURS PRN
Status: DISCONTINUED | OUTPATIENT
Start: 2024-12-01 | End: 2024-12-05 | Stop reason: HOSPADM

## 2024-12-01 RX ADMIN — SODIUM CHLORIDE: 9 INJECTION, SOLUTION INTRAVENOUS at 04:08

## 2024-12-01 RX ADMIN — ENOXAPARIN SODIUM 40 MG: 100 INJECTION SUBCUTANEOUS at 08:18

## 2024-12-01 NOTE — H&P
Krishna Shah M.D.  Internal Medicine History and Phyisical    Patient: Jeremy King  Date of Admission: 11/30/2024  8:58 PM  Date of Evaluation: 12/1/2024      Subjective:  Chief Complaint:    Chief Complaint   Patient presents with    Diarrhea     Pt from assisted living facility, staff reports pt has had diarrhea for last week    Vomiting     Pt with emesis x3 days, denies abd pain, denies urinary complaints, pt with history of bowel obstructions       History Obtained From:  electronic medical record  PCP: Sukumar Larose MD      History of Present Illness:   Jeremy King is a 69 y.o. male who presented to the ER overnight for nausea, vomiting and diarrhea .  He has known MRDD / intellectual dysfunction but answers questions appropriately.  Per chart notes pt started having diarrhea about a week ago and started vomiting about 3 days ago.  In ER he was found to have a SBO.  Gen Surg was consulted and ordered NPO and NGT.  He feels better today.  He denies any abd pain currently.  He denies passing any flatus today.       Review of Systems:  Constitutional:negative  for fevers, and negative for chills.  Respiratory: negative for shortness of breath, negative for cough, and negative for wheezing  Cardiovascular: negative for chest pain, negative for palpitations, and negative for syncope  Gastrointestinal: negative for abdominal pain, negative for nausea,negative for vomiting, negative for diarrhea, negative for constipation, and negative for hematochezia or melena  Genitourinary: negative for dysuria, negative for urinary urgency, negative for urinary frequency, and negative for hematuria  Neurological: negative for unilateral weakness, numbness or tingling.    All other systems were reviewed with the patient and are negative except as stated above      Past Medical History:        Diagnosis Date    Arthritis     BPH (benign prostatic hyperplasia)     Dysphagia     GERD (gastroesophageal reflux

## 2024-12-01 NOTE — ED NOTES
Report given to DEYSI Goins RN.   Report method via telephone   The following was reviewed with receiving RN:   Current vital signs, medication, and safety alerts were reviewed, as well as abnormal labs, abnormal imaging, and abnormal assessment findings. Questions were answered.

## 2024-12-01 NOTE — ED PROVIDER NOTES
solution Place 1 drop into both eyes nightly      Vitamin D (CHOLECALCIFEROL) 25 MCG (1000 UT) TABS tablet Take 1 tablet by mouth daily      !! primidone (MYSOLINE) 250 MG tablet Take 1 tablet by mouth every morning      lacosamide (VIMPAT) 150 MG TABS tablet Take 1 tablet by mouth 2 times daily.      finasteride (PROSCAR) 5 MG tablet Take 1 tablet by mouth nightly      tamsulosin (FLOMAX) 0.4 MG capsule Take 1 capsule by mouth 2 times daily       !! - Potential duplicate medications found. Please discuss with provider.          ALLERGIES     is allergic to penicillins, sodium hypochlorite, and clindamycin.    FAMILY HISTORY     He indicated that his mother is alive. He indicated that his father is . He indicated that his sister is alive. He indicated that his brother is alive. He indicated that his maternal grandmother is . He indicated that his maternal grandfather is . He indicated that his paternal grandmother is . He indicated that his paternal grandfather is .       SOCIAL HISTORY       Social History     Tobacco Use    Smoking status: Never   Vaping Use    Vaping status: Never Used   Substance Use Topics    Alcohol use: No    Drug use: No       PHYSICAL EXAM       ED Triage Vitals   BP Systolic BP Percentile Diastolic BP Percentile Temp Temp src Pulse Respirations SpO2   24 2108 -- -- 24 210 -- 24 21024 21024   (!) 153/83   97.8 °F (36.6 °C)  81 17 97 %      Height Weight         -- --                       Physical Exam  Vitals and nursing note reviewed.   Constitutional:       General: He is not in acute distress.     Appearance: Normal appearance. He is normal weight. He is not toxic-appearing.   HENT:      Head: Normocephalic and atraumatic.      Nose: Nose normal.      Mouth/Throat:      Mouth: Mucous membranes are moist.      Pharynx: Oropharynx is clear.   Eyes:      General: No scleral icterus.        Right eye: No

## 2024-12-01 NOTE — PROGRESS NOTES
Patient admitted to room 320 at this time. Patient is alert and is oriented to person and place but it disoriented to time and situation. Patient has an NG in at 65cm with pink/tan tinged output at low intermit suction. Patient denies pain. Patient incont and changed at this time. Patient navigator completed along with list of medications from [facility]. Patients states no further needs, call light is in reach, bed alarm is on, plan of care is ongoing.

## 2024-12-01 NOTE — PROGRESS NOTES
Occupational Therapy  Facility/Department: [facility] MED SURG  Occupational Therapy Initial Assessment    Name: Jeremy King  : 1955  MRN: [id number]  Date of Service: 2024    Discharge Recommendations:  24 hour supervision or assist          Patient Diagnosis(es): The encounter diagnosis was Abdominal distention.  Past Medical History:  has a past medical history of Arthritis, BPH (benign prostatic hyperplasia), Dysphagia, GERD (gastroesophageal reflux disease), Hearing loss, HLD (hyperlipidemia), Insomnia, MR (mental retardation), Periorbital cellulitis, SBO (small bowel obstruction) (HCC), Seizures (HCC), Ventral hernia, and Vitamin D deficiency.  Past Surgical History:  has a past surgical history that includes Abdomen surgery (, ); laparoscopy (14); laparotomy (14); cast application (Left); Hydrocele surgery (2016); Vagus nerve stimulator insertion; and Vagus nerve stimulator insertion (10/11/2016).    Treatment Diagnosis: Bowel Obstruction.      Assessment  Assessment: No OT recommended at this time.  Treatment Diagnosis: Bowel Obstruction.  Assistance / Modification: Dependent  No Skilled OT: No OT goals identified  REQUIRES OT FOLLOW-UP: No  Activity Tolerance  Activity Tolerance: Treatment limited secondary to decreased cognition;Treatment limited secondary to medical complications (free text)     Plan  Occupational Therapy Plan  Additional Comments: OT Eval only    Restrictions  Restrictions/Precautions  Restrictions/Precautions: Fall Risk, General Precautions, Other (comment) ( bound at [facility].)    Subjective  General  Chart Reviewed: Yes  Patient assessed for rehabilitation services?: Yes  Response to previous treatment: Patient with no complaints from previous session  Family / Caregiver Present: No     Social/Functional History  Social/Functional History  Lives With: Home care staff  Type of Home: Facility  Additional Comments:  bound dependent Self

## 2024-12-01 NOTE — CONSULTS
Completed and, except as mentioned above, was negative or noncontributory  Allergy and Immunology:  Completed and, except as mentioned above, was negative or noncontributory  Hematological and Lymphatic:  Completed and, except as mentioned above, was negative or noncontributory  Endocrine: Completed and, except as mentioned above, was negative or noncontributory  Breast:  Completed and, except as mentioned above, was negative or noncontributory  Respiratory:  Completed and, except as mentioned above, was negative or noncontributory  Cardiovascular:  Completed and, except as mentioned above, was negative or noncontributory  Gastrointestinal: Completed and, except as mentioned above, was negative or noncontributory  Genito-Urinary:  Completed and, except as mentioned above, was negative or noncontributory  Musculoskeletal:  Completed and, except as mentioned above, was negative or noncontributory  Neurological:  Completed and, except as mentioned above, was negative or noncontributory  Dermatological:  Completed and, except as mentioned above, was negative or noncontributory    Allergies: Penicillins, Sodium hypochlorite, and Clindamycin    Current Meds:  Current Facility-Administered Medications:     0.9 % sodium chloride infusion, , IntraVENous, Continuous, Krishna Shah MD, Last Rate: 75 mL/hr at 12/01/24 0408, New Bag at 12/01/24 0408    sodium chloride flush 0.9 % injection 10 mL, 10 mL, IntraVENous, 2 times per day, Krishna Shah MD    sodium chloride flush 0.9 % injection 10 mL, 10 mL, IntraVENous, PRN, Krishna Shah MD    0.9 % sodium chloride infusion, , IntraVENous, PRN, Krishna Shah MD    potassium chloride (KLOR-CON M) extended release tablet 40 mEq, 40 mEq, Oral, PRN **OR** potassium bicarb-citric acid (EFFER-K) effervescent tablet 40 mEq, 40 mEq, Oral, PRN **OR** potassium chloride 10 mEq/100 mL IVPB (Peripheral Line), 10 mEq, IntraVENous, PRPablo LOVETT Christopher D, MD     CALCIUM 9.5 12/01/2024 05:35 AM     Lab Results   Component Value Date/Time    ALKPHOS 165 11/30/2024 09:20 PM    ALT 17 11/30/2024 09:20 PM    AST 18 11/30/2024 09:20 PM    BILITOT <0.2 11/30/2024 09:20 PM    BILIDIR <0.2 11/30/2024 09:20 PM     Lab Results   Component Value Date/Time    AMYLASE 213 08/15/2014 06:20 PM     Lab Results   Component Value Date/Time    LIPASE 94 11/30/2024 09:20 PM     Lab Results   Component Value Date/Time    INR 1.2 11/30/2024 09:20 PM       Radiologic Studies:    EXAMINATION:  CT OF THE ABDOMEN AND PELVIS WITHOUT CONTRAST 11/30/2024 10:13 pm     TECHNIQUE:  CT of the abdomen and pelvis was performed without the administration of  intravenous contrast. Multiplanar reformatted images are provided for review.  Automated exposure control, iterative reconstruction, and/or weight based  adjustment of the mA/kV was utilized to reduce the radiation dose to as low  as reasonably achievable.     COMPARISON:  CT abdomen and pelvis 05/18/2024.     HISTORY:  ORDERING SYSTEM PROVIDED HISTORY: Vomiting; hx of SBO  TECHNOLOGIST PROVIDED HISTORY:  Vomiting; hx of SBO     Decision Support Exception - unselect if not a suspected or confirmed  emergency medical condition->Emergency Medical Condition (MA)     FINDINGS:  Lower Chest: Mild cardiomegaly.  Coronary artery atherosclerotic vascular  calcifications.  Mild bibasilar atelectasis.  Chronic unchanged elevation of  the left hemidiaphragm.     Organs: Lack of intravenous contrast limits evaluation of the solid organs,  vascular structures, and bowel.  The liver and gallbladder are unremarkable.  No biliary ductal dilatation is identified.  The pancreas, spleen, and  bilateral adrenal glands are unremarkable.  The right kidney is unremarkable.  Chronic left Alisa renal subcapsular fluid collection with peripheral  calcifications unchanged from the previous exam.  No further evaluation  recommended.  No obstructive uropathy or urinary collecting

## 2024-12-01 NOTE — PROGRESS NOTES
Spoke with nurse from [facility] and provided update. She informed that someone would be bringing his magnet out.

## 2024-12-01 NOTE — PROGRESS NOTES
Spoke to ER now.    Plan:    Admit to hospitalist.   NGT to LIS. CT images reviewed.   Strict npo.  Soft restraints if needed- canot pull the NGT  Am xrays ordered.       Dr Vick  [phone]  Gen Surg

## 2024-12-01 NOTE — PROGRESS NOTES
Physical Therapy  Facility/Department: [facility] MED SURG  Physical Therapy Initial Assessment    Name: Jeremy King  : 1955  MRN: [id number]  Date of Service: 2024    Discharge Recommendations:  24 hour supervision or assist          Patient Diagnosis(es): The encounter diagnosis was Abdominal distention.  Past Medical History:  has a past medical history of Arthritis, BPH (benign prostatic hyperplasia), Dysphagia, GERD (gastroesophageal reflux disease), Hearing loss, HLD (hyperlipidemia), Insomnia, MR (mental retardation), Periorbital cellulitis, SBO (small bowel obstruction) (HCC), Seizures (HCC), Ventral hernia, and Vitamin D deficiency.  Past Surgical History:  has a past surgical history that includes Abdomen surgery (, ); laparoscopy (14); laparotomy (14); cast application (Left); Hydrocele surgery (2016); Vagus nerve stimulator insertion; and Vagus nerve stimulator insertion (10/11/2016).    Assessment  Assessment: Pt is not in need of skilled PT. Pt is dependent and resides in assisted living. He is w/c bound and depends on lift for transfers.  Therapy Prognosis: Poor  Decision Making: Medium Complexity  Requires PT Follow-Up: No  Activity Tolerance  Activity Tolerance: Patient tolerated evaluation without incident    Plan  Physical Therapy Plan  General Plan: Discharge with evaluation only  Safety Devices  Type of Devices: Bed alarm in place, Call light within reach, Patient at risk for falls, Left in bed    Restrictions  Restrictions/Precautions  Restrictions/Precautions: Fall Risk, General Precautions     Subjective  General  Patient assessed for rehabilitation services?: Yes  Follows Commands: Within Functional Limits  Subjective  Subjective: Pt gave history and states that he can loft his leg. He reports he gets lifted into the w/c and does not stand. No prior level of function left documented in chart.         Social/Functional History  Social/Functional History  Lives  Out 0914         Minutes 13                 Cathy Barba, PT, DPT

## 2024-12-02 ENCOUNTER — APPOINTMENT (OUTPATIENT)
Dept: GENERAL RADIOLOGY | Age: 69
DRG: 389 | End: 2024-12-02
Payer: MEDICARE

## 2024-12-02 LAB
ANION GAP SERPL CALCULATED.3IONS-SCNC: 11 MMOL/L (ref 9–16)
BASOPHILS # BLD: 0.05 K/UL (ref 0–0.2)
BASOPHILS NFR BLD: 0 % (ref 0–2)
BUN SERPL-MCNC: 23 MG/DL (ref 8–23)
BUN/CREAT SERPL: 29 (ref 9–20)
CALCIUM SERPL-MCNC: 8.9 MG/DL (ref 8.6–10.4)
CHLORIDE SERPL-SCNC: 108 MMOL/L (ref 98–107)
CO2 SERPL-SCNC: 26 MMOL/L (ref 20–31)
CREAT SERPL-MCNC: 0.8 MG/DL (ref 0.7–1.2)
EKG ATRIAL RATE: 71 BPM
EKG P AXIS: 90 DEGREES
EKG P-R INTERVAL: 170 MS
EKG Q-T INTERVAL: 402 MS
EKG QRS DURATION: 88 MS
EKG QTC CALCULATION (BAZETT): 436 MS
EKG R AXIS: -17 DEGREES
EKG T AXIS: 57 DEGREES
EKG VENTRICULAR RATE: 71 BPM
EOSINOPHIL # BLD: 0.13 K/UL (ref 0–0.44)
EOSINOPHILS RELATIVE PERCENT: 1 % (ref 1–4)
ERYTHROCYTE [DISTWIDTH] IN BLOOD BY AUTOMATED COUNT: 12.3 % (ref 11.8–14.4)
GFR, ESTIMATED: >90 ML/MIN/1.73M2
GLUCOSE SERPL-MCNC: 92 MG/DL (ref 74–99)
HCT VFR BLD AUTO: 37.8 % (ref 40.7–50.3)
HGB BLD-MCNC: 12.7 G/DL (ref 13–17)
IMM GRANULOCYTES # BLD AUTO: 0.05 K/UL (ref 0–0.3)
IMM GRANULOCYTES NFR BLD: 0 %
LYMPHOCYTES NFR BLD: 2.14 K/UL (ref 1.1–3.7)
LYMPHOCYTES RELATIVE PERCENT: 14 % (ref 24–43)
MCH RBC QN AUTO: 33.3 PG (ref 25.2–33.5)
MCHC RBC AUTO-ENTMCNC: 33.6 G/DL (ref 28.4–34.8)
MCV RBC AUTO: 99.2 FL (ref 82.6–102.9)
MONOCYTES NFR BLD: 1.09 K/UL (ref 0.1–1.2)
MONOCYTES NFR BLD: 7 % (ref 3–12)
NEUTROPHILS NFR BLD: 78 % (ref 36–65)
NEUTS SEG NFR BLD: 12.13 K/UL (ref 1.5–8.1)
NRBC BLD-RTO: 0 PER 100 WBC
PLATELET # BLD AUTO: 222 K/UL (ref 138–453)
PMV BLD AUTO: 8.7 FL (ref 8.1–13.5)
POTASSIUM SERPL-SCNC: 3.7 MMOL/L (ref 3.7–5.3)
RBC # BLD AUTO: 3.81 M/UL (ref 4.21–5.77)
SODIUM SERPL-SCNC: 145 MMOL/L (ref 136–145)
WBC OTHER # BLD: 15.6 K/UL (ref 3.5–11.3)

## 2024-12-02 PROCEDURE — 80048 BASIC METABOLIC PNL TOTAL CA: CPT

## 2024-12-02 PROCEDURE — 6360000002 HC RX W HCPCS: Performed by: NURSE PRACTITIONER

## 2024-12-02 PROCEDURE — 74019 RADEX ABDOMEN 2 VIEWS: CPT

## 2024-12-02 PROCEDURE — 0DH67UZ INSERTION OF FEEDING DEVICE INTO STOMACH, VIA NATURAL OR ARTIFICIAL OPENING: ICD-10-PCS | Performed by: RADIOLOGY

## 2024-12-02 PROCEDURE — 85025 COMPLETE CBC W/AUTO DIFF WBC: CPT

## 2024-12-02 PROCEDURE — 1200000000 HC SEMI PRIVATE

## 2024-12-02 PROCEDURE — C9254 INJECTION, LACOSAMIDE: HCPCS | Performed by: NURSE PRACTITIONER

## 2024-12-02 PROCEDURE — 93010 ELECTROCARDIOGRAM REPORT: CPT | Performed by: FAMILY MEDICINE

## 2024-12-02 PROCEDURE — 6360000002 HC RX W HCPCS: Performed by: INTERNAL MEDICINE

## 2024-12-02 PROCEDURE — 2580000003 HC RX 258: Performed by: INTERNAL MEDICINE

## 2024-12-02 PROCEDURE — 74018 RADEX ABDOMEN 1 VIEW: CPT

## 2024-12-02 PROCEDURE — 2580000003 HC RX 258: Performed by: NURSE PRACTITIONER

## 2024-12-02 PROCEDURE — 94761 N-INVAS EAR/PLS OXIMETRY MLT: CPT

## 2024-12-02 PROCEDURE — 36415 COLL VENOUS BLD VENIPUNCTURE: CPT

## 2024-12-02 PROCEDURE — 2500000003 HC RX 250 WO HCPCS: Performed by: NURSE PRACTITIONER

## 2024-12-02 RX ORDER — LACOSAMIDE 10 MG/ML
150 INJECTION, SOLUTION INTRAVENOUS DAILY
Status: DISCONTINUED | OUTPATIENT
Start: 2024-12-02 | End: 2024-12-02

## 2024-12-02 RX ORDER — LEVETIRACETAM 10 MG/ML
1000 INJECTION INTRAVASCULAR 3 TIMES DAILY
Status: DISCONTINUED | OUTPATIENT
Start: 2024-12-02 | End: 2024-12-02

## 2024-12-02 RX ORDER — LEVETIRACETAM 15 MG/ML
1500 INJECTION INTRAVASCULAR EVERY 12 HOURS
Status: DISCONTINUED | OUTPATIENT
Start: 2024-12-02 | End: 2024-12-04

## 2024-12-02 RX ORDER — SODIUM CHLORIDE 9 MG/ML
INJECTION, SOLUTION INTRAVENOUS CONTINUOUS
Status: DISCONTINUED | OUTPATIENT
Start: 2024-12-02 | End: 2024-12-03

## 2024-12-02 RX ORDER — LACOSAMIDE 10 MG/ML
150 INJECTION, SOLUTION INTRAVENOUS 2 TIMES DAILY
Status: DISCONTINUED | OUTPATIENT
Start: 2024-12-02 | End: 2024-12-04

## 2024-12-02 RX ADMIN — FAMOTIDINE 20 MG: 10 INJECTION, SOLUTION INTRAVENOUS at 21:06

## 2024-12-02 RX ADMIN — ENOXAPARIN SODIUM 40 MG: 100 INJECTION SUBCUTANEOUS at 09:05

## 2024-12-02 RX ADMIN — SODIUM CHLORIDE: 9 INJECTION, SOLUTION INTRAVENOUS at 17:25

## 2024-12-02 RX ADMIN — LACOSAMIDE 150 MG: 10 INJECTION INTRAVENOUS at 21:06

## 2024-12-02 RX ADMIN — SODIUM CHLORIDE, PRESERVATIVE FREE 10 ML: 5 INJECTION INTRAVENOUS at 21:05

## 2024-12-02 RX ADMIN — LEVETIRACETAM 1500 MG: 15 INJECTION INTRAVENOUS at 12:06

## 2024-12-02 RX ADMIN — LACOSAMIDE 150 MG: 10 INJECTION INTRAVENOUS at 12:00

## 2024-12-02 RX ADMIN — SODIUM CHLORIDE, PRESERVATIVE FREE 10 ML: 5 INJECTION INTRAVENOUS at 09:06

## 2024-12-02 RX ADMIN — FAMOTIDINE 20 MG: 10 INJECTION, SOLUTION INTRAVENOUS at 09:05

## 2024-12-02 NOTE — PROGRESS NOTES
Writer entered patient's room and patient was holding his NG tube in his hands. Writer notified AGNES Rodríguez. New orders placed at this time.

## 2024-12-02 NOTE — PROGRESS NOTES
Writer at bedside at this time to perform shift assessment. Patient is lying in bed at this time. Vital signs taken and assessment completed at this time. Patient is alert to self and location only at this time; patient reoriented to situation and time. Patient has no current complaints of pain. Patient denies any further needs at this time. Call light within reach, bed alarm on for safety, care ongoing.

## 2024-12-02 NOTE — PROGRESS NOTES
Patient shift assessment and vitals completed at this time as charted. Patient currently denies pain. NG is in at 65cm on low int suction. Patient states no current needs at this time, call light is in reach, plan of care is ongoing. Bed alarm on, plan of care is ongoing.

## 2024-12-02 NOTE — PROGRESS NOTES
Soft non-violent restraints applied at this time due to patient repeatedly pulling out his NG tube. Patient's CVC dressing changed due to patient picking and pulling at dressing. Writer called to update POA but no answer, message was left to call back.

## 2024-12-02 NOTE — PROGRESS NOTES
Spoke with Patient sister and legal guardian, Esther and with group home nurse, ortiz Oviedo:  discharge planning for Patient.  Patient is a 69 year old single, white male, admitted with a diagnosis of Bowel Obstruction.  Secondary diagnosis' of Mental Retardation and Seizures noted.  Is alert, watches TV.  Hearing loss also noted.  Plan for Patient will be to return to the group home following this hospitalization.    Patient resides in one of three [facility] homes near [place] and managed by [facility].  He lives with seven other residents.  Nurse relates that he does his daily \"exercises\" but reports that he has gone to outpatient therapy at [facility] in [place] for deconditioning following previous hospitalizations.  May need this again if he is here long.  Guardian also open to therapy if needed.  Patient uses a wheelchair for assistance with ambulation.  He is dependent for care and requires assistance daily.  Guardian accompanies to his medical appointments and group home provides for his transportation needs.    PCP is Dr. Larose.  Patient has managed medicare and medicaid insurance coverage.  No further assistance needed for his prescription medications.    Discharge plan is to return to the group home when stable.  Patient remains a 'Full Code' status and does have Legal Guardianship documentation on file.  LSW following.  Group home to provide transportation home if discharged early in the day.    Brunilda Funez, PITER  12/2/2024

## 2024-12-02 NOTE — PROGRESS NOTES
Progress Note    SUBJECTIVE:    Patient seen for f/u of Bowel obstruction (HCC).  He resting in bed with NG to LIWS.  Clear drainage.      ROS:   Constitutional: negative  for fevers, and negative for chills.  Respiratory: negative for shortness of breath, negative for cough, and negative for wheezing  Cardiovascular: negative for chest pain, and negative for palpitations  Gastrointestinal: positive for abdominal pain, negative for nausea,negative for vomiting, negative for diarrhea, and negative for constipation     All other systems were reviewed with the patient and are negative unless otherwise stated in HPI      OBJECTIVE:      Vitals:   Vitals:    12/02/24 0615   BP: 136/78   Pulse: 84   Resp: 22   Temp: 97.3 °F (36.3 °C)   SpO2: 95%     Weight - Scale: 88.9 kg (196 lb)         Weight  Wt Readings from Last 3 Encounters:   12/02/24 88.9 kg (196 lb)   05/21/24 79.4 kg (175 lb)   04/17/24 72.2 kg (159 lb 3.2 oz)     Body mass index is 28.12 kg/m².    24HR INTAKE/OUTPUT:      Intake/Output Summary (Last 24 hours) at 12/2/2024 0803  Last data filed at 12/2/2024 0557  Gross per 24 hour   Intake 828 ml   Output 1650 ml   Net -822 ml     -----------------------------------------------------------------  Exam:    GEN:    Awake, alert and no distress.   EYES:  EOMI, pupils equal   NECK: Supple. No lymphadenopathy.  No carotid bruit  CVS:    regular rate and rhythm, no audible murmur  PULM:  CTA, no wheezes, rales or rhonchi, no acute respiratory distress  ABD:    Bowels sounds hypoactive.  Abdomen is soft.  No distention.   slight  tenderness to upper palpation.   EXT:   no edema bilaterally .  No calf tenderness.   NEURO: Moves all extremities.  Motor and sensory are grossly intact  SKIN:  No rashes.  No skin lesions.    -----------------------------------------------------------------    Diagnostic Data:      Complete Blood Count:   Recent Labs     11/30/24  2120 12/01/24  0535 12/02/24  0550   WBC 11.0 10.7 15.6*    RBC 3.80* 3.90* 3.81*   HGB 12.6* 12.9* 12.7*   HCT 38.4* 39.0* 37.8*   .1 100.0 99.2   MCH 33.2 33.1 33.3   MCHC 32.8 33.1 33.6   RDW 12.4 12.4 12.3    225 222   MPV 9.2 8.7 8.7        Last 3 Blood Glucose:   Recent Labs     11/30/24 2120 12/01/24  0535 12/02/24  0550   GLUCOSE 99 93 92        Comprehensive Metabolic Profile:   Recent Labs     11/30/24 2120 12/01/24  0535 12/02/24  0550    144 145   K 4.1 3.8 3.7    108* 108*   CO2 25 26 26   BUN 26* 23 23   CREATININE 1.0 0.9 0.8   GLUCOSE 99 93 92   CALCIUM 8.9 9.5 8.9   BILITOT <0.2  --   --    ALKPHOS 165*  --   --    AST 18  --   --    ALT 17  --   --         Urinalysis:   Lab Results   Component Value Date/Time    NITRU NEGATIVE 04/08/2024 03:28 PM    COLORU Yellow 04/08/2024 03:28 PM    PHUR 6.0 04/08/2024 03:28 PM    WBCUA 0 TO 2 04/08/2024 03:28 PM    RBCUA None 04/08/2024 03:28 PM    MUCUS TRACE 04/08/2024 03:28 PM    TRICHOMONAS NOT REPORTED 10/03/2019 06:15 AM    YEAST NOT REPORTED 10/03/2019 06:15 AM    BACTERIA TRACE 04/08/2024 03:28 PM    LEUKOCYTESUR NEGATIVE 04/08/2024 03:28 PM    UROBILINOGEN Normal 04/08/2024 03:28 PM    BILIRUBINUR NEGATIVE 04/08/2024 03:28 PM    BILIRUBINUR NEGATIVE 08/29/2011 12:33 PM    GLUCOSEU NEGATIVE 04/08/2024 03:28 PM    GLUCOSEU NEGATIVE 08/29/2011 12:33 PM    KETUA NEGATIVE 04/08/2024 03:28 PM    AMORPHOUS 2+ 04/10/2023 11:10 AM       HgBA1c:  No results found for: \"LABA1C\"    Lactic Acid:   Lab Results   Component Value Date/Time    LACTA 0.6 04/09/2024 05:10 AM    LACTA 1.3 07/01/2023 07:35 PM    LACTA 1.3 04/19/2023 12:30 PM        Troponin: No results for input(s): \"TROPONINI\" in the last 72 hours.    CRP:  No results for input(s): \"CRP\" in the last 72 hours.      Radiology/Imaging:  XR CHEST PORTABLE   Final Result   Right internal jugular central venous catheter tip at the cavoatrial junction.         XR ABDOMEN (KUB) (SINGLE AP VIEW)   Final Result   1. NG tube tip projects over

## 2024-12-02 NOTE — PLAN OF CARE
Problem: Safety - Adult  Goal: Free from fall injury  Outcome: Progressing  Flowsheets (Taken 12/2/2024 1324)  Free From Fall Injury: Instruct family/caregiver on patient safety     Problem: Discharge Planning  Goal: Discharge to home or other facility with appropriate resources  Outcome: Progressing  Flowsheets (Taken 12/2/2024 1324)  Discharge to home or other facility with appropriate resources:   Identify barriers to discharge with patient and caregiver   Identify discharge learning needs (meds, wound care, etc)   Arrange for needed discharge resources and transportation as appropriate     Problem: Skin/Tissue Integrity  Goal: Absence of new skin breakdown  Description: 1.  Monitor for areas of redness and/or skin breakdown  2.  Assess vascular access sites hourly  3.  Every 4-6 hours minimum:  Change oxygen saturation probe site  4.  Every 4-6 hours:  If on nasal continuous positive airway pressure, respiratory therapy assess nares and determine need for appliance change or resting period.  Outcome: Progressing  Note: Nikunj scale monitoring per protocol. Inspect skin for breakdown frequently. Encourage pt to make frequent large adjustments in position or assist patient with turning. Document all areas of breakdown.

## 2024-12-02 NOTE — PROGRESS NOTES
Writer entered patient's room to clamp NG tube so patient could be transported to imaging. Upon arrival in room it was noted that patient's NG tube had been pulled out of nostril; external measurement was around 40cm. Pt stated that he sneezed and it just came out. NG tube reinserted at this time to 65cm and tapped.

## 2024-12-02 NOTE — PROGRESS NOTES
Comprehensive Nutrition Assessment    Type and Reason for Visit:  Initial    Nutrition Recommendations/Plan:   Continue NPO diet.   Progress to puree with honey thickened liquids diet as medically appropriate.   Recommend to recheck vitamin D level and supplement as indicated.   Message with OT about choke proof cup for Pt while hospitalized.      Malnutrition Assessment:  Malnutrition Status:  At risk for malnutrition (12/02/24 0933)    Context:  Acute Illness     Findings of the 6 clinical characteristics of malnutrition:  Energy Intake:  Mild decrease in energy intake (3 days, vomiting)  Weight Loss:  No weight loss     Body Fat Loss:  No body fat loss     Muscle Mass Loss:  No muscle mass loss    Fluid Accumulation:  Mild Extremities (non pitting edema BLE. Active asia sounds)   Strength:  Not Performed    Nutrition Assessment:    Inadequate oral intakes r/t altered GI function aeb NPO, vomiting x 3 days, diarrhea x 1 week, Dx bowel obstruction. Weight gain of 30# x 7 months. Pt sister reports Jeremy was on a puree with thickened liquids diet due to aspiration, states he does not like it, but safer for him to eat. Good PO typically. Spoke with nurse at [facility] to clarify consistency of thickened liquids.  Nurse reports honey thick liquids, feeds himself. Reports Jeremy has failed MBS and swallow studies, sister does not want to take food away from him, she does not want a tube feeding for him. He uses a choke proof cup at [facility]. Left voice mail message for OT to see if we can get a cup for Pt while he is here.    Nutrition Related Findings:    active bowel sounds, BLE non pitting edema. Wound Type: None       Current Nutrition Intake & Therapies:    Average Meal Intake: NPO  Average Supplements Intake: NPO  Diet NPO    Anthropometric Measures:  Height: 177.8 cm (5' 10\")  Ideal Body Weight (IBW): 166 lbs (75 kg)    Admission Body Weight: 88.8 kg (195 lb 11 oz)  Current Body Weight: 88.9 kg (195 lb 15.8

## 2024-12-03 ENCOUNTER — APPOINTMENT (OUTPATIENT)
Dept: GENERAL RADIOLOGY | Age: 69
DRG: 389 | End: 2024-12-03
Payer: MEDICARE

## 2024-12-03 LAB
ANION GAP SERPL CALCULATED.3IONS-SCNC: 11 MMOL/L (ref 9–16)
BASOPHILS # BLD: 0.04 K/UL (ref 0–0.2)
BASOPHILS NFR BLD: 0 % (ref 0–2)
BUN SERPL-MCNC: 26 MG/DL (ref 8–23)
BUN/CREAT SERPL: 37 (ref 9–20)
CALCIUM SERPL-MCNC: 8.7 MG/DL (ref 8.6–10.4)
CHLORIDE SERPL-SCNC: 111 MMOL/L (ref 98–107)
CO2 SERPL-SCNC: 24 MMOL/L (ref 20–31)
CREAT SERPL-MCNC: 0.7 MG/DL (ref 0.7–1.2)
EOSINOPHIL # BLD: 0.14 K/UL (ref 0–0.44)
EOSINOPHILS RELATIVE PERCENT: 1 % (ref 1–4)
ERYTHROCYTE [DISTWIDTH] IN BLOOD BY AUTOMATED COUNT: 12.2 % (ref 11.8–14.4)
GFR, ESTIMATED: >90 ML/MIN/1.73M2
GLUCOSE SERPL-MCNC: 83 MG/DL (ref 74–99)
HCT VFR BLD AUTO: 34.5 % (ref 40.7–50.3)
HGB BLD-MCNC: 11.3 G/DL (ref 13–17)
IMM GRANULOCYTES # BLD AUTO: 0.05 K/UL (ref 0–0.3)
IMM GRANULOCYTES NFR BLD: 1 %
LYMPHOCYTES NFR BLD: 1.39 K/UL (ref 1.1–3.7)
LYMPHOCYTES RELATIVE PERCENT: 13 % (ref 24–43)
MCH RBC QN AUTO: 32.9 PG (ref 25.2–33.5)
MCHC RBC AUTO-ENTMCNC: 32.8 G/DL (ref 28.4–34.8)
MCV RBC AUTO: 100.6 FL (ref 82.6–102.9)
MONOCYTES NFR BLD: 0.86 K/UL (ref 0.1–1.2)
MONOCYTES NFR BLD: 8 % (ref 3–12)
NEUTROPHILS NFR BLD: 77 % (ref 36–65)
NEUTS SEG NFR BLD: 8.1 K/UL (ref 1.5–8.1)
NRBC BLD-RTO: 0 PER 100 WBC
PLATELET # BLD AUTO: 180 K/UL (ref 138–453)
PMV BLD AUTO: 8.8 FL (ref 8.1–13.5)
POTASSIUM SERPL-SCNC: 3.6 MMOL/L (ref 3.7–5.3)
RBC # BLD AUTO: 3.43 M/UL (ref 4.21–5.77)
SODIUM SERPL-SCNC: 146 MMOL/L (ref 136–145)
WBC OTHER # BLD: 10.6 K/UL (ref 3.5–11.3)

## 2024-12-03 PROCEDURE — 80048 BASIC METABOLIC PNL TOTAL CA: CPT

## 2024-12-03 PROCEDURE — 85025 COMPLETE CBC W/AUTO DIFF WBC: CPT

## 2024-12-03 PROCEDURE — 6360000002 HC RX W HCPCS: Performed by: INTERNAL MEDICINE

## 2024-12-03 PROCEDURE — 1200000000 HC SEMI PRIVATE

## 2024-12-03 PROCEDURE — 2580000003 HC RX 258: Performed by: INTERNAL MEDICINE

## 2024-12-03 PROCEDURE — 2500000003 HC RX 250 WO HCPCS: Performed by: NURSE PRACTITIONER

## 2024-12-03 PROCEDURE — 6360000004 HC RX CONTRAST MEDICATION: Performed by: NURSE PRACTITIONER

## 2024-12-03 PROCEDURE — 94761 N-INVAS EAR/PLS OXIMETRY MLT: CPT

## 2024-12-03 PROCEDURE — C9254 INJECTION, LACOSAMIDE: HCPCS | Performed by: NURSE PRACTITIONER

## 2024-12-03 PROCEDURE — 2580000003 HC RX 258: Performed by: NURSE PRACTITIONER

## 2024-12-03 PROCEDURE — 74250 X-RAY XM SM INT 1CNTRST STD: CPT

## 2024-12-03 PROCEDURE — 6360000002 HC RX W HCPCS: Performed by: NURSE PRACTITIONER

## 2024-12-03 PROCEDURE — 36415 COLL VENOUS BLD VENIPUNCTURE: CPT

## 2024-12-03 RX ORDER — DEXTROSE MONOHYDRATE, SODIUM CHLORIDE, AND POTASSIUM CHLORIDE 50; 1.49; 4.5 G/1000ML; G/1000ML; G/1000ML
INJECTION, SOLUTION INTRAVENOUS CONTINUOUS
Status: DISCONTINUED | OUTPATIENT
Start: 2024-12-03 | End: 2024-12-04

## 2024-12-03 RX ORDER — DIATRIZOATE MEGLUMINE AND DIATRIZOATE SODIUM 660; 100 MG/ML; MG/ML
120 SOLUTION ORAL; RECTAL
Status: DISCONTINUED | OUTPATIENT
Start: 2024-12-03 | End: 2024-12-05 | Stop reason: HOSPADM

## 2024-12-03 RX ADMIN — DIATRIZOATE MEGLUMINE AND DIATRIZOATE SODIUM 120 ML: 660; 100 LIQUID ORAL; RECTAL at 11:59

## 2024-12-03 RX ADMIN — POTASSIUM CHLORIDE, DEXTROSE MONOHYDRATE AND SODIUM CHLORIDE: 150; 5; 450 INJECTION, SOLUTION INTRAVENOUS at 11:58

## 2024-12-03 RX ADMIN — LEVETIRACETAM 1500 MG: 15 INJECTION INTRAVENOUS at 00:32

## 2024-12-03 RX ADMIN — LACOSAMIDE 150 MG: 10 INJECTION INTRAVENOUS at 21:28

## 2024-12-03 RX ADMIN — ENOXAPARIN SODIUM 40 MG: 100 INJECTION SUBCUTANEOUS at 09:51

## 2024-12-03 RX ADMIN — LACOSAMIDE 150 MG: 10 INJECTION INTRAVENOUS at 09:52

## 2024-12-03 RX ADMIN — FAMOTIDINE 20 MG: 10 INJECTION, SOLUTION INTRAVENOUS at 09:51

## 2024-12-03 RX ADMIN — SODIUM CHLORIDE: 9 INJECTION, SOLUTION INTRAVENOUS at 06:50

## 2024-12-03 RX ADMIN — LEVETIRACETAM 1500 MG: 15 INJECTION INTRAVENOUS at 12:16

## 2024-12-03 RX ADMIN — FAMOTIDINE 20 MG: 10 INJECTION, SOLUTION INTRAVENOUS at 21:28

## 2024-12-03 RX ADMIN — SODIUM CHLORIDE, PRESERVATIVE FREE 10 ML: 5 INJECTION INTRAVENOUS at 09:59

## 2024-12-03 RX ADMIN — POTASSIUM CHLORIDE, DEXTROSE MONOHYDRATE AND SODIUM CHLORIDE: 150; 5; 450 INJECTION, SOLUTION INTRAVENOUS at 21:28

## 2024-12-03 NOTE — PROGRESS NOTES
Dr. Arevalo messaged about need for new non-violent restraint order and reevaluation at midnight tonight.

## 2024-12-03 NOTE — PROGRESS NOTES
Spiritual Health History and Assessment/Progress Note  [facility]    (P) Initial Encounter,  ,  ,      Name: Jeremy King MRN: [id number]    Age: 69 y.o.     Sex: male   Language: English   Orthodoxy: Non-Jain   Bowel obstruction (HCC)     Date: 12/3/2024            Total Time Calculated: (P) 5 min              [facility]        Referral/Consult From: (P) Rounding   Encounter Overview/Reason: (P) Initial Encounter  Service Provided For: (P) Patient    Mahsa, Belief, Meaning:   Patient unable to assess at this time  Family/Friends Other: Patient was not able to verbally communicate with the  due to his breathing tools. He was sleepy.       Importance and Influence:  Patient unable to assess at this time  Family/Friends No family/friends present    Community:  Patient Other: Patient was not able to communicate with the .   Family/Friends No family/friends present    Assessment and Plan of Care:     Patient Interventions include: Facilitated expression of thoughts and feelings  Family/Friends Interventions include: No family was present at the time.     Patient Plan of Care: Spiritual Care available upon further referral  Family/Friends Plan of Care: No spiritual needs identified for follow-up    Electronically signed by Chaplain Elo on 12/3/2024 at 10:42 AM

## 2024-12-03 NOTE — PROGRESS NOTES
05:30 AM    K 3.6 12/03/2024 05:30 AM     12/03/2024 05:30 AM    CO2 24 12/03/2024 05:30 AM    BUN 26 12/03/2024 05:30 AM    CREATININE 0.7 12/03/2024 05:30 AM    CALCIUM 8.7 12/03/2024 05:30 AM    GFRAA >60 07/09/2022 10:31 PM    LABGLOM >90 12/03/2024 05:30 AM    LABGLOM >90 04/18/2024 05:45 AM    GLUCOSE 83 12/03/2024 05:30 AM    GLUCOSE 92 08/29/2011 11:48 AM       Radiology Review:    FL SMALL BOWEL FOLLOW THROUGH ONLY    Result Date: 12/3/2024  Findings above demonstrate a slightly delayed transit time to the terminal ileum.  Overall, findings can be seen with ileus or partial small bowel obstruction.     XR ABDOMEN FOR NG/OG/NE TUBE PLACEMENT    Result Date: 12/2/2024  1. Enteric tube in the stomach. 2. Decompression of the stomach and small bowel since prior study.     XR ABDOMEN (2 VIEWS)    Result Date: 12/2/2024  Persistent dilated loops of small bowel which could represent ileus or partial small bowel obstruction.  These appears slightly more prominent than in the prior study     XR CHEST PORTABLE    Result Date: 12/1/2024  Right internal jugular central venous catheter tip at the cavoatrial junction.     XR ABDOMEN (KUB) (SINGLE AP VIEW)    Result Date: 12/1/2024  1. NG tube tip projects over the body of the stomach. 2. Dilated loops of small bowel and colon with gas in the distal colon. This could represent ileus or partial small bowel obstruction.     XR CHEST PORTABLE    Result Date: 12/1/2024  1. Right internal jugular central venous catheter is present with tip terminating in the right atrium. 2. Left basilar opacities may represent atelectasis or pneumonia.     XR CHEST PORTABLE    Result Date: 12/1/2024  Interval placement of a right IJ approach central venous catheter with tip in the mid right atrium.  No pneumothorax.     XR ABDOMEN FOR NG/OG/NE TUBE PLACEMENT    Result Date: 12/1/2024  The tip and side port of the enteric tube are in the gastric lumen.     CT ABDOMEN PELVIS WO CONTRAST

## 2024-12-03 NOTE — PLAN OF CARE
Order obtained to retract right IJ triple lumen CVC catheter.  Using sterile technique site around right IJ CVC was cleansed with Chloraprep per protocol after old dressing removed, CVC was then retracted 4 cm after removing sutures X2 as ordered by Dr. Shah.  CVC secured/covered with new CHG dressing.  Patient tolerated procedure.  Portable CXR ordered post retraction.

## 2024-12-03 NOTE — PROGRESS NOTES
Progress Note    SUBJECTIVE:    Patient seen for f/u of Bowel obstruction (HCC).  He resting in bed with NG to LIWS.  Has audible mucus today.   Soft restraints in place to assist with prevention of pulling of NG and Central Line.     ROS:   Constitutional: negative  for fevers, and negative for chills.  Respiratory: negative for shortness of breath, negative for cough, and negative for wheezing  Cardiovascular: negative for chest pain, and negative for palpitations  Gastrointestinal: negative for abdominal pain, negative for nausea,negative for vomiting, negative for diarrhea, and negative for constipation     All other systems were reviewed with the patient and are negative unless otherwise stated in HPI      OBJECTIVE:      Vitals:   Vitals:    12/03/24 0657   BP: 130/80   Pulse: 79   Resp: 22   Temp: 97.8 °F (36.6 °C)   SpO2: 93%     Weight - Scale: 89.1 kg (196 lb 6.4 oz)   Height: 177.8 cm (5' 10\")     Weight  Wt Readings from Last 3 Encounters:   12/03/24 89.1 kg (196 lb 6.4 oz)   05/21/24 79.4 kg (175 lb)   04/17/24 72.2 kg (159 lb 3.2 oz)     Body mass index is 28.18 kg/m².    24HR INTAKE/OUTPUT:      Intake/Output Summary (Last 24 hours) at 12/3/2024 0801  Last data filed at 12/3/2024 0451  Gross per 24 hour   Intake 985.81 ml   Output 900 ml   Net 85.81 ml     -----------------------------------------------------------------  Exam:    GEN:    Awake, alert and no distress.   EYES:  EOMI, pupils equal   NECK: Supple. No lymphadenopathy.  No carotid bruit  CVS:    regular rate and rhythm, no audible murmur  PULM:  CTA, no wheezes, rales or rhonchi, no acute respiratory distress  ABD:    Bowels sounds hypoactive.  Abdomen is soft.  No distention.   no  tenderness to upper palpation.   EXT:   no edema bilaterally .  No calf tenderness.   NEURO: Moves all extremities.  Motor and sensory are grossly intact  SKIN:  No rashes.  No skin lesions.   hours.    CRP:  No results for input(s): \"CRP\" in the last 72 hours.      Radiology/Imaging:  XR ABDOMEN FOR NG/OG/NE TUBE PLACEMENT   Final Result   1. Enteric tube in the stomach.   2. Decompression of the stomach and small bowel since prior study.         XR ABDOMEN (2 VIEWS)   Final Result   Persistent dilated loops of small bowel which could represent ileus or   partial small bowel obstruction.  These appears slightly more prominent than   in the prior study         XR CHEST PORTABLE   Final Result   Right internal jugular central venous catheter tip at the cavoatrial junction.         XR ABDOMEN (KUB) (SINGLE AP VIEW)   Final Result   1. NG tube tip projects over the body of the stomach.   2. Dilated loops of small bowel and colon with gas in the distal colon. This   could represent ileus or partial small bowel obstruction.         XR CHEST PORTABLE   Final Result   1. Right internal jugular central venous catheter is present with tip   terminating in the right atrium.   2. Left basilar opacities may represent atelectasis or pneumonia.         XR CHEST PORTABLE   Final Result   Interval placement of a right IJ approach central venous catheter with tip in   the mid right atrium.  No pneumothorax.         XR ABDOMEN FOR NG/OG/NE TUBE PLACEMENT   Final Result   The tip and side port of the enteric tube are in the gastric lumen.         CT ABDOMEN PELVIS WO CONTRAST Additional Contrast? None   Final Result   1. No acute abnormality in the abdomen or pelvis.   2. Gastric distension of uncertain clinical significance.  Gastroparesis is   not excluded.         FL SMALL BOWEL FOLLOW THROUGH ONLY    (Results Pending)         ASSESSMENT / PLAN:    MEDICAL DECISION MAKING:    Primary Problem(s): Bowel obstruction (HCC)  Differential diagnoses: SBO vs ileus  Condition is an acute or chronic illness or injury that poses threat to life or bodily function  Treatment plan:   General Surgery consult appreciated: Discussed with

## 2024-12-03 NOTE — PLAN OF CARE
Problem: Safety - Adult  Goal: Free from fall injury  12/3/2024 0119 by Sugey Martinez RN  Outcome: Progressing  12/2/2024 1324 by Karlee Flower RN  Outcome: Progressing  Flowsheets (Taken 12/2/2024 1324)  Free From Fall Injury: Instruct family/caregiver on patient safety     Problem: Discharge Planning  Goal: Discharge to home or other facility with appropriate resources  12/3/2024 0119 by Sugey Martinez RN  Outcome: Progressing  12/2/2024 1324 by Karlee Flower RN  Outcome: Progressing  Flowsheets (Taken 12/2/2024 1324)  Discharge to home or other facility with appropriate resources:   Identify barriers to discharge with patient and caregiver   Identify discharge learning needs (meds, wound care, etc)   Arrange for needed discharge resources and transportation as appropriate     Problem: Skin/Tissue Integrity  Goal: Absence of new skin breakdown  Description: 1.  Monitor for areas of redness and/or skin breakdown  2.  Assess vascular access sites hourly  3.  Every 4-6 hours minimum:  Change oxygen saturation probe site  4.  Every 4-6 hours:  If on nasal continuous positive airway pressure, respiratory therapy assess nares and determine need for appliance change or resting period.  12/3/2024 0119 by Sugey Martinez RN  Outcome: Progressing  12/2/2024 1324 by Karlee Flower RN  Outcome: Progressing  Note: Nikunj scale monitoring per protocol. Inspect skin for breakdown frequently. Encourage pt to make frequent large adjustments in position or assist patient with turning. Document all areas of breakdown.        Problem: Nutrition Deficit:  Goal: Optimize nutritional status  12/3/2024 0119 by Sugey Martinez RN  Outcome: Progressing  12/2/2024 1337 by Veronica Olvera RD, LD  Flowsheets (Taken 12/2/2024 1337)  Nutrient intake appropriate for improving, restoring, or maintaining nutritional needs:   Monitor oral intake, labs, and treatment plans   Recommend appropriate diets, oral nutritional

## 2024-12-03 NOTE — PROGRESS NOTES
Assessment and vitals obtained at this time as charted. Pt is oriented to person, place and time and disoriented to situation. Pt is currently in soft restraints d/t pulling out his NG tube several times. Pt denies any further needs at this time. Call light within reach, care ongoing.

## 2024-12-03 NOTE — PROGRESS NOTES
Entered patient's room for morning vital signs and head to toe assessment. Patient resting in the bed at this time. A&O x3, calm, and cooperative. Patient denies of pain at this time. Vital signs and head to toe assessment completed at this time, see flowsheets for more details. Bilateral soft wrist restraints in place at this time. NG tube at 68 cm and tube retaped for comfort at this time. Patient denies no more needs at this time. Call light within reach. Bed alarm on. Bed wheels locked. Bed in lowest position. Seizure pads in place. Side rails up x3.

## 2024-12-03 NOTE — PROGRESS NOTES
Physician Progress Note      PATIENT:               ARIANNE ARIZMENDI  CSN #:                  [id number]  :                       1955  ADMIT DATE:       2024 8:58 PM  DISCH DATE:  RESPONDING  PROVIDER #:        RUDOLPH Matt CNP          QUERY TEXT:    Patient admitted with Bowel obstruction. Pt noted to have BPH and was treated   with Flomax.?If possible, please document in progress notes and discharge   summary if you are evaluating and/or treating any of the following:    The medical record reflects the following:  Risk Factors: 70yo  Clinical Indicators: H&P states \"history of BPH\"  Treatment: Flomax    Thank you,  Zee Levine), RN BSN  Clinical   Options provided:  -- BPH with partial/complete urinary obstruction  -- BPH with urinary retention without obstruction  -- Other - I will add my own diagnosis  -- Disagree - Not applicable / Not valid  -- Disagree - Clinically unable to determine / Unknown  -- Refer to Clinical Documentation Reviewer    PROVIDER RESPONSE TEXT:    This patient has BPH with urinary retention without obstruction.    Query created by: Zee Baltazar on 12/3/2024 12:21 PM      Electronically signed by:  RUDOLPH Matt CNP 12/3/2024 12:57 PM

## 2024-12-04 LAB
ANION GAP SERPL CALCULATED.3IONS-SCNC: 7 MMOL/L (ref 9–16)
BASOPHILS # BLD: 0.03 K/UL (ref 0–0.2)
BASOPHILS NFR BLD: 0 % (ref 0–2)
BUN SERPL-MCNC: 15 MG/DL (ref 8–23)
BUN/CREAT SERPL: 21 (ref 9–20)
CALCIUM SERPL-MCNC: 8.5 MG/DL (ref 8.6–10.4)
CHLORIDE SERPL-SCNC: 110 MMOL/L (ref 98–107)
CO2 SERPL-SCNC: 25 MMOL/L (ref 20–31)
CREAT SERPL-MCNC: 0.7 MG/DL (ref 0.7–1.2)
EOSINOPHIL # BLD: 0.21 K/UL (ref 0–0.44)
EOSINOPHILS RELATIVE PERCENT: 3 % (ref 1–4)
ERYTHROCYTE [DISTWIDTH] IN BLOOD BY AUTOMATED COUNT: 12 % (ref 11.8–14.4)
GFR, ESTIMATED: >90 ML/MIN/1.73M2
GLUCOSE SERPL-MCNC: 107 MG/DL (ref 74–99)
HCT VFR BLD AUTO: 32.6 % (ref 40.7–50.3)
HGB BLD-MCNC: 10.7 G/DL (ref 13–17)
IMM GRANULOCYTES # BLD AUTO: <0.03 K/UL (ref 0–0.3)
IMM GRANULOCYTES NFR BLD: 0 %
LYMPHOCYTES NFR BLD: 1.19 K/UL (ref 1.1–3.7)
LYMPHOCYTES RELATIVE PERCENT: 16 % (ref 24–43)
MCH RBC QN AUTO: 33.3 PG (ref 25.2–33.5)
MCHC RBC AUTO-ENTMCNC: 32.8 G/DL (ref 28.4–34.8)
MCV RBC AUTO: 101.6 FL (ref 82.6–102.9)
MONOCYTES NFR BLD: 0.57 K/UL (ref 0.1–1.2)
MONOCYTES NFR BLD: 8 % (ref 3–12)
NEUTROPHILS NFR BLD: 73 % (ref 36–65)
NEUTS SEG NFR BLD: 5.48 K/UL (ref 1.5–8.1)
NRBC BLD-RTO: 0 PER 100 WBC
PLATELET # BLD AUTO: 163 K/UL (ref 138–453)
PMV BLD AUTO: 8.9 FL (ref 8.1–13.5)
POTASSIUM SERPL-SCNC: 3.8 MMOL/L (ref 3.7–5.3)
RBC # BLD AUTO: 3.21 M/UL (ref 4.21–5.77)
SODIUM SERPL-SCNC: 142 MMOL/L (ref 136–145)
WBC OTHER # BLD: 7.5 K/UL (ref 3.5–11.3)

## 2024-12-04 PROCEDURE — 6360000002 HC RX W HCPCS: Performed by: INTERNAL MEDICINE

## 2024-12-04 PROCEDURE — 85025 COMPLETE CBC W/AUTO DIFF WBC: CPT

## 2024-12-04 PROCEDURE — 1200000000 HC SEMI PRIVATE

## 2024-12-04 PROCEDURE — 94761 N-INVAS EAR/PLS OXIMETRY MLT: CPT

## 2024-12-04 PROCEDURE — 6360000002 HC RX W HCPCS: Performed by: NURSE PRACTITIONER

## 2024-12-04 PROCEDURE — 2500000003 HC RX 250 WO HCPCS: Performed by: NURSE PRACTITIONER

## 2024-12-04 PROCEDURE — 80048 BASIC METABOLIC PNL TOTAL CA: CPT

## 2024-12-04 PROCEDURE — 6370000000 HC RX 637 (ALT 250 FOR IP): Performed by: INTERNAL MEDICINE

## 2024-12-04 PROCEDURE — 99231 SBSQ HOSP IP/OBS SF/LOW 25: CPT | Performed by: SURGERY

## 2024-12-04 RX ORDER — PRIMIDONE 250 MG/1
250 TABLET ORAL EVERY MORNING
Status: DISCONTINUED | OUTPATIENT
Start: 2024-12-04 | End: 2024-12-05 | Stop reason: HOSPADM

## 2024-12-04 RX ORDER — MECLIZINE HYDROCHLORIDE 25 MG/1
25 TABLET ORAL DAILY
Status: DISCONTINUED | OUTPATIENT
Start: 2024-12-04 | End: 2024-12-04

## 2024-12-04 RX ORDER — MECLIZINE HYDROCHLORIDE 25 MG/1
25 TABLET ORAL DAILY
Status: DISCONTINUED | OUTPATIENT
Start: 2024-12-04 | End: 2024-12-05 | Stop reason: HOSPADM

## 2024-12-04 RX ORDER — LATANOPROST 50 UG/ML
1 SOLUTION/ DROPS OPHTHALMIC NIGHTLY
Status: DISCONTINUED | OUTPATIENT
Start: 2024-12-04 | End: 2024-12-05 | Stop reason: HOSPADM

## 2024-12-04 RX ORDER — PANTOPRAZOLE SODIUM 40 MG/1
40 TABLET, DELAYED RELEASE ORAL
Status: DISCONTINUED | OUTPATIENT
Start: 2024-12-05 | End: 2024-12-05 | Stop reason: HOSPADM

## 2024-12-04 RX ORDER — LORAZEPAM 0.5 MG/1
0.5 TABLET ORAL EVERY 8 HOURS PRN
Status: DISCONTINUED | OUTPATIENT
Start: 2024-12-04 | End: 2024-12-05 | Stop reason: HOSPADM

## 2024-12-04 RX ORDER — PRIMIDONE 250 MG/1
250 TABLET ORAL EVERY MORNING
Status: DISCONTINUED | OUTPATIENT
Start: 2024-12-04 | End: 2024-12-04

## 2024-12-04 RX ORDER — LACOSAMIDE 50 MG/1
150 TABLET ORAL 2 TIMES DAILY
Status: DISCONTINUED | OUTPATIENT
Start: 2024-12-04 | End: 2024-12-04 | Stop reason: SDUPTHER

## 2024-12-04 RX ORDER — LANOLIN ALCOHOL/MO/W.PET/CERES
400 CREAM (GRAM) TOPICAL DAILY
Status: DISCONTINUED | OUTPATIENT
Start: 2024-12-04 | End: 2024-12-05 | Stop reason: HOSPADM

## 2024-12-04 RX ORDER — TAMSULOSIN HYDROCHLORIDE 0.4 MG/1
0.4 CAPSULE ORAL 2 TIMES DAILY
Status: DISCONTINUED | OUTPATIENT
Start: 2024-12-04 | End: 2024-12-05 | Stop reason: HOSPADM

## 2024-12-04 RX ORDER — LAMOTRIGINE 100 MG/1
400 TABLET ORAL EVERY MORNING
Status: DISCONTINUED | OUTPATIENT
Start: 2024-12-04 | End: 2024-12-04

## 2024-12-04 RX ORDER — LAMOTRIGINE 100 MG/1
400 TABLET ORAL EVERY MORNING
Status: DISCONTINUED | OUTPATIENT
Start: 2024-12-04 | End: 2024-12-05 | Stop reason: HOSPADM

## 2024-12-04 RX ORDER — LANOLIN ALCOHOL/MO/W.PET/CERES
400 CREAM (GRAM) TOPICAL DAILY
Status: DISCONTINUED | OUTPATIENT
Start: 2024-12-04 | End: 2024-12-04

## 2024-12-04 RX ORDER — LEVETIRACETAM 500 MG/1
1500 TABLET ORAL DAILY
Status: DISCONTINUED | OUTPATIENT
Start: 2024-12-04 | End: 2024-12-05 | Stop reason: HOSPADM

## 2024-12-04 RX ORDER — DIAZEPAM 10 MG/2G
20 GEL RECTAL PRN
Status: DISCONTINUED | OUTPATIENT
Start: 2024-12-04 | End: 2024-12-05 | Stop reason: HOSPADM

## 2024-12-04 RX ORDER — LEVETIRACETAM 500 MG/1
2000 TABLET ORAL NIGHTLY
Status: DISCONTINUED | OUTPATIENT
Start: 2024-12-04 | End: 2024-12-05 | Stop reason: HOSPADM

## 2024-12-04 RX ORDER — ONDANSETRON 4 MG/1
4 TABLET, ORALLY DISINTEGRATING ORAL EVERY 6 HOURS PRN
Status: DISCONTINUED | OUTPATIENT
Start: 2024-12-04 | End: 2024-12-04 | Stop reason: SDUPTHER

## 2024-12-04 RX ORDER — FINASTERIDE 5 MG/1
5 TABLET, FILM COATED ORAL NIGHTLY
Status: DISCONTINUED | OUTPATIENT
Start: 2024-12-04 | End: 2024-12-05 | Stop reason: HOSPADM

## 2024-12-04 RX ORDER — POTASSIUM CHLORIDE 1500 MG/1
20 TABLET, EXTENDED RELEASE ORAL DAILY
Status: DISCONTINUED | OUTPATIENT
Start: 2024-12-04 | End: 2024-12-05 | Stop reason: HOSPADM

## 2024-12-04 RX ORDER — TAMSULOSIN HYDROCHLORIDE 0.4 MG/1
0.4 CAPSULE ORAL 2 TIMES DAILY
Status: DISCONTINUED | OUTPATIENT
Start: 2024-12-04 | End: 2024-12-04

## 2024-12-04 RX ORDER — LACOSAMIDE 50 MG/1
150 TABLET ORAL 2 TIMES DAILY
Status: DISCONTINUED | OUTPATIENT
Start: 2024-12-04 | End: 2024-12-05 | Stop reason: HOSPADM

## 2024-12-04 RX ORDER — LEVETIRACETAM 500 MG/1
1500 TABLET ORAL DAILY
Status: DISCONTINUED | OUTPATIENT
Start: 2024-12-04 | End: 2024-12-04

## 2024-12-04 RX ORDER — PRIMIDONE 250 MG/1
375 TABLET ORAL NIGHTLY
Status: DISCONTINUED | OUTPATIENT
Start: 2024-12-04 | End: 2024-12-05 | Stop reason: HOSPADM

## 2024-12-04 RX ORDER — LAMOTRIGINE 100 MG/1
600 TABLET ORAL NIGHTLY
Status: DISCONTINUED | OUTPATIENT
Start: 2024-12-04 | End: 2024-12-05 | Stop reason: HOSPADM

## 2024-12-04 RX ADMIN — LAMOTRIGINE 600 MG: 100 TABLET ORAL at 17:18

## 2024-12-04 RX ADMIN — LEVETIRACETAM 1500 MG: 15 INJECTION INTRAVENOUS at 01:31

## 2024-12-04 RX ADMIN — LATANOPROST 1 DROP: 50 SOLUTION/ DROPS OPHTHALMIC at 20:29

## 2024-12-04 RX ADMIN — ENOXAPARIN SODIUM 40 MG: 100 INJECTION SUBCUTANEOUS at 11:12

## 2024-12-04 RX ADMIN — PRIMIDONE 375 MG: 250 TABLET ORAL at 19:06

## 2024-12-04 RX ADMIN — PRIMIDONE 250 MG: 250 TABLET ORAL at 11:11

## 2024-12-04 RX ADMIN — LAMOTRIGINE 400 MG: 100 TABLET ORAL at 11:12

## 2024-12-04 RX ADMIN — MECLIZINE HYDROCHLORIDE 25 MG: 25 TABLET ORAL at 11:12

## 2024-12-04 RX ADMIN — TAMSULOSIN HYDROCHLORIDE 0.4 MG: 0.4 CAPSULE ORAL at 11:11

## 2024-12-04 RX ADMIN — TAMSULOSIN HYDROCHLORIDE 0.4 MG: 0.4 CAPSULE ORAL at 19:06

## 2024-12-04 RX ADMIN — GUAIFENESIN, DEXTROMETHORPHAN HBR 1 TABLET: 600; 30 TABLET ORAL at 11:12

## 2024-12-04 RX ADMIN — Medication 400 MG: at 11:12

## 2024-12-04 RX ADMIN — POTASSIUM CHLORIDE, DEXTROSE MONOHYDRATE AND SODIUM CHLORIDE: 150; 5; 450 INJECTION, SOLUTION INTRAVENOUS at 07:55

## 2024-12-04 RX ADMIN — LEVETIRACETAM 1500 MG: 500 TABLET, FILM COATED ORAL at 11:11

## 2024-12-04 RX ADMIN — POTASSIUM CHLORIDE 20 MEQ: 1500 TABLET, EXTENDED RELEASE ORAL at 11:12

## 2024-12-04 RX ADMIN — LEVETIRACETAM 2000 MG: 500 TABLET, FILM COATED ORAL at 17:18

## 2024-12-04 RX ADMIN — FINASTERIDE 5 MG: 5 TABLET, FILM COATED ORAL at 19:06

## 2024-12-04 RX ADMIN — LACOSAMIDE 150 MG: 50 TABLET, FILM COATED ORAL at 11:12

## 2024-12-04 RX ADMIN — LACOSAMIDE 150 MG: 50 TABLET, FILM COATED ORAL at 17:18

## 2024-12-04 NOTE — PROGRESS NOTES
Nutrition Note    SLP and OT report no current availability of a choke proof cup at this time for Pt to use while here. SLP and OT report cup available is a nosey cup. Will monitor for needs. Diet advanced yesterday to moderately thick, clear liquid diet. Monitor tolerance to oral intakes.     Electronically signed by MAKENZIE CARRASCO RD, LD on 12/4/24 at 7:45 AM EST    Contact: [phone]

## 2024-12-04 NOTE — PROGRESS NOTES
Dr. Vick at bedside. States that patient is stable and tolerating discontinuing of the NG tube well and may remain his regular, puree diet.

## 2024-12-04 NOTE — PROGRESS NOTES
Writer at bedside to complete evening assessment. Upon entry to room, pt awake in bed, respirations normal and unlabored while on room air. Vitals obtained and assessment completed, see flow sheet for details. Pt denies needs from writer at this time. Call light in reach. Care is ongoing.     Patient restraints removed and range of motion performed. Patient requested to scratch and this writer told him that he may. Patient attempted to tug on NG tube after itching head and this writer asked patient to not do that. Pt stated \"ok\" restraints reapplied at this time.

## 2024-12-04 NOTE — PROGRESS NOTES
Restraints removed and range of motion completed at this time.    Subjective:      Patient ID: Rosalio Mccarty is a 49 y.o. White male who presents for new evaluation of Consult    HPI    Nov 2023:  The patient location is: [place]  The chief complaint leading to consultation is: CKD  Visit type: audiovisual  65 minutes of total time spent on the encounter, which includes face to face time and non-face to face time preparing to see the patient (eg, review of tests), Obtaining and/or reviewing separately obtained history, Documenting clinical information in the electronic or other health record, Independently interpreting results (not separately reported) and communicating results to the patient/family/caregiver, or Care coordination (not separately reported).   Each patient to whom he or she provides medical services by telemedicine is:  (1) informed of the relationship between the physician and patient and the respective role of any other health care provider with respect to management of the patient; and (2) notified that he or she may decline to receive medical services by telemedicine and may withdraw from such care at any time.  Notes: He si referred by liver txp for CKD  Transplant Date: 1/5/2023   Cirrhosis: Other, Specify - granulomatous hepatitis/sarcoid  Fatigue, 2X rejections, but better X last 2 months  Heaptic sacroid  Psoriais in family  Family histoyt--mom CKD 3,   Pre txp CKD 8-9 years ago secondary to DM   Hydrates 1-3L per day  Hx of low sodium diet but since liver transplant has liberalized sodium intake somewhat   Off Ozempic (cannot find it)  Monthly labs   He works as a neuroscientist    Review of Systems   Constitutional:  Negative for activity change, appetite change and fatigue.   HENT:  Negative for facial swelling.    Respiratory:  Negative for shortness of breath.    Cardiovascular:  Positive for leg swelling.   Gastrointestinal:  Negative for abdominal pain.   Genitourinary:  Negative for difficulty urinating.   Allergic/Immunologic: Positive for  immunocompromised state.   Psychiatric/Behavioral:  Negative for confusion and decreased concentration.       Objective:     Physical Exam  Constitutional:       General: He is not in acute distress.     Appearance: He is obese. He is not toxic-appearing.   HENT:      Head: Atraumatic.   Pulmonary:      Effort: Pulmonary effort is normal. No respiratory distress.   Neurological:      Mental Status: He is alert and oriented to person, place, and time.   Psychiatric:         Mood and Affect: Mood normal.       Assessment:     1. Stage 3 chronic kidney disease, unspecified whether stage 3a or 3b CKD    2. Liver replaced by transplant    3. Type 2 diabetes mellitus with hyperglycemia, unspecified whether long term insulin use    4. Steroid-induced hyperglycemia    5. Chronic systolic heart failure    6. Non morbid obesity due to excess calories    7. Diabetes mellitus, type II, insulin dependent    8. S/P liver transplant    9. Gout, unspecified cause, unspecified chronicity, unspecified site    10. Long-term use of immunosuppressant medication       Plan:     Mild CKD prior to liver txp felt to be due to DM2, but kidney disease has worsened this year since liver txp, initial baseline creatinine 1.4 in Feb but has now reached 2mg/dL.   - calcineurin use is contributing greatly  - in the setting of obesity, HTN and DM   - history of sarcoidosis     We discussed maintaining kidney health, low sodium diet, good BP control, increasing fruits and vegetables   Treatment of CKD--continue Jardiance     Link labs to next week liver txp draw

## 2024-12-04 NOTE — PROGRESS NOTES
Dr. Arevalo on the floor at this time and asked about renewing non-violent restraint order. See orders.

## 2024-12-04 NOTE — PROGRESS NOTES
Jugular CVC removed at this time per Dr. Shah. Patient no longer needs CVC. Dr. Shah ok with no IV access at this time.

## 2024-12-04 NOTE — PLAN OF CARE
Problem: Safety - Adult  Goal: Free from fall injury  Outcome: Progressing     Problem: Discharge Planning  Goal: Discharge to home or other facility with appropriate resources  Outcome: Progressing     Problem: Skin/Tissue Integrity  Goal: Absence of new skin breakdown  Description: 1.  Monitor for areas of redness and/or skin breakdown  2.  Assess vascular access sites hourly  3.  Every 4-6 hours minimum:  Change oxygen saturation probe site  4.  Every 4-6 hours:  If on nasal continuous positive airway pressure, respiratory therapy assess nares and determine need for appliance change or resting period.  Outcome: Progressing     Problem: Safety - Medical Restraint  Goal: Remains free of injury from restraints (Restraint for Interference with Medical Device)  Description: INTERVENTIONS:  1. Determine that other, less restrictive measures have been tried or would not be effective before applying the restraint  2. Evaluate the patient's condition at the time of restraint application  3. Inform patient/family regarding the reason for restraint  4. Q2H: Monitor safety, psychosocial status, comfort, nutrition and hydration  Outcome: Progressing

## 2024-12-04 NOTE — PROGRESS NOTES
Patient resting comfortably at this time. Patient denies any pain and denies any other needs at this time. Patient alert & oriented to self and is aware he is in the hospital. Able to answer some questions appropriately and follow commands. Assessment and vitals as charted. Bed alarm on, bed locked, gripper socks on, call light within reach and able to use appropriately. Will continue to monitor this shift.

## 2024-12-04 NOTE — PROGRESS NOTES
Restraints removed at this time and range of motion completed   [FreeTextEntry1] : Normocytic anemia: multifactorial\par --Art least partially secondary to persistent hematuria\par --CBC from 6/10 was reveiwed, will offer a unit of PRBC is Hb below 10 on 6/17, for urology procedure on 6/20\par --Will complete 5 doses of Venofer on 6/11/2019\par --Continue with B12 injections \par --His erythropoietin level is 11.3 ( outside lab) , will hold on EPO for now until bladder tumor ruled out with cystoscopy\par --May benefit from MM panel and/or BMBx in the future \par \par Follow up in 3-4 weeks \par

## 2024-12-04 NOTE — PROGRESS NOTES
Entered patient's room for morning vital signs and head to toe assessment. Patient resting in the bed at this time. A&O x3, calm, and cooperative. Patient denies of pain at this time. Vital signs and head to toe assessment completed at this time, see flowsheets for more details. NG tube at 68 cm. Soft bilateral wrist restraints present due to patient pulling at jugular CVC and NG tube. Restraints removed, ROM performed, drink and meal given, no irritation or skin breakdown noted, and patient repositioned. Restraints reapplied. Patient denies no more needs at this time. Call light within reach. Bed alarm on. Bed  wheels locked. Bed in lowest position.

## 2024-12-05 VITALS
SYSTOLIC BLOOD PRESSURE: 139 MMHG | RESPIRATION RATE: 20 BRPM | OXYGEN SATURATION: 97 % | HEIGHT: 70 IN | TEMPERATURE: 96.6 F | HEART RATE: 83 BPM | DIASTOLIC BLOOD PRESSURE: 57 MMHG | WEIGHT: 197.5 LBS | BODY MASS INDEX: 28.27 KG/M2

## 2024-12-05 PROCEDURE — 6370000000 HC RX 637 (ALT 250 FOR IP): Performed by: INTERNAL MEDICINE

## 2024-12-05 PROCEDURE — 94761 N-INVAS EAR/PLS OXIMETRY MLT: CPT

## 2024-12-05 PROCEDURE — 6360000002 HC RX W HCPCS: Performed by: INTERNAL MEDICINE

## 2024-12-05 RX ORDER — CETIRIZINE HYDROCHLORIDE 10 MG/1
10 TABLET ORAL DAILY
COMMUNITY
Start: 2024-12-06 | End: 2024-12-16

## 2024-12-05 RX ORDER — CETIRIZINE HYDROCHLORIDE 10 MG/1
10 TABLET ORAL DAILY
Status: DISCONTINUED | OUTPATIENT
Start: 2024-12-05 | End: 2024-12-05 | Stop reason: HOSPADM

## 2024-12-05 RX ADMIN — LEVETIRACETAM 1500 MG: 500 TABLET, FILM COATED ORAL at 08:12

## 2024-12-05 RX ADMIN — LAMOTRIGINE 600 MG: 100 TABLET ORAL at 17:04

## 2024-12-05 RX ADMIN — PRIMIDONE 250 MG: 250 TABLET ORAL at 08:11

## 2024-12-05 RX ADMIN — CETIRIZINE HYDROCHLORIDE 10 MG: 10 TABLET, FILM COATED ORAL at 08:25

## 2024-12-05 RX ADMIN — LAMOTRIGINE 400 MG: 100 TABLET ORAL at 08:11

## 2024-12-05 RX ADMIN — LACOSAMIDE 150 MG: 50 TABLET, FILM COATED ORAL at 08:11

## 2024-12-05 RX ADMIN — LACOSAMIDE 150 MG: 50 TABLET, FILM COATED ORAL at 17:04

## 2024-12-05 RX ADMIN — PANTOPRAZOLE SODIUM 40 MG: 40 TABLET, DELAYED RELEASE ORAL at 08:10

## 2024-12-05 RX ADMIN — Medication 400 MG: at 08:11

## 2024-12-05 RX ADMIN — ENOXAPARIN SODIUM 40 MG: 100 INJECTION SUBCUTANEOUS at 08:10

## 2024-12-05 RX ADMIN — TAMSULOSIN HYDROCHLORIDE 0.4 MG: 0.4 CAPSULE ORAL at 08:10

## 2024-12-05 RX ADMIN — LEVETIRACETAM 2000 MG: 500 TABLET, FILM COATED ORAL at 17:04

## 2024-12-05 RX ADMIN — GUAIFENESIN, DEXTROMETHORPHAN HBR 1 TABLET: 600; 30 TABLET ORAL at 08:25

## 2024-12-05 RX ADMIN — POTASSIUM CHLORIDE 20 MEQ: 1500 TABLET, EXTENDED RELEASE ORAL at 08:11

## 2024-12-05 RX ADMIN — MECLIZINE HYDROCHLORIDE 25 MG: 25 TABLET ORAL at 08:12

## 2024-12-05 NOTE — PROGRESS NOTES
Krishna Shah M.D.  Internal Medicine Hospitalist Progress Note    Patient: Jeremy King  Date of Admission: 11/30/2024  8:58 PM  Date of Evaluation: 12/5/2024      Subjective:  History of Present Illness:   Jeremy King is a 69 y.o. male who is seen for f/u for SBO.  NGT has been DC'd and he is tolerating diet.  He had 2 BMs yesterday per nursing staff and is passing flatus.  Discussed with Dr. Nicanor ANGELES for DC back to group home      Review of Systems:  Constitutional:negative  for fevers, and negative for chills.  Respiratory: negative for shortness of breath, negative for cough, and negative for wheezing  Cardiovascular: negative for chest pain, negative for palpitations, and negative for syncope  Gastrointestinal: negative for abdominal pain, negative for nausea,negative for vomiting, negative for diarrhea, negative for constipation, and negative for hematochezia or melena  Genitourinary: negative for dysuria, negative for urinary urgency, negative for urinary frequency, and negative for hematuria  Neurological: negative for unilateral weakness, numbness or tingling.    All other systems were reviewed with the patient and are negative except as stated above      VITALS:  Temp: (!) 96.6 °F (35.9 °C)  BP: (!) 139/57  Respirations: 20  Pulse: 83  SpO2: 97 %    Weight  Wt Readings from Last 3 Encounters:   12/05/24 89.6 kg (197 lb 8 oz)   05/21/24 79.4 kg (175 lb)   04/17/24 72.2 kg (159 lb 3.2 oz)     Body mass index is 28.34 kg/m².  -----------------------------------------------------------------  EXAM:  GEN:     Awake and alert, no acute distress .    EYES:  EOMI, pupils equal   NECK: Supple. No lymphadenopathy.  No carotid bruit  CVS:    regular rate and rhythm, no audible murmur  PULM:  CTA, no wheezes, rales or rhonchi, no acute respiratory distress  ABD:    Bowels sounds normal.  Abdomen is soft.  No distention.  no tenderness to palpation.   EXT:   no edema bilaterally .  No calf tenderness.    NEURO: Moves all extremities.  Motor and sensory are grossly intact  SKIN:  No rashes.  No skin lesions.    -----------------------------------------------------------------    DATA:  Complete Blood Count:   Recent Labs     12/03/24 0530 12/04/24  0526   WBC 10.6 7.5   RBC 3.43* 3.21*   HGB 11.3* 10.7*   HCT 34.5* 32.6*   .6 101.6   RDW 12.2 12.0    163      Recent Labs     12/03/24 0530 12/04/24  0526   NEUTROABS 8.10 5.48   LYMPHOPCT 13* 16*   LYMPHSABS 1.39 1.19   MONOPCT 8 8   BASOPCT 0 0   IMMGRAN 1* 0       Recent Blood Glucose:   Recent Labs     12/03/24 0530 12/04/24  0526   GLUCOSE 83 107*        Comprehensive Metabolic Profile:   Recent Labs     12/03/24 0530 12/04/24  0526   BUN 26* 15   CREATININE 0.7 0.7   * 142   K 3.6* 3.8   * 110*   CALCIUM 8.7 8.5*   ANIONGAP 11 7*   CO2 24 25          Microbiology / Cultures:  Results       ** No results found for the last 336 hours. **            Imaging Data:   FL SMALL BOWEL FOLLOW THROUGH ONLY    Result Date: 12/3/2024  Findings above demonstrate a slightly delayed transit time to the terminal ileum.  Overall, findings can be seen with ileus or partial small bowel obstruction.     XR ABDOMEN FOR NG/OG/NE TUBE PLACEMENT    Result Date: 12/2/2024  1. Enteric tube in the stomach. 2. Decompression of the stomach and small bowel since prior study.     XR ABDOMEN (2 VIEWS)    Result Date: 12/2/2024  Persistent dilated loops of small bowel which could represent ileus or partial small bowel obstruction.  These appears slightly more prominent than in the prior study     XR CHEST PORTABLE    Result Date: 12/1/2024  Right internal jugular central venous catheter tip at the cavoatrial junction.     XR ABDOMEN (KUB) (SINGLE AP VIEW)    Result Date: 12/1/2024  1. NG tube tip projects over the body of the stomach. 2. Dilated loops of small bowel and colon with gas in the distal colon. This could represent ileus or partial small bowel

## 2024-12-05 NOTE — PROGRESS NOTES
and Viky from lab attempted twice for lab work and was unsuccessful. Writer notified AGNES Morrell who states she will notify Dr. Shah for any further orders.

## 2024-12-05 NOTE — PLAN OF CARE
Problem: Safety - Adult  Goal: Free from fall injury  Outcome: Progressing  Note: Bed alarm on, bed locked, side rails up, gripper socks on, call light within reach and able to use appropriately. Will continue to monitor this shift.     Problem: Skin/Tissue Integrity  Goal: Absence of new skin breakdown  Description: 1.  Monitor for areas of redness and/or skin breakdown  2.  Assess vascular access sites hourly  3.  Every 4-6 hours minimum:  Change oxygen saturation probe site  4.  Every 4-6 hours:  If on nasal continuous positive airway pressure, respiratory therapy assess nares and determine need for appliance change or resting period.  Outcome: Progressing  Note: Patient able to reposition self at this time and use call light appropriately for any assistance. Will continue to monitor this shift     Problem: Nutrition Deficit:  Goal: Optimize nutritional status  Outcome: Progressing  Flowsheets (Taken 12/4/2024 2100)  Nutrient intake appropriate for improving, restoring, or maintaining nutritional needs: Assess nutritional status and recommend course of action  Note: Encouraging foods and liquids as tolerated. Will continue to monitor this shift.     Problem: Safety - Medical Restraint  Goal: Remains free of injury from restraints (Restraint for Interference with Medical Device)  Description: INTERVENTIONS:  1. Determine that other, less restrictive measures have been tried or would not be effective before applying the restraint  2. Evaluate the patient's condition at the time of restraint application  3. Inform patient/family regarding the reason for restraint  4. Q2H: Monitor safety, psychosocial status, comfort, nutrition and hydration  Outcome: Progressing  Flowsheets (Taken 12/4/2024 2100)  Remains free of injury from restraints (restraint for interference with medical device): Determine that other, less restrictive measures have been tried or would not be effective before applying the restraint

## 2024-12-05 NOTE — PROGRESS NOTES
Patient assessment initiated, vital signs taken, whiteboard updated, call light within reach, sr up x 2, bed alarm on.

## 2024-12-05 NOTE — DISCHARGE SUMMARY
Krishna Shah M.D.  Internal Medicine Discharge Summary    Patient ID:  Jeremy King  [id number]  1955    Admission date: 11/30/2024    Discharge date: 12/5/2024     Admitting Physician: No att. providers found     Primary Care Physician: Sukumar Larose MD     Primary Discharge Diagnoses:   Principal Problem:    Bowel obstruction ([facility])  Active Problems:    MR (mental retardation), severe    Seizure disorder ([facility])    S/P placement of VNS (vagus nerve stimulation) device    LEELEE (obstructive sleep apnea)    Dysphagia  Resolved Problems:    Epilepsy ([facility])       Additional Diagnoses:       Diagnosis Date    Arthritis     BPH (benign prostatic hyperplasia)     Dysphagia     GERD (gastroesophageal reflux disease)     Hearing loss     HLD (hyperlipidemia)     Insomnia     MR (mental retardation)     Periorbital cellulitis     SBO (small bowel obstruction) ([facility])     Seizures ([facility])     Epilepsy    Ventral hernia     Vitamin D deficiency         Hospital Course:   Jeremy King is a 69 y.o. male who was admitted for Bowel obstruction ([facility]).    Mr. King was admitted for Gen Surg consult and tx of SBO.  He had serial xrays during his stay.  Dr. Vick followed along and provided recommendations for advancing diet.  He did require right internal jugular CVC placement in ER due to inability to obtain peripheral IV access.  He was made NPO and had NGT placed to [facility].  Over the course of his hospital stay he began to pass flatus and his abd films showed improvement in his bowel gas pattern.  His NGT was clamped and he was trialed on clear liquid diet, which he tolerated.  Small bowel follow through showed contrast going through to the colon.  NGT was subsequently DC'd and his diet was advanced.  He had 2 BMs yesterday.  His right IJ catheter was removed yesterday.    He was deemed medically stable for discharge and was amenable to the discharge plan.      Discharge Exam:  GEN:    Awake, alert and follows commands /  structures, and bowel.  The liver and gallbladder are unremarkable. No biliary ductal dilatation is identified.  The pancreas, spleen, and bilateral adrenal glands are unremarkable.  The right kidney is unremarkable. Chronic left Alisa renal subcapsular fluid collection with peripheral calcifications unchanged from the previous exam.  No further evaluation recommended.  No obstructive uropathy or urinary collecting system calculi. GI/Bowel: No evidence of acute appendicitis.  The colon is unremarkable. Small bowel anastomotic sutures in the left lower quadrant with focal unchanged dilatation likely postoperative in etiology.  The stomach is distended with gas and fluid.  No wall thickening or convincing evidence of obstruction. Pelvis: The urinary bladder is normal in appearance.  No prostatomegaly.  No free fluid in the pelvis.  No pelvic or inguinal lymphadenopathy. Peritoneum/Retroperitoneum: The abdominal aorta is normal in caliber with moderate calcific plaquing.  No retroperitoneal or mesenteric lymphadenopathy is identified.  No free air or fluid is seen in the abdomen. Bones/Soft Tissues: No acute osseous or soft tissue abnormality.     1. No acute abnormality in the abdomen or pelvis. 2. Gastric distension of uncertain clinical significance.  Gastroparesis is not excluded.       Discharge Condition:   stable    Disposition:   Discharge home    Discharge Medications:     Medication List        START taking these medications      cetirizine 10 MG tablet  Commonly known as: ZYRTEC  Take 1 tablet by mouth daily for 10 days  Start taking on: December 6, 2024     dextromethorphan-guaiFENesin  MG per extended release tablet  Commonly known as: MUCINEX DM  Take 1 tablet by mouth 2 times daily for 10 days            CONTINUE taking these medications      benzonatate 100 MG capsule  Commonly known as: TESSALON     calcium carbonate 500 MG chewable tablet  Commonly known as: TUMS     Debrox 6.5 % otic

## 2024-12-05 NOTE — CARE COORDINATION
12/05/24 1116   IMM Letter   IMM Letter given to Patient/Family/Significant other/Guardian/POA/by: 2nd notice reviewed with guardian Esther Denny over the phone/HOANG Perdomo case manager/PITER Veras   IMM Letter date given: 12/05/24   IMM Letter time given: 1111     IMM letter provided to guardian, offered four hours to make informed decision regarding appeal process; agreeable to discharge.

## 2024-12-05 NOTE — PLAN OF CARE
Problem: Safety - Adult  Goal: Free from fall injury  Outcome: Completed     Problem: Discharge Planning  Goal: Discharge to home or other facility with appropriate resources  Outcome: Completed     Problem: Skin/Tissue Integrity  Goal: Absence of new skin breakdown  Description: 1.  Monitor for areas of redness and/or skin breakdown  2.  Assess vascular access sites hourly  3.  Every 4-6 hours minimum:  Change oxygen saturation probe site  4.  Every 4-6 hours:  If on nasal continuous positive airway pressure, respiratory therapy assess nares and determine need for appliance change or resting period.  Outcome: Completed     Problem: Nutrition Deficit:  Goal: Optimize nutritional status  Outcome: Completed     Problem: Safety - Medical Restraint  Goal: Remains free of injury from restraints (Restraint for Interference with Medical Device)  Description: INTERVENTIONS:  1. Determine that other, less restrictive measures have been tried or would not be effective before applying the restraint  2. Evaluate the patient's condition at the time of restraint application  3. Inform patient/family regarding the reason for restraint  4. Q2H: Monitor safety, psychosocial status, comfort, nutrition and hydration  Outcome: Completed

## 2024-12-05 NOTE — PROGRESS NOTES
GENERAL SURGERY PROGRESS NOTE- inpatient      PATIENT NAME: Jeremy King     DATE: 12/5/2024    SUBJECTIVE:    Patient has been doing well tolerating diet without ngt, denies pain, very talkative     OBJECTIVE:   VITALS:  BP (!) 139/57   Pulse 83   Temp (!) 96.6 °F (35.9 °C) (Temporal)   Resp 20   Ht 1.778 m (5' 10\")   Wt 89.6 kg (197 lb 8 oz) Comment: with seizure pads on  SpO2 97%   BMI 28.34 kg/m²    height is 1.778 m (5' 10\") and weight is 89.6 kg (197 lb 8 oz). His temporal temperature is 96.6 °F (35.9 °C) (abnormal). His blood pressure is 139/57 (abnormal) and his pulse is 83. His respiration is 20 and oxygen saturation is 97%.     INTAKE/OUTPUT:    I/O last 3 completed shifts:  In: 2687.8 [P.O.:540; I.V.:2147.8]  Out: -   I/O this shift:  In: 120 [P.O.:120]  Out: 350 [Urine:350]              CONSTITUTIONAL:  awake and alert  LUNGS:  clear to auscultation  HEART:regular rate  ABDOMEN:   normal bowel sounds, soft, non-distended, non-tender   EXTREMITIES: no c/c/e      Data:  CBC:   Recent Labs     12/03/24  0530 12/04/24  0526   WBC 10.6 7.5   HGB 11.3* 10.7*   HCT 34.5* 32.6*    163     BMP:    Recent Labs     12/03/24  0530 12/04/24  0526   * 142   K 3.6* 3.8   * 110*   CO2 24 25   BUN 26* 15   CREATININE 0.7 0.7   GLUCOSE 83 107*     Hepatic: No results for input(s): \"AST\", \"ALT\", \"BILITOT\", \"ALKPHOS\" in the last 72 hours.    Invalid input(s): \"ALB\"      ASSESSMENT & PLAN:  Recurrent partial sbo that has resolved. Diet as tolerated. No plans for surgery. Will sign off/please call as needed. Discussed with Dr. Shah and RN.      Dr. Vick

## 2024-12-05 NOTE — PROGRESS NOTES
[facility]  Inpatient/Observation/Outpatient Rehabilitation    Date: 2024  Patient Name: Jeremy King       [x] Inpatient Acute/Observation       []  Outpatient  : 1955       [] Pt no showed for scheduled appointment    [] As a reminder, pt was contacted/attempted contact via phone of upcoming appointments.    [] Pt refused/declined therapy at this time due to:           [] Pt cancelled due to:  [] No Reason Given   [] Sick/ill   [] Other:      [] Evaluation held by RN/Provider due to:    [] High Heart Rate   [] High Blood Pressure   [] Orthopedic Consult   [] Hgb < 7   [] Other:    [] Pt ordered brace per physician request:  [] Proper fit will be completed and education for wearing/skin checks    [x] Pt does not require skilled services due to:  Pt is dependent for transfers and non-ambulatory. Will require utilization of lift equipment to sit in bedside chair.     Therapist/Assistant will attempt to see this patient, at our earliest opportunity.       aCthy Barba, PT, DPT  Date: 2024

## 2024-12-05 NOTE — PROGRESS NOTES
Pt discharged to return to [facility] home this date. SW called to [facility] and they are unable to provide transportation today and request pt be returned to them by ambulance. [facility]. Pt's guardian contacted and notified of return to group home this date. Guardian in agreement. Packet provided to nursing and ambulance to pick pt up at 2:30 pm. Group home notified of  time. Tammy Veliz, MARISSA, LSW 12/5/2024

## 2024-12-18 ENCOUNTER — HOSPITAL ENCOUNTER (OUTPATIENT)
Age: 69
Setting detail: SPECIMEN
Discharge: HOME | End: 2024-12-18

## 2024-12-20 ENCOUNTER — HOSPITAL ENCOUNTER (OUTPATIENT)
Age: 69
Setting detail: SPECIMEN
Discharge: HOME | End: 2024-12-20

## 2024-12-27 ENCOUNTER — HOSPITAL ENCOUNTER (OUTPATIENT)
Age: 69
Setting detail: SPECIMEN
Discharge: HOME | End: 2024-12-27

## 2025-01-08 ENCOUNTER — HOSPITAL ENCOUNTER (OUTPATIENT)
Age: 70
Setting detail: SPECIMEN
Discharge: HOME | End: 2025-01-08

## 2025-02-15 ENCOUNTER — HOSPITAL ENCOUNTER (INPATIENT)
Age: 70
LOS: 4 days | Discharge: HOME | DRG: 389 | End: 2025-02-19
Attending: EMERGENCY MEDICINE | Admitting: INTERNAL MEDICINE
Payer: MEDICARE

## 2025-02-15 ENCOUNTER — APPOINTMENT (OUTPATIENT)
Dept: GENERAL RADIOLOGY | Age: 70
DRG: 389 | End: 2025-02-15
Payer: MEDICARE

## 2025-02-15 ENCOUNTER — APPOINTMENT (OUTPATIENT)
Dept: CT IMAGING | Age: 70
DRG: 389 | End: 2025-02-15
Payer: MEDICARE

## 2025-02-15 DIAGNOSIS — K56.609 INTESTINAL OBSTRUCTION, UNSPECIFIED CAUSE, UNSPECIFIED WHETHER PARTIAL OR COMPLETE (HCC): Primary | ICD-10-CM

## 2025-02-15 LAB
ALBUMIN SERPL-MCNC: 4.4 G/DL (ref 3.5–5.2)
ALBUMIN/GLOB SERPL: 1.1 {RATIO} (ref 1–2.5)
ALP SERPL-CCNC: 215 U/L (ref 40–129)
ALT SERPL-CCNC: 13 U/L (ref 10–50)
ANION GAP SERPL CALCULATED.3IONS-SCNC: 14 MMOL/L (ref 9–16)
AST SERPL-CCNC: 18 U/L (ref 10–50)
BASOPHILS # BLD: 0.05 K/UL (ref 0–0.2)
BASOPHILS NFR BLD: 0 % (ref 0–2)
BILIRUB SERPL-MCNC: 0.3 MG/DL (ref 0–1.2)
BUN SERPL-MCNC: 27 MG/DL (ref 8–23)
BUN/CREAT SERPL: 21 (ref 9–20)
CALCIUM SERPL-MCNC: 9.9 MG/DL (ref 8.6–10.4)
CHLORIDE SERPL-SCNC: 104 MMOL/L (ref 98–107)
CO2 SERPL-SCNC: 25 MMOL/L (ref 20–31)
CREAT SERPL-MCNC: 1.3 MG/DL (ref 0.7–1.2)
EOSINOPHIL # BLD: <0.03 K/UL (ref 0–0.44)
EOSINOPHILS RELATIVE PERCENT: 0 % (ref 1–4)
ERYTHROCYTE [DISTWIDTH] IN BLOOD BY AUTOMATED COUNT: 12.3 % (ref 11.8–14.4)
GFR, ESTIMATED: 60 ML/MIN/1.73M2
GLUCOSE SERPL-MCNC: 111 MG/DL (ref 74–99)
HCT VFR BLD AUTO: 46.5 % (ref 40.7–50.3)
HGB BLD-MCNC: 15.3 G/DL (ref 13–17)
IMM GRANULOCYTES # BLD AUTO: 0.12 K/UL (ref 0–0.3)
IMM GRANULOCYTES NFR BLD: 1 %
INR PPP: 1.1
LIPASE SERPL-CCNC: 34 U/L (ref 13–60)
LYMPHOCYTES NFR BLD: 1.42 K/UL (ref 1.1–3.7)
LYMPHOCYTES RELATIVE PERCENT: 8 % (ref 24–43)
MCH RBC QN AUTO: 32.3 PG (ref 25.2–33.5)
MCHC RBC AUTO-ENTMCNC: 32.9 G/DL (ref 28.4–34.8)
MCV RBC AUTO: 98.1 FL (ref 82.6–102.9)
MONOCYTES NFR BLD: 1.09 K/UL (ref 0.1–1.2)
MONOCYTES NFR BLD: 6 % (ref 3–12)
NEUTROPHILS NFR BLD: 85 % (ref 36–65)
NEUTS SEG NFR BLD: 16.05 K/UL (ref 1.5–8.1)
NRBC BLD-RTO: 0 PER 100 WBC
PLATELET # BLD AUTO: 270 K/UL (ref 138–453)
PMV BLD AUTO: 8.9 FL (ref 8.1–13.5)
POTASSIUM SERPL-SCNC: 4.3 MMOL/L (ref 3.7–5.3)
PROT SERPL-MCNC: 8.3 G/DL (ref 6.6–8.7)
PROTHROMBIN TIME: 14 SEC (ref 11.7–14.1)
RBC # BLD AUTO: 4.74 M/UL (ref 4.21–5.77)
SODIUM SERPL-SCNC: 143 MMOL/L (ref 136–145)
WBC OTHER # BLD: 18.7 K/UL (ref 3.5–11.3)

## 2025-02-15 PROCEDURE — 6360000002 HC RX W HCPCS: Performed by: EMERGENCY MEDICINE

## 2025-02-15 PROCEDURE — 71045 X-RAY EXAM CHEST 1 VIEW: CPT

## 2025-02-15 PROCEDURE — 36415 COLL VENOUS BLD VENIPUNCTURE: CPT

## 2025-02-15 PROCEDURE — 96376 TX/PRO/DX INJ SAME DRUG ADON: CPT

## 2025-02-15 PROCEDURE — 96374 THER/PROPH/DIAG INJ IV PUSH: CPT

## 2025-02-15 PROCEDURE — 80053 COMPREHEN METABOLIC PANEL: CPT

## 2025-02-15 PROCEDURE — 74177 CT ABD & PELVIS W/CONTRAST: CPT

## 2025-02-15 PROCEDURE — 94761 N-INVAS EAR/PLS OXIMETRY MLT: CPT

## 2025-02-15 PROCEDURE — 99285 EMERGENCY DEPT VISIT HI MDM: CPT

## 2025-02-15 PROCEDURE — 83690 ASSAY OF LIPASE: CPT

## 2025-02-15 PROCEDURE — 74018 RADEX ABDOMEN 1 VIEW: CPT

## 2025-02-15 PROCEDURE — 94664 DEMO&/EVAL PT USE INHALER: CPT

## 2025-02-15 PROCEDURE — 6360000002 HC RX W HCPCS: Performed by: INTERNAL MEDICINE

## 2025-02-15 PROCEDURE — 85025 COMPLETE CBC W/AUTO DIFF WBC: CPT

## 2025-02-15 PROCEDURE — 2580000003 HC RX 258: Performed by: EMERGENCY MEDICINE

## 2025-02-15 PROCEDURE — 1200000000 HC SEMI PRIVATE

## 2025-02-15 PROCEDURE — 2580000003 HC RX 258: Performed by: INTERNAL MEDICINE

## 2025-02-15 PROCEDURE — 85610 PROTHROMBIN TIME: CPT

## 2025-02-15 PROCEDURE — 6360000004 HC RX CONTRAST MEDICATION: Performed by: EMERGENCY MEDICINE

## 2025-02-15 PROCEDURE — 2500000003 HC RX 250 WO HCPCS: Performed by: INTERNAL MEDICINE

## 2025-02-15 PROCEDURE — 96361 HYDRATE IV INFUSION ADD-ON: CPT

## 2025-02-15 PROCEDURE — 93005 ELECTROCARDIOGRAM TRACING: CPT | Performed by: INTERNAL MEDICINE

## 2025-02-15 RX ORDER — SODIUM CHLORIDE 9 MG/ML
INJECTION, SOLUTION INTRAVENOUS CONTINUOUS
Status: ACTIVE | OUTPATIENT
Start: 2025-02-15 | End: 2025-02-16

## 2025-02-15 RX ORDER — MORPHINE SULFATE 4 MG/ML
4 INJECTION, SOLUTION INTRAMUSCULAR; INTRAVENOUS
Status: DISCONTINUED | OUTPATIENT
Start: 2025-02-15 | End: 2025-02-19

## 2025-02-15 RX ORDER — IOPAMIDOL 755 MG/ML
75 INJECTION, SOLUTION INTRAVASCULAR
Status: COMPLETED | OUTPATIENT
Start: 2025-02-15 | End: 2025-02-15

## 2025-02-15 RX ORDER — ONDANSETRON 4 MG/1
4 TABLET, ORALLY DISINTEGRATING ORAL 3 TIMES DAILY PRN
Qty: 20 TABLET | Refills: 0 | Status: SHIPPED | OUTPATIENT
Start: 2025-02-15

## 2025-02-15 RX ORDER — ONDANSETRON 2 MG/ML
4 INJECTION INTRAMUSCULAR; INTRAVENOUS ONCE
Status: COMPLETED | OUTPATIENT
Start: 2025-02-15 | End: 2025-02-15

## 2025-02-15 RX ORDER — SODIUM CHLORIDE 0.9 % (FLUSH) 0.9 %
10 SYRINGE (ML) INJECTION PRN
Status: DISCONTINUED | OUTPATIENT
Start: 2025-02-15 | End: 2025-02-19 | Stop reason: HOSPADM

## 2025-02-15 RX ORDER — ONDANSETRON 2 MG/ML
4 INJECTION INTRAMUSCULAR; INTRAVENOUS EVERY 6 HOURS PRN
Status: DISCONTINUED | OUTPATIENT
Start: 2025-02-15 | End: 2025-02-19 | Stop reason: HOSPADM

## 2025-02-15 RX ORDER — DIAZEPAM 10 MG/2G
20 GEL RECTAL PRN
Status: DISCONTINUED | OUTPATIENT
Start: 2025-02-15 | End: 2025-02-19 | Stop reason: HOSPADM

## 2025-02-15 RX ORDER — PANTOPRAZOLE SODIUM 40 MG/10ML
40 INJECTION, POWDER, LYOPHILIZED, FOR SOLUTION INTRAVENOUS
Status: DISCONTINUED | OUTPATIENT
Start: 2025-02-16 | End: 2025-02-18

## 2025-02-15 RX ORDER — 0.9 % SODIUM CHLORIDE 0.9 %
1000 INTRAVENOUS SOLUTION INTRAVENOUS ONCE
Status: COMPLETED | OUTPATIENT
Start: 2025-02-15 | End: 2025-02-15

## 2025-02-15 RX ORDER — ALBUTEROL SULFATE 90 UG/1
2 INHALANT RESPIRATORY (INHALATION) EVERY 4 HOURS PRN
Status: DISCONTINUED | OUTPATIENT
Start: 2025-02-15 | End: 2025-02-19 | Stop reason: HOSPADM

## 2025-02-15 RX ORDER — ONDANSETRON 4 MG/1
4 TABLET, ORALLY DISINTEGRATING ORAL EVERY 8 HOURS PRN
Status: DISCONTINUED | OUTPATIENT
Start: 2025-02-15 | End: 2025-02-19 | Stop reason: HOSPADM

## 2025-02-15 RX ORDER — LATANOPROST 50 UG/ML
1 SOLUTION/ DROPS OPHTHALMIC NIGHTLY
Status: DISCONTINUED | OUTPATIENT
Start: 2025-02-15 | End: 2025-02-19 | Stop reason: HOSPADM

## 2025-02-15 RX ORDER — MORPHINE SULFATE 2 MG/ML
2 INJECTION, SOLUTION INTRAMUSCULAR; INTRAVENOUS
Status: DISCONTINUED | OUTPATIENT
Start: 2025-02-15 | End: 2025-02-19

## 2025-02-15 RX ORDER — KETOTIFEN FUMARATE 0.35 MG/ML
1 SOLUTION/ DROPS OPHTHALMIC 2 TIMES DAILY
Status: DISCONTINUED | OUTPATIENT
Start: 2025-02-15 | End: 2025-02-19 | Stop reason: HOSPADM

## 2025-02-15 RX ORDER — ENOXAPARIN SODIUM 100 MG/ML
40 INJECTION SUBCUTANEOUS DAILY
Status: DISCONTINUED | OUTPATIENT
Start: 2025-02-15 | End: 2025-02-19 | Stop reason: HOSPADM

## 2025-02-15 RX ORDER — SODIUM CHLORIDE 0.9 % (FLUSH) 0.9 %
10 SYRINGE (ML) INJECTION EVERY 12 HOURS SCHEDULED
Status: DISCONTINUED | OUTPATIENT
Start: 2025-02-15 | End: 2025-02-19 | Stop reason: HOSPADM

## 2025-02-15 RX ORDER — SODIUM CHLORIDE 9 MG/ML
INJECTION, SOLUTION INTRAVENOUS PRN
Status: DISCONTINUED | OUTPATIENT
Start: 2025-02-15 | End: 2025-02-19 | Stop reason: HOSPADM

## 2025-02-15 RX ADMIN — SODIUM CHLORIDE 1000 ML: 9 INJECTION, SOLUTION INTRAVENOUS at 11:31

## 2025-02-15 RX ADMIN — ONDANSETRON 4 MG: 2 INJECTION, SOLUTION INTRAMUSCULAR; INTRAVENOUS at 11:27

## 2025-02-15 RX ADMIN — ENOXAPARIN SODIUM 40 MG: 100 INJECTION SUBCUTANEOUS at 21:22

## 2025-02-15 RX ADMIN — ONDANSETRON 4 MG: 2 INJECTION, SOLUTION INTRAMUSCULAR; INTRAVENOUS at 15:17

## 2025-02-15 RX ADMIN — IOPAMIDOL 75 ML: 755 INJECTION, SOLUTION INTRAVENOUS at 15:02

## 2025-02-15 RX ADMIN — SODIUM CHLORIDE, PRESERVATIVE FREE 10 ML: 5 INJECTION INTRAVENOUS at 19:10

## 2025-02-15 RX ADMIN — SODIUM CHLORIDE: 0.9 INJECTION, SOLUTION INTRAVENOUS at 19:10

## 2025-02-15 RX ADMIN — FAMOTIDINE 20 MG: 10 INJECTION, SOLUTION INTRAVENOUS at 21:21

## 2025-02-15 ASSESSMENT — PAIN - FUNCTIONAL ASSESSMENT: PAIN_FUNCTIONAL_ASSESSMENT: NONE - DENIES PAIN

## 2025-02-15 NOTE — ED PROVIDER NOTES
[facility] EMERGENCY DEPARTMENT  EMERGENCY DEPARTMENT ENCOUNTER      Pt Name: Jeremy King  MRN: [id number]  Birthdate 1955  Date of evaluation: 2/15/2025  Provider: Adrienne Smart MD    CHIEF COMPLAINT       Chief Complaint   Patient presents with   • Hematemesis     Pt to ED from [place] via EMS with c/o possible blood in emesis.  Pt has history of recent bowel obstruction.         HISTORY OF PRESENT ILLNESS   (Location/Symptom, Timing/Onset, Context/Setting, Quality, Duration, Modifying Factors, Severity)  Note limiting factors.   Jeremy King is a 69 y.o. male who presents to the emergency department      69-year-old male presenting the emergency department for evaluation of vomiting that appeared dark brown no pain at his skilled nursing facility.  Patient was recently treated for small bowel obstruction.  On arrival the patient is alert.  He does have history of MRDD.  He is smiling and answering questions appropriately.  He is denying any acute localized complaints.  No other acute concerns      Nursing Notes were reviewed.    REVIEW OF SYSTEMS    (2-9 systems for level 4, 10 or more for level 5)     Review of Systems   All other systems reviewed and are negative.      Except as noted above the remainder of the review of systems was reviewed and negative.       PAST MEDICAL HISTORY     Past Medical History:   Diagnosis Date   • Arthritis    • BPH (benign prostatic hyperplasia)    • Dysphagia    • GERD (gastroesophageal reflux disease)    • Hearing loss    • HLD (hyperlipidemia)    • Insomnia    • MR (mental retardation)    • Periorbital cellulitis    • SBO (small bowel obstruction) (HCC)    • Seizures (HCC)     Epilepsy   • Ventral hernia    • Vitamin D deficiency          SURGICAL HISTORY       Past Surgical History:   Procedure Laterality Date   • ABDOMEN SURGERY  2013, 2014    Bowel Obstruction X2   • CAST APPLICATION Left     Lt. Ankle , X3   • HYDROCELE EXCISION  08/2016   • LAPAROSCOPY  8/17/14

## 2025-02-15 NOTE — DISCHARGE INSTRUCTIONS
Continue current medications as prescribed.  Use Zofran every 8 hours as needed for nausea or vomiting follow-up with your primary care provider in 3 to 5 days if symptoms not resolve you must seek medical attention immediately for any worsening symptoms or any acute concern

## 2025-02-15 NOTE — ED PROVIDER NOTES
[facility] EMERGENCY DEPARTMENT  EMERGENCY DEPARTMENT ENCOUNTER      Pt Name: Jeremy King  MRN: [id number]  Birthdate 1955  Date of evaluation: 2/15/2025  Provider: Adrienne Smart MD    CHIEF COMPLAINT       Chief Complaint   Patient presents with    Hematemesis     Pt to ED from [place] via EMS with c/o possible blood in emesis.  Pt has history of recent bowel obstruction.         HISTORY OF PRESENT ILLNESS   (Location/Symptom, Timing/Onset, Context/Setting, Quality, Duration, Modifying Factors, Severity)  Note limiting factors.   Jeremy King is a 69 y.o. male who presents to the emergency department      69-year-old male presenting the emergency department for evaluation of vomiting that appeared dark brown no pain at his skilled nursing facility.  Patient was recently treated for small bowel obstruction.  On arrival the patient is alert.  He does have history of MRDD.  He is smiling and answering questions appropriately.  He is denying any acute localized complaints.  No other acute concerns        Nursing Notes were reviewed.    REVIEW OF SYSTEMS    (2-9 systems for level 4, 10 or more for level 5)     Review of Systems   All other systems reviewed and are negative.      Except as noted above the remainder of the review of systems was reviewed and negative.       PAST MEDICAL HISTORY     Past Medical History:   Diagnosis Date    Arthritis     BPH (benign prostatic hyperplasia)     Dysphagia     GERD (gastroesophageal reflux disease)     Hearing loss     HLD (hyperlipidemia)     Insomnia     MR (mental retardation)     Periorbital cellulitis     SBO (small bowel obstruction) (HCC)     Seizures (HCC)     Epilepsy    Ventral hernia     Vitamin D deficiency          SURGICAL HISTORY       Past Surgical History:   Procedure Laterality Date    ABDOMEN SURGERY  2013, 2014    Bowel Obstruction X2    CAST APPLICATION Left     Lt. Ankle , X3    HYDROCELE EXCISION  08/2016    LAPAROSCOPY  8/17/14    with BERNARD

## 2025-02-15 NOTE — PROGRESS NOTES
Patient known to me. Seen by several surgeons here in the past as well. Recurrent sbo. Hemodynamically stable. NGT to LIS for decompression. Therapeutic gastrograffin sbft study ordered for am via ngt. Mild mental retardation, cooperative, prior ex laps in past/partial bowel resections large and small bowel segmental resections in the past 10 years ago.    Discussed with Dr. Shah.     Further  recommendations to follow. He has known small bowel adhesions that contribute to partial sbos that typically resolve with NGT and GG SBFT. There is large bowel gas/ileus/possible Ogilvies on imaging. Await sbft study. Labs improved. Serial abdominal exams needed/ngt output I/Os noted-     I have not yet seen this patient on this admission    Dr. Vick

## 2025-02-16 LAB
ANION GAP SERPL CALCULATED.3IONS-SCNC: 12 MMOL/L (ref 9–16)
BASOPHILS # BLD: 0.05 K/UL (ref 0–0.2)
BASOPHILS NFR BLD: 0 % (ref 0–2)
BUN SERPL-MCNC: 36 MG/DL (ref 8–23)
BUN/CREAT SERPL: 33 (ref 9–20)
CALCIUM SERPL-MCNC: 8.6 MG/DL (ref 8.6–10.4)
CHLORIDE SERPL-SCNC: 109 MMOL/L (ref 98–107)
CO2 SERPL-SCNC: 25 MMOL/L (ref 20–31)
CREAT SERPL-MCNC: 1.1 MG/DL (ref 0.7–1.2)
EKG ATRIAL RATE: 97 BPM
EKG P AXIS: 40 DEGREES
EKG P-R INTERVAL: 162 MS
EKG Q-T INTERVAL: 364 MS
EKG QRS DURATION: 86 MS
EKG QTC CALCULATION (BAZETT): 462 MS
EKG R AXIS: -11 DEGREES
EKG T AXIS: 107 DEGREES
EKG VENTRICULAR RATE: 97 BPM
EOSINOPHIL # BLD: 0.11 K/UL (ref 0–0.44)
EOSINOPHILS RELATIVE PERCENT: 1 % (ref 1–4)
ERYTHROCYTE [DISTWIDTH] IN BLOOD BY AUTOMATED COUNT: 12.5 % (ref 11.8–14.4)
GFR, ESTIMATED: 76 ML/MIN/1.73M2
GLUCOSE SERPL-MCNC: 104 MG/DL (ref 74–99)
HCT VFR BLD AUTO: 42.4 % (ref 40.7–50.3)
HGB BLD-MCNC: 13.9 G/DL (ref 13–17)
IMM GRANULOCYTES # BLD AUTO: 0.03 K/UL (ref 0–0.3)
IMM GRANULOCYTES NFR BLD: 0 %
LYMPHOCYTES NFR BLD: 2.14 K/UL (ref 1.1–3.7)
LYMPHOCYTES RELATIVE PERCENT: 17 % (ref 24–43)
MCH RBC QN AUTO: 32.3 PG (ref 25.2–33.5)
MCHC RBC AUTO-ENTMCNC: 32.8 G/DL (ref 28.4–34.8)
MCV RBC AUTO: 98.4 FL (ref 82.6–102.9)
MONOCYTES NFR BLD: 1.16 K/UL (ref 0.1–1.2)
MONOCYTES NFR BLD: 9 % (ref 3–12)
NEUTROPHILS NFR BLD: 73 % (ref 36–65)
NEUTS SEG NFR BLD: 9.04 K/UL (ref 1.5–8.1)
NRBC BLD-RTO: 0 PER 100 WBC
PLATELET # BLD AUTO: 250 K/UL (ref 138–453)
PMV BLD AUTO: 8.9 FL (ref 8.1–13.5)
POTASSIUM SERPL-SCNC: 3.8 MMOL/L (ref 3.7–5.3)
RBC # BLD AUTO: 4.31 M/UL (ref 4.21–5.77)
SODIUM SERPL-SCNC: 146 MMOL/L (ref 136–145)
WBC OTHER # BLD: 12.5 K/UL (ref 3.5–11.3)

## 2025-02-16 PROCEDURE — 2500000003 HC RX 250 WO HCPCS: Performed by: INTERNAL MEDICINE

## 2025-02-16 PROCEDURE — 1200000000 HC SEMI PRIVATE

## 2025-02-16 PROCEDURE — 93010 ELECTROCARDIOGRAM REPORT: CPT | Performed by: INTERNAL MEDICINE

## 2025-02-16 PROCEDURE — 94761 N-INVAS EAR/PLS OXIMETRY MLT: CPT

## 2025-02-16 PROCEDURE — 80048 BASIC METABOLIC PNL TOTAL CA: CPT

## 2025-02-16 PROCEDURE — 36415 COLL VENOUS BLD VENIPUNCTURE: CPT

## 2025-02-16 PROCEDURE — 97166 OT EVAL MOD COMPLEX 45 MIN: CPT

## 2025-02-16 PROCEDURE — 97162 PT EVAL MOD COMPLEX 30 MIN: CPT

## 2025-02-16 PROCEDURE — 6360000002 HC RX W HCPCS: Performed by: INTERNAL MEDICINE

## 2025-02-16 PROCEDURE — 2580000003 HC RX 258: Performed by: INTERNAL MEDICINE

## 2025-02-16 PROCEDURE — 6370000000 HC RX 637 (ALT 250 FOR IP): Performed by: INTERNAL MEDICINE

## 2025-02-16 PROCEDURE — 85025 COMPLETE CBC W/AUTO DIFF WBC: CPT

## 2025-02-16 RX ORDER — SODIUM CHLORIDE 9 MG/ML
INJECTION, SOLUTION INTRAVENOUS CONTINUOUS
Status: DISCONTINUED | OUTPATIENT
Start: 2025-02-17 | End: 2025-02-18

## 2025-02-16 RX ADMIN — LATANOPROST 1 DROP: 50 SOLUTION/ DROPS OPHTHALMIC at 21:35

## 2025-02-16 RX ADMIN — KETOTIFEN FUMARATE 1 DROP: 0.25 SOLUTION/ DROPS OPHTHALMIC at 21:35

## 2025-02-16 RX ADMIN — KETOTIFEN FUMARATE 1 DROP: 0.25 SOLUTION/ DROPS OPHTHALMIC at 08:46

## 2025-02-16 RX ADMIN — SODIUM CHLORIDE: 0.9 INJECTION, SOLUTION INTRAVENOUS at 11:08

## 2025-02-16 RX ADMIN — SODIUM CHLORIDE, PRESERVATIVE FREE 10 ML: 5 INJECTION INTRAVENOUS at 08:40

## 2025-02-16 RX ADMIN — FAMOTIDINE 20 MG: 10 INJECTION, SOLUTION INTRAVENOUS at 21:33

## 2025-02-16 RX ADMIN — ENOXAPARIN SODIUM 40 MG: 100 INJECTION SUBCUTANEOUS at 08:38

## 2025-02-16 RX ADMIN — FAMOTIDINE 20 MG: 10 INJECTION, SOLUTION INTRAVENOUS at 08:39

## 2025-02-16 RX ADMIN — PANTOPRAZOLE SODIUM 40 MG: 40 INJECTION, POWDER, FOR SOLUTION INTRAVENOUS at 08:39

## 2025-02-16 NOTE — H&P
personally reviewed       Disposition:  Shared decision making: All test results, treatment options and disposition options were discussed with the patient today  Social determinants of health that may impact management:  MRDD  Code status: Full Code   [facility]      [facility] Medication Reconciliation documentation: (check appropriate boxes [x]  )  [x] I have utilized all available immediate resources to obtain, update, or review the patient's current medications (including all prescriptions, over-the-counter products, herbals, cannabinoid products and vitamin/mineral/dietary/nutritional supplements.  [If 'yes\", STOP HERE]     []  The patient is not eligible for medication reconciliation; the patient is in an emergent medical situation where delaying treatment would jeopardize the patient's health    []  I did NOT confirm, update or review the patient's current list of medications today.  [DOES NOT SATISFY [facility] PERFORMANCE]        [facility] Advanced Care Planning documentation: (check appropriate boxes [x]  )  [x] I have confirmed that the patient's Advance Care Plan is present, Code Status is documented, or surrogate decision maker is listed in the patient's medical record  [If \"yes\", STOP HERE]     [] The patient's Advance Care Plan is NOT present because:    []  I confirmed today that the patient does not wish or was not able to name a   surrogate decision maker or provide and advance care plan.    [] Hospice care is currently being provided or has been provided within the   calendar year.    []  I did NOT confirm today the presence of an Advance Care Plan or surrogate   decision maker documented within the patient's medical record.   [DOES NOT SATISFY [facility] PERFORMANCE]      Krishna Shah MD , M.D.  2/16/2025  12:48 PM

## 2025-02-16 NOTE — PROGRESS NOTES
Occupational Therapy  Facility/Department: [facility] MED SURG  Occupational Therapy Initial Assessment    Name: Jeremy King  : 1955  MRN: [id number]  Date of Service: 2025    Discharge Recommendations:  Continue to assess pending progress, 24 hour supervision or assist, Long Term Care with OT, Subacute/Skilled Nursing Facility          Patient Diagnosis(es): The encounter diagnosis was Intestinal obstruction, unspecified cause, unspecified whether partial or complete (HCC).  Past Medical History:  has a past medical history of Arthritis, BPH (benign prostatic hyperplasia), Dysphagia, GERD (gastroesophageal reflux disease), Hearing loss, HLD (hyperlipidemia), Insomnia, MR (mental retardation), Periorbital cellulitis, SBO (small bowel obstruction) (HCC), Seizures (HCC), Ventral hernia, and Vitamin D deficiency.  Past Surgical History:  has a past surgical history that includes Abdomen surgery (, ); laparoscopy (14); laparotomy (14); cast application (Left); Hydrocele surgery (2016); Vagus nerve stimulator insertion; and Vagus nerve stimulator insertion (10/11/2016).           Assessment  Performance deficits / Impairments: Decreased functional mobility ;Decreased endurance;Decreased coordination;Decreased ADL status;Decreased balance;Decreased strength;Decreased safe awareness;Decreased cognition  Assessment: Pt is 70 y/o male with history of MRDD and admitted for small bowel obstruction. Pt was evaluated for skilled OT need this date. Pt is considered poor historian, so unable to gather PLOF or social history in regards to patient prior level of assist for mobility and self care. Pt presents with increased weakness and requiring significant assistance of Mod-Max A of 1-2 for all functional transfers. Max assist for all self care. Pt would benefit from additional skilled therapy to ensure safe return to PLOF .  Prognosis: Fair  Decision Making: Medium Complexity  REQUIRES OT FOLLOW-UP:

## 2025-02-16 NOTE — FLOWSHEET NOTE
Up in recliner at bedside per PT. Tolerated well. Call light within reach. Chair alarm on for safety

## 2025-02-16 NOTE — FLOWSHEET NOTE
Sitting up in chair at bedside. Pulled taper off of nose securing NG tube. Nose cleaned and new tape secured NG tubing. Tubing at 55. Continue to monitor

## 2025-02-16 NOTE — FLOWSHEET NOTE
In recliner at bedside. Returned to bed with max of 2 assist. Unsteady on feet. Call light within reach. Bed alarm on for safety

## 2025-02-16 NOTE — FLOWSHEET NOTE
Resting in bed with eyes closed. Awake for vitals and assessment. Denies pain. FLACC score 0. NG intact draining brown drainage . Bowel sounds hypoactive x 4. No needs at present time. Call light within reach. Continue to monitor

## 2025-02-16 NOTE — PROGRESS NOTES
Writer called guardian for updates per request of the group home. She informed writer that pt has a VNS implant for seizures and there should be a magnet in pts belongings.

## 2025-02-16 NOTE — PROGRESS NOTES
Pt arrived to floor via stretcher, report received from Karlee BOLTON. Admission assessment and vitals completed. Writer attempted to complete admission navigator but pt only oriented to self. Medication list completed with packet from group home. NG connected to low intermittent suction. Call light in reach.

## 2025-02-16 NOTE — PROGRESS NOTES
Physical Therapy  Facility/Department: [facility] MED SURG  Physical Therapy Initial Assessment    Name: Jeremy King  : 1955  MRN: [id number]  Date of Service: 2025    Discharge Recommendations:  Continue to assess pending progress          Patient Diagnosis(es): The encounter diagnosis was Intestinal obstruction, unspecified cause, unspecified whether partial or complete (HCC).  Past Medical History:  has a past medical history of Arthritis, BPH (benign prostatic hyperplasia), Dysphagia, GERD (gastroesophageal reflux disease), Hearing loss, HLD (hyperlipidemia), Insomnia, MR (mental retardation), Periorbital cellulitis, SBO (small bowel obstruction) (HCC), Seizures (HCC), Ventral hernia, and Vitamin D deficiency.  Past Surgical History:  has a past surgical history that includes Abdomen surgery (, ); laparoscopy (14); laparotomy (14); cast application (Left); Hydrocele surgery (2016); Vagus nerve stimulator insertion; and Vagus nerve stimulator insertion (10/11/2016).    Assessment  Body Structures, Functions, Activity Limitations Requiring Skilled Therapeutic Intervention: Decreased functional mobility ;Decreased endurance;Decreased balance;Decreased tolerance to work activity;Decreased high-level IADLs;Decreased strength;Decreased safe awareness  Assessment: Pt is a 69 year old male that was referred to PT due to general weakness, decreased balance and requiring more assistance for transfers and ambulation. Pt will benefit from skilled PT in order to address these deficits  Treatment Diagnosis: general weakness  Specific Instructions for Next Treatment: 1x/daily on weekends and holidays  Therapy Prognosis: Fair  Decision Making: Medium Complexity  Requires PT Follow-Up: Yes  Activity Tolerance  Activity Tolerance: Patient tolerated evaluation without incident    Plan  Physical Therapy Plan  General Plan: 2 times a day 7 days a week  Specific Instructions for Next Treatment:

## 2025-02-16 NOTE — PROGRESS NOTES
RESPIRATORY ASSESSMENT PROTOCOL                                                                                              Patient Name: Jeermy King Room#: 0316/0316-01 : 1955     Admitting diagnosis: Small bowel obstruction (HCC) [K56.609]  Intestinal obstruction, unspecified cause, unspecified whether partial or complete (HCC) [K56.609]       Medical History:   Past Medical History:   Diagnosis Date    Arthritis     BPH (benign prostatic hyperplasia)     Dysphagia     GERD (gastroesophageal reflux disease)     Hearing loss     HLD (hyperlipidemia)     Insomnia     MR (mental retardation)     Periorbital cellulitis     SBO (small bowel obstruction) (HCC)     Seizures (HCC)     Epilepsy    Ventral hernia     Vitamin D deficiency        PATIENT ASSESSMENT    LABORATORY DATA  Hematology:   Lab Results   Component Value Date/Time    WBC 18.7 02/15/2025 12:15 PM    RBC 4.74 02/15/2025 12:15 PM    RBC 3.43 2011 11:48 AM    HGB 15.3 02/15/2025 12:15 PM    HCT 46.5 02/15/2025 12:15 PM     02/15/2025 12:15 PM     2011 11:48 AM     Chemistry:  No results found for: \"PHART\", \"MFI9QIV\", \"PO2ART\", \"W9MTMCYO\", \"JDK3JFI\", \"PBEA\", \"NBEA\"    VITALS  Pulse: (!) 111   Respirations: 25  BP: (!) 143/96  SpO2: 92 % O2 Device: Nasal cannula  Temp: 98.2 °F (36.8 °C)    SKIN COLOR  [x] Normal  [] Pale  [] Dusky  [] Cyanotic    RESPIRATORY PATTERN  [x] Normal  [] Dyspnea  [] Cheyne-Regalado  [] Kussmaul  [] Biots    AMBULATORY  [] Yes  [x] No  [x] With Assistance    Patient Acuity 0 1 2 3 4 Score   Level of Consciousness (LOC) [x]  Alert & Oriented or Pt normal LOC []  Confused;follows directions []  Confused & uncooper-ative []  Obtunded []  Comatose 0   Respiratory Rate  (RR) [x]  Reg. rate & pattern. 12 - 20 bpm  []  Increased RR. Greater than 20 bpm   []  SOB w/ exertion or RR greater than 24 bpm []  Access- ory muscle use at rest. Abn.  resp. []  SOB at rest.   0   Bilateral Breath Sounds (BBS)

## 2025-02-17 ENCOUNTER — APPOINTMENT (OUTPATIENT)
Dept: GENERAL RADIOLOGY | Age: 70
DRG: 389 | End: 2025-02-17
Payer: MEDICARE

## 2025-02-17 LAB
ANION GAP SERPL CALCULATED.3IONS-SCNC: 11 MMOL/L (ref 9–16)
BASOPHILS # BLD: 0.04 K/UL (ref 0–0.2)
BASOPHILS NFR BLD: 0 % (ref 0–2)
BUN SERPL-MCNC: 29 MG/DL (ref 8–23)
BUN/CREAT SERPL: 36 (ref 9–20)
CALCIUM SERPL-MCNC: 8.6 MG/DL (ref 8.6–10.4)
CHLORIDE SERPL-SCNC: 114 MMOL/L (ref 98–107)
CO2 SERPL-SCNC: 24 MMOL/L (ref 20–31)
CREAT SERPL-MCNC: 0.8 MG/DL (ref 0.7–1.2)
EOSINOPHIL # BLD: 0.09 K/UL (ref 0–0.44)
EOSINOPHILS RELATIVE PERCENT: 1 % (ref 1–4)
ERYTHROCYTE [DISTWIDTH] IN BLOOD BY AUTOMATED COUNT: 12.3 % (ref 11.8–14.4)
GFR, ESTIMATED: >90 ML/MIN/1.73M2
GLUCOSE SERPL-MCNC: 82 MG/DL (ref 74–99)
HCT VFR BLD AUTO: 38.1 % (ref 40.7–50.3)
HGB BLD-MCNC: 12.3 G/DL (ref 13–17)
IMM GRANULOCYTES # BLD AUTO: 0.04 K/UL (ref 0–0.3)
IMM GRANULOCYTES NFR BLD: 0 %
LYMPHOCYTES NFR BLD: 1.7 K/UL (ref 1.1–3.7)
LYMPHOCYTES RELATIVE PERCENT: 15 % (ref 24–43)
MCH RBC QN AUTO: 32.5 PG (ref 25.2–33.5)
MCHC RBC AUTO-ENTMCNC: 32.3 G/DL (ref 28.4–34.8)
MCV RBC AUTO: 100.5 FL (ref 82.6–102.9)
MONOCYTES NFR BLD: 0.9 K/UL (ref 0.1–1.2)
MONOCYTES NFR BLD: 8 % (ref 3–12)
NEUTROPHILS NFR BLD: 76 % (ref 36–65)
NEUTS SEG NFR BLD: 8.28 K/UL (ref 1.5–8.1)
NRBC BLD-RTO: 0 PER 100 WBC
PLATELET # BLD AUTO: 194 K/UL (ref 138–453)
PMV BLD AUTO: 9.2 FL (ref 8.1–13.5)
POTASSIUM SERPL-SCNC: 3.9 MMOL/L (ref 3.7–5.3)
RBC # BLD AUTO: 3.79 M/UL (ref 4.21–5.77)
SODIUM SERPL-SCNC: 149 MMOL/L (ref 136–145)
WBC OTHER # BLD: 11.1 K/UL (ref 3.5–11.3)

## 2025-02-17 PROCEDURE — 80048 BASIC METABOLIC PNL TOTAL CA: CPT

## 2025-02-17 PROCEDURE — 1200000000 HC SEMI PRIVATE

## 2025-02-17 PROCEDURE — 94664 DEMO&/EVAL PT USE INHALER: CPT

## 2025-02-17 PROCEDURE — 2580000003 HC RX 258

## 2025-02-17 PROCEDURE — 6360000004 HC RX CONTRAST MEDICATION: Performed by: INTERNAL MEDICINE

## 2025-02-17 PROCEDURE — 97110 THERAPEUTIC EXERCISES: CPT

## 2025-02-17 PROCEDURE — 74250 X-RAY XM SM INT 1CNTRST STD: CPT

## 2025-02-17 PROCEDURE — 6360000002 HC RX W HCPCS: Performed by: INTERNAL MEDICINE

## 2025-02-17 PROCEDURE — 2580000003 HC RX 258: Performed by: INTERNAL MEDICINE

## 2025-02-17 PROCEDURE — 94761 N-INVAS EAR/PLS OXIMETRY MLT: CPT

## 2025-02-17 PROCEDURE — 99222 1ST HOSP IP/OBS MODERATE 55: CPT | Performed by: STUDENT IN AN ORGANIZED HEALTH CARE EDUCATION/TRAINING PROGRAM

## 2025-02-17 PROCEDURE — 36415 COLL VENOUS BLD VENIPUNCTURE: CPT

## 2025-02-17 PROCEDURE — 2500000003 HC RX 250 WO HCPCS: Performed by: INTERNAL MEDICINE

## 2025-02-17 PROCEDURE — 85025 COMPLETE CBC W/AUTO DIFF WBC: CPT

## 2025-02-17 RX ORDER — DIATRIZOATE MEGLUMINE AND DIATRIZOATE SODIUM 660; 100 MG/ML; MG/ML
120 SOLUTION ORAL; RECTAL
Status: DISCONTINUED | OUTPATIENT
Start: 2025-02-17 | End: 2025-02-19 | Stop reason: HOSPADM

## 2025-02-17 RX ADMIN — SODIUM CHLORIDE, PRESERVATIVE FREE 10 ML: 5 INJECTION INTRAVENOUS at 07:02

## 2025-02-17 RX ADMIN — FAMOTIDINE 20 MG: 10 INJECTION, SOLUTION INTRAVENOUS at 20:43

## 2025-02-17 RX ADMIN — SODIUM CHLORIDE: 9 INJECTION, SOLUTION INTRAVENOUS at 14:52

## 2025-02-17 RX ADMIN — SODIUM CHLORIDE: 9 INJECTION, SOLUTION INTRAVENOUS at 01:32

## 2025-02-17 RX ADMIN — SODIUM CHLORIDE, PRESERVATIVE FREE 10 ML: 5 INJECTION INTRAVENOUS at 20:44

## 2025-02-17 RX ADMIN — KETOTIFEN FUMARATE 1 DROP: 0.25 SOLUTION/ DROPS OPHTHALMIC at 20:43

## 2025-02-17 RX ADMIN — KETOTIFEN FUMARATE 1 DROP: 0.25 SOLUTION/ DROPS OPHTHALMIC at 09:00

## 2025-02-17 RX ADMIN — LATANOPROST 1 DROP: 50 SOLUTION/ DROPS OPHTHALMIC at 20:44

## 2025-02-17 RX ADMIN — DIATRIZOATE MEGLUMINE AND DIATRIZOATE SODIUM 120 ML: 660; 100 LIQUID ORAL; RECTAL at 09:20

## 2025-02-17 RX ADMIN — ENOXAPARIN SODIUM 40 MG: 100 INJECTION SUBCUTANEOUS at 08:59

## 2025-02-17 RX ADMIN — PANTOPRAZOLE SODIUM 40 MG: 40 INJECTION, POWDER, FOR SOLUTION INTRAVENOUS at 07:02

## 2025-02-17 RX ADMIN — FAMOTIDINE 20 MG: 10 INJECTION, SOLUTION INTRAVENOUS at 08:59

## 2025-02-17 NOTE — PROGRESS NOTES
Physical Therapy  Facility/Department: [facility] MED SURG  Daily Treatment Note  NAME: Jeremy King  : 1955  MRN: [id number]    Date of Service: 2025    Discharge Recommendations:  Continue to assess pending progress        Patient Diagnosis(es): The encounter diagnosis was Intestinal obstruction, unspecified cause, unspecified whether partial or complete (HCC).    Assessment  Assessment: Pt in bed upon arrival. RN requested pt to stay in bed d/t erratic and spontaneous behavior being a safety concern in recliner. Completed supine BLE therex x10 in all planes, pt performing full ROM once demonstrated with PROM. Supine>sit modA with pt spontaneously throwing himself back and hitting head on bed rail (RN notified). Cues to maintain upright seated position constantly. Completed LAQ in seated position, one sit>stand with standing balance ~2 minutes with constant support. Pt is quick to sit and throws himself into supine position independently. ModA to reposition in bed. Will cont to progress as able.  Activity Tolerance: Treatment limited secondary to decreased cognition    Plan  Physical Therapy Plan  General Plan: 2 times a day 7 days a week  Specific Instructions for Next Treatment: 1x/daily on weekends and holidays  Current Treatment Recommendations: Strengthening;Home exercise program;Balance training;Gait training;Therapeutic activities;Safety education & training;Functional mobility training;Neuromuscular re-education;Patient/Caregiver education & training;Endurance training;Transfer training    Restrictions  Restrictions/Precautions  Restrictions/Precautions: Fall Risk, General Precautions  Required Braces or Orthoses?: No     Subjective   Subjective  Subjective: Pt. in bed upon arrival, agreeable to therapy at this time  Pain: denies    Objective  Vitals     Bed Mobility Training  Bed Mobility Training: Yes  Overall Level of Assistance: Partial/Moderate assistance  Interventions: Safety awareness

## 2025-02-17 NOTE — PROGRESS NOTES
RESPIRATORY ASSESSMENT PROTOCOL                                                                                              Patient Name: Jeremy King Room#: 0316/0316-01 : 1955     Admitting diagnosis: Small bowel obstruction (HCC) [K56.609]  Intestinal obstruction, unspecified cause, unspecified whether partial or complete (HCC) [K56.609]       Medical History:   Past Medical History:   Diagnosis Date    Arthritis     BPH (benign prostatic hyperplasia)     Dysphagia     GERD (gastroesophageal reflux disease)     Hearing loss     HLD (hyperlipidemia)     Insomnia     MR (mental retardation)     Periorbital cellulitis     SBO (small bowel obstruction) (HCC)     Seizures (HCC)     Epilepsy    Ventral hernia     Vitamin D deficiency        PATIENT ASSESSMENT    LABORATORY DATA  Hematology:   Lab Results   Component Value Date/Time    WBC 11.1 2025 06:20 AM    RBC 3.79 2025 06:20 AM    RBC 3.43 2011 11:48 AM    HGB 12.3 2025 06:20 AM    HCT 38.1 2025 06:20 AM     2025 06:20 AM     2011 11:48 AM     Chemistry:  No results found for: \"PHART\", \"LWR0TOI\", \"PO2ART\", \"T8AEOXVT\", \"FRG4CAK\", \"PBEA\", \"NBEA\"    VITALS  Pulse: 87   Respirations: 20  BP: 123/73  SpO2: 92 % O2 Device: None (Room air)  Temp: 98.5 °F (36.9 °C)    SKIN COLOR  [x] Normal  [] Pale  [] Dusky  [] Cyanotic    RESPIRATORY PATTERN  [x] Normal  [] Dyspnea  [] Cheyne-Regalado  [] Kussmaul  [] Biots    AMBULATORY  [] Yes  [] No  [x] With Assistance          Patient Acuity 0 1 2 3 4 Score   Level of Consciousness (LOC) []  Alert & Oriented or Pt normal LOC [x]  Confused;follows directions []  Confused & uncooper-ative []  Obtunded []  Comatose 1   Respiratory Rate  (RR) [x]  Reg. rate & pattern. 12 - 20 bpm  []  Increased RR. Greater than 20 bpm   []  SOB w/ exertion or RR greater than 24 bpm []  Access- ory muscle use at rest. Abn.  resp. []  SOB at rest.   0   Bilateral Breath Sounds (BBS)

## 2025-02-17 NOTE — PROGRESS NOTES
Krishna Shah M.D.  Internal Medicine Progress Note    Patient: Jeremy King  Date of Admission: 2/15/2025  9:01 AM  Date of Evaluation: 2/17/2025      Subjective:  Jeremy King is a 69 y.o. male who is seen for f/u SBO.  Pain is controlled.  NGT remains in place.  He denies any flatus or stool yet.        Review of Systems:  Constitutional:negative  for fevers, and negative for chills.  Respiratory: negative for shortness of breath, negative for cough, and negative for wheezing  Cardiovascular: negative for chest pain, negative for palpitations, and negative for syncope  Gastrointestinal: negative for abdominal pain, negative for nausea,negative for vomiting, negative for diarrhea, negative for constipation, and negative for hematochezia or melena  Genitourinary: negative for dysuria, negative for urinary urgency, negative for urinary frequency, and negative for hematuria  Neurological: negative for unilateral weakness, numbness or tingling.    All other systems were reviewed with the patient and are negative except as stated above      VITALS:  Temp: 97.8 °F (36.6 °C)  BP: (!) 155/79  Respirations: 20  Pulse: 86  SpO2: 95 %    Weight  Wt Readings from Last 3 Encounters:   02/17/25 91.4 kg (201 lb 6.4 oz)   12/05/24 89.6 kg (197 lb 8 oz)   05/21/24 79.4 kg (175 lb)     Body mass index is 28.9 kg/m².  -----------------------------------------------------------------  EXAM:  GEN:    Awake and alert.  Disoriented at baseline.    EYES:  EOMI, pupils equal   NECK: Supple. No lymphadenopathy.  No carotid bruit  CVS:    regular rate and rhythm, no audible murmur  PULM:  CTA, no wheezes, rales or rhonchi, no acute respiratory distress  ABD:    Bowels sounds quiet.  Abdomen is soft.  No distention.  no tenderness to palpation.   EXT:   no edema bilaterally .  No calf tenderness.   NEURO: Moves all extremities.  Motor and sensory are grossly intact  SKIN:  No rashes.  No skin lesions.

## 2025-02-17 NOTE — PROGRESS NOTES
Occupational Therapy  Facility/Department: [facility] MED SURG  Daily Treatment Note  NAME: Jeremy King  : 1955  MRN: [id number]    Date of Service: 2025    Discharge Recommendations:  Continue to assess pending progress, 24 hour supervision or assist, Long Term Care with OT, Subacute/Skilled Nursing Facility         Patient Diagnosis(es): The encounter diagnosis was Intestinal obstruction, unspecified cause, unspecified whether partial or complete (HCC).     Assessment   Assessment: Pt in bed upon arrival with being very confused and disoriented x4  Activity Tolerance: Treatment limited secondary to decreased cognition;Treatment limited secondary to agitation  Discharge Recommendations: Continue to assess pending progress;24 hour supervision or assist;Long Term Care with OT;Subacute/Skilled Nursing Facility     Plan  Occupational Therapy Plan  Times Per Day: Once a day  Current Treatment Recommendations: Strengthening;ROM;Balance training;Functional mobility training;Endurance training;Safety education & training;Patient/Caregiver education & training;Self-Care / ADL    Restrictions   Disoriented x4, Fall risk.     Subjective  Subjective  Subjective: Pt lying in bed upon arrival. PtNuring and writer checked brief, no incontience. Attempted AAROM with patient, patient squeezing therapists hands very hard throughout.  Pain: Unable to verbalized  Orientation  Overall Orientation Status: Impaired  Orientation Level: Disoriented X4            Objective  Vitals     Bed Mobility Training  Bed Mobility Training: No  Transfer Training  Transfer Training: No  Gait  Gait Training: No        OT Exercises  Exercise Treatment: Attempted AAROM with LUE x 3 planes x 15 reps with patient guarding throughout and squeezing therapists hands very hard throughout session. Maxcueing needed to participate     Safety Devices  Type of Devices: Nurse notified;Call light within reach;All fall risk precautions in place;Patient at

## 2025-02-17 NOTE — PROGRESS NOTES
PT suction canister emptied, contents charted see flowsheets. Pericare preformed with the assistance of PCT, PT cooperative to care. PCT preformed oral hygiene and assisted with positioning PT comfortably. Call light in reach, tele-sitter active, care on-going.

## 2025-02-17 NOTE — PROGRESS NOTES
Patient pulled his right hand IV out at this time. Adrienne RN and Aicha RN to bedside and Adrienne RN started new IV to left upper forearm. IV fluids restarted through new IV. Care ongoing.

## 2025-02-17 NOTE — CARE COORDINATION
Case Management Assessment  Initial Evaluation    Date/Time of Evaluation: 2/17/2025 3:28 PM  Assessment Completed by: PITER Maria    If patient is discharged prior to next notation, then this note serves as note for discharge by case management.    Patient Name: Jeremy King                   YOB: 1955  Diagnosis: Small bowel obstruction (HCC) [K56.609]  Intestinal obstruction, unspecified cause, unspecified whether partial or complete (HCC) [K56.609]                   Date / Time: 2/15/2025  9:01 AM    Patient Admission Status: Inpatient   Readmission Risk (Low < 19, Mod (19-27), High > 27): Readmission Risk Score: 24.1    Current PCP: Sukumar Larose MD  PCP verified by CM? Yes    Chart Reviewed: Yes      History Provided by: Child/Family  Patient Orientation: Alert and Oriented    Patient Cognition: Long Term Memory Deficit    Hospitalization in the last 30 days (Readmission):  No    If yes, Readmission Assessment in  Navigator will be completed.    Advance Directives:      Code Status: Full Code   Patient's Primary Decision Maker is: Named in Scanned ACP Document    Primary Decision Maker: Esther Denny - Legal Guardian, Legal Guardian - [phone]    Discharge Planning:    Patient lives with: Other (Comment) Type of Home: Group Home  Primary Care Giver: Other (Comment) (group home)  Patient Support Systems include: Family Members, /, Other (Comment) (group home)   Current Financial resources: Medicaid, Medicare  Current community resources: Other (Comment), Transportation (Group home)  Current services prior to admission: Durable Medical Equipment, Extended Care Facility            Current DME: Wheelchair            Type of Home Care services:  Aide Services, Nursing Services    ADLS  Prior functional level: Assistance with the following:, Bathing, Dressing, Toileting, Cooking, Housework, Shopping, Mobility  Current functional level:

## 2025-02-17 NOTE — PROGRESS NOTES
Writer at bedside for shift assessment. PT A&O to self only, they are able to answer questions and follow commands appropriately. Vitals and assessment completed as charted, see flowsheets. PT denies any other needs at this time and is resting comfortably. Call light in reach, bed in the lowest position and locked, care on-going.

## 2025-02-17 NOTE — PLAN OF CARE
Problem: Safety - Adult  Goal: Free from fall injury  2/16/2025 2202 by Brinda Quintana, RN  Outcome: Progressing  2/16/2025 1142 by Justyna Escoto, RN  Outcome: Progressing     Problem: Discharge Planning  Goal: Discharge to home or other facility with appropriate resources  2/16/2025 2202 by Brinda Quintana, RN  Outcome: Progressing  Flowsheets (Taken 2/16/2025 2202)  Discharge to home or other facility with appropriate resources: Identify barriers to discharge with patient and caregiver  2/16/2025 1142 by Justyna Escoto, RN  Outcome: Progressing

## 2025-02-17 NOTE — PROGRESS NOTES
Physical Therapy  Facility/Department: [facility] MED SURG  Daily Treatment Note  NAME: Jeremy King  : 1955  MRN: [id number]    Date of Service: 2025    Discharge Recommendations:  Continue to assess pending progress     Patient Diagnosis(es): The encounter diagnosis was Intestinal obstruction, unspecified cause, unspecified whether partial or complete (HCC).    Assessment  Assessment: Pt. in bed upon arrival, requested to keep pt. in bed at this time d/t having imaging this morning. Pt. completed supine BLE therex x10 in all planes. Pt. is resistive with attempts of PROM for completion of full ROM. Will attempt to transfer pt. later this afternoon if able. Will continue to progress as tolerated.  Activity Tolerance: Patient tolerated treatment well;Treatment limited secondary to decreased cognition    Plan  Physical Therapy Plan  General Plan: 2 times a day 7 days a week  Specific Instructions for Next Treatment: 1x/daily on weekends and holidays  Current Treatment Recommendations: Strengthening;Home exercise program;Balance training;Gait training;Therapeutic activities;Safety education & training;Functional mobility training;Neuromuscular re-education;Patient/Caregiver education & training;Endurance training;Transfer training    Restrictions  Restrictions/Precautions  Restrictions/Precautions: Fall Risk, General Precautions  Required Braces or Orthoses?: No     Subjective   Subjective  Subjective: Pt. in bed upon arrival, agreeable to therapy at this time  Pain: denies    Objective  Bed Mobility Training  Bed Mobility Training: No  Transfer Training  Transfer Training: No  Gait  Gait Training: No  PT Exercises  Exercise Treatment: Supine BLE x15 in all available planes of motion  Safety Devices  Type of Devices: Nurse notified;Call light within reach;All fall risk precautions in place;Patient at risk for falls;Bed alarm in place;Left in bed      Goals  Short Term Goals  Time Frame for Short Term Goals:

## 2025-02-17 NOTE — PROGRESS NOTES
Shift assessment and vitals obtained at this time as charted. Blood pressure slightly elevated, vitals otherwise WNL. Patient denies pain at this time. Patient is oriented to person only. Rhonchi heard throughout lung fields, HOB was elevated. Respirations unlabored. Abdomen rounded and distended with hypoactive bowel sounds throughout. No guarding noted to abdomen. NG tube remains in place and hooked up to low intermittent suction. Non-pitting edema noted to BLE. Assessment otherwise as charted, see flowsheets. Brief also changed at this time and josh-care provided due to incontinence of urine. Patient is resting in the bed with bed alarm on and call light in reach, denies other needs at this time. Care ongoing.

## 2025-02-17 NOTE — PROGRESS NOTES
Comprehensive Nutrition Assessment    Type and Reason for Visit:  Initial    Nutrition Recommendations/Plan:   Continue NPO diet.   Progress to puree with honey thickened liquids diet.      Malnutrition Assessment:  Malnutrition Status:  At risk for malnutrition (02/17/25 0946)    Context:  Acute Illness     Findings of the 6 clinical characteristics of malnutrition:  Energy Intake:  Mild decrease in energy intake (NPO  since admission x 3 days)  Weight Loss:  No weight loss     Body Fat Loss:  No body fat loss     Muscle Mass Loss:  No muscle mass loss    Fluid Accumulation:  Mild Extremities (BLE non pitting edema)   Strength:  Not Performed    Nutrition Assessment:    Inadequate oral intakes r/t altered GI function aeb NPO. Pt with NGT, Pt states he \"feels fine.\" Historically he was on a puree with honey thick liquids diet at last admission. Education is not appropriate.    Nutrition Related Findings:    hypoactive bowel sounds, non pitting BLE edema Wound Type: None       Current Nutrition Intake & Therapies:    Average Meal Intake: NPO  Average Supplements Intake: NPO  Diet NPO    Anthropometric Measures:  Height: 177.8 cm (5' 10\")  Ideal Body Weight (IBW): 166 lbs (75 kg)    Admission Body Weight: 91.1 kg (200 lb 14 oz)  Current Body Weight: 91.4 kg (201 lb 8 oz), 121.4 % IBW. Weight Source: Bed scale  Current BMI (kg/m2): 28.9  Usual Body Weight: 74.9 kg (165 lb 2 oz)     % Weight Change (Calculated): 22  Weight Adjustment For: No Adjustment                 BMI Categories: Overweight (BMI 25.0-29.9)    Estimated Daily Nutrient Needs:  Energy Requirements Based On: Kcal/kg  Weight Used for Energy Requirements: Current  Energy (kcal/day): 0090-4561 (20-23/kg)  Weight Used for Protein Requirements: Ideal  Protein (g/day): 90-105g (1.2-1.4g/kg)  Method Used for Fluid Requirements: 1 ml/kcal  Fluid (ml/day): 2,100 ml    Hematology:  Recent Labs     02/15/25  1215 02/16/25  0645 02/17/25  0620   WBC 18.7* 12.5*

## 2025-02-17 NOTE — PLAN OF CARE
Problem: Safety - Adult  Goal: Free from fall injury  2/17/2025 0938 by Matilde Villa, RN  Outcome: Progressing     Problem: Discharge Planning  Goal: Discharge to home or other facility with appropriate resources  2/17/2025 0938 by Matilde Villa, RN  Outcome: Progressing     Problem: Confusion  Goal: Confusion, delirium, dementia, or psychosis is improved or at baseline  Description: INTERVENTIONS:  1. Assess for possible contributors to thought disturbance, including medications, impaired vision or hearing, underlying metabolic abnormalities, dehydration, psychiatric diagnoses, and notify attending LIP  2. [place] high risk fall precautions, as indicated  3. Provide frequent short contacts to provide reality reorientation, refocusing and direction  4. Decrease environmental stimuli, including noise as appropriate  5. Monitor and intervene to maintain adequate nutrition, hydration, elimination, sleep and activity  6. If unable to ensure safety without constant attention obtain sitter and review sitter guidelines with assigned personnel  7. Initiate Psychosocial CNS and Spiritual Care consult, as indicated  2/17/2025 0938 by Matilde Villa, RN  Outcome: Progressing     Problem: Skin/Tissue Integrity  Goal: Skin integrity remains intact  Description: 1.  Monitor for areas of redness and/or skin breakdown  2.  Assess vascular access sites hourly  3.  Every 4-6 hours minimum:  Change oxygen saturation probe site  4.  Every 4-6 hours:  If on nasal continuous positive airway pressure, respiratory therapy assess nares and determine need for appliance change or resting period  Outcome: Progressing     Problem: ABCDS Injury Assessment  Goal: Absence of physical injury  Outcome: Progressing

## 2025-02-17 NOTE — PROGRESS NOTES
Writer received call from radiology at this time who states that patient will need to have another x-ray taken at 2000 tonight so patient will have to be disconnected from his NG tube until then.

## 2025-02-17 NOTE — CONSULTS
General Surgery:  Consult Note        PATIENT NAME: Jeremy King   YOB: 1955    ADMISSION DATE: 2/15/2025  9:01 AM      TODAY'S DATE: 2/17/2025    Chief Complaint: Emesis  Consult Regarding: Concern for SBO    HISTORY OF PRESENT ILLNESS:  The patient is a 69 y.o. male  who presented to the emergency department with emesis.  Patient had lab work which showed leukocytosis of 18 and a CT scan which showed dilated loops of small bowel without true transition point.  There is some dilated proximal colon with an abrupt change in caliber but no mass seen.  Patient had NG tube placed and was admitted to the hospital for further management.  General surgery was consulted for the CT scan findings.  Patient is well-known to the hospital in the general surgery service.  He has been admitted numerous times for small bowel obstructions which recently have mostly been managed with NG tube decompression and small bowel follow-through.  Patient has history of multiple exploratory laparotomies with partial colectomy and small bowel resection.    Patient had small bowel follow-through started this morning.  NG tube has been clamped.  Initial images show contrast progressing through small bowel.  No significant colonic dilation.  There appears to be gas throughout the colon as well.    Jeremy is oriented to himself.  Denies any pain or nausea at this time.  Denies any flatus or BM.      Past Medical History:        Diagnosis Date    Arthritis     BPH (benign prostatic hyperplasia)     Dysphagia     GERD (gastroesophageal reflux disease)     Hearing loss     HLD (hyperlipidemia)     Insomnia     MR (mental retardation)     Periorbital cellulitis     SBO (small bowel obstruction) (HCC)     Seizures (HCC)     Epilepsy    Ventral hernia     Vitamin D deficiency        Past Surgical History:        Procedure Laterality Date    ABDOMEN SURGERY  2013, 2014    Bowel Obstruction X2    CAST APPLICATION Left     Lt. Ankle , X3

## 2025-02-18 PROBLEM — K56.609 SBO (SMALL BOWEL OBSTRUCTION) (HCC): Status: RESOLVED | Noted: 2023-04-19 | Resolved: 2025-02-18

## 2025-02-18 PROBLEM — K56.609 BOWEL OBSTRUCTION (HCC): Status: RESOLVED | Noted: 2024-12-01 | Resolved: 2025-02-18

## 2025-02-18 LAB
ANION GAP SERPL CALCULATED.3IONS-SCNC: 13 MMOL/L (ref 9–16)
BASOPHILS # BLD: 0.03 K/UL (ref 0–0.2)
BASOPHILS NFR BLD: 0 % (ref 0–2)
BUN SERPL-MCNC: 26 MG/DL (ref 8–23)
BUN/CREAT SERPL: 33 (ref 9–20)
CALCIUM SERPL-MCNC: 8.7 MG/DL (ref 8.6–10.4)
CHLORIDE SERPL-SCNC: 118 MMOL/L (ref 98–107)
CO2 SERPL-SCNC: 22 MMOL/L (ref 20–31)
CREAT SERPL-MCNC: 0.8 MG/DL (ref 0.7–1.2)
EOSINOPHIL # BLD: 0.06 K/UL (ref 0–0.44)
EOSINOPHILS RELATIVE PERCENT: 1 % (ref 1–4)
ERYTHROCYTE [DISTWIDTH] IN BLOOD BY AUTOMATED COUNT: 12.3 % (ref 11.8–14.4)
GFR, ESTIMATED: >90 ML/MIN/1.73M2
GLUCOSE SERPL-MCNC: 79 MG/DL (ref 74–99)
HCT VFR BLD AUTO: 37.2 % (ref 40.7–50.3)
HGB BLD-MCNC: 11.9 G/DL (ref 13–17)
IMM GRANULOCYTES # BLD AUTO: <0.03 K/UL (ref 0–0.3)
IMM GRANULOCYTES NFR BLD: 0 %
LYMPHOCYTES NFR BLD: 1.35 K/UL (ref 1.1–3.7)
LYMPHOCYTES RELATIVE PERCENT: 14 % (ref 24–43)
MCH RBC QN AUTO: 32.8 PG (ref 25.2–33.5)
MCHC RBC AUTO-ENTMCNC: 32 G/DL (ref 28.4–34.8)
MCV RBC AUTO: 102.5 FL (ref 82.6–102.9)
MONOCYTES NFR BLD: 0.88 K/UL (ref 0.1–1.2)
MONOCYTES NFR BLD: 9 % (ref 3–12)
NEUTROPHILS NFR BLD: 76 % (ref 36–65)
NEUTS SEG NFR BLD: 7.52 K/UL (ref 1.5–8.1)
NRBC BLD-RTO: 0 PER 100 WBC
PLATELET # BLD AUTO: 190 K/UL (ref 138–453)
PMV BLD AUTO: 9.5 FL (ref 8.1–13.5)
POTASSIUM SERPL-SCNC: 3.8 MMOL/L (ref 3.7–5.3)
RBC # BLD AUTO: 3.63 M/UL (ref 4.21–5.77)
SODIUM SERPL-SCNC: 153 MMOL/L (ref 136–145)
WBC OTHER # BLD: 9.9 K/UL (ref 3.5–11.3)

## 2025-02-18 PROCEDURE — 80048 BASIC METABOLIC PNL TOTAL CA: CPT

## 2025-02-18 PROCEDURE — 97110 THERAPEUTIC EXERCISES: CPT

## 2025-02-18 PROCEDURE — 94761 N-INVAS EAR/PLS OXIMETRY MLT: CPT

## 2025-02-18 PROCEDURE — 2580000003 HC RX 258

## 2025-02-18 PROCEDURE — 36415 COLL VENOUS BLD VENIPUNCTURE: CPT

## 2025-02-18 PROCEDURE — 97530 THERAPEUTIC ACTIVITIES: CPT

## 2025-02-18 PROCEDURE — 2580000003 HC RX 258: Performed by: INTERNAL MEDICINE

## 2025-02-18 PROCEDURE — 1200000000 HC SEMI PRIVATE

## 2025-02-18 PROCEDURE — 99232 SBSQ HOSP IP/OBS MODERATE 35: CPT | Performed by: STUDENT IN AN ORGANIZED HEALTH CARE EDUCATION/TRAINING PROGRAM

## 2025-02-18 PROCEDURE — 6370000000 HC RX 637 (ALT 250 FOR IP): Performed by: NURSE PRACTITIONER

## 2025-02-18 PROCEDURE — 6360000002 HC RX W HCPCS: Performed by: INTERNAL MEDICINE

## 2025-02-18 PROCEDURE — 2500000003 HC RX 250 WO HCPCS: Performed by: INTERNAL MEDICINE

## 2025-02-18 PROCEDURE — 85025 COMPLETE CBC W/AUTO DIFF WBC: CPT

## 2025-02-18 RX ORDER — LAMOTRIGINE 100 MG/1
600 TABLET ORAL NIGHTLY
Status: DISCONTINUED | OUTPATIENT
Start: 2025-02-18 | End: 2025-02-19 | Stop reason: HOSPADM

## 2025-02-18 RX ORDER — MECLIZINE HYDROCHLORIDE 25 MG/1
25 TABLET ORAL DAILY
Status: DISCONTINUED | OUTPATIENT
Start: 2025-02-18 | End: 2025-02-19 | Stop reason: HOSPADM

## 2025-02-18 RX ORDER — SENNA AND DOCUSATE SODIUM 50; 8.6 MG/1; MG/1
2 TABLET, FILM COATED ORAL DAILY
Status: DISCONTINUED | OUTPATIENT
Start: 2025-02-18 | End: 2025-02-19 | Stop reason: HOSPADM

## 2025-02-18 RX ORDER — FINASTERIDE 5 MG/1
5 TABLET, FILM COATED ORAL NIGHTLY
Status: DISCONTINUED | OUTPATIENT
Start: 2025-02-18 | End: 2025-02-19 | Stop reason: HOSPADM

## 2025-02-18 RX ORDER — LORAZEPAM 0.5 MG/1
0.5 TABLET ORAL EVERY 8 HOURS PRN
Status: DISCONTINUED | OUTPATIENT
Start: 2025-02-18 | End: 2025-02-19 | Stop reason: HOSPADM

## 2025-02-18 RX ORDER — PRIMIDONE 250 MG/1
250 TABLET ORAL EVERY MORNING
Status: DISCONTINUED | OUTPATIENT
Start: 2025-02-19 | End: 2025-02-19 | Stop reason: HOSPADM

## 2025-02-18 RX ORDER — LANOLIN ALCOHOL/MO/W.PET/CERES
400 CREAM (GRAM) TOPICAL DAILY
Status: DISCONTINUED | OUTPATIENT
Start: 2025-02-18 | End: 2025-02-19 | Stop reason: HOSPADM

## 2025-02-18 RX ORDER — LEVETIRACETAM 500 MG/1
2000 TABLET ORAL NIGHTLY
Status: DISCONTINUED | OUTPATIENT
Start: 2025-02-18 | End: 2025-02-19 | Stop reason: HOSPADM

## 2025-02-18 RX ORDER — PANTOPRAZOLE SODIUM 40 MG/1
40 TABLET, DELAYED RELEASE ORAL
Status: DISCONTINUED | OUTPATIENT
Start: 2025-02-19 | End: 2025-02-19 | Stop reason: HOSPADM

## 2025-02-18 RX ORDER — LAMOTRIGINE 100 MG/1
400 TABLET ORAL EVERY MORNING
Status: DISCONTINUED | OUTPATIENT
Start: 2025-02-19 | End: 2025-02-19 | Stop reason: HOSPADM

## 2025-02-18 RX ORDER — TAMSULOSIN HYDROCHLORIDE 0.4 MG/1
0.4 CAPSULE ORAL 2 TIMES DAILY
Status: DISCONTINUED | OUTPATIENT
Start: 2025-02-18 | End: 2025-02-19 | Stop reason: HOSPADM

## 2025-02-18 RX ORDER — LACOSAMIDE 50 MG/1
150 TABLET ORAL 2 TIMES DAILY
Status: DISCONTINUED | OUTPATIENT
Start: 2025-02-18 | End: 2025-02-19 | Stop reason: HOSPADM

## 2025-02-18 RX ORDER — LANOLIN ALCOHOL/MO/W.PET/CERES
1000 CREAM (GRAM) TOPICAL DAILY
Status: DISCONTINUED | OUTPATIENT
Start: 2025-02-18 | End: 2025-02-19 | Stop reason: HOSPADM

## 2025-02-18 RX ORDER — LEVETIRACETAM 500 MG/1
1500 TABLET ORAL DAILY
Status: DISCONTINUED | OUTPATIENT
Start: 2025-02-18 | End: 2025-02-19 | Stop reason: HOSPADM

## 2025-02-18 RX ORDER — PRIMIDONE 250 MG/1
375 TABLET ORAL NIGHTLY
Status: DISCONTINUED | OUTPATIENT
Start: 2025-02-18 | End: 2025-02-19 | Stop reason: HOSPADM

## 2025-02-18 RX ORDER — VITAMIN B COMPLEX
1000 TABLET ORAL DAILY
Status: DISCONTINUED | OUTPATIENT
Start: 2025-02-18 | End: 2025-02-19 | Stop reason: HOSPADM

## 2025-02-18 RX ORDER — HYDROXYZINE HYDROCHLORIDE 25 MG/1
25 TABLET, FILM COATED ORAL EVERY 12 HOURS PRN
Status: DISCONTINUED | OUTPATIENT
Start: 2025-02-18 | End: 2025-02-19 | Stop reason: HOSPADM

## 2025-02-18 RX ADMIN — SODIUM CHLORIDE, PRESERVATIVE FREE 10 ML: 5 INJECTION INTRAVENOUS at 20:20

## 2025-02-18 RX ADMIN — ENOXAPARIN SODIUM 40 MG: 100 INJECTION SUBCUTANEOUS at 08:41

## 2025-02-18 RX ADMIN — TAMSULOSIN HYDROCHLORIDE 0.4 MG: 0.4 CAPSULE ORAL at 20:21

## 2025-02-18 RX ADMIN — LACOSAMIDE 150 MG: 50 TABLET, FILM COATED ORAL at 20:22

## 2025-02-18 RX ADMIN — LEVETIRACETAM 1500 MG: 500 TABLET, FILM COATED ORAL at 17:48

## 2025-02-18 RX ADMIN — SODIUM CHLORIDE: 9 INJECTION, SOLUTION INTRAVENOUS at 05:33

## 2025-02-18 RX ADMIN — KETOTIFEN FUMARATE 1 DROP: 0.25 SOLUTION/ DROPS OPHTHALMIC at 20:27

## 2025-02-18 RX ADMIN — LAMOTRIGINE 600 MG: 100 TABLET ORAL at 20:21

## 2025-02-18 RX ADMIN — Medication 400 MG: at 17:48

## 2025-02-18 RX ADMIN — PANTOPRAZOLE SODIUM 40 MG: 40 INJECTION, POWDER, FOR SOLUTION INTRAVENOUS at 06:55

## 2025-02-18 RX ADMIN — MECLIZINE HYDROCHLORIDE 25 MG: 25 TABLET ORAL at 17:48

## 2025-02-18 RX ADMIN — PRIMIDONE 375 MG: 250 TABLET ORAL at 20:21

## 2025-02-18 RX ADMIN — Medication 1000 UNITS: at 17:48

## 2025-02-18 RX ADMIN — LEVETIRACETAM 2000 MG: 500 TABLET, FILM COATED ORAL at 20:21

## 2025-02-18 RX ADMIN — FAMOTIDINE 20 MG: 10 INJECTION, SOLUTION INTRAVENOUS at 08:41

## 2025-02-18 RX ADMIN — SODIUM CHLORIDE, PRESERVATIVE FREE 10 ML: 5 INJECTION INTRAVENOUS at 08:41

## 2025-02-18 RX ADMIN — FINASTERIDE 5 MG: 5 TABLET, FILM COATED ORAL at 20:22

## 2025-02-18 RX ADMIN — CYANOCOBALAMIN TAB 1000 MCG 1000 MCG: 1000 TAB at 17:48

## 2025-02-18 RX ADMIN — LATANOPROST 1 DROP: 50 SOLUTION/ DROPS OPHTHALMIC at 20:27

## 2025-02-18 RX ADMIN — KETOTIFEN FUMARATE 1 DROP: 0.25 SOLUTION/ DROPS OPHTHALMIC at 08:41

## 2025-02-18 NOTE — PROGRESS NOTES
Physical Therapy  Facility/Department: [facility] MED SURG  Daily Treatment Note  NAME: Jeremy King  : 1955  MRN: [id number]    Date of Service: 2025    Discharge Recommendations:  Continue to assess pending progress        Patient Diagnosis(es): The encounter diagnosis was Intestinal obstruction, unspecified cause, unspecified whether partial or complete (HCC).    Assessment  Assessment: Nurse present throughout treatment for assistance for chair to bed transfers. Transfers: Max A x 3. Bed Mobility: Mod-Max A x 2 for rolling and scooting. PROM supine BLE exercises x 15 repetitions.  Activity Tolerance: Treatment limited secondary to decreased cognition    Plan  Physical Therapy Plan  General Plan: 2 times a day 7 days a week  Specific Instructions for Next Treatment: 1x/daily on weekends and holidays  Current Treatment Recommendations: Strengthening;Home exercise program;Balance training;Gait training;Therapeutic activities;Safety education & training;Functional mobility training;Neuromuscular re-education;Patient/Caregiver education & training;Endurance training;Transfer training    Restrictions  Restrictions/Precautions  Restrictions/Precautions: Fall Risk, General Precautions  Required Braces or Orthoses?: No     Subjective   Subjective  Subjective: Pt. in chair upon arrival with nurse present, agreeable to therapy at this time  Pain: denies    Objective  Bed Mobility Training  Bed Mobility Training: Yes  Overall Level of Assistance: Substantial/Maximal assistance  Interventions: Safety awareness training;Verbal cues  Rolling: Substantial/Maximal assistance;2 Person assistance  Supine to Sit: 2 Person assistance;Substantial/Maximal assistance  Sit to Supine: Substantial/Maximal assistance;2 Person assistance  Scooting: Partial/Moderate assistance  Balance  Sitting: Impaired  Sitting - Static: Poor (constant support)  Sitting - Dynamic: Poor (constant support)  Standing: Impaired  Standing - Static:

## 2025-02-18 NOTE — PROGRESS NOTES
General Surgery:  Daily Progress Note                   PATIENT NAME: Jeremy King     TODAY'S DATE: 2/18/2025, 9:35 AM  CC:  Denies abdominal pain    SUBJECTIVE:     Pt seen and examined at bedside this AM.  No acute events overnight.  Denies nausea or emesis.  NG tube came out this morning.  Having bowel function.  Small bowel follow-through shows contrast in the distal colon and rectum.    OBJECTIVE:   VITALS:  /73   Pulse 70   Temp 98.6 °F (37 °C) (Temporal)   Resp 20   Ht 1.778 m (5' 10\")   Wt 89.5 kg (197 lb 4.8 oz) Comment: Bed not zero  SpO2 93%   BMI 28.31 kg/m²      INTAKE/OUTPUT:      Intake/Output Summary (Last 24 hours) at 2/18/2025 0935  Last data filed at 2/18/2025 0904  Gross per 24 hour   Intake 2549.23 ml   Output --   Net 2549.23 ml       PHYSICAL EXAM:  General Appearance: awake, alert, oriented to himself, in no acute distress  HEENT:  Normocephalic, atraumatic, mucus membranes moist   Heart: Regular rate and rhythm  Lungs: Equal chest rise bilaterally, no accessory muscle use  Abdomen: Soft, nontender, minimally distended  Extremities: No cyanosis, pitting edema, rashes noted.    Skin: Skin color, texture, turgor normal. No rashes or lesions.    Data:  CBC with Differential:    Lab Results   Component Value Date/Time    WBC 9.9 02/18/2025 05:10 AM    RBC 3.63 02/18/2025 05:10 AM    RBC 3.43 08/29/2011 11:48 AM    HGB 11.9 02/18/2025 05:10 AM    HCT 37.2 02/18/2025 05:10 AM     02/18/2025 05:10 AM     08/29/2011 11:48 AM    .5 02/18/2025 05:10 AM    MCH 32.8 02/18/2025 05:10 AM    MCHC 32.0 02/18/2025 05:10 AM    RDW 12.3 02/18/2025 05:10 AM    LYMPHOPCT 14 02/18/2025 05:10 AM    MONOPCT 9 02/18/2025 05:10 AM    EOSPCT 1 02/18/2025 05:10 AM    BASOPCT 0 02/18/2025 05:10 AM    MONOSABS 0.88 02/18/2025 05:10 AM    LYMPHSABS 1.35 02/18/2025 05:10 AM    EOSABS 0.06 02/18/2025 05:10 AM    BASOSABS 0.03 02/18/2025 05:10 AM    DIFFTYPE NOT REPORTED 02/09/2022 07:30 AM

## 2025-02-18 NOTE — PROGRESS NOTES
Physical Therapy  Facility/Department: [facility] MED SURG  Daily Treatment Note  NAME: Jeremy King  : 1955  MRN: [id number]    Date of Service: 2025    Discharge Recommendations:  Continue to assess pending progress      Patient Diagnosis(es): The encounter diagnosis was Intestinal obstruction, unspecified cause, unspecified whether partial or complete (HCC).    Assessment  Assessment: Nurse present throughout treatment for assistance with transfers. Pt bed mobility maxAx2 for bed mobiilty for hygeine with cues for hand placement. Pt required ModA with Supine to sit transfer with hand placement cues. Pt ModAx2 with STS transfer from bed to chair but max A x2 with SPT. Pt required maxAx2 for boost in chair. pt completed reclined B LE exer x10 with therapist encouragement for full ROM.    Plan  Physical Therapy Plan  General Plan: 2 times a day 7 days a week  Specific Instructions for Next Treatment: 1x/daily on weekends and holidays  Current Treatment Recommendations: Strengthening;Home exercise program;Balance training;Gait training;Therapeutic activities;Safety education & training;Functional mobility training;Neuromuscular re-education;Patient/Caregiver education & training;Endurance training;Transfer training    Restrictions  Restrictions/Precautions  Restrictions/Precautions: Fall Risk, General Precautions  Required Braces or Orthoses?: No     Subjective   Subjective  Subjective: Pt. in bed upon arrival, agreeable to therapy at this time  Pain: denies    Objective  Bed Mobility Training  Bed Mobility Training: Yes  Overall Level of Assistance: Substantial/Maximal assistance;Partial/Moderate assistance  Interventions: Safety awareness training;Verbal cues  Rolling: Substantial/Maximal assistance;2 Person assistance  Supine to Sit: Partial/Moderate assistance;2 Person assistance  Scooting: Minimal assistance  Balance  Sitting: Impaired  Sitting - Static: Poor (constant support)  Sitting - Dynamic: Poor

## 2025-02-18 NOTE — PROGRESS NOTES
PT resting in bed, tele-sitter active and present during assessment. PT is alert to self, they are able to answer questions and follow commands without issue. PT NG placement verified at 55cm, tube is currently clamped until completion of small bowel follow through. PT made comfortable with call light in reach, care on-going.

## 2025-02-18 NOTE — PROGRESS NOTES
Occupational Therapy  Facility/Department: [facility] MED SURG  Daily Treatment Note  NAME: Jeremy King  : 1955  MRN: [id number]    Date of Service: 2025    Discharge Recommendations:  Continue to assess pending progress, 24 hour supervision or assist, Long Term Care with OT, Subacute/Skilled Nursing Facility         Patient Diagnosis(es): The encounter diagnosis was Intestinal obstruction, unspecified cause, unspecified whether partial or complete (HCC).     Assessment   Assessment: Pt in bed upon arrival with being very confused and disoriented x4  Activity Tolerance: Treatment limited secondary to decreased cognition  Discharge Recommendations: Continue to assess pending progress;24 hour supervision or assist;Long Term Care with OT;Subacute/Skilled Nursing Facility     Plan  Occupational Therapy Plan  Times Per Day: Once a day  Days Per Week: 7 Days  Current Treatment Recommendations: Strengthening;ROM;Balance training;Functional mobility training;Endurance training;Safety education & training;Patient/Caregiver education & training;Self-Care / ADL    Restrictions   General Fall Risk    Subjective  Subjective  Subjective: Pt lying in bed upon arrival. Nursing and PTA completed brief change and bed mobility.  Pain: Unable to verbalized  Orientation  Overall Orientation Status: Impaired  Orientation Level: Disoriented X4  Pain: denies  Cognition  Cognition Comment: MRDD       Objective  Vitals     Bed Mobility Training  Bed Mobility Training: Yes  Overall Level of Assistance: Substantial/Maximal assistance;Partial/Moderate assistance  Interventions: Safety awareness training;Verbal cues  Rolling: Substantial/Maximal assistance;2 Person assistance  Supine to Sit: Partial/Moderate assistance;2 Person assistance  Scooting: Minimal assistance  Balance  Sitting: Impaired  Sitting - Static: Poor (constant support)  Sitting - Dynamic: Poor (constant support)  Standing: Impaired  Standing - Static: Poor  Standing

## 2025-02-18 NOTE — PLAN OF CARE
Problem: Safety - Adult  Goal: Free from fall injury  2/17/2025 1923 by Brinda Quintana RN  Outcome: Progressing  2/17/2025 0938 by Matilde Villa RN  Outcome: Progressing     Problem: Discharge Planning  Goal: Discharge to home or other facility with appropriate resources  2/17/2025 1923 by Brinda Quintana RN  Outcome: Progressing  Flowsheets (Taken 2/17/2025 1923)  Discharge to home or other facility with appropriate resources: Identify barriers to discharge with patient and caregiver  2/17/2025 0938 by Matilde Villa, RN  Outcome: Progressing     Problem: Confusion  Goal: Confusion, delirium, dementia, or psychosis is improved or at baseline  Description: INTERVENTIONS:  1. Assess for possible contributors to thought disturbance, including medications, impaired vision or hearing, underlying metabolic abnormalities, dehydration, psychiatric diagnoses, and notify attending LIP  2. [place] high risk fall precautions, as indicated  3. Provide frequent short contacts to provide reality reorientation, refocusing and direction  4. Decrease environmental stimuli, including noise as appropriate  5. Monitor and intervene to maintain adequate nutrition, hydration, elimination, sleep and activity  6. If unable to ensure safety without constant attention obtain sitter and review sitter guidelines with assigned personnel  7. Initiate Psychosocial CNS and Spiritual Care consult, as indicated  2/17/2025 1923 by Brinda Quintana RN  Outcome: Progressing  Flowsheets (Taken 2/17/2025 1923)  Effect of thought disturbance (confusion, delirium, dementia, or psychosis) are managed with adequate functional status:   Assess for contributors to thought disturbance, including medications, impaired vision or hearing, underlying metabolic abnormalities, dehydration, psychiatric diagnoses, notify LIP   [place] high risk fall precautions, as indicated   Provide frequent short contacts to provide reality reorientation, refocusing and

## 2025-02-18 NOTE — PROGRESS NOTES
Entered patient's room for morning vital signs and head to toe assessment. Patient resting in the bed at this time. A&O x1, calm, and cooperative, but impulsive. Patient denies of pain at this time and does not appear to be in pain. Vital signs and head to toe assessment completed at this time, see flowsheets for more details. Patient removed NG tube himself upon writer walking into room. NG tube found on floor. Telesitter in place, plugged in, and on, but writer was never informed or alarmed patient was pulling at tube. Anne NAVA notified of NG tube status and stated, \"That's fine, leave it out for now.\" Bowel sounds are active x4. Abdomen is soft and non tender upon palpation. Patient denies no more needs at this time. Call light within reach. Bed alarm on. Bed wheels locked. Bed in lowest position. Side rails up x3.

## 2025-02-18 NOTE — PROGRESS NOTES
Progress Note    SUBJECTIVE:    Patient seen for f/u of Small bowel obstruction (HCC).  He resting in bed with audible rhonchi.  No pain. Stated \"I'm fine\".  NG pulled out and having stools.      ROS:   Constitutional: negative  for fevers, and negative for chills.  Respiratory: negative for shortness of breath, negative for cough, and negative for wheezing  Cardiovascular: negative for chest pain, and negative for palpitations  Gastrointestinal: negative for abdominal pain, negative for nausea,negative for vomiting, negative for diarrhea, and negative for constipation     All other systems were reviewed with the patient and are negative unless otherwise stated in HPI      OBJECTIVE:      Vitals:   Vitals:    02/18/25 0649   BP: 139/73   Pulse: 70   Resp: 20   Temp: 98.6 °F (37 °C)   SpO2: 93%     Weight - Scale: 89.5 kg (197 lb 4.8 oz) (Bed not zero)   Height: 177.8 cm (5' 10\")     Weight  Wt Readings from Last 3 Encounters:   02/18/25 89.5 kg (197 lb 4.8 oz)   12/05/24 89.6 kg (197 lb 8 oz)   05/21/24 79.4 kg (175 lb)     Body mass index is 28.31 kg/m².    24HR INTAKE/OUTPUT:      Intake/Output Summary (Last 24 hours) at 2/18/2025 0750  Last data filed at 2/18/2025 0417  Gross per 24 hour   Intake 1869.17 ml   Output --   Net 1869.17 ml     -----------------------------------------------------------------  Exam:    GEN:    Awake, alert and oriented x3.   EYES:  EOMI, pupils equal   NECK: Supple. No lymphadenopathy.  No carotid bruit  CVS:    regular rate and rhythm, no audible murmur  PULM:  CTA, no wheezes, rales or rhonchi, no acute respiratory distress  ABD:    Bowels sounds normal.  Abdomen is soft.  No distention.  no tenderness to palpation.   EXT:   no edema bilaterally .  No calf tenderness.   NEURO: Moves all extremities.  Motor and sensory are grossly intact  SKIN:  No rashes.  No skin lesions.    -----------------------------------------------------------------    Diagnostic Data:      Complete Blood

## 2025-02-18 NOTE — PROGRESS NOTES
Dr. Cochran at the bedside at this time to see patient. Dr. Cochran made aware that the patient removed his NG tube this morning. Dr. Cochran ok with it being out due to small bowel follow through results.

## 2025-02-19 VITALS
TEMPERATURE: 97 F | WEIGHT: 197.5 LBS | BODY MASS INDEX: 28.27 KG/M2 | OXYGEN SATURATION: 96 % | DIASTOLIC BLOOD PRESSURE: 64 MMHG | HEART RATE: 76 BPM | SYSTOLIC BLOOD PRESSURE: 129 MMHG | HEIGHT: 70 IN | RESPIRATION RATE: 18 BRPM

## 2025-02-19 LAB
ANION GAP SERPL CALCULATED.3IONS-SCNC: 9 MMOL/L (ref 9–16)
BASOPHILS # BLD: 0.03 K/UL (ref 0–0.2)
BASOPHILS NFR BLD: 0 % (ref 0–2)
BUN SERPL-MCNC: 20 MG/DL (ref 8–23)
BUN/CREAT SERPL: 25 (ref 9–20)
CALCIUM SERPL-MCNC: 8.6 MG/DL (ref 8.6–10.4)
CHLORIDE SERPL-SCNC: 116 MMOL/L (ref 98–107)
CO2 SERPL-SCNC: 25 MMOL/L (ref 20–31)
CREAT SERPL-MCNC: 0.8 MG/DL (ref 0.7–1.2)
EOSINOPHIL # BLD: 0.24 K/UL (ref 0–0.44)
EOSINOPHILS RELATIVE PERCENT: 3 % (ref 1–4)
ERYTHROCYTE [DISTWIDTH] IN BLOOD BY AUTOMATED COUNT: 12.3 % (ref 11.8–14.4)
GFR, ESTIMATED: >90 ML/MIN/1.73M2
GLUCOSE SERPL-MCNC: 95 MG/DL (ref 74–99)
HCT VFR BLD AUTO: 37.4 % (ref 40.7–50.3)
HGB BLD-MCNC: 11.8 G/DL (ref 13–17)
IMM GRANULOCYTES # BLD AUTO: <0.03 K/UL (ref 0–0.3)
IMM GRANULOCYTES NFR BLD: 0 %
LYMPHOCYTES NFR BLD: 1.79 K/UL (ref 1.1–3.7)
LYMPHOCYTES RELATIVE PERCENT: 24 % (ref 24–43)
MCH RBC QN AUTO: 32.4 PG (ref 25.2–33.5)
MCHC RBC AUTO-ENTMCNC: 31.6 G/DL (ref 28.4–34.8)
MCV RBC AUTO: 102.7 FL (ref 82.6–102.9)
MONOCYTES NFR BLD: 0.59 K/UL (ref 0.1–1.2)
MONOCYTES NFR BLD: 8 % (ref 3–12)
NEUTROPHILS NFR BLD: 65 % (ref 36–65)
NEUTS SEG NFR BLD: 4.7 K/UL (ref 1.5–8.1)
NRBC BLD-RTO: 0 PER 100 WBC
PLATELET # BLD AUTO: 195 K/UL (ref 138–453)
PMV BLD AUTO: 9.5 FL (ref 8.1–13.5)
POTASSIUM SERPL-SCNC: 3.9 MMOL/L (ref 3.7–5.3)
RBC # BLD AUTO: 3.64 M/UL (ref 4.21–5.77)
SODIUM SERPL-SCNC: 150 MMOL/L (ref 136–145)
WBC OTHER # BLD: 7.4 K/UL (ref 3.5–11.3)

## 2025-02-19 PROCEDURE — 6370000000 HC RX 637 (ALT 250 FOR IP): Performed by: NURSE PRACTITIONER

## 2025-02-19 PROCEDURE — 97110 THERAPEUTIC EXERCISES: CPT

## 2025-02-19 PROCEDURE — 85025 COMPLETE CBC W/AUTO DIFF WBC: CPT

## 2025-02-19 PROCEDURE — 80048 BASIC METABOLIC PNL TOTAL CA: CPT

## 2025-02-19 PROCEDURE — 2500000003 HC RX 250 WO HCPCS: Performed by: INTERNAL MEDICINE

## 2025-02-19 PROCEDURE — 6360000002 HC RX W HCPCS: Performed by: INTERNAL MEDICINE

## 2025-02-19 RX ADMIN — MECLIZINE HYDROCHLORIDE 25 MG: 25 TABLET ORAL at 09:03

## 2025-02-19 RX ADMIN — LEVETIRACETAM 1500 MG: 500 TABLET, FILM COATED ORAL at 09:03

## 2025-02-19 RX ADMIN — LACOSAMIDE 150 MG: 50 TABLET, FILM COATED ORAL at 09:03

## 2025-02-19 RX ADMIN — PANTOPRAZOLE SODIUM 40 MG: 40 TABLET, DELAYED RELEASE ORAL at 09:03

## 2025-02-19 RX ADMIN — KETOTIFEN FUMARATE 1 DROP: 0.25 SOLUTION/ DROPS OPHTHALMIC at 09:04

## 2025-02-19 RX ADMIN — Medication 400 MG: at 09:03

## 2025-02-19 RX ADMIN — SODIUM CHLORIDE, PRESERVATIVE FREE 10 ML: 5 INJECTION INTRAVENOUS at 09:04

## 2025-02-19 RX ADMIN — TAMSULOSIN HYDROCHLORIDE 0.4 MG: 0.4 CAPSULE ORAL at 09:04

## 2025-02-19 RX ADMIN — DOCUSATE SODIUM 50 MG AND SENNOSIDES 8.6 MG 2 TABLET: 8.6; 5 TABLET, FILM COATED ORAL at 09:03

## 2025-02-19 RX ADMIN — LAMOTRIGINE 400 MG: 100 TABLET ORAL at 09:03

## 2025-02-19 RX ADMIN — Medication 1000 UNITS: at 09:04

## 2025-02-19 RX ADMIN — CYANOCOBALAMIN TAB 1000 MCG 1000 MCG: 1000 TAB at 09:03

## 2025-02-19 RX ADMIN — PRIMIDONE 250 MG: 250 TABLET ORAL at 09:03

## 2025-02-19 RX ADMIN — ENOXAPARIN SODIUM 40 MG: 100 INJECTION SUBCUTANEOUS at 09:02

## 2025-02-19 NOTE — CARE COORDINATION
IMM letter provided to patient.  Patient offered four hours to make informed decision regarding appeal process; patient agreeable to discharge.     02/19/25 1156   IMM Letter   IMM Letter given to Patient/Family/Significant other/Guardian/POA/by: Esther Elena (Legal Guardian) by phone/ GUILLERMO DUMAS   IMM Letter date given: 02/19/25   IMM Letter time given: 1140     Patient is discharge back to group home today. His guardian is aware of the discharge.  Patient will go by [facility].  PITER Maria

## 2025-02-19 NOTE — PLAN OF CARE
Problem: Safety - Adult  Goal: Free from fall injury  Outcome: Progressing     Problem: Discharge Planning  Goal: Discharge to home or other facility with appropriate resources  Outcome: Progressing     Problem: Confusion  Goal: Confusion, delirium, dementia, or psychosis is improved or at baseline  Description: INTERVENTIONS:  1. Assess for possible contributors to thought disturbance, including medications, impaired vision or hearing, underlying metabolic abnormalities, dehydration, psychiatric diagnoses, and notify attending LIP  2. [place] high risk fall precautions, as indicated  3. Provide frequent short contacts to provide reality reorientation, refocusing and direction  4. Decrease environmental stimuli, including noise as appropriate  5. Monitor and intervene to maintain adequate nutrition, hydration, elimination, sleep and activity  6. If unable to ensure safety without constant attention obtain sitter and review sitter guidelines with assigned personnel  7. Initiate Psychosocial CNS and Spiritual Care consult, as indicated  Outcome: Progressing     Problem: Skin/Tissue Integrity  Goal: Skin integrity remains intact  Description: 1.  Monitor for areas of redness and/or skin breakdown  2.  Assess vascular access sites hourly  3.  Every 4-6 hours minimum:  Change oxygen saturation probe site  4.  Every 4-6 hours:  If on nasal continuous positive airway pressure, respiratory therapy assess nares and determine need for appliance change or resting period  Outcome: Progressing     Problem: ABCDS Injury Assessment  Goal: Absence of physical injury  Outcome: Progressing     Problem: Nutrition Deficit:  Goal: Optimize nutritional status  Outcome: Progressing

## 2025-02-19 NOTE — DISCHARGE SUMMARY
Discharge Summary    Jeremy King  :  1955  MRN:  [id number]    Admit date:  2/15/2025      Discharge date: 2025     Admitting Physician:  Krishna Shah MD    Discharge Diagnoses:    Principal Problem:    Small bowel obstruction (HCC)  Active Problems:    MR (mental retardation), severe    S/P placement of VNS (vagus nerve stimulation) device    Seizure disorder (HCC)  Resolved Problems:    * No resolved hospital problems. *      Hospital Course:   Jeremy King is a 69 y.o. male SBO.  Patient presented to the emergency room with possible hematic emesis.  Patient has history of bowel resection and recurrent small bowel obstructions.  CT in the emergency room showed dilated small bowel loops and NG was placed for decompression.  General surgery was consulted.  Patient was admitted labs monitored electrolytes replaced.  Patient had nasogastric tube in for several days.  Patient completed small bowel follow-through and obstruction was cleared.  Diet was advanced he is tolerated well.  Hemodynamically stable.  Plan will be to discharge today back to [place].    Consultants:  Dr. Vick, gen surg    Procedures: none    Complications: none    Discharge Condition: fair    Exam:  GEN:    Awake, alert and oriented x3.   EYES:   EOMI, pupils equal   NECK: Supple. No lymphadenopathy.  No carotid bruit  CVS:     regular rate and rhythm, no audible murmur  PULM:  CTA, no wheezes, rales or rhonchi, no acute respiratory distress  ABD:     Bowels sounds normal.  Abdomen is soft.  No distention.  no tenderness to palpation.   EXT:     no edema bilaterally .  No calf tenderness.   NEURO: Moves all extremities.  Motor and sensory are grossly intact  SKIN:    No rashes.  No skin lesions.      Significant Diagnostic Studies:   Lab Results   Component Value Date    WBC 7.4 2025    HGB 11.8 (L) 2025     2025       Lab Results   Component Value Date    BUN 20 2025    CREATININE 0.8

## 2025-02-19 NOTE — PLAN OF CARE
Problem: Safety - Adult  Goal: Free from fall injury  Outcome: Progressing     Problem: Discharge Planning  Goal: Discharge to home or other facility with appropriate resources  Outcome: Progressing  Flowsheets (Taken 2/18/2025 1913)  Discharge to home or other facility with appropriate resources: Identify barriers to discharge with patient and caregiver     Problem: Confusion  Goal: Confusion, delirium, dementia, or psychosis is improved or at baseline  Description: INTERVENTIONS:  1. Assess for possible contributors to thought disturbance, including medications, impaired vision or hearing, underlying metabolic abnormalities, dehydration, psychiatric diagnoses, and notify attending LIP  2. [place] high risk fall precautions, as indicated  3. Provide frequent short contacts to provide reality reorientation, refocusing and direction  4. Decrease environmental stimuli, including noise as appropriate  5. Monitor and intervene to maintain adequate nutrition, hydration, elimination, sleep and activity  6. If unable to ensure safety without constant attention obtain sitter and review sitter guidelines with assigned personnel  7. Initiate Psychosocial CNS and Spiritual Care consult, as indicated  Outcome: Progressing  Flowsheets (Taken 2/18/2025 1913)  Effect of thought disturbance (confusion, delirium, dementia, or psychosis) are managed with adequate functional status:   Assess for contributors to thought disturbance, including medications, impaired vision or hearing, underlying metabolic abnormalities, dehydration, psychiatric diagnoses, notify LIP   [place] high risk fall precautions, as indicated   Provide frequent short contacts to provide reality reorientation, refocusing and direction   Decrease environmental stimuli, including noise as appropriate   Monitor and intervene to maintain adequate nutrition, hydration, elimination, sleep and activity   If unable to ensure safety without constant attention obtain

## 2025-02-19 NOTE — PROGRESS NOTES
PT resting in bed during assessment, they are oriented to self only. Assessment complete and charted, see flowsheets. PT made comfortable, call light in reach, care on-going.

## 2025-02-19 NOTE — PROGRESS NOTES
Occupational Therapy  Facility/Department: [facility] MED SURG  Daily Treatment Note  NAME: Jeremy King  : 1955  MRN: [id number]    Date of Service: 2025    Discharge Recommendations:  Continue to assess pending progress, 24 hour supervision or assist, Long Term Care with OT, Subacute/Skilled Nursing Facility     Patient Diagnosis(es): The encounter diagnosis was Intestinal obstruction, unspecified cause, unspecified whether partial or complete (HCC).     Assessment   Assessment: Pt in bed upon arrival with being very confused and disoriented x4  Activity Tolerance: Treatment limited secondary to decreased cognition  Discharge Recommendations: Continue to assess pending progress;24 hour supervision or assist;Long Term Care with OT;Subacute/Skilled Nursing Facility     Plan  Occupational Therapy Plan  Times Per Day: Once a day  Days Per Week: 7 Days  Current Treatment Recommendations: Strengthening;ROM;Balance training;Functional mobility training;Endurance training;Safety education & training;Patient/Caregiver education & training;Self-Care / ADL    Restrictions   Decreased cognition, Fall Risk    Subjective  Subjective  Subjective: Pt sleeping, aroused for therapy, PTA and SANDRA present for therapy session  Pain: Unable to verbalized  Orientation  Overall Orientation Status: Impaired  Orientation Level: Disoriented X4          Objective  Vitals           ADL  Product Used : Bath wipes  OT Exercises  Exercise Treatment: Attempted AROM with BUE x 4 planes x 15 reps with patient having difficulty with following commands with  visual and tactile cues needed throughout     Safety Devices  Type of Devices: Nurse notified;Call light within reach;All fall risk precautions in place;Patient at risk for falls;Gait belt;Left in bed;Bed alarm in place    Patient Education  Education Given To: Patient  Education Provided: Role of Therapy;Plan of Care  Education Method: Verbal  Barriers to Learning: Cognition  Education

## 2025-02-19 NOTE — PROGRESS NOTES
Shift assessment and vitals obtained at this time as charted. Vitals WNL, patient denies pain. Patient remains oriented to person only. Rhonchi heard throughout lung fields. Bowel sounds active throughout with no guarding/tenderness noted to abdomen. Trace, non-pitting edema noted to BLE. Assessment otherwise as charted, see flowsheets. Morning medications also given at this time. Patient is resting in the bed with bed alarm on and call light in reach, denies other needs at this time. Care ongoing.

## 2025-02-19 NOTE — PLAN OF CARE
Problem: Safety - Adult  Goal: Free from fall injury  2/19/2025 1228 by Matilde Villa RN  Outcome: Completed  2/19/2025 0957 by Matilde Villa RN  Outcome: Progressing     Problem: Discharge Planning  Goal: Discharge to home or other facility with appropriate resources  2/19/2025 1228 by Matilde Villa, RN  Outcome: Completed  2/19/2025 0957 by Matilde Villa RN  Outcome: Progressing     Problem: Confusion  Goal: Confusion, delirium, dementia, or psychosis is improved or at baseline  Description: INTERVENTIONS:  1. Assess for possible contributors to thought disturbance, including medications, impaired vision or hearing, underlying metabolic abnormalities, dehydration, psychiatric diagnoses, and notify attending LIP  2. [place] high risk fall precautions, as indicated  3. Provide frequent short contacts to provide reality reorientation, refocusing and direction  4. Decrease environmental stimuli, including noise as appropriate  5. Monitor and intervene to maintain adequate nutrition, hydration, elimination, sleep and activity  6. If unable to ensure safety without constant attention obtain sitter and review sitter guidelines with assigned personnel  7. Initiate Psychosocial CNS and Spiritual Care consult, as indicated  2/19/2025 1228 by Matilde Villa, RN  Outcome: Completed  2/19/2025 0957 by Matilde Villa RN  Outcome: Progressing     Problem: Skin/Tissue Integrity  Goal: Skin integrity remains intact  Description: 1.  Monitor for areas of redness and/or skin breakdown  2.  Assess vascular access sites hourly  3.  Every 4-6 hours minimum:  Change oxygen saturation probe site  4.  Every 4-6 hours:  If on nasal continuous positive airway pressure, respiratory therapy assess nares and determine need for appliance change or resting period  2/19/2025 1228 by Matilde Villa, RN  Outcome: Completed  2/19/2025 0957 by Matilde Villa RN  Outcome: Progressing     Problem: ABCDS Injury Assessment  Goal: Absence of physical

## 2025-02-19 NOTE — DISCHARGE INSTR - COC
belongings (please select all that are sent with patient):  Eye drops, bag of clothes, helmet.    RN SIGNATURE:  Electronically signed by Matilde Villa RN on 2/19/25 at 11:22 AM EST    CASE MANAGEMENT/SOCIAL WORK SECTION    Inpatient Status Date: 2/15/25    Readmission Risk Assessment Score:  [facility] RISK OF UNPLANNED READMISSION 2.0             24.6 Total Score        Discharging to Facility/ Agency   Name: [facility] home  Address:[address] [place] [address]  Phone:  Fax:    Dialysis Facility (if applicable)   Name:  Address:  Dialysis Schedule:  Phone:  Fax:    / signature: Electronically signed by PITER Maria on 2/19/25 at 11:48 AM EST    PHYSICIAN SECTION    Prognosis: Fair    Condition at Discharge: Stable    Rehab Potential (if transferring to Rehab): Fair    Recommended Labs or Other Treatments After Discharge: na    Physician Certification: I certify the above information and transfer of Jeremy King  is necessary for the continuing treatment of the diagnosis listed and that he requires Intermediate/Mental Retardation/Developmental Disabilities Care for greater 30 days.     Update Admission H&P: No change in H&P    PHYSICIAN SIGNATURE:  Electronically signed by TIFFANIE Elizabeth CNP on 2/19/25 at 10:03 AM EST

## 2025-02-19 NOTE — PROGRESS NOTES
Progress Note    SUBJECTIVE:    Patient seen for f/u of Small bowel obstruction (HCC).  He resting in bed with audible rhonchi.  No pain. Stated \"I'm fine\".  Tolerating diet well.     ROS:   Constitutional: negative  for fevers, and negative for chills.  Respiratory: negative for shortness of breath, negative for cough, and negative for wheezing  Cardiovascular: negative for chest pain, and negative for palpitations  Gastrointestinal: negative for abdominal pain, negative for nausea,negative for vomiting, negative for diarrhea, and negative for constipation     All other systems were reviewed with the patient and are negative unless otherwise stated in HPI      OBJECTIVE:      Vitals:   Vitals:    02/18/25 1845   BP: 122/68   Pulse: 68   Resp: 20   Temp: 98.4 °F (36.9 °C)   SpO2: 95%     Weight - Scale: 89.5 kg (197 lb 4.8 oz) (Bed not zero)   Height: 177.8 cm (5' 10\")     Weight  Wt Readings from Last 3 Encounters:   02/18/25 89.5 kg (197 lb 4.8 oz)   12/05/24 89.6 kg (197 lb 8 oz)   05/21/24 79.4 kg (175 lb)     Body mass index is 28.31 kg/m².    24HR INTAKE/OUTPUT:      Intake/Output Summary (Last 24 hours) at 2/19/2025 0850  Last data filed at 2/18/2025 1715  Gross per 24 hour   Intake 1280.06 ml   Output --   Net 1280.06 ml     -----------------------------------------------------------------  Exam:    GEN:    Awake, alert and oriented x3.   EYES:  EOMI, pupils equal   NECK: Supple. No lymphadenopathy.  No carotid bruit  CVS:    regular rate and rhythm, no audible murmur  PULM:  CTA, no wheezes, rales or rhonchi, no acute respiratory distress  ABD:    Bowels sounds normal.  Abdomen is soft.  No distention.  no tenderness to palpation.   EXT:   no edema bilaterally .  No calf tenderness.   NEURO: Moves all extremities.  Motor and sensory are grossly intact  SKIN:  No rashes.  No skin lesions.    -----------------------------------------------------------------    Diagnostic Data:      Complete Blood Count:

## 2025-02-19 NOTE — PROGRESS NOTES
Physical Therapy  Facility/Department: [facility] MED SURG  Daily Treatment Note  NAME: Jeremy King  : 1955  MRN: [id number]    Date of Service: 2025    Discharge Recommendations:  Continue to assess pending progress      Patient Diagnosis(es): The encounter diagnosis was Intestinal obstruction, unspecified cause, unspecified whether partial or complete (HCC).    Assessment  Assessment: Pt performed supine exer x15 B LE in all available planes of motion, therapist having to encourage pt for maintaining on task as pt continues to fall alseep. Did not perform transfers at this time per nurse request.  Activity Tolerance: Treatment limited secondary to decreased cognition    Plan  Physical Therapy Plan  General Plan: 2 times a day 7 days a week  Specific Instructions for Next Treatment: 1x/daily on weekends and holidays  Current Treatment Recommendations: Strengthening;Home exercise program;Balance training;Gait training;Therapeutic activities;Safety education & training;Functional mobility training;Neuromuscular re-education;Patient/Caregiver education & training;Endurance training;Transfer training    Restrictions  Restrictions/Precautions  Restrictions/Precautions: Fall Risk, General Precautions  Required Braces or Orthoses?: No     Subjective   Subjective  Subjective: Pt in bed upon arrival, per nurse please leave pt in bed at this time.  Pain: denies    Objective  Bed Mobility Training  Bed Mobility Training: No  Transfer Training  Transfer Training: No  Gait  Gait Training: No  PT Exercises  Exercise Treatment: Supine BLE x15 in all available planes of motion  Safety Devices  Type of Devices: Nurse notified;Call light within reach;All fall risk precautions in place;Patient at risk for falls;Gait belt;Left in bed;Bed alarm in place     Goals  Short Term Goals  Time Frame for Short Term Goals: 20 days  Short Term Goal 1: Pt will be educated on his POC and HEP  Short Term Goal 2: Pt will perform bed

## 2025-02-26 ENCOUNTER — HOSPITAL ENCOUNTER (OUTPATIENT)
Age: 70
Setting detail: SPECIMEN
Discharge: HOME | End: 2025-02-26
Payer: MEDICARE

## 2025-02-26 LAB
ALBUMIN SERPL-MCNC: 3.8 G/DL (ref 3.5–5.2)
ALBUMIN/GLOB SERPL: 1.1 {RATIO} (ref 1–2.5)
ALP SERPL-CCNC: 156 U/L (ref 40–129)
ALT SERPL-CCNC: 16 U/L (ref 10–50)
ANION GAP SERPL CALCULATED.3IONS-SCNC: 10 MMOL/L (ref 9–16)
AST SERPL-CCNC: 13 U/L (ref 10–50)
BASOPHILS # BLD: 0.04 K/UL (ref 0–0.2)
BASOPHILS NFR BLD: 1 % (ref 0–2)
BILIRUB SERPL-MCNC: <0.2 MG/DL (ref 0–1.2)
BUN SERPL-MCNC: 12 MG/DL (ref 8–23)
BUN/CREAT SERPL: 13 (ref 9–20)
CALCIUM SERPL-MCNC: 8.9 MG/DL (ref 8.6–10.4)
CHLORIDE SERPL-SCNC: 107 MMOL/L (ref 98–107)
CO2 SERPL-SCNC: 21 MMOL/L (ref 20–31)
CREAT SERPL-MCNC: 0.9 MG/DL (ref 0.7–1.2)
EOSINOPHIL # BLD: 0.5 K/UL (ref 0–0.44)
EOSINOPHILS RELATIVE PERCENT: 7 % (ref 1–4)
ERYTHROCYTE [DISTWIDTH] IN BLOOD BY AUTOMATED COUNT: 12.2 % (ref 11.8–14.4)
GFR, ESTIMATED: >90 ML/MIN/1.73M2
GLUCOSE SERPL-MCNC: 86 MG/DL (ref 74–99)
HCT VFR BLD AUTO: 35.6 % (ref 40.7–50.3)
HGB BLD-MCNC: 11.6 G/DL (ref 13–17)
IMM GRANULOCYTES # BLD AUTO: 0.03 K/UL (ref 0–0.3)
IMM GRANULOCYTES NFR BLD: 0 %
LEVETIRACETAM SERPL-MCNC: 40 UG/ML
LYMPHOCYTES NFR BLD: 1.7 K/UL (ref 1.1–3.7)
LYMPHOCYTES RELATIVE PERCENT: 23 % (ref 24–43)
MAGNESIUM SERPL-MCNC: 1.7 MG/DL (ref 1.6–2.4)
MCH RBC QN AUTO: 32 PG (ref 25.2–33.5)
MCHC RBC AUTO-ENTMCNC: 32.6 G/DL (ref 28.4–34.8)
MCV RBC AUTO: 98.3 FL (ref 82.6–102.9)
MONOCYTES NFR BLD: 0.76 K/UL (ref 0.1–1.2)
MONOCYTES NFR BLD: 10 % (ref 3–12)
NEUTROPHILS NFR BLD: 59 % (ref 36–65)
NEUTS SEG NFR BLD: 4.42 K/UL (ref 1.5–8.1)
NRBC BLD-RTO: 0 PER 100 WBC
PLATELET # BLD AUTO: 252 K/UL (ref 138–453)
PMV BLD AUTO: 10.3 FL (ref 8.1–13.5)
POTASSIUM SERPL-SCNC: 4.3 MMOL/L (ref 3.7–5.3)
PROT SERPL-MCNC: 7.2 G/DL (ref 6.6–8.7)
RBC # BLD AUTO: 3.62 M/UL (ref 4.21–5.77)
SODIUM SERPL-SCNC: 138 MMOL/L (ref 136–145)
VIT B12 SERPL-MCNC: 949 PG/ML (ref 232–1245)
WBC OTHER # BLD: 7.5 K/UL (ref 3.5–11.3)

## 2025-02-26 PROCEDURE — 36415 COLL VENOUS BLD VENIPUNCTURE: CPT

## 2025-02-26 PROCEDURE — 80184 ASSAY OF PHENOBARBITAL: CPT

## 2025-02-26 PROCEDURE — 80188 ASSAY OF PRIMIDONE: CPT

## 2025-02-26 PROCEDURE — 80053 COMPREHEN METABOLIC PANEL: CPT

## 2025-02-26 PROCEDURE — 80235 DRUG ASSAY LACOSAMIDE: CPT

## 2025-02-26 PROCEDURE — 82607 VITAMIN B-12: CPT

## 2025-02-26 PROCEDURE — 83735 ASSAY OF MAGNESIUM: CPT

## 2025-02-26 PROCEDURE — 85025 COMPLETE CBC W/AUTO DIFF WBC: CPT

## 2025-02-26 PROCEDURE — 80177 DRUG SCRN QUAN LEVETIRACETAM: CPT

## 2025-02-28 LAB
LACOSAMIDE SERPL-MCNC: 2.8 UG/ML (ref 1–10)
PHENOBARB SERPL-MCNC: 13.4 UG/ML (ref 15–40)
PRIMIDONE SERPL-MCNC: 12.4 UG/ML (ref 5–12)

## 2025-04-14 ENCOUNTER — APPOINTMENT (OUTPATIENT)
Dept: CT IMAGING | Age: 70
DRG: 389 | End: 2025-04-14
Payer: MEDICARE

## 2025-04-14 ENCOUNTER — APPOINTMENT (OUTPATIENT)
Dept: GENERAL RADIOLOGY | Age: 70
DRG: 389 | End: 2025-04-14
Payer: MEDICARE

## 2025-04-14 ENCOUNTER — HOSPITAL ENCOUNTER (INPATIENT)
Age: 70
LOS: 3 days | Discharge: HOME | DRG: 389 | End: 2025-04-17
Attending: EMERGENCY MEDICINE | Admitting: INTERNAL MEDICINE
Payer: MEDICARE

## 2025-04-14 DIAGNOSIS — K56.609 SBO (SMALL BOWEL OBSTRUCTION) (HCC): Primary | ICD-10-CM

## 2025-04-14 PROBLEM — R00.0 SINUS TACHYCARDIA: Status: ACTIVE | Noted: 2025-04-14

## 2025-04-14 PROBLEM — R94.31 ABNORMAL ECG: Status: ACTIVE | Noted: 2025-04-14

## 2025-04-14 LAB
ALBUMIN SERPL-MCNC: 4.3 G/DL (ref 3.5–5.2)
ALBUMIN/GLOB SERPL: 1.2 {RATIO} (ref 1–2.5)
ALP SERPL-CCNC: 209 U/L (ref 40–129)
ALT SERPL-CCNC: 16 U/L (ref 10–50)
ANION GAP SERPL CALCULATED.3IONS-SCNC: 12 MMOL/L (ref 9–16)
AST SERPL-CCNC: 19 U/L (ref 10–50)
BACTERIA URNS QL MICRO: ABNORMAL
BASOPHILS # BLD: 0.06 K/UL (ref 0–0.2)
BASOPHILS NFR BLD: 0 % (ref 0–2)
BILIRUB DIRECT SERPL-MCNC: <0.2 MG/DL (ref 0–0.3)
BILIRUB INDIRECT SERPL-MCNC: ABNORMAL MG/DL (ref 0–1)
BILIRUB SERPL-MCNC: 0.3 MG/DL (ref 0–1.2)
BILIRUB UR QL STRIP: NEGATIVE
BUN SERPL-MCNC: 25 MG/DL (ref 8–23)
BUN/CREAT SERPL: 25 (ref 9–20)
CALCIUM SERPL-MCNC: 9.7 MG/DL (ref 8.6–10.4)
CHLORIDE SERPL-SCNC: 103 MMOL/L (ref 98–107)
CLARITY UR: CLEAR
CO2 SERPL-SCNC: 26 MMOL/L (ref 20–31)
COLOR UR: YELLOW
CREAT SERPL-MCNC: 1 MG/DL (ref 0.7–1.2)
EOSINOPHIL # BLD: 0.15 K/UL (ref 0–0.44)
EOSINOPHILS RELATIVE PERCENT: 1 % (ref 1–4)
EPI CELLS #/AREA URNS HPF: ABNORMAL /HPF (ref 0–5)
ERYTHROCYTE [DISTWIDTH] IN BLOOD BY AUTOMATED COUNT: 12.6 % (ref 11.8–14.4)
GFR, ESTIMATED: 78 ML/MIN/1.73M2
GLUCOSE SERPL-MCNC: 113 MG/DL (ref 74–99)
GLUCOSE UR STRIP-MCNC: NEGATIVE MG/DL
HCT VFR BLD AUTO: 46.4 % (ref 40.7–50.3)
HGB BLD-MCNC: 15.1 G/DL (ref 13–17)
HGB UR QL STRIP.AUTO: ABNORMAL
IMM GRANULOCYTES # BLD AUTO: 0.04 K/UL (ref 0–0.3)
IMM GRANULOCYTES NFR BLD: 0 %
KETONES UR STRIP-MCNC: NEGATIVE MG/DL
LACTATE BLDV-SCNC: 1.8 MMOL/L (ref 0.5–1.9)
LEUKOCYTE ESTERASE UR QL STRIP: NEGATIVE
LIPASE SERPL-CCNC: 45 U/L (ref 13–60)
LYMPHOCYTES NFR BLD: 1.4 K/UL (ref 1.1–3.7)
LYMPHOCYTES RELATIVE PERCENT: 10 % (ref 24–43)
MCH RBC QN AUTO: 31.8 PG (ref 25.2–33.5)
MCHC RBC AUTO-ENTMCNC: 32.5 G/DL (ref 28.4–34.8)
MCV RBC AUTO: 97.7 FL (ref 82.6–102.9)
MONOCYTES NFR BLD: 0.77 K/UL (ref 0.1–1.2)
MONOCYTES NFR BLD: 5 % (ref 3–12)
MUCOUS THREADS URNS QL MICRO: ABNORMAL
NEUTROPHILS NFR BLD: 84 % (ref 36–65)
NEUTS SEG NFR BLD: 11.75 K/UL (ref 1.5–8.1)
NITRITE UR QL STRIP: POSITIVE
NRBC BLD-RTO: 0 PER 100 WBC
PH UR STRIP: 7 [PH] (ref 5–9)
PLATELET # BLD AUTO: 271 K/UL (ref 138–453)
PMV BLD AUTO: 8.9 FL (ref 8.1–13.5)
POTASSIUM SERPL-SCNC: 4 MMOL/L (ref 3.7–5.3)
PROT SERPL-MCNC: 8.1 G/DL (ref 6.6–8.7)
PROT UR STRIP-MCNC: ABNORMAL MG/DL
RBC # BLD AUTO: 4.75 M/UL (ref 4.21–5.77)
RBC #/AREA URNS HPF: ABNORMAL /HPF (ref 0–2)
SODIUM SERPL-SCNC: 141 MMOL/L (ref 136–145)
SP GR UR STRIP: 1.01 (ref 1.01–1.02)
TROPONIN I SERPL HS-MCNC: 12 NG/L (ref 0–22)
TROPONIN I SERPL HS-MCNC: 12 NG/L (ref 0–22)
UROBILINOGEN UR STRIP-ACNC: NORMAL EU/DL (ref 0–1)
WBC #/AREA URNS HPF: ABNORMAL /HPF (ref 0–5)
WBC OTHER # BLD: 14.2 K/UL (ref 3.5–11.3)

## 2025-04-14 PROCEDURE — 74177 CT ABD & PELVIS W/CONTRAST: CPT

## 2025-04-14 PROCEDURE — 94640 AIRWAY INHALATION TREATMENT: CPT

## 2025-04-14 PROCEDURE — 99223 1ST HOSP IP/OBS HIGH 75: CPT | Performed by: FAMILY MEDICINE

## 2025-04-14 PROCEDURE — 2580000003 HC RX 258: Performed by: INTERNAL MEDICINE

## 2025-04-14 PROCEDURE — 6360000002 HC RX W HCPCS: Performed by: INTERNAL MEDICINE

## 2025-04-14 PROCEDURE — 6370000000 HC RX 637 (ALT 250 FOR IP): Performed by: INTERNAL MEDICINE

## 2025-04-14 PROCEDURE — 81001 URINALYSIS AUTO W/SCOPE: CPT

## 2025-04-14 PROCEDURE — 0D9670Z DRAINAGE OF STOMACH WITH DRAINAGE DEVICE, VIA NATURAL OR ARTIFICIAL OPENING: ICD-10-PCS | Performed by: INTERNAL MEDICINE

## 2025-04-14 PROCEDURE — 93005 ELECTROCARDIOGRAM TRACING: CPT | Performed by: FAMILY MEDICINE

## 2025-04-14 PROCEDURE — 93005 ELECTROCARDIOGRAM TRACING: CPT | Performed by: INTERNAL MEDICINE

## 2025-04-14 PROCEDURE — 71045 X-RAY EXAM CHEST 1 VIEW: CPT

## 2025-04-14 PROCEDURE — 87040 BLOOD CULTURE FOR BACTERIA: CPT

## 2025-04-14 PROCEDURE — 1200000000 HC SEMI PRIVATE

## 2025-04-14 PROCEDURE — 94761 N-INVAS EAR/PLS OXIMETRY MLT: CPT

## 2025-04-14 PROCEDURE — 80076 HEPATIC FUNCTION PANEL: CPT

## 2025-04-14 PROCEDURE — 85025 COMPLETE CBC W/AUTO DIFF WBC: CPT

## 2025-04-14 PROCEDURE — 83690 ASSAY OF LIPASE: CPT

## 2025-04-14 PROCEDURE — 80048 BASIC METABOLIC PNL TOTAL CA: CPT

## 2025-04-14 PROCEDURE — 36415 COLL VENOUS BLD VENIPUNCTURE: CPT

## 2025-04-14 PROCEDURE — 84484 ASSAY OF TROPONIN QUANT: CPT

## 2025-04-14 PROCEDURE — 74018 RADEX ABDOMEN 1 VIEW: CPT

## 2025-04-14 PROCEDURE — 6360000004 HC RX CONTRAST MEDICATION: Performed by: EMERGENCY MEDICINE

## 2025-04-14 PROCEDURE — 83605 ASSAY OF LACTIC ACID: CPT

## 2025-04-14 PROCEDURE — 2500000003 HC RX 250 WO HCPCS: Performed by: FAMILY MEDICINE

## 2025-04-14 PROCEDURE — 94664 DEMO&/EVAL PT USE INHALER: CPT

## 2025-04-14 PROCEDURE — 99285 EMERGENCY DEPT VISIT HI MDM: CPT

## 2025-04-14 PROCEDURE — 99222 1ST HOSP IP/OBS MODERATE 55: CPT | Performed by: STUDENT IN AN ORGANIZED HEALTH CARE EDUCATION/TRAINING PROGRAM

## 2025-04-14 RX ORDER — POLYETHYLENE GLYCOL 3350 17 G/17G
17 POWDER, FOR SOLUTION ORAL DAILY PRN
Status: DISCONTINUED | OUTPATIENT
Start: 2025-04-14 | End: 2025-04-17 | Stop reason: HOSPADM

## 2025-04-14 RX ORDER — METOPROLOL TARTRATE 25 MG/1
25 TABLET, FILM COATED ORAL 2 TIMES DAILY
Status: DISCONTINUED | OUTPATIENT
Start: 2025-04-14 | End: 2025-04-14

## 2025-04-14 RX ORDER — ENOXAPARIN SODIUM 100 MG/ML
40 INJECTION SUBCUTANEOUS DAILY
Status: DISCONTINUED | OUTPATIENT
Start: 2025-04-14 | End: 2025-04-17 | Stop reason: HOSPADM

## 2025-04-14 RX ORDER — POTASSIUM CHLORIDE 7.45 MG/ML
10 INJECTION INTRAVENOUS PRN
Status: DISCONTINUED | OUTPATIENT
Start: 2025-04-14 | End: 2025-04-17 | Stop reason: HOSPADM

## 2025-04-14 RX ORDER — ALBUTEROL SULFATE 90 UG/1
2 INHALANT RESPIRATORY (INHALATION) EVERY 4 HOURS PRN
Status: DISCONTINUED | OUTPATIENT
Start: 2025-04-14 | End: 2025-04-17 | Stop reason: HOSPADM

## 2025-04-14 RX ORDER — METOPROLOL TARTRATE 1 MG/ML
5 INJECTION, SOLUTION INTRAVENOUS ONCE
Status: COMPLETED | OUTPATIENT
Start: 2025-04-14 | End: 2025-04-14

## 2025-04-14 RX ORDER — MORPHINE SULFATE 2 MG/ML
2 INJECTION, SOLUTION INTRAMUSCULAR; INTRAVENOUS
Status: DISCONTINUED | OUTPATIENT
Start: 2025-04-14 | End: 2025-04-17

## 2025-04-14 RX ORDER — SODIUM CHLORIDE 0.9 % (FLUSH) 0.9 %
10 SYRINGE (ML) INJECTION PRN
Status: DISCONTINUED | OUTPATIENT
Start: 2025-04-14 | End: 2025-04-17 | Stop reason: HOSPADM

## 2025-04-14 RX ORDER — SODIUM CHLORIDE 0.9 % (FLUSH) 0.9 %
10 SYRINGE (ML) INJECTION EVERY 12 HOURS SCHEDULED
Status: DISCONTINUED | OUTPATIENT
Start: 2025-04-14 | End: 2025-04-17 | Stop reason: HOSPADM

## 2025-04-14 RX ORDER — IOPAMIDOL 755 MG/ML
75 INJECTION, SOLUTION INTRAVASCULAR
Status: COMPLETED | OUTPATIENT
Start: 2025-04-14 | End: 2025-04-14

## 2025-04-14 RX ORDER — SODIUM CHLORIDE 9 MG/ML
INJECTION, SOLUTION INTRAVENOUS CONTINUOUS
Status: ACTIVE | OUTPATIENT
Start: 2025-04-14 | End: 2025-04-15

## 2025-04-14 RX ORDER — POTASSIUM CHLORIDE 1500 MG/1
40 TABLET, EXTENDED RELEASE ORAL PRN
Status: DISCONTINUED | OUTPATIENT
Start: 2025-04-14 | End: 2025-04-17 | Stop reason: HOSPADM

## 2025-04-14 RX ORDER — ACETAMINOPHEN 500 MG
500 TABLET ORAL EVERY 6 HOURS PRN
COMMUNITY

## 2025-04-14 RX ORDER — ONDANSETRON 2 MG/ML
4 INJECTION INTRAMUSCULAR; INTRAVENOUS EVERY 6 HOURS PRN
Status: DISCONTINUED | OUTPATIENT
Start: 2025-04-14 | End: 2025-04-17 | Stop reason: HOSPADM

## 2025-04-14 RX ORDER — ONDANSETRON 4 MG/1
4 TABLET, ORALLY DISINTEGRATING ORAL EVERY 8 HOURS PRN
Status: DISCONTINUED | OUTPATIENT
Start: 2025-04-14 | End: 2025-04-17 | Stop reason: HOSPADM

## 2025-04-14 RX ORDER — MORPHINE SULFATE 4 MG/ML
4 INJECTION, SOLUTION INTRAMUSCULAR; INTRAVENOUS
Status: DISCONTINUED | OUTPATIENT
Start: 2025-04-14 | End: 2025-04-17

## 2025-04-14 RX ORDER — POLYETHYLENE GLYCOL 3350 17 G/17G
17 POWDER, FOR SOLUTION ORAL PRN
COMMUNITY

## 2025-04-14 RX ORDER — ALBUTEROL SULFATE 90 UG/1
2 INHALANT RESPIRATORY (INHALATION)
Status: DISCONTINUED | OUTPATIENT
Start: 2025-04-14 | End: 2025-04-17

## 2025-04-14 RX ORDER — SODIUM CHLORIDE 9 MG/ML
INJECTION, SOLUTION INTRAVENOUS PRN
Status: DISCONTINUED | OUTPATIENT
Start: 2025-04-14 | End: 2025-04-15 | Stop reason: SDUPTHER

## 2025-04-14 RX ORDER — FAMOTIDINE 20 MG/1
20 TABLET, FILM COATED ORAL 2 TIMES DAILY
Status: DISCONTINUED | OUTPATIENT
Start: 2025-04-14 | End: 2025-04-14

## 2025-04-14 RX ORDER — LEVETIRACETAM 10 MG/ML
1000 INJECTION INTRAVASCULAR EVERY 12 HOURS
Status: DISCONTINUED | OUTPATIENT
Start: 2025-04-14 | End: 2025-04-15

## 2025-04-14 RX ORDER — LORAZEPAM 2 MG/ML
0.5 INJECTION INTRAMUSCULAR EVERY 6 HOURS PRN
Status: DISCONTINUED | OUTPATIENT
Start: 2025-04-14 | End: 2025-04-17 | Stop reason: HOSPADM

## 2025-04-14 RX ADMIN — SODIUM CHLORIDE: 0.9 INJECTION, SOLUTION INTRAVENOUS at 17:20

## 2025-04-14 RX ADMIN — FAMOTIDINE 20 MG: 10 INJECTION, SOLUTION INTRAVENOUS at 20:59

## 2025-04-14 RX ADMIN — ENOXAPARIN SODIUM 40 MG: 100 INJECTION SUBCUTANEOUS at 17:38

## 2025-04-14 RX ADMIN — METOPROLOL TARTRATE 5 MG: 5 INJECTION INTRAVENOUS at 17:20

## 2025-04-14 RX ADMIN — IOPAMIDOL 75 ML: 755 INJECTION, SOLUTION INTRAVENOUS at 11:33

## 2025-04-14 RX ADMIN — LEVETIRACETAM 1000 MG: 10 INJECTION INTRAVENOUS at 17:25

## 2025-04-14 RX ADMIN — ALBUTEROL SULFATE 2 PUFF: 90 AEROSOL, METERED RESPIRATORY (INHALATION) at 20:28

## 2025-04-14 NOTE — PROGRESS NOTES
RESPIRATORY ASSESSMENT PROTOCOL                                                                                              Patient Name: Jeremy King Room#: 0302/0302-01 : 1955     Admitting diagnosis: Small bowel obstruction (HCC) [K56.609]  SBO (small bowel obstruction) (HCC) [K56.609]       Medical History:   Past Medical History:   Diagnosis Date    Arthritis     BPH (benign prostatic hyperplasia)     Dysphagia     GERD (gastroesophageal reflux disease)     Hearing loss     HLD (hyperlipidemia)     Insomnia     MR (mental retardation)     Periorbital cellulitis     SBO (small bowel obstruction) (HCC)     Seizures (HCC)     Epilepsy    Ventral hernia     Vitamin D deficiency        PATIENT ASSESSMENT    LABORATORY DATA  Hematology:   Lab Results   Component Value Date/Time    WBC 14.2 2025 10:04 AM    RBC 4.75 2025 10:04 AM    RBC 3.43 2011 11:48 AM    HGB 15.1 2025 10:04 AM    HCT 46.4 2025 10:04 AM     2025 10:04 AM     2011 11:48 AM     Chemistry:  No results found for: \"PHART\", \"NYZ9OQE\", \"PO2ART\", \"W6HDVRXD\", \"YLC0EDE\", \"PBEA\", \"NBEA\"    VITALS  Pulse: (!) 117   Respirations: 20  BP: (!) 155/90  SpO2: 96 %    Temp: 97.7 °F (36.5 °C)    SKIN COLOR  [x] Normal  [] Pale  [] Dusky  [] Cyanotic    RESPIRATORY PATTERN  [x] Normal  [] Dyspnea  [] Cheyne-Regalado  [] Kussmaul  [] Biots    AMBULATORY  [] Yes  [] No  [x] With Assistance    Patient Acuity 0 1 2 3 4 Score   Level of Consciousness (LOC) [x]  Alert & Oriented or Pt normal LOC []  Confused;follows directions []  Confused & uncooper-ative []  Obtunded []  Comatose 0   Respiratory Rate  (RR) [x]  Reg. rate & pattern. 12 - 20 bpm  []  Increased RR. Greater than 20 bpm   []  SOB w/ exertion or RR greater than 24 bpm []  Access- ory muscle use at rest. Abn.  resp. []  SOB at rest.   0   Bilateral Breath Sounds (BBS) []  Clear []  Diminish-ed bases  []  Diminish-ed t/o, or rales   [x]  Sporadic,

## 2025-04-14 NOTE — ED NOTES
After insertion of NG tube, patient noted to have approx 30 second seizure, patient maintained airway and seizure stopped without intervention. Dr Lovett aware.

## 2025-04-14 NOTE — H&P
diazePAM (DIASTAT) 20 MG GEL Place 20 mg rectally as needed (seizure activity>15 minutes).    ProviderFauzia MD   carbamide peroxide (DEBROX) 6.5 % otic solution Place 4 drops into both ears every 30 days Apply cotton swabs to ears, after instilling drops.  Then irrigate with room temperature water the next morning 4/20/24   Provider, MD Fauzia       Allergies:  Penicillins, Sodium hypochlorite, and Clindamycin    Social History:   TOBACCO:   reports that he has never smoked. He does not have any smokeless tobacco history on file.  ETOH:   reports no history of alcohol use.    Family History:       Problem Relation Age of Onset    High Blood Pressure Mother     Thyroid Cancer Mother     Other Mother         Myeloma    Heart Disease Mother         2 Stents    Arthritis Mother         2 Back surgeries    Lung Cancer Father     Heart Disease Brother         Defibrilator    Heart Attack Maternal Grandfather     Diabetes Paternal Grandmother          VITALS:  Temp: 98.5 °F (36.9 °C)  BP: (!) 147/92  Respirations: 20  Pulse: 95  SpO2: 94 %    Weight  Wt Readings from Last 3 Encounters:   04/14/25 96.3 kg (212 lb 6.4 oz)   02/19/25 89.6 kg (197 lb 8 oz)   12/05/24 89.6 kg (197 lb 8 oz)     Body mass index is 30.48 kg/m².  -----------------------------------------------------------------  EXAM:  GEN:     Awake and alert, no acute distress.  .    EYES:  EOMI, pupils equal   NECK: Supple. No lymphadenopathy.  No carotid bruit  CVS:    regular rate and rhythm, no audible murmur  PULM:  CTA, no wheezes, rales or rhonchi, no acute respiratory distress  ABD:    Bowels sounds hypoactive.  Abdomen is soft.  No distention.  no tenderness to palpation.   EXT:   no edema bilaterally .  No calf tenderness.   NEURO: Moves all extremities.  Motor and sensory are grossly intact  SKIN:  No rashes.  No skin lesions.    -----------------------------------------------------------------    DATA:  Complete Blood Count:   Recent  record  [If \"yes\", STOP HERE]     [] The patient's Advance Care Plan is NOT present because:    []  I confirmed today that the patient does not wish or was not able to name a   surrogate decision maker or provide and advance care plan.    [] Hospice care is currently being provided or has been provided within the   calendar year.    []  I did NOT confirm today the presence of an Advance Care Plan or surrogate   decision maker documented within the patient's medical record.   [DOES NOT SATISFY MIPS PERFORMANCE]      Krishna Shah MD , MLYLE.  4/14/2025  6:21 PM

## 2025-04-14 NOTE — PROGRESS NOTES
[facility]  Inpatient/Observation/Outpatient Rehabilitation    Date: 2025  Patient Name: Jeremy King       [x] Inpatient Acute/Observation       []  Outpatient  : 1955     [x] Evaluation held by RN/Provider/Physical Therapist due to:    [] High Heart Rate   [] High Blood Pressure   [] Orthopedic Consult   [] Hgb < 7   [x] Other:  Testing for a possible MI       Therapist/Assistant will attempt to see this patient, at our earliest opportunity.       Carlin Packer, PT, DPT, OCS, Cert. DN  Date: 2025

## 2025-04-14 NOTE — ED PROVIDER NOTES
EMERGENCY DEPARTMENT ENCOUNTER    Pt Name: Jeremy King  MRN: [id number]  Birthdate 1955  Date of evaluation: 4/14/25  CHIEF COMPLAINT       Chief Complaint   Patient presents with    Abdominal Pain     Sent from group home. Staff stated patient had lower quadrant abd pain with distant bowel sounds. Had 3 focal seizure episodes. Staff reports all these symptoms happened with prior bowel obstructions. No seizure like activity in squad. Vomiting upon arrival. Patient denies abd pain.      HISTORY OF PRESENT ILLNESS   This is a 69-year-old male with a history of previous abdominal surgeries, previous small bowel obstructions.  Patient was noted to be having some abdominal pain and discomfort,.  Some focal seizure activity, these are typical signs when he has a small bowel obstruction.  The patient does not provide anything to the clinical history.           REVIEW OF SYSTEMS     Review of Systems  PASTMEDICAL HISTORY     Past Medical History:   Diagnosis Date    Arthritis     BPH (benign prostatic hyperplasia)     Dysphagia     GERD (gastroesophageal reflux disease)     Hearing loss     HLD (hyperlipidemia)     Insomnia     MR (mental retardation)     Periorbital cellulitis     SBO (small bowel obstruction) (HCC)     Seizures (HCC)     Epilepsy    Ventral hernia     Vitamin D deficiency      Past Problem List  Patient Active Problem List   Diagnosis Code    MR (mental retardation), severe F72    LEELEE (obstructive sleep apnea) G47.33    Intellectual disability F79    S/P placement of VNS (vagus nerve stimulation) device Z96.89    Dysphagia R13.10    Seizure disorder ([facility]) G40.909    Pneumatosis intestinalis K63.89    Small bowel obstruction ([facility]) K56.609     SURGICAL HISTORY       Past Surgical History:   Procedure Laterality Date    ABDOMEN SURGERY  2013, 2014    Bowel Obstruction X2    CAST APPLICATION Left     Lt. Ankle , X3    HYDROCELE EXCISION  08/2016    LAPAROSCOPY  8/17/14    with BERNARD    LAPAROTOMY  8/17/14  Conjunctiva/sclera: Conjunctivae normal.      Pupils: Pupils are equal, round, and reactive to light.   Neck:      Trachea: Trachea normal.   Cardiovascular:      Rate and Rhythm: Normal rate and regular rhythm.      Heart sounds: S1 normal and S2 normal. No murmur heard.  Pulmonary:      Effort: Pulmonary effort is normal. No accessory muscle usage or respiratory distress.      Breath sounds: Normal breath sounds.   Chest:      Chest wall: No tenderness.   Abdominal:      General: Bowel sounds are normal. There is distension. There is no abdominal bruit.      Palpations: Abdomen is not rigid.      Tenderness: There is generalized abdominal tenderness. There is no guarding or rebound.   Musculoskeletal:      Cervical back: Normal range of motion and neck supple.   Skin:     General: Skin is warm.      Findings: No rash.   Neurological:      Mental Status: He is alert and oriented to person, place, and time.      GCS: GCS eye subscore is 4. GCS verbal subscore is 5. GCS motor subscore is 6.   Psychiatric:         Speech: Speech normal.         MEDICAL DECISION MAKING / ED COURSE:   Summary of Patient Presentation:      69-year-old presents with complaints of abdominal pain and distention.  History of previous small bowel obstruction.  Plan is basic labs CT and reevaluation.    2:22 PM EDT  Case was discussed with the hospitalist service as well as general surgery.  Dr. Cochran from general surgery recommended a NG tube placement.  I discussed the case with Dr. Shah from the hospitalist service who agrees with admission.      CRITICAL CARE:       PROCEDURES:  Procedures         DATA FOR LAB AND RADIOLOGY TESTS ORDERED BELOW ARE REVIEWED BY THE ED CLINICIAN:    RADIOLOGY: All x-rays, CT, MRI, and formal ultrasound images (except ED bedside ultrasound) are read by the radiologist, see reports below, unless otherwise noted in MDM or here.  Reports below are reviewed by myself.  CT ABDOMEN PELVIS W IV CONTRAST

## 2025-04-14 NOTE — CONSULTS
Patient: Jeremy King  : 1955  Date of Admission: 2025  Primary Care Physician: Sukumar Larose  Today's Date: 2025    REASON FOR CONSULTATION/CC: Acute MI/STEMI    HPI: I had the pleasure of seeing Mr. King today who is a 69 y.o. male who was admitted for a suspected small bowel obstruction. I was consulted after he underwent an ECG which was suggestive of an acute MI/STEMI.    He is an adult with a history of MR but was able to answer most questions without difficulty. He was most interested in talking about his recent constipation since Thursday of last week leading to a large BM this morning in the ER which he says resolved his abdominal pain. He was also frustrated about his worsening leg weakness and reduced ability to walk.    History of Present Illness  The patient presents to the emergency room for evaluation of suspected myocardial infarction. He is accompanied by a sore in the gluteal region, causing significant pain and impeding mobility, which has prevented him from reaching the bathroom for defecation. Severe abdominal pain was experienced over the weekend, which was alleviated following a bowel movement in the emergency room. The last bowel movement prior to this was on 04/10/2025, marking an unusual delay in his regular bi-daily bowel movements. A bowel movement occurred at 4:00 AM today. No current abdominal pain, chest pain, or pressure is reported. Respiratory function remains unimpaired, and no nausea is reported. There is no history of cardiac issues or myocardial infarctions.     He is currently on furosemide for leg edema and is seeking advice on strengthening his legs. A recent transition from a nursing home involved receiving physical therapy, but upon returning home, difficulty walking was experienced.    SOCIAL HISTORY  Exercise: Participated in therapy while in a nursing home, but not currently exercising.    FAMILY HISTORY  He says his mom  of a  (MAG-OX) 400 (240 Mg) MG tablet Take 1 tablet by mouth daily      meclizine (ANTIVERT) 25 MG tablet Take 1 tablet by mouth daily      levETIRAcetam (KEPPRA) 750 MG tablet Take 2 tablets by mouth daily 60 tablet 3    levETIRAcetam (KEPPRA) 1000 MG tablet Take 2 tablets by mouth nightly 60 tablet 3    pantoprazole (PROTONIX) 40 MG tablet Take 1 tablet by mouth every morning (before breakfast) 90 tablet 1    lamoTRIgine (LAMICTAL) 200 MG tablet Take 2 tablets by mouth every morning      lamoTRIgine (LAMICTAL) 200 MG tablet Take 3 tablets by mouth at bedtime      primidone (MYSOLINE) 250 MG tablet Take 1.5 tablets by mouth nightly      calcium carbonate (TUMS) 500 MG chewable tablet Take 1 tablet by mouth 2 times daily      latanoprost (XALATAN) 0.005 % ophthalmic solution Place 1 drop into both eyes nightly      Vitamin D (CHOLECALCIFEROL) 25 MCG (1000 UT) TABS tablet Take 1 tablet by mouth daily      primidone (MYSOLINE) 250 MG tablet Take 1 tablet by mouth every morning      lacosamide (VIMPAT) 150 MG TABS tablet Take 1 tablet by mouth 2 times daily.      finasteride (PROSCAR) 5 MG tablet Take 1 tablet by mouth nightly      tamsulosin (FLOMAX) 0.4 MG capsule Take 1 capsule by mouth 2 times daily         albuterol sulfate HFA (PROVENTIL;VENTOLIN;PROAIR) 108 (90 Base) MCG/ACT inhaler 2 puff, Q4H PRN  0.9 % sodium chloride infusion, Continuous  sodium chloride flush 0.9 % injection 10 mL, 2 times per day  sodium chloride flush 0.9 % injection 10 mL, PRN  0.9 % sodium chloride infusion, PRN  potassium chloride (KLOR-CON M) extended release tablet 40 mEq, PRN   Or  potassium bicarb-citric acid (EFFER-K) effervescent tablet 40 mEq, PRN   Or  potassium chloride 10 mEq/100 mL IVPB (Peripheral Line), PRN  enoxaparin (LOVENOX) injection 40 mg, Daily  ondansetron (ZOFRAN-ODT) disintegrating tablet 4 mg, Q8H PRN   Or  ondansetron (ZOFRAN) injection 4 mg, Q6H PRN  polyethylene glycol (GLYCOLAX) packet 17 g, Daily PRN  famotidine

## 2025-04-15 ENCOUNTER — APPOINTMENT (OUTPATIENT)
Dept: GENERAL RADIOLOGY | Age: 70
DRG: 389 | End: 2025-04-15
Payer: MEDICARE

## 2025-04-15 LAB
ALBUMIN SERPL-MCNC: 3.9 G/DL (ref 3.5–5.2)
ALBUMIN/GLOB SERPL: 1.3 {RATIO} (ref 1–2.5)
ALP SERPL-CCNC: 173 U/L (ref 40–129)
ALT SERPL-CCNC: 15 U/L (ref 10–50)
ANION GAP SERPL CALCULATED.3IONS-SCNC: 7 MMOL/L (ref 9–16)
AST SERPL-CCNC: 20 U/L (ref 10–50)
BASOPHILS # BLD: 0.05 K/UL (ref 0–0.2)
BASOPHILS NFR BLD: 0 % (ref 0–2)
BILIRUB SERPL-MCNC: 0.4 MG/DL (ref 0–1.2)
BUN SERPL-MCNC: 24 MG/DL (ref 8–23)
BUN/CREAT SERPL: 24 (ref 9–20)
CALCIUM SERPL-MCNC: 8.9 MG/DL (ref 8.6–10.4)
CHLORIDE SERPL-SCNC: 112 MMOL/L (ref 98–107)
CO2 SERPL-SCNC: 22 MMOL/L (ref 20–31)
CREAT SERPL-MCNC: 1 MG/DL (ref 0.7–1.2)
EKG ATRIAL RATE: 113 BPM
EKG ATRIAL RATE: 90 BPM
EKG P AXIS: 25 DEGREES
EKG P AXIS: 55 DEGREES
EKG P-R INTERVAL: 172 MS
EKG P-R INTERVAL: 198 MS
EKG Q-T INTERVAL: 322 MS
EKG Q-T INTERVAL: 374 MS
EKG Q-T INTERVAL: 440 MS
EKG QRS DURATION: 88 MS
EKG QRS DURATION: 88 MS
EKG QRS DURATION: 92 MS
EKG QTC CALCULATION (BAZETT): 441 MS
EKG QTC CALCULATION (BAZETT): 457 MS
EKG QTC CALCULATION (BAZETT): 603 MS
EKG R AXIS: -18 DEGREES
EKG R AXIS: 0 DEGREES
EKG R AXIS: 2 DEGREES
EKG T AXIS: 25 DEGREES
EKG T AXIS: 26 DEGREES
EKG T AXIS: 92 DEGREES
EKG VENTRICULAR RATE: 113 BPM
EKG VENTRICULAR RATE: 113 BPM
EKG VENTRICULAR RATE: 90 BPM
EOSINOPHIL # BLD: 0.19 K/UL (ref 0–0.44)
EOSINOPHILS RELATIVE PERCENT: 2 % (ref 1–4)
ERYTHROCYTE [DISTWIDTH] IN BLOOD BY AUTOMATED COUNT: 12.8 % (ref 11.8–14.4)
GFR, ESTIMATED: 87 ML/MIN/1.73M2
GLUCOSE SERPL-MCNC: 97 MG/DL (ref 74–99)
HCT VFR BLD AUTO: 42.2 % (ref 40.7–50.3)
HGB BLD-MCNC: 13.5 G/DL (ref 13–17)
IMM GRANULOCYTES # BLD AUTO: 0.03 K/UL (ref 0–0.3)
IMM GRANULOCYTES NFR BLD: 0 %
LYMPHOCYTES NFR BLD: 2.06 K/UL (ref 1.1–3.7)
LYMPHOCYTES RELATIVE PERCENT: 17 % (ref 24–43)
MCH RBC QN AUTO: 32.1 PG (ref 25.2–33.5)
MCHC RBC AUTO-ENTMCNC: 32 G/DL (ref 28.4–34.8)
MCV RBC AUTO: 100.2 FL (ref 82.6–102.9)
MONOCYTES NFR BLD: 1.1 K/UL (ref 0.1–1.2)
MONOCYTES NFR BLD: 9 % (ref 3–12)
NEUTROPHILS NFR BLD: 72 % (ref 36–65)
NEUTS SEG NFR BLD: 8.72 K/UL (ref 1.5–8.1)
NRBC BLD-RTO: 0 PER 100 WBC
PLATELET # BLD AUTO: 214 K/UL (ref 138–453)
PMV BLD AUTO: 9.1 FL (ref 8.1–13.5)
POTASSIUM SERPL-SCNC: 4.1 MMOL/L (ref 3.7–5.3)
PROT SERPL-MCNC: 7 G/DL (ref 6.6–8.7)
RBC # BLD AUTO: 4.21 M/UL (ref 4.21–5.77)
SODIUM SERPL-SCNC: 141 MMOL/L (ref 136–145)
WBC OTHER # BLD: 12.2 K/UL (ref 3.5–11.3)

## 2025-04-15 PROCEDURE — 94761 N-INVAS EAR/PLS OXIMETRY MLT: CPT

## 2025-04-15 PROCEDURE — 93010 ELECTROCARDIOGRAM REPORT: CPT | Performed by: INTERNAL MEDICINE

## 2025-04-15 PROCEDURE — 2500000003 HC RX 250 WO HCPCS: Performed by: INTERNAL MEDICINE

## 2025-04-15 PROCEDURE — 94664 DEMO&/EVAL PT USE INHALER: CPT

## 2025-04-15 PROCEDURE — 1200000000 HC SEMI PRIVATE

## 2025-04-15 PROCEDURE — 80053 COMPREHEN METABOLIC PANEL: CPT

## 2025-04-15 PROCEDURE — 2580000003 HC RX 258: Performed by: NURSE PRACTITIONER

## 2025-04-15 PROCEDURE — 85025 COMPLETE CBC W/AUTO DIFF WBC: CPT

## 2025-04-15 PROCEDURE — 6360000002 HC RX W HCPCS: Performed by: NURSE PRACTITIONER

## 2025-04-15 PROCEDURE — C9254 INJECTION, LACOSAMIDE: HCPCS | Performed by: NURSE PRACTITIONER

## 2025-04-15 PROCEDURE — 99232 SBSQ HOSP IP/OBS MODERATE 35: CPT | Performed by: STUDENT IN AN ORGANIZED HEALTH CARE EDUCATION/TRAINING PROGRAM

## 2025-04-15 PROCEDURE — 6370000000 HC RX 637 (ALT 250 FOR IP): Performed by: INTERNAL MEDICINE

## 2025-04-15 PROCEDURE — 99232 SBSQ HOSP IP/OBS MODERATE 35: CPT | Performed by: FAMILY MEDICINE

## 2025-04-15 PROCEDURE — 94640 AIRWAY INHALATION TREATMENT: CPT

## 2025-04-15 PROCEDURE — 36415 COLL VENOUS BLD VENIPUNCTURE: CPT

## 2025-04-15 PROCEDURE — 74018 RADEX ABDOMEN 1 VIEW: CPT

## 2025-04-15 PROCEDURE — 05H933Z INSERTION OF INFUSION DEVICE INTO RIGHT BRACHIAL VEIN, PERCUTANEOUS APPROACH: ICD-10-PCS | Performed by: INTERNAL MEDICINE

## 2025-04-15 PROCEDURE — 2580000003 HC RX 258: Performed by: INTERNAL MEDICINE

## 2025-04-15 PROCEDURE — 6360000002 HC RX W HCPCS: Performed by: INTERNAL MEDICINE

## 2025-04-15 RX ORDER — LACOSAMIDE 10 MG/ML
150 INJECTION, SOLUTION INTRAVENOUS 2 TIMES DAILY
Status: DISCONTINUED | OUTPATIENT
Start: 2025-04-15 | End: 2025-04-17

## 2025-04-15 RX ORDER — SODIUM CHLORIDE 0.9 % (FLUSH) 0.9 %
5-40 SYRINGE (ML) INJECTION EVERY 12 HOURS SCHEDULED
Status: DISCONTINUED | OUTPATIENT
Start: 2025-04-15 | End: 2025-04-17 | Stop reason: HOSPADM

## 2025-04-15 RX ORDER — SODIUM CHLORIDE 9 MG/ML
INJECTION, SOLUTION INTRAVENOUS PRN
Status: DISCONTINUED | OUTPATIENT
Start: 2025-04-15 | End: 2025-04-17 | Stop reason: HOSPADM

## 2025-04-15 RX ORDER — LIDOCAINE HYDROCHLORIDE 10 MG/ML
50 INJECTION, SOLUTION INFILTRATION; PERINEURAL ONCE
Status: DISCONTINUED | OUTPATIENT
Start: 2025-04-15 | End: 2025-04-17 | Stop reason: HOSPADM

## 2025-04-15 RX ORDER — SODIUM CHLORIDE 9 MG/ML
INJECTION, SOLUTION INTRAVENOUS CONTINUOUS
Status: ACTIVE | OUTPATIENT
Start: 2025-04-15 | End: 2025-04-16

## 2025-04-15 RX ORDER — LEVETIRACETAM 15 MG/ML
1500 INJECTION INTRAVASCULAR DAILY
Status: DISCONTINUED | OUTPATIENT
Start: 2025-04-16 | End: 2025-04-17

## 2025-04-15 RX ORDER — SODIUM CHLORIDE 0.9 % (FLUSH) 0.9 %
5-40 SYRINGE (ML) INJECTION PRN
Status: DISCONTINUED | OUTPATIENT
Start: 2025-04-15 | End: 2025-04-17 | Stop reason: HOSPADM

## 2025-04-15 RX ADMIN — SODIUM CHLORIDE: 0.9 INJECTION, SOLUTION INTRAVENOUS at 06:53

## 2025-04-15 RX ADMIN — LACOSAMIDE 150 MG: 10 INJECTION INTRAVENOUS at 14:46

## 2025-04-15 RX ADMIN — ALBUTEROL SULFATE 2 PUFF: 90 AEROSOL, METERED RESPIRATORY (INHALATION) at 15:15

## 2025-04-15 RX ADMIN — LEVETIRACETAM 2000 MG: 100 INJECTION INTRAVENOUS at 22:04

## 2025-04-15 RX ADMIN — LEVETIRACETAM 1000 MG: 10 INJECTION INTRAVENOUS at 04:14

## 2025-04-15 RX ADMIN — ALBUTEROL SULFATE 2 PUFF: 90 AEROSOL, METERED RESPIRATORY (INHALATION) at 08:06

## 2025-04-15 RX ADMIN — ENOXAPARIN SODIUM 40 MG: 100 INJECTION SUBCUTANEOUS at 09:13

## 2025-04-15 RX ADMIN — SODIUM CHLORIDE, PRESERVATIVE FREE 10 ML: 5 INJECTION INTRAVENOUS at 21:53

## 2025-04-15 RX ADMIN — FAMOTIDINE 20 MG: 10 INJECTION, SOLUTION INTRAVENOUS at 09:13

## 2025-04-15 RX ADMIN — ALBUTEROL SULFATE 2 PUFF: 90 AEROSOL, METERED RESPIRATORY (INHALATION) at 20:19

## 2025-04-15 RX ADMIN — FAMOTIDINE 20 MG: 10 INJECTION, SOLUTION INTRAVENOUS at 21:52

## 2025-04-15 RX ADMIN — LACOSAMIDE 150 MG: 10 INJECTION INTRAVENOUS at 21:52

## 2025-04-15 NOTE — PROGRESS NOTES
Comprehensive Nutrition Assessment    Type and Reason for Visit:  Initial    Nutrition Recommendations/Plan:   Monitor NPO duration.   Monitor GI viability.       Malnutrition Assessment:  Malnutrition Status:  At risk for malnutrition (04/15/25 0731)    Context:  Acute Illness     Findings of the 6 clinical characteristics of malnutrition:  Energy Intake:  Mild decrease in energy intake  Weight Loss:  No weight loss     Body Fat Loss:  No body fat loss     Muscle Mass Loss:  No muscle mass loss    Fluid Accumulation:  Mild Extremities   Strength:  Not Performed    Nutrition Assessment:    Inadequate oral intake r/t altered GI function aeb NPO due to small bowel obstruction. Pt is on a dysphagia 1 diet (pureed with nectar thick liquids).When entering the room pt expressed to leave him alone as he's in bed. When entering pt room discussed comfortably with NGT stating he is \"fine\". Labs reviewed with elevated BUN (24) and no edema present. Education is not appropriate for pt.    Nutrition Related Findings:    active bowel sounds, non pitting RLE and LLE edema Wound Type: None       Current Nutrition Intake & Therapies:    Average Meal Intake: NPO  Average Supplements Intake: None Ordered  Diet NPO    Anthropometric Measures:  Height: 177.8 cm (5' 10\")  Ideal Body Weight (IBW): 166 lbs (75 kg)    Admission Body Weight: 96.3 kg (212 lb 4.9 oz)  Current Body Weight: 96.3 kg (212 lb 4.9 oz), 127.9 % IBW. Weight Source: Bed scale  Current BMI (kg/m2): 30.5  Usual Body Weight: 79.4 kg (175 lb 0.7 oz) (based on weight history stable weight gain for a year)     % Weight Change (Calculated): 21.3  Weight Adjustment For: No Adjustment                 BMI Categories: Obese Class 1 (BMI 30.0-34.9)  Hematology:  Recent Labs     04/14/25  1004 04/15/25  0525   WBC 14.2* 12.2*   HGB 15.1 13.5   HCT 46.4 42.2     Chemistry:  Recent Labs     04/14/25  1004 04/15/25  0525    141   K 4.0 4.1    112*   CO2 26 22   GLUCOSE

## 2025-04-15 NOTE — PROGRESS NOTES
Entered patient's room for morning vital signs and head to toe assessment. Patient resting in the bed at this time. A&O x2 (person and place), calm, and cooperative. Patient denies of pain at this time and appears to be comfortable. Vital signs and head to toe assessment completed at this time, see flowsheets for more details. NG tube in place and connected to low intermittent suction. NG tube at 70 cm and retaped at this time. Patient denies no more needs at this time. Call light within reach. Bed alarm on. Bed wheels locked. Bed in lowest position. Side rails up x2.     Received forms at [place], please sign and fax back.

## 2025-04-15 NOTE — PROGRESS NOTES
Patient A&O x4, calm, and cooperative. Vital signs and head to toe assessment completed at this time, see flowsheets for details.  Patient was briefly educated on medications due tonight. Patient denies needs at this time. Call light within reach. Bedside table within reach, bed in lowest position, bed/chair wheels locked, and alarm is set. Care ongoing.

## 2025-04-15 NOTE — PROGRESS NOTES
Patient pulled out IV. , Aicha BOLTON, and Jazmine BOLTON attempted to get another peripheral IV in, and after 4 attempts were unsuccessful. Writer called Jarrod BOLTON and he was busy at the time to try for a ultrasound IV placement. IVAT team consulted and perfectserved for new line placement.

## 2025-04-15 NOTE — PROGRESS NOTES
Patient resting comfortable in bed. Patient denies any pain at this time. Patient reports no nausea or vomiting. Bowel sounds active in all 4 quadrants. NG tube connected to low intermittent suction, tube it secured with tape to right nare at 70 cm. Patient denies any complaints at this time. Bed alarm set, call light within reach, instructed on how to use call light, bed locked.

## 2025-04-15 NOTE — PROGRESS NOTES
Spoke with Patient's sister/Legal Guardian, ortiz Santana:  discharge planning.  Patient is a 69 year old single, white male, admitted with a diagnosis of SBO.  History of Mental Retardation and Epilepsy noted.  Patient resides in a group home in [place] and his plan will be to return to the group home at discharge.    Patient primarily relies on a wheelchair for assistance with ambulation.  Requires assistance daily with his ADL's.  Group home staff provides this assistance along with managing his medications and providing for his transportation needs.    PCP is Dr. Larose.  Patient has Medicare and Medicaid insurance coverage and his prescriptions are well covered at this point.    Discharge plan is to return to group home when stable.  Patient is a 'Full Code' status at this time and has legal guardian.  LSW following.  Group home to provide transportation home at discharge.    PITER Blanco  4/15/2025

## 2025-04-15 NOTE — PROGRESS NOTES
RESPIRATORY ASSESSMENT PROTOCOL                                                                                              Patient Name: Jeremy KRAFT [facility] Room#: 0302/0302-01 : 1955     Admitting diagnosis: Small bowel obstruction (HCC) [K56.609]  SBO (small bowel obstruction) (HCC) [K56.609]       Medical History:   Past Medical History:   Diagnosis Date    Arthritis     BPH (benign prostatic hyperplasia)     Dysphagia     GERD (gastroesophageal reflux disease)     Hearing loss     HLD (hyperlipidemia)     Insomnia     MR (mental retardation)     Periorbital cellulitis     SBO (small bowel obstruction) (HCC)     Seizures (HCC)     Epilepsy    Ventral hernia     Vitamin D deficiency        PATIENT ASSESSMENT    LABORATORY DATA  Hematology:   Lab Results   Component Value Date/Time    WBC 12.2 04/15/2025 05:25 AM    RBC 4.21 04/15/2025 05:25 AM    RBC 3.43 2011 11:48 AM    HGB 13.5 04/15/2025 05:25 AM    HCT 42.2 04/15/2025 05:25 AM     04/15/2025 05:25 AM     2011 11:48 AM     Chemistry:  No results found for: \"PHART\", \"RKL8FYU\", \"PO2ART\", \"X0OAGKQD\", \"VLC4YTJ\", \"PBEA\", \"NBEA\"    VITALS  Pulse: 75   Respirations: 20  BP: (!) 141/81  SpO2: 97 % O2 Device: None (Room air)  Temp: 96.9 °F (36.1 °C)    SKIN COLOR  [x] Normal  [] Pale  [] Dusky  [] Cyanotic    RESPIRATORY PATTERN  [x] Normal  [] Dyspnea  [] Cheyne-Regalado  [] Kussmaul  [] Biots    AMBULATORY  [] Yes  [] No  [x] With Assistance            Patient Acuity 0 1 2 3 4 Score   Level of Consciousness (LOC) [x]  Alert & Oriented or Pt normal LOC []  Confused;follows directions []  Confused & uncooper-ative []  Obtunded []  Comatose 0   Respiratory Rate  (RR) [x]  Reg. rate & pattern. 12 - 20 bpm  []  Increased RR. Greater than 20 bpm   []  SOB w/ exertion or RR greater than 24 bpm []  Access- ory muscle use at rest. Abn.  resp. []  SOB at rest.   0   Bilateral Breath Sounds (BBS) []  Clear []  Diminish-ed bases  []  Diminish-ed t/o,

## 2025-04-15 NOTE — PROCEDURES
PROCEDURE NOTE  Date: 4/15/2025   Name: Jeremy King  YOB: 1955    Procedures        Midline insertion note:     Prescribed therapy: Irritants  Product type: 4.5fr single lumen Antimicrobial/Antithrombogenic Arrow Midline  History/Labs/Allergies Reviewed  Placed By:   Kike BAIRES - [facility]ANG IV Team    Time out Performed using Two Identifiers  Insertion site: Right   brachial vein  External catheter length: 1 cm  Extremity circumference at insertion site: 29 cm  Number of attempts: 1  Estimated blood loss: 1 ml  Placement verified by: positive blood return & flushes easily  Special equipment used: ultrasound & micro-introducer technique   Catheter secured with adhesive securement device  Catheter adhesive (SecureportIV) utilized at insertion site  Dressing applied: Tegaderm CHG  Lidocaine administered intradermally conc.1%: approx 1 mL    Midline education:     [ X ] Post care line insertion was discussed with patient/Family or POA prior to procedure.  Risks, benefits, alternatives, and reason for procedure were discussed and teaching was reinforced. An educational handout on post insertion line care and maintenance was left at bedside with patient or in chart. Patient (Family or POA) acknowledged understanding of information relayed.

## 2025-04-15 NOTE — PROGRESS NOTES
Patient: Jeremy King  : 1955  Date of Admission: 2025  Primary Care Physician: Sukumar Larose  Today's Date: 4/15/2025    REASON FOR CONSULTATION/CC: Acute MI/STEMI    HPI: I had the pleasure of seeing Mr. Fernando dumont who is a 69 y.o. male who was admitted for a suspected small bowel obstruction. I was consulted after he underwent an ECG which was suggestive of an acute MI/STEMI.    He is an adult with a history of MR but was able to answer most questions without difficulty.     History of Present Illness 4/15/25  The patient is a 71-year-old male who presents for follow-up after being admitted for a small bowel obstruction. He was counseled on 2025 for a possible non-STEMI with what appeared to be ST elevation in the inferior leads. Metoprolol IV was administered due to NPO status, and a repeat EKG showed improvement, though not complete normalization. Troponins times 2 were flat at 12 and within normal limits. At that time, he denied any chest pain or chest pressure.    Currently, no pain or discomfort is reported, including abdominal pain. Respiratory function appears to be satisfactory.    He denied any current chest pain, shortness of breath, abdominal pain, bleeding problems, problems with his medications or any other concerns at this time.     Past Medical History:   Diagnosis Date    Arthritis     BPH (benign prostatic hyperplasia)     Dysphagia     GERD (gastroesophageal reflux disease)     Hearing loss     HLD (hyperlipidemia)     Insomnia     MR (mental retardation)     Periorbital cellulitis     SBO (small bowel obstruction) (HCC)     Seizures (HCC)     Epilepsy    Ventral hernia     Vitamin D deficiency        CURRENT ALLERGIES: Penicillins, Sodium hypochlorite, and Clindamycin REVIEW OF SYSTEMS: 14 systems were reviewed. Pertinent positives and negatives as above, all else negative.     Past Surgical History:   Procedure Laterality Date    ABDOMEN SURGERY  ,

## 2025-04-15 NOTE — PLAN OF CARE
Problem: Discharge Planning  Goal: Discharge to home or other facility with appropriate resources  Outcome: Progressing     Problem: Skin/Tissue Integrity  Goal: Skin integrity remains intact  Description: 1.  Monitor for areas of redness and/or skin breakdown2.  Assess vascular access sites hourly3.  Every 4-6 hours minimum:  Change oxygen saturation probe site4.  Every 4-6 hours:  If on nasal continuous positive airway pressure, respiratory therapy assess nares and determine need for appliance change or resting period  Outcome: Progressing     Problem: Safety - Adult  Goal: Free from fall injury  Outcome: Progressing

## 2025-04-15 NOTE — PROGRESS NOTES
Progress Note    SUBJECTIVE:    Patient seen for f/u of Small bowel obstruction (HCC).  He resting in bed no distress. NG draining brown liquid.  No complaints     ROS:   Constitutional: negative  for fevers, and negative for chills.  Respiratory: negative for shortness of breath, negative for cough, and negative for wheezing  Cardiovascular: negative for chest pain, and negative for palpitations  Gastrointestinal: negative for abdominal pain, negative for nausea,negative for vomiting, negative for diarrhea, and negative for constipation     All other systems were reviewed with the patient and are negative unless otherwise stated in HPI      OBJECTIVE:      Vitals:   Vitals:    04/15/25 0642   BP: 131/89   Pulse: 79   Resp: 20   Temp: 97.2 °F (36.2 °C)   SpO2: 94%     Weight - Scale: 96.3 kg (212 lb 4.9 oz)   Height: 177.8 cm (5' 10\")     Weight  Wt Readings from Last 3 Encounters:   04/15/25 96.3 kg (212 lb 4.9 oz)   02/19/25 89.6 kg (197 lb 8 oz)   12/05/24 89.6 kg (197 lb 8 oz)     Body mass index is 30.46 kg/m².    24HR INTAKE/OUTPUT:      Intake/Output Summary (Last 24 hours) at 4/15/2025 0747  Last data filed at 4/15/2025 0513  Gross per 24 hour   Intake 912.25 ml   Output 2600 ml   Net -1687.75 ml     -----------------------------------------------------------------  Exam:    GEN:    Awake, alert and oriented x3.   EYES:  EOMI, pupils equal   NECK: Supple. No lymphadenopathy.  No carotid bruit  CVS:    regular rate and rhythm, no audible murmur  PULM:  CTA, no wheezes, rales or rhonchi, no acute respiratory distress  ABD:    Bowels sounds hypoactive.  Abdomen is soft.  No distention.  no tenderness to palpation.   EXT:   no edema bilaterally .  No calf tenderness.   NEURO: Moves all extremities.  Motor and sensory are grossly intact  SKIN:  No rashes.  No skin lesions.    -----------------------------------------------------------------    Diagnostic Data:      Complete Blood Count:   Recent Labs

## 2025-04-15 NOTE — PROGRESS NOTES
Jarrod BOLTON at bedside to attempt IV insertion. He was also unsuccessful after 2 attempts. RVAT team member will be here in an hour and a half.

## 2025-04-15 NOTE — PROGRESS NOTES
General Surgery:  Daily Progress Note                   PATIENT NAME: Jeremy King     TODAY'S DATE: 4/15/2025, 6:39 PM  CC: Doing okay    SUBJECTIVE:     Pt seen and examined at bedside today.  Denies abdominal pain, nausea.  Having bowel movements and passing flatus.    OBJECTIVE:   VITALS:  BP (!) 141/81   Pulse 77   Temp 96.9 °F (36.1 °C) (Temporal)   Resp 18   Ht 1.778 m (5' 10\")   Wt 96.3 kg (212 lb 4.9 oz)   SpO2 96%   BMI 30.46 kg/m²      INTAKE/OUTPUT:      Intake/Output Summary (Last 24 hours) at 4/15/2025 1839  Last data filed at 4/15/2025 1546  Gross per 24 hour   Intake 2260.74 ml   Output 2650 ml   Net -389.26 ml       PHYSICAL EXAM:  General Appearance: awake, alert, in no acute distress  HEENT:  Normocephalic, atraumatic, mucus membranes moist   Heart: Regular rate and rhythm  Lungs: Equal chest rise bilaterally, no accessory muscle use  Abdomen: Soft, nontender, mild distention  Extremities: No cyanosis, pitting edema, rashes noted.    Skin: Skin color, texture, turgor normal. No rashes or lesions.    Data:  CBC with Differential:    Lab Results   Component Value Date/Time    WBC 12.2 04/15/2025 05:25 AM    RBC 4.21 04/15/2025 05:25 AM    RBC 3.43 08/29/2011 11:48 AM    HGB 13.5 04/15/2025 05:25 AM    HCT 42.2 04/15/2025 05:25 AM     04/15/2025 05:25 AM     08/29/2011 11:48 AM    .2 04/15/2025 05:25 AM    MCH 32.1 04/15/2025 05:25 AM    MCHC 32.0 04/15/2025 05:25 AM    RDW 12.8 04/15/2025 05:25 AM    LYMPHOPCT 17 04/15/2025 05:25 AM    MONOPCT 9 04/15/2025 05:25 AM    EOSPCT 2 04/15/2025 05:25 AM    BASOPCT 0 04/15/2025 05:25 AM    MONOSABS 1.10 04/15/2025 05:25 AM    LYMPHSABS 2.06 04/15/2025 05:25 AM    EOSABS 0.19 04/15/2025 05:25 AM    BASOSABS 0.05 04/15/2025 05:25 AM    DIFFTYPE NOT REPORTED 02/09/2022 07:30 AM     BMP:    Lab Results   Component Value Date/Time     04/15/2025 05:25 AM    K 4.1 04/15/2025 05:25 AM     04/15/2025 05:25 AM    CO2 22

## 2025-04-15 NOTE — PROGRESS NOTES
[facility]  Inpatient/Observation    Date: 4/15/2025  Patient Name: Jeremy King             : 1955         [x] Pt does not require skilled services due to:  PT/OT order received and chart reviewed. Upon entering the room patient yelling at therapists refusing to sit edge of bed. Therapists continued to educated patient on purpose of therapy with patient continuing to refuse. When attempting to sit edge of bed patient became agitated and nursing stated patient did not need to get up. Per nursing patient is at his baseline and doesn't feel that patient needs therapy at this time and will more than likely continue to refuse. PT order will be discontinued at this time.  Please re-consult if pt has a change or decline in function.  Thank you for the opportunity to assist in the care of this patient.          Jazmine Bills, PT, DPT           Date: 4/15/2025

## 2025-04-15 NOTE — PROGRESS NOTES
[facility]  Inpatient/Observation    Date: 4/15/2025  Patient Name: Jeremy King             : 1955         [x] Pt does not require skilled services due to:  PT/OT order received and chart reviewed.  Attempted OT evaluation, however, patient non compliant. Patient with verbal outbursts stating that he does not need help and he is just simply not doing it. Per RN, patient at baseline.  OT order will be discontinued at this time.  Please re-consult if pt has a change or decline in function.  Thank you for the opportunity to assist in the care of this patient.          Sarah Sanchez, OTR/L           Date: 4/15/2025

## 2025-04-15 NOTE — CONSULTS
General Surgery:  Consult Note        PATIENT NAME: Jeremy King   YOB: 1955    ADMISSION DATE: 4/14/2025  9:36 AM      TODAY'S DATE: 4/14/2025    Chief Complaint: Currently states he is fine  Consult Regarding: Small bowel obstruction    HISTORY OF PRESENT ILLNESS:  The patient is a 69 y.o. male  who presented to the emergency department due to abdominal pain as well as having 3 focal seizure episodes.  In the emergency department patient had nausea and vomiting.  Patient underwent labs and CT evaluation.  Labs were significant for leukocytosis of 14 however lactic acid was only 1.8.  CT scan showed dilated stomach as well as loops of small bowel with possible transition point in the right lower quadrant.  General surgery was consulted for these findings.  Patient is well-known to the surgical service here and has been admitted multiple times for partial small bowel obstructions.  Patient also has surgical history of multiple laparotomies for partial colectomy as well as bowel obstructions in the past.  Most recently his bowel obstructions tend to resolve with a few days of bowel rest and NG tube decompression.  Of note after NG tube was placed patient had a seizure episode.  Also sometime thereafter patient became tachycardic with heart rate in the 1 teens.  EKGs was obtained which showed possible STEMI.  Patient's troponins were negative and once heart rate came back below 100 repeat EKG failed to show STEMI.    During my evaluation patient stated that he had abdominal pain however currently has none.  Denies nausea as well.  Patient states he had not had a bowel movement for a few days however had a bowel movement in the emergency department and also had a bowel movement up on the floor.  Nurses stated that he was also passing gas when he was placed on the bedpan.  Patient denied any chest pain or shortness of breath.  Patient's history always questionable due to his history of developmental  bowel function and his abdominal pain is significantly improved since arrival.  NPO, IVF  Will continue NG decompression for now and hope to manage this obstruction nonoperatively  Strict I's and O's, continue to monitor for bowel function  Will check KUB in the morning to see progression of bowel and stomach dilation.  If no significant change on his a.m. imaging will consider small bowel follow-through in the next 48 hours      Electronically signed by Anmol Cochran DO  on 4/14/2025 at 8:21 PM

## 2025-04-16 LAB
ALBUMIN SERPL-MCNC: 3.8 G/DL (ref 3.5–5.2)
ALBUMIN/GLOB SERPL: 1.4 {RATIO} (ref 1–2.5)
ALP SERPL-CCNC: 151 U/L (ref 40–129)
ALT SERPL-CCNC: 16 U/L (ref 10–50)
ANION GAP SERPL CALCULATED.3IONS-SCNC: 12 MMOL/L (ref 9–16)
AST SERPL-CCNC: 15 U/L (ref 10–50)
BASOPHILS # BLD: 0.04 K/UL (ref 0–0.2)
BASOPHILS NFR BLD: 0 % (ref 0–2)
BILIRUB SERPL-MCNC: 0.5 MG/DL (ref 0–1.2)
BUN SERPL-MCNC: 20 MG/DL (ref 8–23)
BUN/CREAT SERPL: 25 (ref 9–20)
CALCIUM SERPL-MCNC: 8.6 MG/DL (ref 8.6–10.4)
CHLORIDE SERPL-SCNC: 110 MMOL/L (ref 98–107)
CO2 SERPL-SCNC: 22 MMOL/L (ref 20–31)
CREAT SERPL-MCNC: 0.8 MG/DL (ref 0.7–1.2)
EOSINOPHIL # BLD: 0.22 K/UL (ref 0–0.44)
EOSINOPHILS RELATIVE PERCENT: 2 % (ref 1–4)
ERYTHROCYTE [DISTWIDTH] IN BLOOD BY AUTOMATED COUNT: 12.6 % (ref 11.8–14.4)
GFR, ESTIMATED: >90 ML/MIN/1.73M2
GLUCOSE SERPL-MCNC: 83 MG/DL (ref 74–99)
HCT VFR BLD AUTO: 36.3 % (ref 40.7–50.3)
HGB BLD-MCNC: 11.9 G/DL (ref 13–17)
IMM GRANULOCYTES # BLD AUTO: 0.03 K/UL (ref 0–0.3)
IMM GRANULOCYTES NFR BLD: 0 %
LYMPHOCYTES NFR BLD: 1.57 K/UL (ref 1.1–3.7)
LYMPHOCYTES RELATIVE PERCENT: 16 % (ref 24–43)
MCH RBC QN AUTO: 32.4 PG (ref 25.2–33.5)
MCHC RBC AUTO-ENTMCNC: 32.8 G/DL (ref 28.4–34.8)
MCV RBC AUTO: 98.9 FL (ref 82.6–102.9)
MONOCYTES NFR BLD: 0.73 K/UL (ref 0.1–1.2)
MONOCYTES NFR BLD: 8 % (ref 3–12)
NEUTROPHILS NFR BLD: 74 % (ref 36–65)
NEUTS SEG NFR BLD: 7.14 K/UL (ref 1.5–8.1)
NRBC BLD-RTO: 0 PER 100 WBC
PLATELET # BLD AUTO: 184 K/UL (ref 138–453)
PMV BLD AUTO: 9.1 FL (ref 8.1–13.5)
POTASSIUM SERPL-SCNC: 3.8 MMOL/L (ref 3.7–5.3)
PROT SERPL-MCNC: 6.5 G/DL (ref 6.6–8.7)
RBC # BLD AUTO: 3.67 M/UL (ref 4.21–5.77)
SODIUM SERPL-SCNC: 144 MMOL/L (ref 136–145)
WBC OTHER # BLD: 9.7 K/UL (ref 3.5–11.3)

## 2025-04-16 PROCEDURE — 94664 DEMO&/EVAL PT USE INHALER: CPT

## 2025-04-16 PROCEDURE — 2500000003 HC RX 250 WO HCPCS: Performed by: INTERNAL MEDICINE

## 2025-04-16 PROCEDURE — 94640 AIRWAY INHALATION TREATMENT: CPT

## 2025-04-16 PROCEDURE — C9254 INJECTION, LACOSAMIDE: HCPCS | Performed by: NURSE PRACTITIONER

## 2025-04-16 PROCEDURE — 1200000000 HC SEMI PRIVATE

## 2025-04-16 PROCEDURE — 6370000000 HC RX 637 (ALT 250 FOR IP): Performed by: INTERNAL MEDICINE

## 2025-04-16 PROCEDURE — 85025 COMPLETE CBC W/AUTO DIFF WBC: CPT

## 2025-04-16 PROCEDURE — 6360000002 HC RX W HCPCS: Performed by: NURSE PRACTITIONER

## 2025-04-16 PROCEDURE — 2580000003 HC RX 258: Performed by: INTERNAL MEDICINE

## 2025-04-16 PROCEDURE — 6360000002 HC RX W HCPCS: Performed by: INTERNAL MEDICINE

## 2025-04-16 PROCEDURE — 94761 N-INVAS EAR/PLS OXIMETRY MLT: CPT

## 2025-04-16 PROCEDURE — 2580000003 HC RX 258

## 2025-04-16 PROCEDURE — 99232 SBSQ HOSP IP/OBS MODERATE 35: CPT | Performed by: STUDENT IN AN ORGANIZED HEALTH CARE EDUCATION/TRAINING PROGRAM

## 2025-04-16 PROCEDURE — 94669 MECHANICAL CHEST WALL OSCILL: CPT

## 2025-04-16 PROCEDURE — 2580000003 HC RX 258: Performed by: NURSE PRACTITIONER

## 2025-04-16 PROCEDURE — 80053 COMPREHEN METABOLIC PANEL: CPT

## 2025-04-16 RX ADMIN — ENOXAPARIN SODIUM 40 MG: 100 INJECTION SUBCUTANEOUS at 09:02

## 2025-04-16 RX ADMIN — SODIUM CHLORIDE: 0.9 INJECTION, SOLUTION INTRAVENOUS at 03:48

## 2025-04-16 RX ADMIN — SODIUM CHLORIDE: 0.9 INJECTION, SOLUTION INTRAVENOUS at 17:06

## 2025-04-16 RX ADMIN — SODIUM CHLORIDE, PRESERVATIVE FREE 10 ML: 5 INJECTION INTRAVENOUS at 22:00

## 2025-04-16 RX ADMIN — ALBUTEROL SULFATE 2 PUFF: 90 AEROSOL, METERED RESPIRATORY (INHALATION) at 15:51

## 2025-04-16 RX ADMIN — ALBUTEROL SULFATE 2 PUFF: 90 AEROSOL, METERED RESPIRATORY (INHALATION) at 20:04

## 2025-04-16 RX ADMIN — LACOSAMIDE 150 MG: 10 INJECTION INTRAVENOUS at 09:01

## 2025-04-16 RX ADMIN — LACOSAMIDE 150 MG: 10 INJECTION INTRAVENOUS at 21:49

## 2025-04-16 RX ADMIN — FAMOTIDINE 20 MG: 10 INJECTION, SOLUTION INTRAVENOUS at 21:46

## 2025-04-16 RX ADMIN — ALBUTEROL SULFATE 2 PUFF: 90 AEROSOL, METERED RESPIRATORY (INHALATION) at 11:16

## 2025-04-16 RX ADMIN — FAMOTIDINE 20 MG: 10 INJECTION, SOLUTION INTRAVENOUS at 09:01

## 2025-04-16 RX ADMIN — LEVETIRACETAM 1500 MG: 15 INJECTION, SOLUTION INTRAVENOUS at 09:08

## 2025-04-16 RX ADMIN — ALBUTEROL SULFATE 2 PUFF: 90 AEROSOL, METERED RESPIRATORY (INHALATION) at 05:11

## 2025-04-16 RX ADMIN — LEVETIRACETAM 2000 MG: 100 INJECTION INTRAVENOUS at 21:49

## 2025-04-16 NOTE — PROGRESS NOTES
RT at bedside due to patient being noncompliant with coughing and increased phlegm production. Patient encouraged to cough. AGNES Morrell notified.

## 2025-04-16 NOTE — PROGRESS NOTES
Entered patient's room for morning vital signs and head to toe assessment. Patient resting in the bed at this time. A&O x2 (person and place), calm, and cooperative. Patient denies of pain at this time. Vital signs and head to toe assessment completed at this time, see flowsheets for more details. Oral care performed and oral suction completed. Bowel sounds active x4. NG tube in place a 70 cm and attached to low intermittent suction. Patient denies no more needs at this time. Call light within reach. Bed alarm on. Bed/chair wheels locked. Bed in lowest position.

## 2025-04-16 NOTE — PROGRESS NOTES
Comprehensive Nutrition Assessment    Type and Reason for Visit:  Reassess    Nutrition Recommendations/Plan:   Monitor clear liquid diet.   Monitor diet progression and tolerance.      Malnutrition Assessment:  Malnutrition Status:  At risk for malnutrition (04/15/25 0731)    Context:  Acute Illness     Findings of the 6 clinical characteristics of malnutrition:  Energy Intake:  Mild decrease in energy intake  Weight Loss:  No weight loss     Body Fat Loss:  No body fat loss     Muscle Mass Loss:  No muscle mass loss    Fluid Accumulation:  Mild Extremities   Strength:  Not Performed    Nutrition Assessment:    Inadequate oral intake r/t altered GI function aeb NGT pulled starting a clear liquid diet order. Labs reviewed with a slight decrease in weight    Nutrition Related Findings:    active bowel sounds, non pitting RLE and LLE edema Wound Type: None       Current Nutrition Intake & Therapies:    Average Meal Intake: Unable to assess (Clear liquid diet ordered)  Average Supplements Intake: None Ordered  ADULT DIET; Clear Liquid; Moderately Thick (Honey); Patient needs pureed diet when advanced to solid foods    Anthropometric Measures:  Height: 177.8 cm (5' 10\")  Ideal Body Weight (IBW): 166 lbs (75 kg)    Admission Body Weight: 96.3 kg (212 lb 4.9 oz)  Current Body Weight: 93.4 kg (205 lb 14.6 oz), 124 % IBW. Weight Source: Bed scale  Current BMI (kg/m2): 29.5  Usual Body Weight: 79.4 kg (175 lb 0.7 oz)     % Weight Change (Calculated): 17.6  Weight Adjustment For: No Adjustment                 BMI Categories: Overweight (BMI 25.0-29.9)  Hematology:  Recent Labs     04/14/25  1004 04/15/25  0525 04/16/25  0515   WBC 14.2* 12.2* 9.7   HGB 15.1 13.5 11.9*   HCT 46.4 42.2 36.3*     Chemistry:  Recent Labs     04/14/25  1004 04/15/25  0525 04/16/25  0515    141 144   K 4.0 4.1 3.8    112* 110*   CO2 26 22 22   GLUCOSE 113* 97 83   BUN 25* 24* 20   CREATININE 1.0 1.0 0.8   CALCIUM 9.7 8.9 8.6

## 2025-04-16 NOTE — PROGRESS NOTES
General Surgery:  Daily Progress Note                   PATIENT NAME: Jeremy King     TODAY'S DATE: 4/16/2025, 1:33 PM  CC: Doing okay    SUBJECTIVE:     Pt seen and examined at bedside today.  Denies abdominal pain, nausea.  Having bowel movements and passing flatus.    OBJECTIVE:   VITALS:  BP (!) 144/78   Pulse 73   Temp 97.3 °F (36.3 °C) (Temporal)   Resp 22   Ht 1.778 m (5' 10\")   Wt 93.4 kg (205 lb 14.4 oz)   SpO2 95%   BMI 29.54 kg/m²      INTAKE/OUTPUT:      Intake/Output Summary (Last 24 hours) at 4/16/2025 1333  Last data filed at 4/16/2025 0938  Gross per 24 hour   Intake 2652.79 ml   Output 650 ml   Net 2002.79 ml       PHYSICAL EXAM:  General Appearance: awake, alert, in no acute distress  HEENT:  Normocephalic, atraumatic, mucus membranes moist   Heart: Regular rate and rhythm  Lungs: Equal chest rise bilaterally, no accessory muscle use  Abdomen: Soft, nontender, mild distention  Extremities: No cyanosis, pitting edema, rashes noted.    Skin: Skin color, texture, turgor normal. No rashes or lesions.    Data:  CBC with Differential:    Lab Results   Component Value Date/Time    WBC 9.7 04/16/2025 05:15 AM    RBC 3.67 04/16/2025 05:15 AM    RBC 3.43 08/29/2011 11:48 AM    HGB 11.9 04/16/2025 05:15 AM    HCT 36.3 04/16/2025 05:15 AM     04/16/2025 05:15 AM     08/29/2011 11:48 AM    MCV 98.9 04/16/2025 05:15 AM    MCH 32.4 04/16/2025 05:15 AM    MCHC 32.8 04/16/2025 05:15 AM    RDW 12.6 04/16/2025 05:15 AM    LYMPHOPCT 16 04/16/2025 05:15 AM    MONOPCT 8 04/16/2025 05:15 AM    EOSPCT 2 04/16/2025 05:15 AM    BASOPCT 0 04/16/2025 05:15 AM    MONOSABS 0.73 04/16/2025 05:15 AM    LYMPHSABS 1.57 04/16/2025 05:15 AM    EOSABS 0.22 04/16/2025 05:15 AM    BASOSABS 0.04 04/16/2025 05:15 AM    DIFFTYPE NOT REPORTED 02/09/2022 07:30 AM     BMP:    Lab Results   Component Value Date/Time     04/16/2025 05:15 AM    K 3.8 04/16/2025 05:15 AM     04/16/2025 05:15 AM    CO2 22

## 2025-04-16 NOTE — PROGRESS NOTES
RESPIRATORY ASSESSMENT PROTOCOL                                                                                              Patient Name: Jeremy KRAFT [facility] Room#: 0302/0302-01 : 1955     Admitting diagnosis: Small bowel obstruction (HCC) [K56.609]  SBO (small bowel obstruction) (HCC) [K56.609]       Medical History:   Past Medical History:   Diagnosis Date    Arthritis     BPH (benign prostatic hyperplasia)     Dysphagia     GERD (gastroesophageal reflux disease)     Hearing loss     HLD (hyperlipidemia)     Insomnia     MR (mental retardation)     Periorbital cellulitis     SBO (small bowel obstruction) (HCC)     Seizures (HCC)     Epilepsy    Ventral hernia     Vitamin D deficiency        PATIENT ASSESSMENT    LABORATORY DATA  Hematology:   Lab Results   Component Value Date/Time    WBC 9.7 2025 05:15 AM    RBC 3.67 2025 05:15 AM    RBC 3.43 2011 11:48 AM    HGB 11.9 2025 05:15 AM    HCT 36.3 2025 05:15 AM     2025 05:15 AM     2011 11:48 AM     Chemistry:  No results found for: \"PHART\", \"IOH3YMJ\", \"PO2ART\", \"S4QYARMN\", \"BUI7XMA\", \"PBEA\", \"NBEA\"    VITALS  Pulse: 81   Respirations: 22  BP: (!) 130/118  SpO2: 98 % O2 Device: None (Room air)  Temp: 97.6 °F (36.4 °C)    SKIN COLOR  [x] Normal  [] Pale  [] Dusky  [] Cyanotic    RESPIRATORY PATTERN  [] Normal  [x] Dyspnea  [] Cheyne-Regalado  [] Kussmaul  [] Biots    AMBULATORY  [] Yes  [x] No  [x] With Assistance      Patient Acuity 0 1 2 3 4 Score   Level of Consciousness (LOC) [x]  Alert & Oriented or Pt normal LOC []  Confused;follows directions []  Confused & uncooper-ative []  Obtunded []  Comatose 0   Respiratory Rate  (RR) []  Reg. rate & pattern. 12 - 20 bpm  [x]  Increased RR. Greater than 20 bpm   []  SOB w/ exertion or RR greater than 24 bpm []  Access- ory muscle use at rest. Abn.  resp. []  SOB at rest.   1   Bilateral Breath Sounds (BBS) []  Clear []  Diminish-ed bases  []  Diminish-ed t/o, or

## 2025-04-16 NOTE — PLAN OF CARE
Problem: Discharge Planning  Goal: Discharge to home or other facility with appropriate resources  Outcome: Progressing  Flowsheets (Taken 4/15/2025 2244)  Discharge to home or other facility with appropriate resources:   Identify barriers to discharge with patient and caregiver   Identify discharge learning needs (meds, wound care, etc)   Refer to discharge planning if patient needs post-hospital services based on physician order or complex needs related to functional status, cognitive ability or social support system   Arrange for needed discharge resources and transportation as appropriate     Problem: Skin/Tissue Integrity  Goal: Skin integrity remains intact  Description: 1.  Monitor for areas of redness and/or skin breakdown2.  Assess vascular access sites hourly3.  Every 4-6 hours minimum:  Change oxygen saturation probe site4.  Every 4-6 hours:  If on nasal continuous positive airway pressure, respiratory therapy assess nares and determine need for appliance change or resting period  Outcome: Progressing  Flowsheets (Taken 4/15/2025 2244)  Skin Integrity Remains Intact:   Monitor for areas of redness and/or skin breakdown   Assess vascular access sites hourly   Monitor skin under medical devices     Problem: Safety - Adult  Goal: Free from fall injury  Outcome: Progressing  Flowsheets (Taken 4/15/2025 2244)  Free From Fall Injury: Instruct family/caregiver on patient safety     Problem: Nutrition Deficit:  Goal: Optimize nutritional status  4/15/2025 2244 by Sal Solano GN  Outcome: Progressing  Flowsheets (Taken 4/15/2025 2244)  Nutrient intake appropriate for improving, restoring, or maintaining nutritional needs:   Assess nutritional status and recommend course of action   Monitor oral intake, labs, and treatment plans   Recommend appropriate diets, oral nutritional supplements, and vitamin/mineral supplements

## 2025-04-16 NOTE — PROGRESS NOTES
Patient resting comfortably at this time in bed. Patient denies any pain, numbness, or tingling. No signs of respiratory distress noted. Patient denies SOB or chest pain. Patient denies any abdominal pain, nausea, or vomiting and tolerating NG tube removal well. Oriented to person only, able to follow commands. Patient denies any concerns at this time. Personal items and call light within reach, bed locked, bed alarm on.

## 2025-04-16 NOTE — PROGRESS NOTES
Writer notified AGNES Morrell regarding discontinuing of fluids and that patient has crackles throughout. New order for fluids due to patient being NPO per AGNES Morrell.

## 2025-04-16 NOTE — PROGRESS NOTES
Dr. Cochran at the bedside to see patient. He would like the NG tube clamped and to check residual after 4 hours. If residual is under 150 mL, NG can be removed and patient can be started on clear liquids.

## 2025-04-16 NOTE — PROGRESS NOTES
Progress Note    SUBJECTIVE:    Patient seen for f/u of Small bowel obstruction (HCC).  He resting in bed no distress. NG draining brown liquid.  No complaints .    ROS:   Constitutional: negative  for fevers, and negative for chills.  Respiratory: negative for shortness of breath, negative for cough, and negative for wheezing  Cardiovascular: negative for chest pain, and negative for palpitations  Gastrointestinal: negative for abdominal pain, negative for nausea,negative for vomiting, negative for diarrhea, and negative for constipation     All other systems were reviewed with the patient and are negative unless otherwise stated in HPI      OBJECTIVE:      Vitals:   Vitals:    04/16/25 0656   BP: (!) 144/78   Pulse: 73   Resp: 22   Temp: 97.3 °F (36.3 °C)   SpO2: 95%     Weight - Scale: 93.4 kg (205 lb 14.4 oz)   Height: 177.8 cm (5' 10\")     Weight  Wt Readings from Last 3 Encounters:   04/16/25 93.4 kg (205 lb 14.4 oz)   02/19/25 89.6 kg (197 lb 8 oz)   12/05/24 89.6 kg (197 lb 8 oz)     Body mass index is 29.54 kg/m².    24HR INTAKE/OUTPUT:      Intake/Output Summary (Last 24 hours) at 4/16/2025 0841  Last data filed at 4/16/2025 0459  Gross per 24 hour   Intake 1836.44 ml   Output 550 ml   Net 1286.44 ml     -----------------------------------------------------------------  Exam:    GEN:    Awake, alert and oriented x3.   EYES:  EOMI, pupils equal   NECK: Supple. No lymphadenopathy.  No carotid bruit  CVS:    regular rate and rhythm, no audible murmur  PULM:  CTA, no wheezes, rales or rhonchi, no acute respiratory distress  ABD:    Bowels sounds hypoactive.  Abdomen is soft.  No distention.  no tenderness to palpation.   EXT:   no edema bilaterally .  No calf tenderness.   NEURO: Moves all extremities.  Motor and sensory are grossly intact  SKIN:  No rashes.  No skin lesions.    -----------------------------------------------------------------    Diagnostic Data:      Complete Blood Count:   Recent Labs      0731)  Acute Illness - Weight Loss : No weight loss (04/15/25 0731)  Acute Illness - Body Fat Loss: No body fat loss (04/15/25 0731)  Acute Illness - Muscle Mass Loss: No muscle mass loss (04/15/25 0731)  Acute Illness - Fluid Accumulation : Mild (04/15/25 0731)  Acute Illness - Fluid Accumulation Location: Extremities (04/15/25 0731)  Acute Illness -  Strength: Not Performed (04/15/25 0731)  Acute Illness - Malnutrition Score: 1 (04/15/25 0731)  Malnutrition Status: At risk for malnutrition (04/15/25 0731)    I agree with the dietitian's malnutrition assessment.    Medical Nutrition Therapy:  N.p.o.    Electronically signed by TIFFANIE Elizabeth CNP on 4/15/25 at 7:47 AM EDT     Hospital Prophylaxis:   DVT: Lovenox   Stress Ulcer: H2 Blocker     Disposition:  Shared decision making:  MRDD no family at bedside  Social determinants of health that may impact management:  Resides in group home  Code status: Full Code   Disposition: Discharge plan is pending    [facility] Advanced Care Planning documentation:  [x] I have confirmed that the patient's Advance Care Plan is present, Code Status is documented, or surrogate decision maker is listed in the patient's medical record  [If \"yes\", STOP HERE]     [] The patient's Advance Care Plan is NOT present because:    []  I confirmed today that the patient does not wish or was not able to name a   surrogate decision maker or provide and advance care plan.    [] Hospice care is currently being provided or has been provided within the   calendar year.    []  I did NOT confirm today the presence of an Advance Care Plan or surrogate   decision maker documented within the patient's medical record.   [DOES NOT SATISFY [facility] PERFORMANCE]    TIFFANIE Elizabeth CNP , TIFFANIE, NP-C  Hospitalist Medicine        4/16/2025, 8:41 AM

## 2025-04-17 VITALS
SYSTOLIC BLOOD PRESSURE: 148 MMHG | TEMPERATURE: 98.7 F | RESPIRATION RATE: 18 BRPM | WEIGHT: 206.3 LBS | BODY MASS INDEX: 29.54 KG/M2 | HEART RATE: 83 BPM | OXYGEN SATURATION: 95 % | HEIGHT: 70 IN | DIASTOLIC BLOOD PRESSURE: 76 MMHG

## 2025-04-17 PROCEDURE — 94669 MECHANICAL CHEST WALL OSCILL: CPT

## 2025-04-17 PROCEDURE — 99231 SBSQ HOSP IP/OBS SF/LOW 25: CPT | Performed by: SURGERY

## 2025-04-17 PROCEDURE — 6370000000 HC RX 637 (ALT 250 FOR IP): Performed by: NURSE PRACTITIONER

## 2025-04-17 PROCEDURE — 94761 N-INVAS EAR/PLS OXIMETRY MLT: CPT

## 2025-04-17 PROCEDURE — 2500000003 HC RX 250 WO HCPCS: Performed by: INTERNAL MEDICINE

## 2025-04-17 PROCEDURE — 6360000002 HC RX W HCPCS: Performed by: INTERNAL MEDICINE

## 2025-04-17 PROCEDURE — 94664 DEMO&/EVAL PT USE INHALER: CPT

## 2025-04-17 PROCEDURE — 94640 AIRWAY INHALATION TREATMENT: CPT

## 2025-04-17 PROCEDURE — 6370000000 HC RX 637 (ALT 250 FOR IP): Performed by: INTERNAL MEDICINE

## 2025-04-17 RX ORDER — VITAMIN B COMPLEX
1000 TABLET ORAL DAILY
Status: DISCONTINUED | OUTPATIENT
Start: 2025-04-17 | End: 2025-04-17 | Stop reason: HOSPADM

## 2025-04-17 RX ORDER — LEVETIRACETAM 500 MG/1
1500 TABLET ORAL DAILY
Status: DISCONTINUED | OUTPATIENT
Start: 2025-04-17 | End: 2025-04-17 | Stop reason: HOSPADM

## 2025-04-17 RX ORDER — LANOLIN ALCOHOL/MO/W.PET/CERES
400 CREAM (GRAM) TOPICAL DAILY
Status: DISCONTINUED | OUTPATIENT
Start: 2025-04-17 | End: 2025-04-17 | Stop reason: HOSPADM

## 2025-04-17 RX ORDER — ALBUTEROL SULFATE 0.83 MG/ML
2.5 SOLUTION RESPIRATORY (INHALATION)
Status: DISCONTINUED | OUTPATIENT
Start: 2025-04-17 | End: 2025-04-17 | Stop reason: HOSPADM

## 2025-04-17 RX ORDER — KETOTIFEN FUMARATE 0.35 MG/ML
1 SOLUTION/ DROPS OPHTHALMIC DAILY
Status: DISCONTINUED | OUTPATIENT
Start: 2025-04-17 | End: 2025-04-17 | Stop reason: HOSPADM

## 2025-04-17 RX ORDER — TAMSULOSIN HYDROCHLORIDE 0.4 MG/1
0.4 CAPSULE ORAL 2 TIMES DAILY
Status: DISCONTINUED | OUTPATIENT
Start: 2025-04-17 | End: 2025-04-17 | Stop reason: HOSPADM

## 2025-04-17 RX ORDER — LAMOTRIGINE 100 MG/1
600 TABLET ORAL NIGHTLY
Status: DISCONTINUED | OUTPATIENT
Start: 2025-04-17 | End: 2025-04-17 | Stop reason: HOSPADM

## 2025-04-17 RX ORDER — MECLIZINE HYDROCHLORIDE 25 MG/1
25 TABLET ORAL DAILY
Status: DISCONTINUED | OUTPATIENT
Start: 2025-04-17 | End: 2025-04-17 | Stop reason: HOSPADM

## 2025-04-17 RX ORDER — PRIMIDONE 250 MG/1
250 TABLET ORAL EVERY MORNING
Status: DISCONTINUED | OUTPATIENT
Start: 2025-04-17 | End: 2025-04-17 | Stop reason: HOSPADM

## 2025-04-17 RX ORDER — LAMOTRIGINE 100 MG/1
400 TABLET ORAL EVERY MORNING
Status: DISCONTINUED | OUTPATIENT
Start: 2025-04-17 | End: 2025-04-17 | Stop reason: HOSPADM

## 2025-04-17 RX ORDER — LACOSAMIDE 50 MG/1
150 TABLET ORAL 2 TIMES DAILY
Status: DISCONTINUED | OUTPATIENT
Start: 2025-04-17 | End: 2025-04-17 | Stop reason: HOSPADM

## 2025-04-17 RX ORDER — LEVETIRACETAM 500 MG/1
2000 TABLET ORAL NIGHTLY
Status: DISCONTINUED | OUTPATIENT
Start: 2025-04-17 | End: 2025-04-17 | Stop reason: HOSPADM

## 2025-04-17 RX ORDER — PANTOPRAZOLE SODIUM 40 MG/1
40 TABLET, DELAYED RELEASE ORAL
Status: DISCONTINUED | OUTPATIENT
Start: 2025-04-18 | End: 2025-04-17 | Stop reason: HOSPADM

## 2025-04-17 RX ORDER — SENNA AND DOCUSATE SODIUM 50; 8.6 MG/1; MG/1
2 TABLET, FILM COATED ORAL
Status: DISCONTINUED | OUTPATIENT
Start: 2025-04-17 | End: 2025-04-17 | Stop reason: HOSPADM

## 2025-04-17 RX ORDER — LANOLIN ALCOHOL/MO/W.PET/CERES
1000 CREAM (GRAM) TOPICAL DAILY
Status: DISCONTINUED | OUTPATIENT
Start: 2025-04-17 | End: 2025-04-17 | Stop reason: HOSPADM

## 2025-04-17 RX ADMIN — KETOTIFEN FUMARATE 1 DROP: 0.25 SOLUTION/ DROPS OPHTHALMIC at 10:21

## 2025-04-17 RX ADMIN — ENOXAPARIN SODIUM 40 MG: 100 INJECTION SUBCUTANEOUS at 10:21

## 2025-04-17 RX ADMIN — CYANOCOBALAMIN TAB 1000 MCG 1000 MCG: 1000 TAB at 10:12

## 2025-04-17 RX ADMIN — ALBUTEROL SULFATE 2 PUFF: 90 AEROSOL, METERED RESPIRATORY (INHALATION) at 07:23

## 2025-04-17 RX ADMIN — Medication 400 MG: at 10:12

## 2025-04-17 RX ADMIN — LAMOTRIGINE 400 MG: 100 TABLET ORAL at 10:12

## 2025-04-17 RX ADMIN — LACOSAMIDE 150 MG: 50 TABLET, FILM COATED ORAL at 10:11

## 2025-04-17 RX ADMIN — MECLIZINE HYDROCHLORIDE 25 MG: 25 TABLET ORAL at 10:13

## 2025-04-17 RX ADMIN — PRIMIDONE 250 MG: 250 TABLET ORAL at 10:13

## 2025-04-17 RX ADMIN — LEVETIRACETAM 1500 MG: 500 TABLET, FILM COATED ORAL at 10:13

## 2025-04-17 RX ADMIN — Medication 1000 UNITS: at 10:13

## 2025-04-17 RX ADMIN — SODIUM CHLORIDE, PRESERVATIVE FREE 10 ML: 5 INJECTION INTRAVENOUS at 10:21

## 2025-04-17 RX ADMIN — TAMSULOSIN HYDROCHLORIDE 0.4 MG: 0.4 CAPSULE ORAL at 10:15

## 2025-04-17 NOTE — PROGRESS NOTES
Patient discharged to [facility], alert to person, baseline for this visit. Patient respirations easy and nonlabored. No acute distress noted. This RN gave report to EMS transport at this time. Patient discharged with all belongings.

## 2025-04-17 NOTE — PROGRESS NOTES
This RN attempted to call report to Julia Perdomo at [facility], states he just got there and is doing well. Declined wanting a full report. declined any needs, questions or concerns.  Encouraged to call with any questions.

## 2025-04-17 NOTE — PROGRESS NOTES
Progress Note    SUBJECTIVE:    Patient seen for f/u of Small bowel obstruction (HCC).  He resting in bed no distress.  Well.  No complaints or distress.  No reports of vomiting per nursing      ROS:   Constitutional: negative  for fevers, and negative for chills.  Respiratory: negative for shortness of breath, negative for cough, and negative for wheezing  Cardiovascular: negative for chest pain, and negative for palpitations  Gastrointestinal: negative for abdominal pain, negative for nausea,negative for vomiting, negative for diarrhea, and negative for constipation     All other systems were reviewed with the patient and are negative unless otherwise stated in HPI      OBJECTIVE:      Vitals:   Vitals:    04/17/25 0723   BP:    Pulse:    Resp:    Temp:    SpO2: 96%     Weight - Scale: 93.6 kg (206 lb 4.8 oz)   Height: 177.8 cm (5' 10\")     Weight  Wt Readings from Last 3 Encounters:   04/17/25 93.6 kg (206 lb 4.8 oz)   02/19/25 89.6 kg (197 lb 8 oz)   12/05/24 89.6 kg (197 lb 8 oz)     Body mass index is 29.6 kg/m².    24HR INTAKE/OUTPUT:      Intake/Output Summary (Last 24 hours) at 4/17/2025 0905  Last data filed at 4/17/2025 0502  Gross per 24 hour   Intake 3621.66 ml   Output 100 ml   Net 3521.66 ml     -----------------------------------------------------------------  Exam:    GEN:    Awake, alert and pleasant  EYES:  EOMI, pupils equal   NECK: Supple. No lymphadenopathy.  No carotid bruit  CVS:    regular rate and rhythm, no audible murmur  PULM:  CTA, no wheezes, rales or rhonchi, no acute respiratory distress  ABD:    Bowels sounds active.  Abdomen is soft.  No distention.  no tenderness to palpation.   EXT:   no edema bilaterally .  No calf tenderness.   NEURO: Moves all extremities.  Motor and sensory are grossly intact  SKIN:  No rashes.  No skin lesions.    -----------------------------------------------------------------    Diagnostic Data:      Complete Blood Count:   Recent Labs     04/14/25  1005

## 2025-04-17 NOTE — RT PROTOCOL NOTE
RESPIRATORY ASSESSMENT PROTOCOL                                                                                              Patient Name: Jeremy King Room#: 0302/0302-01 : 1955     Admitting diagnosis: Small bowel obstruction (HCC) [K56.609]  SBO (small bowel obstruction) (HCC) [K56.609]       Medical History:   Past Medical History:   Diagnosis Date    Arthritis     BPH (benign prostatic hyperplasia)     Dysphagia     GERD (gastroesophageal reflux disease)     Hearing loss     HLD (hyperlipidemia)     Insomnia     MR (mental retardation)     Periorbital cellulitis     SBO (small bowel obstruction) (HCC)     Seizures (HCC)     Epilepsy    Ventral hernia     Vitamin D deficiency        PATIENT ASSESSMENT    LABORATORY DATA  Hematology:   Lab Results   Component Value Date/Time    WBC 9.7 2025 05:15 AM    RBC 3.67 2025 05:15 AM    RBC 3.43 2011 11:48 AM    HGB 11.9 2025 05:15 AM    HCT 36.3 2025 05:15 AM     2025 05:15 AM     2011 11:48 AM     Chemistry:  No results found for: \"PHART\", \"JMG4SWK\", \"PO2ART\", \"O8SCDERF\", \"RAH1GMZ\", \"PBEA\", \"NBEA\"    VITALS  Pulse: 75   Respirations: 20  BP: (!) 143/74  SpO2: 94 % O2 Device: None (Room air)  Temp: 97.4 °F (36.3 °C)    SKIN COLOR  [x] Normal  [] Pale  [] Dusky  [] Cyanotic    RESPIRATORY PATTERN  [x] Normal  [] Dyspnea  [] Cheyne-Regalado  [] Kussmaul  [] Biots    AMBULATORY  [] Yes  [x] No  [] With Assistance    PEAK FLOW  Predicted:     Personal Best:            Patient Acuity 0 1 2 3 4 Score   Level of Consciousness (LOC) [x]  Alert & Oriented or Pt normal LOC []  Confused;follows directions []  Confused & uncooper-ative []  Obtunded []  Comatose 0   Respiratory Rate  (RR) []  Reg. rate & pattern. 12 - 20 bpm  [x]  Increased RR. Greater than 20 bpm   []  SOB w/ exertion or RR greater than 24 bpm []  Access- ory muscle use at rest. Abn.  resp. []  SOB at rest.   1   Bilateral Breath Sounds (BBS) []  Clear  ____Patient Refused

## 2025-04-17 NOTE — PROGRESS NOTES
Comprehensive Nutrition Assessment    Type and Reason for Visit:  Reassess    Nutrition Recommendations/Plan:   Monitor tolerance of pureed and moderately thick liquids diet.      Malnutrition Assessment:  Malnutrition Status:  At risk for malnutrition (04/15/25 0771)    Context:  Acute Illness     Findings of the 6 clinical characteristics of malnutrition:  Energy Intake:  Mild decrease in energy intake  Weight Loss:  No weight loss     Body Fat Loss:  No body fat loss     Muscle Mass Loss:  No muscle mass loss    Fluid Accumulation:  Mild Extremities   Strength:  Not Performed    Nutrition Assessment:    Predicted inadequate energy intake r/t altered GI structure aeb NPO/clear liquid status. Pt tolerated clear liquid diet for breakfast. Diet order was changed to pureed and moderately thick liquids.    Nutrition Related Findings:    active bowel sounds, non pitting RLE and LLE edema Wound Type: None       Current Nutrition Intake & Therapies:    Average Meal Intake: Unable to assess (Clear liquid diet ordered)  Average Supplements Intake: None Ordered  ADULT DIET; Dysphagia - Pureed; Moderately Thick (Honey); Patient needs pureed diet when advanced to solid foods    Anthropometric Measures:  Height: 177.8 cm (5' 10\")  Ideal Body Weight (IBW): 166 lbs (75 kg)    Admission Body Weight: 96.3 kg (212 lb 4.9 oz)  Current Body Weight: 93.4 kg (205 lb 14.6 oz), 124 % IBW. Weight Source: Bed scale  Current BMI (kg/m2): 29.5  Usual Body Weight: 79.4 kg (175 lb 0.7 oz)     % Weight Change (Calculated): 17.6  Weight Adjustment For: No Adjustment                 BMI Categories: Overweight (BMI 25.0-29.9)    Estimated Daily Nutrient Needs:  Energy Requirements Based On: Kcal/kg  Weight Used for Energy Requirements: Current  Energy (kcal/day): 2525-5458 (25-28 kcal/kg)  Weight Used for Protein Requirements: Ideal  Protein (g/day): 75-90 (1.0-1.2g/kg)  Method Used for Fluid Requirements: 1 ml/kcal  Fluid (ml/day):

## 2025-04-17 NOTE — PLAN OF CARE
Problem: Discharge Planning  Goal: Discharge to home or other facility with appropriate resources  Outcome: Completed     Problem: Skin/Tissue Integrity  Goal: Skin integrity remains intact  Description: 1.  Monitor for areas of redness and/or skin breakdown2.  Assess vascular access sites hourly3.  Every 4-6 hours minimum:  Change oxygen saturation probe site4.  Every 4-6 hours:  If on nasal continuous positive airway pressure, respiratory therapy assess nares and determine need for appliance change or resting period  Outcome: Completed     Problem: Safety - Adult  Goal: Free from fall injury  Outcome: Completed     Problem: Nutrition Deficit:  Goal: Optimize nutritional status  Outcome: Completed

## 2025-04-17 NOTE — PROGRESS NOTES
GENERAL SURGERY PROGRESS NOTE- inpatient      PATIENT NAME: Jeremy King     DATE: 4/17/2025    SUBJECTIVE:    Patient tolerated clear liquids., no vomiting overnight. Chart reviewed     OBJECTIVE:   VITALS:  /72   Pulse 67   Temp 97 °F (36.1 °C) (Temporal)   Resp 20   Ht 1.778 m (5' 10\")   Wt 93.6 kg (206 lb 4.8 oz)   SpO2 96%   BMI 29.60 kg/m²    height is 1.778 m (5' 10\") and weight is 93.6 kg (206 lb 4.8 oz). His temporal temperature is 97 °F (36.1 °C). His blood pressure is 130/72 and his pulse is 67. His respiration is 20 and oxygen saturation is 96%.     INTAKE/OUTPUT:    I/O last 3 completed shifts:  In: 5458.1 [P.O.:1200; I.V.:4158.1; IV Piggyback:100]  Out: 350 [Emesis/NG output:350]  No intake/output data recorded.              CONSTITUTIONAL:  awake and alert  LUNGS:  clear to auscultation  HEART:regular rate  ABDOMEN:   normal bowel sounds, soft, non-distended, non-tender   EXTREMITIES: no c/c/e      Data:  CBC:   Recent Labs     04/15/25  0525 04/16/25  0515   WBC 12.2* 9.7   HGB 13.5 11.9*   HCT 42.2 36.3*    184     BMP:    Recent Labs     04/15/25  0525 04/16/25  0515    144   K 4.1 3.8   * 110*   CO2 22 22   BUN 24* 20   CREATININE 1.0 0.8   GLUCOSE 97 83     Hepatic:   Recent Labs     04/15/25  0525 04/16/25  0515   AST 20 15   ALT 15 16   BILITOT 0.4 0.5   ALKPHOS 173* 151*         ASSESSMENT & PLAN:    Recurrent sbo resolved.   Advance diet as tolerated- already done by primary. No surgical plans at this time.  Surgery will sign off- please call as needed      Dr. Vick

## 2025-04-17 NOTE — PLAN OF CARE
Problem: Discharge Planning  Goal: Discharge to home or other facility with appropriate resources  Outcome: Progressing  Flowsheets (Taken 4/16/2025 2220)  Discharge to home or other facility with appropriate resources:   Identify barriers to discharge with patient and caregiver   Arrange for needed discharge resources and transportation as appropriate   Identify discharge learning needs (meds, wound care, etc)   Refer to discharge planning if patient needs post-hospital services based on physician order or complex needs related to functional status, cognitive ability or social support system     Problem: Skin/Tissue Integrity  Goal: Skin integrity remains intact  Description: 1.  Monitor for areas of redness and/or skin breakdown2.  Assess vascular access sites hourly3.  Every 4-6 hours minimum:  Change oxygen saturation probe site4.  Every 4-6 hours:  If on nasal continuous positive airway pressure, respiratory therapy assess nares and determine need for appliance change or resting period  Outcome: Progressing  Flowsheets (Taken 4/16/2025 2220)  Skin Integrity Remains Intact:   Monitor for areas of redness and/or skin breakdown   Assess vascular access sites hourly   Check visual cues for pain     Problem: Safety - Adult  Goal: Free from fall injury  Outcome: Progressing  Flowsheets (Taken 4/16/2025 2220)  Free From Fall Injury: Instruct family/caregiver on patient safety     Problem: Nutrition Deficit:  Goal: Optimize nutritional status  4/16/2025 2220 by Sal Solano GN  Outcome: Progressing  Flowsheets (Taken 4/16/2025 2220)  Nutrient intake appropriate for improving, restoring, or maintaining nutritional needs:   Assess nutritional status and recommend course of action   Monitor oral intake, labs, and treatment plans   Recommend appropriate diets, oral nutritional supplements, and vitamin/mineral supplements   Provide specific nutrition education to patient or family as appropriate

## 2025-04-17 NOTE — DISCHARGE INSTR - COC
Continuity of Care Form    Patient Name: Jeremy King   :  1955  MRN:  [id number]    Admit date:  2025  Discharge date:  2025    Code Status Order: Full Code   Advance Directives:     Admitting Physician:  Krishna Shah MD  PCP: Sukumar Larose MD    Discharging Nurse: Brinda BOLTON  Discharging Hospital Unit/Room#: 0302/0302-01  Discharging Unit Phone Number: [phone]    Emergency Contact:   Extended Emergency Contact Information  Primary Emergency Contact: Esther Denny  Address: [address] [place] [address] [address] [facility]  Home Phone: [phone]  Relation: Legal Guardian  Secondary Emergency Contact: Samir King  Home Phone: [phone]  Relation: Brother/Sister    Past Surgical History:  Past Surgical History:   Procedure Laterality Date    ABDOMEN SURGERY  ,     Bowel Obstruction X2    CAST APPLICATION Left     Lt. Ankle , X3    HYDROCELE EXCISION  2016    LAPAROSCOPY  14    with BERNARD    LAPAROTOMY  14    with partial cecectomy    VAGAL NERVE STIMULATION      placed, then replaced    VAGAL NERVE STIMULATION  10/11/2016    replaced generator battery       Immunization History:   Immunization History   Administered Date(s) Administered    COVID-19, PFIZER PURPLE top, DILUTE for use, (age 12 y+), 30mcg/0.3mL 2021, 2021, 2022    TDaP, ADACEL (age 10y-64y), BOOSTRIX (age 10y+), IM, 0.5mL 2013       Active Problems:  Patient Active Problem List   Diagnosis Code    MR (mental retardation), severe F72    SBO (small bowel obstruction) ([facility]) K56.609    LEELEE (obstructive sleep apnea) G47.33    Intellectual disability F79    S/P placement of VNS (vagus nerve stimulation) device Z96.89    Dysphagia R13.10    Seizure disorder ([facility]) G40.909    Pneumatosis intestinalis K63.89    Small bowel obstruction ([facility]) K56.609    Abnormal ECG R94.31    Sinus tachycardia R00.0       Isolation/Infection:   Isolation            No

## 2025-04-17 NOTE — DISCHARGE SUMMARY
Discharge Summary    Jeremy King  :  1955  MRN:  [id number]    Admit date:  2025      Discharge date: 2025     Admitting Physician:  Krishna Shah MD    Discharge Diagnoses:    Principal Problem:    Small bowel obstruction (HCC)  Active Problems:    MR (mental retardation), severe    S/P placement of VNS (vagus nerve stimulation) device    Seizure disorder (HCC)    Abnormal ECG    Sinus tachycardia  Resolved Problems:    * No resolved hospital problems. *      Hospital Course:   Jeremy King is a 69 y.o. male admitted with SBO.  He presented from the group home with abdominal pain.  Patient has history of frequent SBO's.  Per history they resolved with NGT decompression.  Staff also reported 3 focal seizures.  When EMS arrived patient was vomiting.  Initial output after NG was placed with 300 mL of stomach contents removed.  Patient continues to report that he is \"fine\".  EKG was done on the floor initially which showed significant ST changes and cardiology was consulted.  Repeat EKG showed similar changes.  Troponin was normal at 12.  The week before patient's troponin was 17.  Cardiology evaluated patient with no further changes.  He will resume on his Mag-Ox with no further workup.  During his admission radiology studies continue to be monitored and patient progressed nicely.  He has had no further seizure activity and was given Keppra and Vimpat via IV.  Patient's NG was clamped and he was fluid challenged and tolerated well.  He is continually eats slowly advance diet without issues.  NG is out.  Plan will be to discharge today back to the group home.    Consultants:   Dr. Cochran, general surgery; Dr. Arevalo, cardiology    Procedures: none    Complications: none    Discharge Condition: fair    Exam:  GEN:    Awake, alert and pleasant  EYES:   EOMI, pupils equal   NECK: Supple. No lymphadenopathy.  No carotid bruit  CVS:     regular rate and rhythm, no audible murmur  PULM:  CTA, no  500 MG tablet  Commonly known as: TYLENOL     benzonatate 100 MG capsule  Commonly known as: TESSALON     calcium carbonate 500 MG chewable tablet  Commonly known as: TUMS     Debrox 6.5 % otic solution  Generic drug: carbamide peroxide     diazePAM 20 MG Gel  Commonly known as: DIASTAT     finasteride 5 MG tablet  Commonly known as: PROSCAR     hydrOXYzine HCl 25 MG tablet  Commonly known as: ATARAX     lacosamide 150 MG Tabs tablet  Commonly known as: VIMPAT     * lamoTRIgine 200 MG tablet  Commonly known as: LAMICTAL     * lamoTRIgine 200 MG tablet  Commonly known as: LAMICTAL     latanoprost 0.005 % ophthalmic solution  Commonly known as: XALATAN     * levETIRAcetam 1000 MG tablet  Commonly known as: KEPPRA  Take 2 tablets by mouth nightly     * levETIRAcetam 750 MG tablet  Commonly known as: KEPPRA  Take 2 tablets by mouth daily     linaclotide 145 MCG capsule  Commonly known as: LINZESS  Take 1 capsule by mouth every morning (before breakfast)     LORazepam 0.5 MG tablet  Commonly known as: ATIVAN     magnesium oxide 400 (240 Mg) MG tablet  Commonly known as: MAG-OX     meclizine 25 MG tablet  Commonly known as: ANTIVERT     melatonin 3 MG Tabs tablet     olopatadine 0.2 % Soln ophthalmic solution  Commonly known as: PATADAY     ondansetron 4 MG disintegrating tablet  Commonly known as: ZOFRAN-ODT  Take 1 tablet by mouth 3 times daily as needed for Nausea or Vomiting     pantoprazole 40 MG tablet  Commonly known as: PROTONIX  Take 1 tablet by mouth every morning (before breakfast)     polyethylene glycol 17 g packet  Commonly known as: GLYCOLAX     * primidone 250 MG tablet  Commonly known as: MYSOLINE     * primidone 250 MG tablet  Commonly known as: MYSOLINE     ProAir  (90 Base) MCG/ACT inhaler  Generic drug: albuterol sulfate HFA     sennosides-docusate sodium 8.6-50 MG tablet  Commonly known as: SENOKOT-S     tamsulosin 0.4 MG capsule  Commonly known as: FLOMAX     vitamin B-12 1000 MCG

## 2025-04-17 NOTE — PROGRESS NOTES
Report received from Tia, checked in bedside with patient who is resting in bed with eyes closed. Easy nonlabored respirations noted. No acute distress noted. Care ongoing.

## 2025-04-17 NOTE — PROGRESS NOTES
RN in to assess patient. Patient resting in bed with eyes closed, easily awakens to verbal stimuli, Alert to person only, answers simple questions appropriately, Respirations Easy and Nonlabored. Lung sounds course throughout. Patient denies pain. Patient on tele. Assessment and vital signs completed as charted. Call light within reach, declines needs or concerns at this time. Care ongoing.

## 2025-04-17 NOTE — CARE COORDINATION
04/17/25 1007   IMM Letter   IMM Letter given to Patient/Family/Significant other/Guardian/POA/by: second by guardian by phone-Esther Denny/ GUILLERMO DUMAS   IMM Letter date given: 04/17/25   IMM Letter time given: 1006      Patient will return to the Group home today.  Requested ambulance for 3 PM.  The guardian aware of possible discharge.  PITER Maria

## 2025-04-17 NOTE — PROGRESS NOTES
This RN call placed to [facility] regarding middline. Updated that ANIL incorrect and patient has midline not PICC, nursing staff (Leticia) can pull midline, nurse declined any further questions or concerns at this time. Nursing supervisor aware.

## 2025-04-17 NOTE — PROGRESS NOTES
Physician Progress Note      PATIENT:               ARIANNE ARIZMENDI  CSN #:                  [id number]  :                       1955  ADMIT DATE:       2025 9:36 AM  DISCH DATE:  RESPONDING  PROVIDER #:        RUDOLPH Matt CNP          QUERY TEXT:    BPH is documented in the Progress Notes by Nasir Richter RCP on 2025.   Please specify the associated urinary conditions:    The clinical indicators include:  BPH , 69Yrs M    - BPH (benign prostatic hyperplasia) (Progress Notes by Nasir Richter RCP   on 2025)    - FINDINGS: Pelvis: The bladder is partially distended with urine. (H&P by   Krishna Shah MD at 2025 )    -tamsulosin (FLOMAX) 0.4 MG capsule      Thank you  Monse Adams [facility]-CDS  Options provided:  -- BPH with partial/complete urinary obstruction  -- BPH with urinary retention without obstruction  -- Other - I will add my own diagnosis  -- Disagree - Not applicable / Not valid  -- Disagree - Clinically unable to determine / Unknown  -- Refer to Clinical Documentation Reviewer    PROVIDER RESPONSE TEXT:    This patient has BPH with urinary retention without obstruction.    Query created by: Monse Adams on 2025 10:59 AM      Electronically signed by:  RUDOLPH Matt CNP 2025 1:31 PM

## 2025-04-19 LAB
MICROORGANISM SPEC CULT: NORMAL
SERVICE CMNT-IMP: NORMAL
SPECIMEN DESCRIPTION: NORMAL

## 2025-04-21 NOTE — PROGRESS NOTES
Physician Progress Note      PATIENT:               ARIANNE ARIZMENDI  CSN #:                  [id number]  :                       1955  ADMIT DATE:       2025 9:36 AM  DISCH DATE:        2025 2:30 PM  RESPONDING  PROVIDER #:        ANNE MORENO - CNP          QUERY TEXT:    STEMI is documented in the Cardiology Consults by Lazaro Arreola MD on   2025 . Please provide additional clinical indicators supportive of your   documentation. Or please document if the diagnosis of STEMI has been ruled out   after study.    The clinical indicators include:  STEMI, 69Yrs M    -  HPI : I was consulted after he underwent an ECG which was suggestive of an   acute MI/STEMI.  - Suspected myocardial infarction: Initial EKG concerning for STEMI.  - EKG very suspicious for ST elevation Myocardial Infarction (STEMI), inferior   with ST depression in the lateral and high lateral leads (Cardiology Consults   by Lazaro Arreola MD on 2025 )    - HISTORY OF PRESENT ILLNESS:  EKGs was obtained which showed possible STEMI.  - Patient's troponins were negative and once heart rate came back below 100   repeat EKG failed to show STEMI. (General surgery Consults by Anmol Cochran DO at 2025 )    - EKG was done on the floor initially which showed significant ST changes and   cardiology was consulted.  Repeat EKG showed similar changes.  Troponin was   normal at 12.  The week before patient's troponin was 17.  Cardiology   evaluated patient with no further changes (Discharge Summary by Anne Quevedo APRN on 2025)    - Repeat EKG showed improvement, though not complete normalization. (Progress   Notes by Lazaro Arreola MD at 4/15/2025 )    -Troponin, High Sens (ng/L) 12 - 2025    -Metoprolol IV was administered due to NPO status , Monitor vitals .    Thank you  Monse leija [facility]-CDS  Options provided:  -- STEMI present as evidenced by, Please document evidence.  -- STEMI was

## 2025-05-07 NOTE — PROGRESS NOTES
techs, equipment needs, BUE HEP, pressure relief, and d/c recommendations.  Long Term Goals  Long Term Goal 1: Patient will demonstrate tolerance for reassessment of functional mobility tasks in order to add goal to OTPOC as appropriate.    AM-PAC Score        AM-PAC Inpatient Daily Activity Raw Score: 11 (04/19/24 1040)  AM-PAC Inpatient ADL T-Scale Score : 29.04 (04/19/24 1040)  ADL Inpatient CMS 0-100% Score: 70.42 (04/19/24 1040)  ADL Inpatient CMS G-Code Modifier : CL (04/19/24 1040)      Therapy Time   Individual Concurrent Group Co-treatment   Time In 1012         Time Out 1036         Minutes 24               Co-treatment with PT warranted secondary to decreased safety and independence requiring 2 skilled therapy professionals to address individual discipline's goals. OT addressing preparation for ADL transfer, sitting balance for increased ADL performance, sitting/activity tolerance, functional reaching, environmental safety/scanning, fall prevention, functional mobility for ADL transfers, ability to sequence and follow directions, bed mobility tech, and functional UE strength.     SANDRA GUAMAN      supervision

## 2025-05-12 ENCOUNTER — HOSPITAL ENCOUNTER (INPATIENT)
Age: 70
LOS: 2 days | Discharge: HOME | DRG: 690 | End: 2025-05-14
Admitting: STUDENT IN AN ORGANIZED HEALTH CARE EDUCATION/TRAINING PROGRAM
Payer: MEDICARE

## 2025-05-12 ENCOUNTER — APPOINTMENT (OUTPATIENT)
Dept: CT IMAGING | Age: 70
DRG: 690 | End: 2025-05-12
Payer: MEDICARE

## 2025-05-12 DIAGNOSIS — K56.600 PARTIAL SMALL BOWEL OBSTRUCTION (HCC): ICD-10-CM

## 2025-05-12 DIAGNOSIS — R19.7 DIARRHEA, UNSPECIFIED TYPE: ICD-10-CM

## 2025-05-12 DIAGNOSIS — N30.00 ACUTE CYSTITIS WITHOUT HEMATURIA: Primary | ICD-10-CM

## 2025-05-12 PROBLEM — K52.9 ILEITIS: Status: ACTIVE | Noted: 2025-05-12

## 2025-05-12 PROBLEM — N39.0 COMPLICATED UTI (URINARY TRACT INFECTION): Status: ACTIVE | Noted: 2025-05-12

## 2025-05-12 LAB
ALBUMIN SERPL-MCNC: 4.8 G/DL (ref 3.5–5.2)
ALBUMIN/GLOB SERPL: 1.4 {RATIO} (ref 1–2.5)
ALP SERPL-CCNC: 196 U/L (ref 40–129)
ALT SERPL-CCNC: 19 U/L (ref 10–50)
ANION GAP SERPL CALCULATED.3IONS-SCNC: 14 MMOL/L (ref 9–16)
AST SERPL-CCNC: 18 U/L (ref 10–50)
BACTERIA URNS QL MICRO: ABNORMAL
BASOPHILS # BLD: 0.05 K/UL (ref 0–0.2)
BASOPHILS NFR BLD: 0 % (ref 0–2)
BILIRUB SERPL-MCNC: 0.3 MG/DL (ref 0–1.2)
BILIRUB UR QL STRIP: NEGATIVE
BUN SERPL-MCNC: 29 MG/DL (ref 8–23)
BUN/CREAT SERPL: 22 (ref 9–20)
C DIFF GDH + TOXINS A+B STL QL IA.RAPID: NEGATIVE
CALCIUM SERPL-MCNC: 9.8 MG/DL (ref 8.6–10.4)
CASTS #/AREA URNS LPF: ABNORMAL /LPF
CHARACTER UR: ABNORMAL
CHLORIDE SERPL-SCNC: 101 MMOL/L (ref 98–107)
CLARITY UR: CLEAR
CO2 SERPL-SCNC: 25 MMOL/L (ref 20–31)
COLOR UR: YELLOW
CREAT SERPL-MCNC: 1.3 MG/DL (ref 0.7–1.2)
DATE, STOOL #1: ABNORMAL
EOSINOPHIL # BLD: 0.03 K/UL (ref 0–0.44)
EOSINOPHILS RELATIVE PERCENT: 0 % (ref 1–4)
EPI CELLS #/AREA URNS HPF: ABNORMAL /HPF (ref 0–5)
ERYTHROCYTE [DISTWIDTH] IN BLOOD BY AUTOMATED COUNT: 12.8 % (ref 11.8–14.4)
GFR, ESTIMATED: 62 ML/MIN/1.73M2
GLUCOSE SERPL-MCNC: 111 MG/DL (ref 74–99)
GLUCOSE UR STRIP-MCNC: NEGATIVE MG/DL
HCT VFR BLD AUTO: 46.3 % (ref 40.7–50.3)
HEMOCCULT SP1 STL QL: POSITIVE
HGB BLD-MCNC: 15.1 G/DL (ref 13–17)
HGB UR QL STRIP.AUTO: ABNORMAL
IMM GRANULOCYTES # BLD AUTO: 0.04 K/UL (ref 0–0.3)
IMM GRANULOCYTES NFR BLD: 0 %
KETONES UR STRIP-MCNC: ABNORMAL MG/DL
LACTATE BLDV-SCNC: 1.7 MMOL/L (ref 0.5–2.2)
LEUKOCYTE ESTERASE UR QL STRIP: ABNORMAL
LIPASE SERPL-CCNC: 62 U/L (ref 13–60)
LYMPHOCYTES NFR BLD: 0.91 K/UL (ref 1.1–3.7)
LYMPHOCYTES RELATIVE PERCENT: 5 % (ref 24–43)
MCH RBC QN AUTO: 32.1 PG (ref 25.2–33.5)
MCHC RBC AUTO-ENTMCNC: 32.6 G/DL (ref 28.4–34.8)
MCV RBC AUTO: 98.5 FL (ref 82.6–102.9)
MONOCYTES NFR BLD: 0.84 K/UL (ref 0.1–1.2)
MONOCYTES NFR BLD: 5 % (ref 3–12)
MUCOUS THREADS URNS QL MICRO: ABNORMAL
NEUTROPHILS NFR BLD: 90 % (ref 36–65)
NEUTS SEG NFR BLD: 14.99 K/UL (ref 1.5–8.1)
NITRITE UR QL STRIP: POSITIVE
NRBC BLD-RTO: 0 PER 100 WBC
PH UR STRIP: 6 [PH] (ref 5–9)
PLATELET # BLD AUTO: 287 K/UL (ref 138–453)
PMV BLD AUTO: 9.2 FL (ref 8.1–13.5)
POTASSIUM SERPL-SCNC: 4.6 MMOL/L (ref 3.7–5.3)
PROT SERPL-MCNC: 8.1 G/DL (ref 6.6–8.7)
PROT UR STRIP-MCNC: ABNORMAL MG/DL
RBC # BLD AUTO: 4.7 M/UL (ref 4.21–5.77)
RBC #/AREA URNS HPF: ABNORMAL /HPF (ref 0–2)
ROTAVIRUS ANTIGEN: NEGATIVE
SODIUM SERPL-SCNC: 140 MMOL/L (ref 136–145)
SOURCE, 60200063: NORMAL
SP GR UR STRIP: 1.01 (ref 1.01–1.02)
SPECIMEN DESCRIPTION: NORMAL
TIME, STOOL #1: 901
UROBILINOGEN UR STRIP-ACNC: NORMAL EU/DL (ref 0–1)
WBC #/AREA URNS HPF: ABNORMAL /HPF (ref 0–5)
WBC OTHER # BLD: 16.9 K/UL (ref 3.5–11.3)

## 2025-05-12 PROCEDURE — 6360000002 HC RX W HCPCS: Performed by: NURSE PRACTITIONER

## 2025-05-12 PROCEDURE — 6360000002 HC RX W HCPCS

## 2025-05-12 PROCEDURE — 87493 C DIFF AMPLIFIED PROBE: CPT

## 2025-05-12 PROCEDURE — 83690 ASSAY OF LIPASE: CPT

## 2025-05-12 PROCEDURE — 87425 ROTAVIRUS AG IA: CPT

## 2025-05-12 PROCEDURE — 87324 CLOSTRIDIUM AG IA: CPT

## 2025-05-12 PROCEDURE — 80053 COMPREHEN METABOLIC PANEL: CPT

## 2025-05-12 PROCEDURE — 2500000003 HC RX 250 WO HCPCS

## 2025-05-12 PROCEDURE — 87506 IADNA-DNA/RNA PROBE TQ 6-11: CPT

## 2025-05-12 PROCEDURE — 1200000000 HC SEMI PRIVATE

## 2025-05-12 PROCEDURE — 87449 NOS EACH ORGANISM AG IA: CPT

## 2025-05-12 PROCEDURE — 74177 CT ABD & PELVIS W/CONTRAST: CPT

## 2025-05-12 PROCEDURE — 94761 N-INVAS EAR/PLS OXIMETRY MLT: CPT

## 2025-05-12 PROCEDURE — 81001 URINALYSIS AUTO W/SCOPE: CPT

## 2025-05-12 PROCEDURE — 94664 DEMO&/EVAL PT USE INHALER: CPT

## 2025-05-12 PROCEDURE — 2580000003 HC RX 258: Performed by: NURSE PRACTITIONER

## 2025-05-12 PROCEDURE — 6370000000 HC RX 637 (ALT 250 FOR IP): Performed by: NURSE PRACTITIONER

## 2025-05-12 PROCEDURE — 82705 FATS/LIPIDS FECES QUAL: CPT

## 2025-05-12 PROCEDURE — 87086 URINE CULTURE/COLONY COUNT: CPT

## 2025-05-12 PROCEDURE — 6360000004 HC RX CONTRAST MEDICATION

## 2025-05-12 PROCEDURE — 82270 OCCULT BLOOD FECES: CPT

## 2025-05-12 PROCEDURE — 87186 SC STD MICRODIL/AGAR DIL: CPT

## 2025-05-12 PROCEDURE — 96361 HYDRATE IV INFUSION ADD-ON: CPT

## 2025-05-12 PROCEDURE — 96365 THER/PROPH/DIAG IV INF INIT: CPT

## 2025-05-12 PROCEDURE — 36415 COLL VENOUS BLD VENIPUNCTURE: CPT

## 2025-05-12 PROCEDURE — 83605 ASSAY OF LACTIC ACID: CPT

## 2025-05-12 PROCEDURE — 87040 BLOOD CULTURE FOR BACTERIA: CPT

## 2025-05-12 PROCEDURE — 2580000003 HC RX 258

## 2025-05-12 PROCEDURE — 96375 TX/PRO/DX INJ NEW DRUG ADDON: CPT

## 2025-05-12 PROCEDURE — 99222 1ST HOSP IP/OBS MODERATE 55: CPT | Performed by: STUDENT IN AN ORGANIZED HEALTH CARE EDUCATION/TRAINING PROGRAM

## 2025-05-12 PROCEDURE — 85025 COMPLETE CBC W/AUTO DIFF WBC: CPT

## 2025-05-12 PROCEDURE — 87088 URINE BACTERIA CULTURE: CPT

## 2025-05-12 PROCEDURE — 99285 EMERGENCY DEPT VISIT HI MDM: CPT

## 2025-05-12 RX ORDER — MECLIZINE HYDROCHLORIDE 25 MG/1
25 TABLET ORAL DAILY
Status: DISCONTINUED | OUTPATIENT
Start: 2025-05-12 | End: 2025-05-14 | Stop reason: HOSPADM

## 2025-05-12 RX ORDER — ONDANSETRON 2 MG/ML
4 INJECTION INTRAMUSCULAR; INTRAVENOUS EVERY 6 HOURS PRN
Status: DISCONTINUED | OUTPATIENT
Start: 2025-05-12 | End: 2025-05-14 | Stop reason: HOSPADM

## 2025-05-12 RX ORDER — SODIUM CHLORIDE 0.9 % (FLUSH) 0.9 %
5-40 SYRINGE (ML) INJECTION EVERY 12 HOURS SCHEDULED
Status: DISCONTINUED | OUTPATIENT
Start: 2025-05-12 | End: 2025-05-14 | Stop reason: HOSPADM

## 2025-05-12 RX ORDER — PRIMIDONE 250 MG/1
250 TABLET ORAL EVERY MORNING
Status: DISCONTINUED | OUTPATIENT
Start: 2025-05-12 | End: 2025-05-14 | Stop reason: HOSPADM

## 2025-05-12 RX ORDER — SODIUM CHLORIDE 0.9 % (FLUSH) 0.9 %
5-40 SYRINGE (ML) INJECTION PRN
Status: DISCONTINUED | OUTPATIENT
Start: 2025-05-12 | End: 2025-05-14 | Stop reason: HOSPADM

## 2025-05-12 RX ORDER — KETOTIFEN FUMARATE 0.35 MG/ML
1 SOLUTION/ DROPS OPHTHALMIC 2 TIMES DAILY
Status: DISCONTINUED | OUTPATIENT
Start: 2025-05-12 | End: 2025-05-14 | Stop reason: HOSPADM

## 2025-05-12 RX ORDER — SODIUM CHLORIDE 0.9 % (FLUSH) 0.9 %
3 SYRINGE (ML) INJECTION EVERY 8 HOURS
Status: DISCONTINUED | OUTPATIENT
Start: 2025-05-12 | End: 2025-05-12

## 2025-05-12 RX ORDER — METRONIDAZOLE 500 MG/100ML
500 INJECTION, SOLUTION INTRAVENOUS EVERY 8 HOURS
Status: DISCONTINUED | OUTPATIENT
Start: 2025-05-12 | End: 2025-05-14

## 2025-05-12 RX ORDER — LANOLIN ALCOHOL/MO/W.PET/CERES
400 CREAM (GRAM) TOPICAL DAILY
Status: DISCONTINUED | OUTPATIENT
Start: 2025-05-12 | End: 2025-05-14 | Stop reason: HOSPADM

## 2025-05-12 RX ORDER — LORAZEPAM 0.5 MG/1
0.5 TABLET ORAL EVERY 8 HOURS PRN
Status: DISCONTINUED | OUTPATIENT
Start: 2025-05-12 | End: 2025-05-14 | Stop reason: HOSPADM

## 2025-05-12 RX ORDER — LAMOTRIGINE 100 MG/1
600 TABLET ORAL NIGHTLY
Status: DISCONTINUED | OUTPATIENT
Start: 2025-05-12 | End: 2025-05-14 | Stop reason: HOSPADM

## 2025-05-12 RX ORDER — DIAZEPAM 10 MG/2G
20 GEL RECTAL PRN
Status: DISCONTINUED | OUTPATIENT
Start: 2025-05-12 | End: 2025-05-14 | Stop reason: HOSPADM

## 2025-05-12 RX ORDER — SODIUM CHLORIDE 9 MG/ML
INJECTION, SOLUTION INTRAVENOUS PRN
Status: DISCONTINUED | OUTPATIENT
Start: 2025-05-12 | End: 2025-05-14 | Stop reason: HOSPADM

## 2025-05-12 RX ORDER — SODIUM CHLORIDE 9 MG/ML
INJECTION, SOLUTION INTRAVENOUS CONTINUOUS
Status: DISCONTINUED | OUTPATIENT
Start: 2025-05-12 | End: 2025-05-14

## 2025-05-12 RX ORDER — ACETAMINOPHEN 500 MG
500 TABLET ORAL EVERY 6 HOURS PRN
Status: DISCONTINUED | OUTPATIENT
Start: 2025-05-12 | End: 2025-05-14 | Stop reason: HOSPADM

## 2025-05-12 RX ORDER — IOPAMIDOL 755 MG/ML
75 INJECTION, SOLUTION INTRAVASCULAR
Status: COMPLETED | OUTPATIENT
Start: 2025-05-12 | End: 2025-05-12

## 2025-05-12 RX ORDER — LEVETIRACETAM 500 MG/1
2000 TABLET ORAL NIGHTLY
Status: DISCONTINUED | OUTPATIENT
Start: 2025-05-12 | End: 2025-05-14 | Stop reason: HOSPADM

## 2025-05-12 RX ORDER — FINASTERIDE 5 MG/1
5 TABLET, FILM COATED ORAL NIGHTLY
Status: DISCONTINUED | OUTPATIENT
Start: 2025-05-12 | End: 2025-05-14 | Stop reason: HOSPADM

## 2025-05-12 RX ORDER — TAMSULOSIN HYDROCHLORIDE 0.4 MG/1
0.4 CAPSULE ORAL 2 TIMES DAILY
Status: DISCONTINUED | OUTPATIENT
Start: 2025-05-12 | End: 2025-05-14 | Stop reason: HOSPADM

## 2025-05-12 RX ORDER — LACOSAMIDE 50 MG/1
150 TABLET ORAL 2 TIMES DAILY
Status: DISCONTINUED | OUTPATIENT
Start: 2025-05-12 | End: 2025-05-14 | Stop reason: HOSPADM

## 2025-05-12 RX ORDER — PRIMIDONE 250 MG/1
375 TABLET ORAL NIGHTLY
Status: DISCONTINUED | OUTPATIENT
Start: 2025-05-12 | End: 2025-05-14 | Stop reason: HOSPADM

## 2025-05-12 RX ORDER — PANTOPRAZOLE SODIUM 40 MG/1
40 TABLET, DELAYED RELEASE ORAL
Status: DISCONTINUED | OUTPATIENT
Start: 2025-05-13 | End: 2025-05-14 | Stop reason: HOSPADM

## 2025-05-12 RX ORDER — CIPROFLOXACIN 2 MG/ML
400 INJECTION, SOLUTION INTRAVENOUS EVERY 12 HOURS
Status: DISCONTINUED | OUTPATIENT
Start: 2025-05-12 | End: 2025-05-14

## 2025-05-12 RX ORDER — VITAMIN B COMPLEX
1000 TABLET ORAL DAILY
Status: DISCONTINUED | OUTPATIENT
Start: 2025-05-12 | End: 2025-05-14 | Stop reason: HOSPADM

## 2025-05-12 RX ORDER — ALBUTEROL SULFATE 90 UG/1
2 INHALANT RESPIRATORY (INHALATION) EVERY 4 HOURS PRN
Status: DISCONTINUED | OUTPATIENT
Start: 2025-05-12 | End: 2025-05-14 | Stop reason: HOSPADM

## 2025-05-12 RX ORDER — HYDROXYZINE HYDROCHLORIDE 25 MG/1
25 TABLET, FILM COATED ORAL EVERY 12 HOURS PRN
Status: DISCONTINUED | OUTPATIENT
Start: 2025-05-12 | End: 2025-05-14 | Stop reason: HOSPADM

## 2025-05-12 RX ORDER — CALCIUM CARBONATE 500 MG/1
1 TABLET, CHEWABLE ORAL 2 TIMES DAILY
Status: DISCONTINUED | OUTPATIENT
Start: 2025-05-12 | End: 2025-05-14 | Stop reason: HOSPADM

## 2025-05-12 RX ORDER — METRONIDAZOLE 500 MG/100ML
500 INJECTION, SOLUTION INTRAVENOUS ONCE
Status: COMPLETED | OUTPATIENT
Start: 2025-05-12 | End: 2025-05-12

## 2025-05-12 RX ORDER — SENNA AND DOCUSATE SODIUM 50; 8.6 MG/1; MG/1
2 TABLET, FILM COATED ORAL
Status: DISCONTINUED | OUTPATIENT
Start: 2025-05-12 | End: 2025-05-14 | Stop reason: HOSPADM

## 2025-05-12 RX ORDER — ONDANSETRON 4 MG/1
4 TABLET, ORALLY DISINTEGRATING ORAL EVERY 8 HOURS PRN
Status: DISCONTINUED | OUTPATIENT
Start: 2025-05-12 | End: 2025-05-14 | Stop reason: HOSPADM

## 2025-05-12 RX ORDER — ONDANSETRON 4 MG/1
4 TABLET, ORALLY DISINTEGRATING ORAL 3 TIMES DAILY PRN
Status: DISCONTINUED | OUTPATIENT
Start: 2025-05-12 | End: 2025-05-12 | Stop reason: SDUPTHER

## 2025-05-12 RX ORDER — LANOLIN ALCOHOL/MO/W.PET/CERES
1000 CREAM (GRAM) TOPICAL DAILY
Status: DISCONTINUED | OUTPATIENT
Start: 2025-05-12 | End: 2025-05-14 | Stop reason: HOSPADM

## 2025-05-12 RX ORDER — POLYETHYLENE GLYCOL 3350 17 G/17G
17 POWDER, FOR SOLUTION ORAL DAILY PRN
Status: DISCONTINUED | OUTPATIENT
Start: 2025-05-12 | End: 2025-05-14 | Stop reason: HOSPADM

## 2025-05-12 RX ORDER — BENZONATATE 100 MG/1
100 CAPSULE ORAL EVERY 8 HOURS PRN
Status: DISCONTINUED | OUTPATIENT
Start: 2025-05-12 | End: 2025-05-14 | Stop reason: HOSPADM

## 2025-05-12 RX ORDER — LEVETIRACETAM 500 MG/1
1500 TABLET ORAL DAILY
Status: DISCONTINUED | OUTPATIENT
Start: 2025-05-12 | End: 2025-05-14 | Stop reason: HOSPADM

## 2025-05-12 RX ORDER — 0.9 % SODIUM CHLORIDE 0.9 %
1000 INTRAVENOUS SOLUTION INTRAVENOUS ONCE
Status: COMPLETED | OUTPATIENT
Start: 2025-05-12 | End: 2025-05-12

## 2025-05-12 RX ORDER — LAMOTRIGINE 100 MG/1
400 TABLET ORAL EVERY MORNING
Status: DISCONTINUED | OUTPATIENT
Start: 2025-05-12 | End: 2025-05-14 | Stop reason: HOSPADM

## 2025-05-12 RX ORDER — LATANOPROST 50 UG/ML
1 SOLUTION/ DROPS OPHTHALMIC NIGHTLY
Status: DISCONTINUED | OUTPATIENT
Start: 2025-05-12 | End: 2025-05-14 | Stop reason: HOSPADM

## 2025-05-12 RX ADMIN — KETOTIFEN FUMARATE 1 DROP: 0.25 SOLUTION/ DROPS OPHTHALMIC at 22:04

## 2025-05-12 RX ADMIN — WATER 2000 MG: 1 INJECTION INTRAMUSCULAR; INTRAVENOUS; SUBCUTANEOUS at 09:38

## 2025-05-12 RX ADMIN — LATANOPROST 1 DROP: 50 SOLUTION/ DROPS OPHTHALMIC at 22:05

## 2025-05-12 RX ADMIN — LEVETIRACETAM 2000 MG: 500 TABLET, FILM COATED ORAL at 21:57

## 2025-05-12 RX ADMIN — LAMOTRIGINE 400 MG: 100 TABLET ORAL at 14:59

## 2025-05-12 RX ADMIN — SODIUM CHLORIDE: 0.9 INJECTION, SOLUTION INTRAVENOUS at 23:44

## 2025-05-12 RX ADMIN — LAMOTRIGINE 600 MG: 100 TABLET ORAL at 21:57

## 2025-05-12 RX ADMIN — KETOTIFEN FUMARATE 1 DROP: 0.25 SOLUTION/ DROPS OPHTHALMIC at 15:00

## 2025-05-12 RX ADMIN — LEVETIRACETAM 1500 MG: 500 TABLET, FILM COATED ORAL at 15:00

## 2025-05-12 RX ADMIN — PRIMIDONE 250 MG: 250 TABLET ORAL at 15:00

## 2025-05-12 RX ADMIN — METRONIDAZOLE 500 MG: 500 INJECTION, SOLUTION INTRAVENOUS at 17:41

## 2025-05-12 RX ADMIN — SODIUM CHLORIDE 1000 ML: 9 INJECTION, SOLUTION INTRAVENOUS at 07:47

## 2025-05-12 RX ADMIN — LACOSAMIDE 150 MG: 50 TABLET, FILM COATED ORAL at 21:57

## 2025-05-12 RX ADMIN — SODIUM CHLORIDE: 0.9 INJECTION, SOLUTION INTRAVENOUS at 13:45

## 2025-05-12 RX ADMIN — MECLIZINE HYDROCHLORIDE 25 MG: 25 TABLET ORAL at 14:59

## 2025-05-12 RX ADMIN — METRONIDAZOLE 500 MG: 500 INJECTION, SOLUTION INTRAVENOUS at 10:38

## 2025-05-12 RX ADMIN — IOPAMIDOL 75 ML: 755 INJECTION, SOLUTION INTRAVENOUS at 08:32

## 2025-05-12 RX ADMIN — CIPROFLOXACIN 400 MG: 2 INJECTION, SOLUTION INTRAVENOUS at 15:10

## 2025-05-12 ASSESSMENT — PAIN - FUNCTIONAL ASSESSMENT: PAIN_FUNCTIONAL_ASSESSMENT: NONE - DENIES PAIN

## 2025-05-12 NOTE — DISCHARGE INSTR - COC
Continuity of Care Form    Patient Name: Jeremy King   :  1955  MRN:  [id number]    Admit date:  2025  Discharge date:  2025    Code Status Order: Full Code   Advance Directives:     Admitting Physician:  Jj Arevalo MD  PCP: Sukumar Larose MD    Discharging Nurse: Brinda Marcum RN  Discharging Hospital Unit/Room#: 0310/0310-01  Discharging Unit Phone Number: [phone]    Emergency Contact:   Extended Emergency Contact Information  Primary Emergency Contact: Esther Denny  Address: [address] [place] [address] [address]           [address] [facility]  Home Phone: [phone]  Relation: Legal Guardian  Secondary Emergency Contact: Samir King  Home Phone: [phone]  Relation: Brother/Sister    Past Surgical History:  Past Surgical History:   Procedure Laterality Date    ABDOMEN SURGERY  ,     Bowel Obstruction X2    CAST APPLICATION Left     Lt. Ankle , X3    HYDROCELE EXCISION  2016    LAPAROSCOPY  14    with BERNARD    LAPAROTOMY  14    with partial cecectomy    VAGAL NERVE STIMULATION      placed, then replaced    VAGAL NERVE STIMULATION  10/11/2016    replaced generator battery       Immunization History:   Immunization History   Administered Date(s) Administered    COVID-19, PFIZER PURPLE top, DILUTE for use, (age 12 y+), 30mcg/0.3mL 2021, 2021, 2022    TDaP, ADACEL (age 10y-64y), BOOSTRIX (age 10y+), IM, 0.5mL 2013       Active Problems:  Patient Active Problem List   Diagnosis Code    MR (mental retardation), severe F72    SBO (small bowel obstruction) ([facility]) K56.609    LEELEE (obstructive sleep apnea) G47.33    Intellectual disability F79    S/P placement of VNS (vagus nerve stimulation) device Z96.89    Dysphagia R13.10    Seizure disorder ([facility]) G40.909    Pneumatosis intestinalis K63.89    Small bowel obstruction ([facility]) K56.609    Abnormal ECG R94.31    Sinus tachycardia R00.0    Complicated UTI (urinary tract infection) N39.0

## 2025-05-12 NOTE — PROGRESS NOTES
Patient assessment and vitals completed as charted. Patient Alert and oriented to self and time only. Pt is cooperative with assessment. Patient resting comfortably at this time, denies any pain at this time and denies any needs. Call light within patients reach and bed alarm on. Plan of care ongoing.

## 2025-05-12 NOTE — PROGRESS NOTES
Surgery consult request sent to Dr. Cochran via perfect serve. Dr. Cochran responded affirmatively.

## 2025-05-12 NOTE — PROGRESS NOTES
This nurse and phleb attempted to draw blood for #2 blood culture at this time. Pt requires US start IV. Unable to get blood culture #2 at this time even with use of vein finder. IV fluids are running as ordered.

## 2025-05-12 NOTE — PROGRESS NOTES
Pt admitted to room 310 from ER for complicated UTI and partial SBO. Pt alert and oriented to self and time only. Pt personal belongings at bedside. See assessment flowsheet for more information.

## 2025-05-12 NOTE — CONSULTS
General Surgery:  Consult Note        PATIENT NAME: Jeremy King   YOB: 1955    ADMISSION DATE: 5/12/2025  7:08 AM      TODAY'S DATE: 5/12/2025    Chief Complaint:  \" Feeling fine\"  Consult Regarding: Partial small bowel obstruction    HISTORY OF PRESENT ILLNESS:  The patient is a 69 y.o. male  who presented from his care facility due to decreased bowel sounds and diarrhea.  Patient also had seizure-like activity which he typically has when he develops bowel obstructions.  Patient is well-known to the hospital and surgical service.  Has had multiple exploratory laparotomies in the past for partial colectomy and previous bowel obstructions.  He often comes in for partial small bowel obstructions typically treated with bowel rest and sometimes NG tube and typically resolve.  CT scan shows some dilated loops of small bowel likely secondary to partial small bowel obstruction also showed some thickening of the ileum.  Jeremy denies any pain, nausea, or emesis.  He has not had any emesis per reports.  He has continued to pass stool and flatus.  Multiple watery bowel movements in the emergency department.  He was found to have leukocytosis as well as a UTI so was admitted to the hospital for further management.  Surgery was consulted due to the partial obstruction versus ileitis.      Past Medical History:        Diagnosis Date    Arthritis     BPH (benign prostatic hyperplasia)     Dysphagia     GERD (gastroesophageal reflux disease)     Hearing loss     HLD (hyperlipidemia)     Insomnia     MR (mental retardation)     Periorbital cellulitis     SBO (small bowel obstruction) (HCC)     Seizures (HCC)     Epilepsy    Ventral hernia     Vitamin D deficiency        Past Surgical History:        Procedure Laterality Date    ABDOMEN SURGERY  2013, 2014    Bowel Obstruction X2    CAST APPLICATION Left     Lt. Ankle , X3    HYDROCELE EXCISION  08/2016    LAPAROSCOPY  8/17/14    with BERNARD    LAPAROTOMY  8/17/14     surgical intervention at this time.      Electronically signed by Anmol Cochran DO  on 5/12/2025 at 7:00 PM

## 2025-05-12 NOTE — H&P
History and Physical    Patient:  Jeremy King  MRN: [id number]    Chief Complaint: Diarrhea    History Obtained From:  electronic medical record, reason patient could not give history: MRDD    PCP: Sukumar Larose MD    History of Present Illness:   The patient is a 69 y.o. male who presented to the emergency room from [facility] in [place].  Staff they reported patient had decreased bowel sounds and diarrhea for the past few days.  He does have history of multiple admissions for multiple bowel obstructions.  Diarrhea was nonbloody.  When asking patient if he is having pain he states \"I'm fine\".  Patient does have history of MRDD, seizures, hearing loss and multiple bowel obstructions.  During patient's evaluation BUN was 29 and creatinine 1.3 slightly elevated.  LFTs normal.  Patient had leukocytosis of 16.9 with a left shift.  Patient did have diarrhea while in the emergency room and was negative for C. difficile.  Stool was positive for blood.  Urinalysis showed 20-50 WBCs, trace leukocyte and positive nitrites.  CT abdomen and pelvis showed dilated small bowel loops with air-fluid levels with gradual transition point seen at the anastomosis site in the left lower quadrant suggesting partial obstruction, segment areas of hyperenhancement and thickening of the distal ileum could relate to under distention although a form of ileitis is difficult to exclude.  Patient was initiated on IV Rocephin and Flagyl as well as IV fluids while in the emergency room.    Past Medical History:        Diagnosis Date    Arthritis     BPH (benign prostatic hyperplasia)     Dysphagia     GERD (gastroesophageal reflux disease)     Hearing loss     HLD (hyperlipidemia)     Insomnia     MR (mental retardation)     Periorbital cellulitis     SBO (small bowel obstruction) (HCC)     Seizures (HCC)     Epilepsy    Ventral hernia     Vitamin D deficiency        Past Surgical History:        Procedure Laterality Date    ABDOMEN  no audible murmur  PULM:  CTA, no wheezes, rales or rhonchi, no acute respiratory distress  ABD:    Bowels sounds normal.  Abdomen is soft.  No distention.  Possible tenderness to palpation as he does have mild face grimace on exam, but denied pain.   EXT:   no edema bilaterally .  No calf tenderness.   NEURO: Moves all extremities.  Motor and sensory are grossly intact  SKIN:  No rashes.  No skin lesions.    -----------------------------------------------------------------  Diagnostic Data:     DATA:    CBC:   Lab Results   Component Value Date    WBC 16.9 (H) 05/12/2025    RBC 4.70 05/12/2025    HGB 15.1 05/12/2025    HCT 46.3 05/12/2025    MCV 98.5 05/12/2025     05/12/2025        CMP:   Lab Results   Component Value Date    GLUCOSE 111 (H) 05/12/2025    BUN 29 (H) 05/12/2025    CREATININE 1.3 (H) 05/12/2025     05/12/2025    K 4.6 05/12/2025    CALCIUM 9.8 05/12/2025     05/12/2025    CO2 25 05/12/2025    BILITOT 0.3 05/12/2025    ALKPHOS 196 (H) 05/12/2025    ALT 19 05/12/2025    AST 18 05/12/2025       UA:   Lab Results   Component Value Date    COLORU Yellow 05/12/2025    WBCUA 20 TO 50 05/12/2025    RBCUA 0 TO 2 05/12/2025    LEUKOCYTESUR TRACE (A) 05/12/2025    GLUCOSEU NEGATIVE 05/12/2025    KETUA TRACE (A) 05/12/2025    PROTEINU 2+ (A) 05/12/2025    HGBUR 2+ (A) 05/12/2025    CASTUA 5 TO 10 HYALINE 05/12/2025    BACTERIA 3+ (A) 05/12/2025    YEAST NOT REPORTED 10/03/2019       Lactic Acid:   Lab Results   Component Value Date    LACTA 1.7 05/12/2025       D-Dimer:  No results found for: \"DDIMER\"    PT/INR:  Lab Results   Component Value Date/Time    PROTIME 14.0 02/15/2025 12:15 PM    INR 1.1 02/15/2025 12:15 PM       High Sensitivity Troponin:  No results for input(s): \"TROPHS\" in the last 72 hours.    ABGs:   No results found for: \"PHART\", \"PH\", \"QDH5BOR\", \"PCO2\", \"PO2ART\", \"PO2\", \"SAV8FOG\", \"HCO3\", \"BEART\", \"BE\", \"THGBART\", \"THB\", \"LOE5HDB\", \"U5NSAXBM\", \"O2SAT\", \"FIO2\"        CT

## 2025-05-12 NOTE — RT PROTOCOL NOTE
RESPIRATORY ASSESSMENT PROTOCOL                                                                                              Patient Name: Jeremy King Room#: 0310/0310-01 : 1955     Admitting diagnosis: Partial small bowel obstruction (HCC) [K56.600]  Acute cystitis without hematuria [N30.00]  Complicated UTI (urinary tract infection) [N39.0]  Diarrhea, unspecified type [R19.7]       Medical History:   Past Medical History:   Diagnosis Date    Arthritis     BPH (benign prostatic hyperplasia)     Dysphagia     GERD (gastroesophageal reflux disease)     Hearing loss     HLD (hyperlipidemia)     Insomnia     MR (mental retardation)     Periorbital cellulitis     SBO (small bowel obstruction) (HCC)     Seizures (HCC)     Epilepsy    Ventral hernia     Vitamin D deficiency        PATIENT ASSESSMENT    LABORATORY DATA  Hematology:   Lab Results   Component Value Date/Time    WBC 16.9 2025 07:36 AM    RBC 4.70 2025 07:36 AM    RBC 3.43 2011 11:48 AM    HGB 15.1 2025 07:36 AM    HCT 46.3 2025 07:36 AM     2025 07:36 AM     2011 11:48 AM     Chemistry:  No results found for: \"PHART\", \"IXI1YCO\", \"PO2ART\", \"W0DLQOSO\", \"MPF8KGK\", \"PBEA\", \"NBEA\"    VITALS  Pulse: 99   Respirations: 20  BP: (!) 144/89  SpO2: 98 % O2 Device: None (Room air)  Temp: 97.4 °F (36.3 °C)    SKIN COLOR  [x] Normal  [] Pale  [] Dusky  [] Cyanotic    RESPIRATORY PATTERN  [x] Normal  [] Dyspnea  [] Cheyne-Regalado  [] Kussmaul  [] Biots    AMBULATORY  [x] Yes  [] No  [x] With Assistance            Patient Acuity 0 1 2 3 4 Score   Level of Consciousness (LOC) [x]  Alert & Oriented or Pt normal LOC []  Confused;follows directions []  Confused & uncooper-ative []  Obtunded []  Comatose 0   Respiratory Rate  (RR) [x]  Reg. rate & pattern. 12 - 20 bpm  []  Increased RR. Greater than 20 bpm   []  SOB w/ exertion or RR greater than 24 bpm []  Access- ory muscle use at rest. Abn.  resp. []  SOB at

## 2025-05-12 NOTE — ED PROVIDER NOTES
movement. [TC]      ED Course User Index  [TC] Shannan Wood MD       Medications   sodium chloride flush 0.9 % injection 3 mL (has no administration in time range)   metroNIDAZOLE (FLAGYL) 500 mg in 0.9% NaCl 100 mL IVPB premix (has no administration in time range)   sodium chloride 0.9 % bolus 1,000 mL (0 mLs IntraVENous Stopped 5/12/25 0957)   iopamidol (ISOVUE-370) 76 % injection 75 mL (75 mLs IntraVENous Given 5/12/25 0832)   cefTRIAXone (ROCEPHIN) 2,000 mg in sterile water 20 mL IV syringe (2,000 mg IntraVENous Given 5/12/25 0938)       New Prescriptions    No medications on file         Counseling:   The emergency provider has spoken with the patient and discussed today’s results, in addition to providing specific details for the plan of care and counseling regarding the diagnosis and prognosis.  Questions are answered at this time and they are agreeable with the plan.       --------------------------------- IMPRESSION AND DISPOSITION ---------------------------------    IMPRESSION  1. Acute cystitis without hematuria    2. Diarrhea, unspecified type    3. Partial small bowel obstruction (HCC)        DISPOSITION  Disposition: Admit [facility]  Patient condition is stable        NOTE: This report was transcribed using voice recognition software. Every effort was made to ensure accuracy; however, inadvertent computerized transcription errors may be present          Shannan Wood MD  05/12/25 1012

## 2025-05-13 ENCOUNTER — APPOINTMENT (OUTPATIENT)
Dept: GENERAL RADIOLOGY | Age: 70
DRG: 690 | End: 2025-05-13
Payer: MEDICARE

## 2025-05-13 LAB
ANION GAP SERPL CALCULATED.3IONS-SCNC: 14 MMOL/L (ref 9–16)
BASOPHILS # BLD: 0.04 K/UL (ref 0–0.2)
BASOPHILS NFR BLD: 1 % (ref 0–2)
BUN SERPL-MCNC: 24 MG/DL (ref 8–23)
BUN/CREAT SERPL: 27 (ref 9–20)
C DIFFICILE TOXINS, PCR: NORMAL
CALCIUM SERPL-MCNC: 8.5 MG/DL (ref 8.6–10.4)
CAMPYLOBACTER DNA SPEC NAA+PROBE: NORMAL
CHLORIDE SERPL-SCNC: 109 MMOL/L (ref 98–107)
CO2 SERPL-SCNC: 16 MMOL/L (ref 20–31)
CREAT SERPL-MCNC: 0.9 MG/DL (ref 0.7–1.2)
EOSINOPHIL # BLD: 0.43 K/UL (ref 0–0.44)
EOSINOPHILS RELATIVE PERCENT: 5 % (ref 1–4)
ERYTHROCYTE [DISTWIDTH] IN BLOOD BY AUTOMATED COUNT: 13 % (ref 11.8–14.4)
ETEC ELTA+ESTB GENES STL QL NAA+PROBE: NORMAL
GFR, ESTIMATED: 88 ML/MIN/1.73M2
GLUCOSE SERPL-MCNC: 94 MG/DL (ref 74–99)
HCT VFR BLD AUTO: 37.6 % (ref 40.7–50.3)
HGB BLD-MCNC: 12.2 G/DL (ref 13–17)
IMM GRANULOCYTES # BLD AUTO: <0.03 K/UL (ref 0–0.3)
IMM GRANULOCYTES NFR BLD: 0 %
LYMPHOCYTES NFR BLD: 1.86 K/UL (ref 1.1–3.7)
LYMPHOCYTES RELATIVE PERCENT: 22 % (ref 24–43)
MCH RBC QN AUTO: 32.4 PG (ref 25.2–33.5)
MCHC RBC AUTO-ENTMCNC: 32.4 G/DL (ref 28.4–34.8)
MCV RBC AUTO: 99.7 FL (ref 82.6–102.9)
MONOCYTES NFR BLD: 0.73 K/UL (ref 0.1–1.2)
MONOCYTES NFR BLD: 9 % (ref 3–12)
NEUTROPHILS NFR BLD: 64 % (ref 36–65)
NEUTS SEG NFR BLD: 5.51 K/UL (ref 1.5–8.1)
NRBC BLD-RTO: 0 PER 100 WBC
P SHIGELLOIDES DNA STL QL NAA+PROBE: NORMAL
PLATELET # BLD AUTO: 205 K/UL (ref 138–453)
PMV BLD AUTO: 9 FL (ref 8.1–13.5)
POTASSIUM SERPL-SCNC: 4.4 MMOL/L (ref 3.7–5.3)
RBC # BLD AUTO: 3.77 M/UL (ref 4.21–5.77)
SALMONELLA DNA SPEC QL NAA+PROBE: NORMAL
SHIGA TOXIN STX GENE SPEC NAA+PROBE: NORMAL
SHIGELLA DNA SPEC QL NAA+PROBE: NORMAL
SODIUM SERPL-SCNC: 139 MMOL/L (ref 136–145)
SPECIMEN DESCRIPTION: NORMAL
SPECIMEN DESCRIPTION: NORMAL
V CHOL+PARA RFBL+TRKH+TNAA STL QL NAA+PR: NORMAL
WBC OTHER # BLD: 8.6 K/UL (ref 3.5–11.3)
Y ENTERO RECN STL QL NAA+PROBE: NORMAL

## 2025-05-13 PROCEDURE — 74018 RADEX ABDOMEN 1 VIEW: CPT

## 2025-05-13 PROCEDURE — 80048 BASIC METABOLIC PNL TOTAL CA: CPT

## 2025-05-13 PROCEDURE — 1200000000 HC SEMI PRIVATE

## 2025-05-13 PROCEDURE — 99232 SBSQ HOSP IP/OBS MODERATE 35: CPT | Performed by: STUDENT IN AN ORGANIZED HEALTH CARE EDUCATION/TRAINING PROGRAM

## 2025-05-13 PROCEDURE — 85025 COMPLETE CBC W/AUTO DIFF WBC: CPT

## 2025-05-13 PROCEDURE — 94761 N-INVAS EAR/PLS OXIMETRY MLT: CPT

## 2025-05-13 PROCEDURE — 36415 COLL VENOUS BLD VENIPUNCTURE: CPT

## 2025-05-13 PROCEDURE — 6370000000 HC RX 637 (ALT 250 FOR IP): Performed by: NURSE PRACTITIONER

## 2025-05-13 PROCEDURE — 6360000002 HC RX W HCPCS: Performed by: NURSE PRACTITIONER

## 2025-05-13 PROCEDURE — 2580000003 HC RX 258: Performed by: NURSE PRACTITIONER

## 2025-05-13 RX ADMIN — LAMOTRIGINE 600 MG: 100 TABLET ORAL at 20:21

## 2025-05-13 RX ADMIN — PRIMIDONE 375 MG: 250 TABLET ORAL at 20:21

## 2025-05-13 RX ADMIN — METRONIDAZOLE 500 MG: 500 INJECTION, SOLUTION INTRAVENOUS at 10:01

## 2025-05-13 RX ADMIN — TAMSULOSIN HYDROCHLORIDE 0.4 MG: 0.4 CAPSULE ORAL at 09:54

## 2025-05-13 RX ADMIN — Medication 400 MG: at 09:54

## 2025-05-13 RX ADMIN — METRONIDAZOLE 500 MG: 500 INJECTION, SOLUTION INTRAVENOUS at 02:36

## 2025-05-13 RX ADMIN — Medication 1000 UNITS: at 09:54

## 2025-05-13 RX ADMIN — METRONIDAZOLE 500 MG: 500 INJECTION, SOLUTION INTRAVENOUS at 17:47

## 2025-05-13 RX ADMIN — KETOTIFEN FUMARATE 1 DROP: 0.25 SOLUTION/ DROPS OPHTHALMIC at 09:59

## 2025-05-13 RX ADMIN — MECLIZINE HYDROCHLORIDE 25 MG: 25 TABLET ORAL at 09:53

## 2025-05-13 RX ADMIN — LACOSAMIDE 150 MG: 50 TABLET, FILM COATED ORAL at 09:54

## 2025-05-13 RX ADMIN — DOCUSATE SODIUM 50 MG AND SENNOSIDES 8.6 MG 2 TABLET: 8.6; 5 TABLET, FILM COATED ORAL at 17:45

## 2025-05-13 RX ADMIN — KETOTIFEN FUMARATE 1 DROP: 0.25 SOLUTION/ DROPS OPHTHALMIC at 20:27

## 2025-05-13 RX ADMIN — SODIUM CHLORIDE: 0.9 INJECTION, SOLUTION INTRAVENOUS at 16:43

## 2025-05-13 RX ADMIN — TAMSULOSIN HYDROCHLORIDE 0.4 MG: 0.4 CAPSULE ORAL at 20:22

## 2025-05-13 RX ADMIN — FINASTERIDE 5 MG: 5 TABLET, FILM COATED ORAL at 20:22

## 2025-05-13 RX ADMIN — LEVETIRACETAM 1500 MG: 500 TABLET, FILM COATED ORAL at 09:54

## 2025-05-13 RX ADMIN — CIPROFLOXACIN 400 MG: 2 INJECTION, SOLUTION INTRAVENOUS at 14:46

## 2025-05-13 RX ADMIN — LACOSAMIDE 150 MG: 50 TABLET, FILM COATED ORAL at 20:22

## 2025-05-13 RX ADMIN — PANTOPRAZOLE SODIUM 40 MG: 40 TABLET, DELAYED RELEASE ORAL at 09:54

## 2025-05-13 RX ADMIN — LATANOPROST 1 DROP: 50 SOLUTION/ DROPS OPHTHALMIC at 20:27

## 2025-05-13 RX ADMIN — LAMOTRIGINE 400 MG: 100 TABLET ORAL at 09:54

## 2025-05-13 RX ADMIN — LEVETIRACETAM 2000 MG: 500 TABLET, FILM COATED ORAL at 20:22

## 2025-05-13 RX ADMIN — ANTACID TABLETS 500 MG: 500 TABLET, CHEWABLE ORAL at 20:22

## 2025-05-13 RX ADMIN — ANTACID TABLETS 500 MG: 500 TABLET, CHEWABLE ORAL at 09:54

## 2025-05-13 RX ADMIN — SODIUM CHLORIDE: 0.9 INJECTION, SOLUTION INTRAVENOUS at 08:22

## 2025-05-13 RX ADMIN — CYANOCOBALAMIN TAB 1000 MCG 1000 MCG: 1000 TAB at 09:54

## 2025-05-13 RX ADMIN — PRIMIDONE 250 MG: 250 TABLET ORAL at 09:54

## 2025-05-13 RX ADMIN — CIPROFLOXACIN 400 MG: 2 INJECTION, SOLUTION INTRAVENOUS at 02:35

## 2025-05-13 NOTE — CARE COORDINATION
Case Management Assessment  Initial Evaluation    Date/Time of Evaluation: 5/13/2025 10:12 AM  Assessment Completed by: Srinath Esteban RN    If patient is discharged prior to next notation, then this note serves as note for discharge by case management.    Patient Name: Jeremy King                   YOB: 1955  Diagnosis: Partial small bowel obstruction (HCC) [K56.600]  Acute cystitis without hematuria [N30.00]  Complicated UTI (urinary tract infection) [N39.0]  Diarrhea, unspecified type [R19.7]                   Date / Time: 5/12/2025  7:08 AM    Patient Admission Status: Inpatient   Readmission Risk (Low < 19, Mod (19-27), High > 27): Readmission Risk Score: 21.7    Current PCP: Sukumar Larose MD  PCP verified by CM? (P) Yes    Chart Reviewed: Yes      History Provided by: (P) Child/Family, Medical Record  Patient Orientation: (P) Person, Place, Alert and Oriented    Patient Cognition: (P) Alert    Hospitalization in the last 30 days (Readmission):  Yes    If yes, Readmission Assessment in  Navigator will be completed.    Advance Directives:      Code Status: Full Code   Patient's Primary Decision Maker is: (P) Named in Scanned ACP Document    Primary Decision Maker: Esther Denny - Legal Guardian, Legal Guardian - [phone]    Discharge Planning:    Patient lives with: (P) Other (Comment) Type of Home: (P) Other (Comment) (Group home.)  Primary Care Giver: (P) Other (Comment) (Group home staff.)  Patient Support Systems include: (P) Family Members, Home Care Staff   Current Financial resources: (P) Medicaid, Medicare  Current community resources: (P) Other (Comment) (Group home.)  Current services prior to admission: (P) Durable Medical Equipment            Current DME: (P) Wheelchair            Type of Home Care services:  (P) None    ADLS  Prior functional level: (P) Assistance with the following:, Bathing, Dressing, Toileting, Feeding, Cooking, Housework, Shopping,  Mobility  Current functional level: (P) Assistance with the following:, Bathing, Dressing, Toileting, Feeding, Cooking, Housework, Shopping, Mobility    PT AM-PAC:   /24  OT AM-PAC:   /24    Family can provide assistance at DC: (P) Other (comment) (Group home staff assist with home care needs.)  Would you like Case Management to discuss the discharge plan with any other family members/significant others, and if so, who? (P) Yes (Guardian.)  Plans to Return to Present Housing: (P) Yes  Other Identified Issues/Barriers to RETURNING to current housing: none.  Potential Assistance needed at discharge: (P) N/A            Potential DME:    Patient expects to discharge to: (P) Group home  Plan for transportation at discharge: (P) Other (see comment)    Financial    Payor: [facility] MEDICARE / Plan: UNITEDHEALTHCARE DUAL COMPLETE / Product Type: *No Product type* /     Does insurance require precert for SNF: Yes    Potential assistance Purchasing Medications: (P) No  Meds-to-Beds request:        [facility] - [place], [place] - [address] [facility] [address] - P [phone] - F [phone]  [address] [place] [address]  Phone: [phone] Fax: [phone]      Notes:    Factors facilitating achievement of predicted outcomes: Family support, Caregiver support, Cooperative, Pleasant, Has needed Durable Medical Equipment at home.    Barriers to discharge: Cognitive deficit, Limited safety awareness, Limited insight into deficits, Long standing deficits, Medical complications, Medication management.    Additional Case Management Notes:  Patient is known to us for his previous admissions.  He is single and lives at the [facility] in [place].  He uses a wheelchair to get around in.  Patient requires assistance with his ADL's.  The group [facility] manages his medications and transportation.  His sister & brother are his guardian.  Patient will return to the group [facility] when discharge at this time.     The Plan for Transition of Care is

## 2025-05-13 NOTE — PLAN OF CARE
Problem: Discharge Planning  Goal: Discharge to home or other facility with appropriate resources  Outcome: Progressing  Flowsheets (Taken 5/13/2025 1017)  Discharge to home or other facility with appropriate resources:   Identify barriers to discharge with patient and caregiver   Identify discharge learning needs (meds, wound care, etc)   Refer to discharge planning if patient needs post-hospital services based on physician order or complex needs related to functional status, cognitive ability or social support system   Arrange for needed discharge resources and transportation as appropriate     Problem: Skin/Tissue Integrity  Goal: Skin integrity remains intact  Description: 1.  Monitor for areas of redness and/or skin breakdown2.  Assess vascular access sites hourly3.  Every 4-6 hours minimum:  Change oxygen saturation probe site4.  Every 4-6 hours:  If on nasal continuous positive airway pressure, respiratory therapy assess nares and determine need for appliance change or resting period  5/13/2025 1017 by Shyann Tim RN  Outcome: Progressing  Flowsheets (Taken 5/13/2025 1017)  Skin Integrity Remains Intact: Monitor for areas of redness and/or skin breakdown  5/12/2025 2300 by Kalani Hannah RN  Outcome: Progressing  Flowsheets (Taken 5/12/2025 2300)  Skin Integrity Remains Intact:   Monitor for areas of redness and/or skin breakdown   Assess vascular access sites hourly     Problem: ABCDS Injury Assessment  Goal: Absence of physical injury  5/12/2025 2300 by Kalani Hannah RN  Outcome: Progressing  Flowsheets (Taken 5/12/2025 2300)  Absence of Physical Injury: Implement safety measures based on patient assessment     Problem: Safety - Adult  Goal: Free from fall injury  5/13/2025 1017 by Shyann Tim RN  Outcome: Progressing  Flowsheets (Taken 5/13/2025 1017)  Free From Fall Injury: Instruct family/caregiver on patient safety  5/12/2025 2300 by Kalani Hannah RN  Outcome:

## 2025-05-13 NOTE — PROGRESS NOTES
Comprehensive Nutrition Assessment    Type and Reason for Visit:  Initial    Nutrition Recommendations/Plan:   Monitor duration of NPO and fluid intake   Meet >75% of estimated needs.  Diet advancement to low fiber bland diet   Follow a dysphagia Pureed, nectar thick liquids consistency.      Malnutrition Assessment:  Malnutrition Status:  No malnutrition (05/13/25 0905)    Context:  Acute Illness     Findings of the 6 clinical characteristics of malnutrition:  Energy Intake:  Mild decrease in energy intake  Weight Loss:  No weight loss     Body Fat Loss:  No body fat loss     Muscle Mass Loss:  No muscle mass loss    Fluid Accumulation:  No fluid accumulation     Strength:  Not Performed    Nutrition Assessment:    Predicted inadequate energy intake r/t altered GI function aeb NPO status due to being admitted for diarrhea and decreased bowel sounds. Pt appeared frustrated and disinterested during visit (known MRDD). Denied any GI upset although has a history of being a poor , expresses weight gain was due to eating more at his group home. Pt denied any nutritional concerns claiming he is fine. Diet advancement to a low fiber bland diet in guidelines to a dysphagia pureed, nectar thick liquids consistency.    Nutrition Related Findings:    active bowel sounds and no edema Wound Type: None       Current Nutrition Intake & Therapies:    Average Meal Intake: NPO  Average Supplements Intake: None Ordered, NPO  ADULT DIET; Full Liquid    Anthropometric Measures:  Height: 177.8 cm (5' 10\")  Ideal Body Weight (IBW): 166 lbs (75 kg)    Admission Body Weight: 95.1 kg (209 lb 10.5 oz)  Current Body Weight: 96.4 kg (212 lb 8.4 oz), 128 % IBW. Weight Source: Bed scale  Current BMI (kg/m2): 30.5  Usual Body Weight: 93.4 kg (206 lb) (1 month ago.)     % Weight Change (Calculated): 3.2  Weight Adjustment For: No Adjustment                 BMI Categories: Obese Class 1 (BMI 30.0-34.9)  Hematology:  Recent Labs

## 2025-05-13 NOTE — PROGRESS NOTES
Progress Note    SUBJECTIVE:    Patient seen for f/u of Complicated UTI (urinary tract infection).  He resting in bed no distress.  Stated \"I'm fine\".       ROS:   Constitutional: negative  for fevers, and negative for chills.  Respiratory: negative for shortness of breath, negative for cough, and negative for wheezing  Cardiovascular: negative for chest pain, and negative for palpitations  Gastrointestinal: negative for abdominal pain, negative for nausea,negative for vomiting, negative for diarrhea, and negative for constipation     All other systems were reviewed with the patient and are negative unless otherwise stated in HPI      OBJECTIVE:      Vitals:   Vitals:    05/13/25 0915   BP: 126/69   Pulse: 66   Resp: 20   Temp: (!) 96.4 °F (35.8 °C)   SpO2: 95%     Weight - Scale: 96.4 kg (212 lb 9.6 oz)   Height: 177.8 cm (5' 10\")     Weight  Wt Readings from Last 3 Encounters:   05/13/25 96.4 kg (212 lb 9.6 oz)   04/17/25 93.6 kg (206 lb 4.8 oz)   02/19/25 89.6 kg (197 lb 8 oz)     Body mass index is 30.5 kg/m².    24HR INTAKE/OUTPUT:      Intake/Output Summary (Last 24 hours) at 5/13/2025 0930  Last data filed at 5/13/2025 0458  Gross per 24 hour   Intake 2240 ml   Output --   Net 2240 ml     -----------------------------------------------------------------  Exam:    GEN:    Awake, alert and pleasant.   EYES:  EOMI, pupils equal   NECK: Supple. No lymphadenopathy.  No carotid bruit  CVS:    regular rate and rhythm, no audible murmur  PULM:  CTA, no wheezes, rales or rhonchi, no acute respiratory distress  ABD:    Bowels sounds normal.  Abdomen is soft.  No distention.  no tenderness to palpation.   EXT:   no edema bilaterally .  No calf tenderness.   NEURO: Moves all extremities.  Motor and sensory are grossly intact  SKIN:  No rashes.  No skin lesions.    -----------------------------------------------------------------    Diagnostic Data:      Complete Blood Count:   Recent Labs     05/12/25  0758

## 2025-05-13 NOTE — PROGRESS NOTES
General Surgery:  Daily Progress Note                   PATIENT NAME: Jeremy King     TODAY'S DATE: 5/13/2025, 1:48 PM  CC: Feeling fine    SUBJECTIVE:     Pt seen and examined at bedside this AM.  No acute events overnight.  Continues to have bowel function.  Tolerating liquid diet today.    OBJECTIVE:   VITALS:  /69   Pulse 66   Temp (!) 96.4 °F (35.8 °C) (Temporal)   Resp 20   Ht 1.778 m (5' 10\")   Wt 96.4 kg (212 lb 9.6 oz)   SpO2 95%   BMI 30.50 kg/m²      INTAKE/OUTPUT:      Intake/Output Summary (Last 24 hours) at 5/13/2025 1348  Last data filed at 5/13/2025 0458  Gross per 24 hour   Intake 2240 ml   Output --   Net 2240 ml       PHYSICAL EXAM:  General Appearance: awake, alert, in no acute distress  HEENT:  Normocephalic, atraumatic, mucus membranes moist   Heart: Regular rate and rhythm  Lungs: Equal chest rise bilaterally, no accessory muscle use  Abdomen: Soft, nontender, nondistended  Extremities: No cyanosis, pitting edema, rashes noted.    Skin: Skin color, texture, turgor normal. No rashes or lesions.    Data:  CBC with Differential:    Lab Results   Component Value Date/Time    WBC 8.6 05/13/2025 06:30 AM    RBC 3.77 05/13/2025 06:30 AM    RBC 3.43 08/29/2011 11:48 AM    HGB 12.2 05/13/2025 06:30 AM    HCT 37.6 05/13/2025 06:30 AM     05/13/2025 06:30 AM     08/29/2011 11:48 AM    MCV 99.7 05/13/2025 06:30 AM    MCH 32.4 05/13/2025 06:30 AM    MCHC 32.4 05/13/2025 06:30 AM    RDW 13.0 05/13/2025 06:30 AM    LYMPHOPCT 22 05/13/2025 06:30 AM    MONOPCT 9 05/13/2025 06:30 AM    EOSPCT 5 05/13/2025 06:30 AM    BASOPCT 1 05/13/2025 06:30 AM    MONOSABS 0.73 05/13/2025 06:30 AM    LYMPHSABS 1.86 05/13/2025 06:30 AM    EOSABS 0.43 05/13/2025 06:30 AM    BASOSABS 0.04 05/13/2025 06:30 AM    DIFFTYPE NOT REPORTED 02/09/2022 07:30 AM     BMP:    Lab Results   Component Value Date/Time     05/13/2025 06:30 AM    K 4.4 05/13/2025 06:30 AM     05/13/2025 06:30 AM    CO2 16

## 2025-05-13 NOTE — PROGRESS NOTES
Pt resting in bed comfortably.  Medications given crushed in pudding and water provided.  Patient took medications well with no issues.  IV and pumps checked.  Pt states no other need at this time.

## 2025-05-13 NOTE — CARE COORDINATION
05/13/25 1115   Readmission Assessment   Number of Days since last admission? 8-30 days   Previous Disposition Other (comment)  (Group home.)   Who is being Interviewed   (Chart review.)   What was the patient's/caregiver's perception as to why they think they needed to return back to the hospital? Other (Comment)  (Diarrhea and decreased bowel sounds.)   Did you visit your Primary Care Physician after you left the hospital, before you returned this time? Yes   Did you see a specialist, such as Cardiac, Pulmonary, Orthopedic Physician, etc. after you left the hospital? No   Who advised the patient to return to the hospital? Other (Comment)  (Group home.)   Does the patient report anything that got in the way of taking their medications? No   In our efforts to provide the best possible care to you and others like you, can you think of anything that we could have done to help you after you left the hospital the first time, so that you might not have needed to return so soon? Other (Comment)  (Nothing could have been done.)

## 2025-05-13 NOTE — PLAN OF CARE
Problem: Skin/Tissue Integrity  Goal: Skin integrity remains intact  Description: 1.  Monitor for areas of redness and/or skin breakdown2.  Assess vascular access sites hourly3.  Every 4-6 hours minimum:  Change oxygen saturation probe site4.  Every 4-6 hours:  If on nasal continuous positive airway pressure, respiratory therapy assess nares and determine need for appliance change or resting period  Outcome: Progressing  Flowsheets (Taken 5/12/2025 2300)  Skin Integrity Remains Intact:   Monitor for areas of redness and/or skin breakdown   Assess vascular access sites hourly     Problem: ABCDS Injury Assessment  Goal: Absence of physical injury  Outcome: Progressing  Flowsheets (Taken 5/12/2025 2300)  Absence of Physical Injury: Implement safety measures based on patient assessment     Problem: Safety - Adult  Goal: Free from fall injury  Outcome: Progressing  Flowsheets (Taken 5/12/2025 2300)  Free From Fall Injury: Instruct family/caregiver on patient safety  Note: Patient will be free from falls while in the hospital. Call light within reach, bed alarm and gripper socks on and denies any needs. Plan of care ongoing.

## 2025-05-14 VITALS
HEART RATE: 73 BPM | OXYGEN SATURATION: 96 % | DIASTOLIC BLOOD PRESSURE: 61 MMHG | RESPIRATION RATE: 18 BRPM | HEIGHT: 70 IN | SYSTOLIC BLOOD PRESSURE: 108 MMHG | WEIGHT: 214 LBS | BODY MASS INDEX: 30.64 KG/M2 | TEMPERATURE: 97.9 F

## 2025-05-14 LAB
ANION GAP SERPL CALCULATED.3IONS-SCNC: 10 MMOL/L (ref 9–16)
BASOPHILS # BLD: 0.03 K/UL (ref 0–0.2)
BASOPHILS NFR BLD: 1 % (ref 0–2)
BUN SERPL-MCNC: 11 MG/DL (ref 8–23)
BUN/CREAT SERPL: 14 (ref 9–20)
CALCIUM SERPL-MCNC: 8.1 MG/DL (ref 8.6–10.4)
CHLORIDE SERPL-SCNC: 111 MMOL/L (ref 98–107)
CO2 SERPL-SCNC: 20 MMOL/L (ref 20–31)
CREAT SERPL-MCNC: 0.8 MG/DL (ref 0.7–1.2)
EOSINOPHIL # BLD: 0.41 K/UL (ref 0–0.44)
EOSINOPHILS RELATIVE PERCENT: 7 % (ref 1–4)
ERYTHROCYTE [DISTWIDTH] IN BLOOD BY AUTOMATED COUNT: 12.9 % (ref 11.8–14.4)
FAT QUALITATIVE SPLIT STOOL: NORMAL
FECAL NEUTRAL FAT: NORMAL
GFR, ESTIMATED: >90 ML/MIN/1.73M2
GLUCOSE SERPL-MCNC: 82 MG/DL (ref 74–99)
HCT VFR BLD AUTO: 34.7 % (ref 40.7–50.3)
HGB BLD-MCNC: 11.1 G/DL (ref 13–17)
IMM GRANULOCYTES # BLD AUTO: <0.03 K/UL (ref 0–0.3)
IMM GRANULOCYTES NFR BLD: 0 %
LYMPHOCYTES NFR BLD: 1.57 K/UL (ref 1.1–3.7)
LYMPHOCYTES RELATIVE PERCENT: 26 % (ref 24–43)
MCH RBC QN AUTO: 32.4 PG (ref 25.2–33.5)
MCHC RBC AUTO-ENTMCNC: 32 G/DL (ref 28.4–34.8)
MCV RBC AUTO: 101.2 FL (ref 82.6–102.9)
MICROORGANISM SPEC CULT: ABNORMAL
MONOCYTES NFR BLD: 0.44 K/UL (ref 0.1–1.2)
MONOCYTES NFR BLD: 7 % (ref 3–12)
NEUTROPHILS NFR BLD: 59 % (ref 36–65)
NEUTS SEG NFR BLD: 3.55 K/UL (ref 1.5–8.1)
NRBC BLD-RTO: 0 PER 100 WBC
PLATELET # BLD AUTO: 164 K/UL (ref 138–453)
PMV BLD AUTO: 9.2 FL (ref 8.1–13.5)
POTASSIUM SERPL-SCNC: 4.1 MMOL/L (ref 3.7–5.3)
RBC # BLD AUTO: 3.43 M/UL (ref 4.21–5.77)
SODIUM SERPL-SCNC: 141 MMOL/L (ref 136–145)
SPECIMEN DESCRIPTION: ABNORMAL
WBC OTHER # BLD: 6 K/UL (ref 3.5–11.3)

## 2025-05-14 PROCEDURE — 94761 N-INVAS EAR/PLS OXIMETRY MLT: CPT

## 2025-05-14 PROCEDURE — 85025 COMPLETE CBC W/AUTO DIFF WBC: CPT

## 2025-05-14 PROCEDURE — 80048 BASIC METABOLIC PNL TOTAL CA: CPT

## 2025-05-14 PROCEDURE — 6360000002 HC RX W HCPCS: Performed by: NURSE PRACTITIONER

## 2025-05-14 PROCEDURE — 6370000000 HC RX 637 (ALT 250 FOR IP): Performed by: NURSE PRACTITIONER

## 2025-05-14 PROCEDURE — 36415 COLL VENOUS BLD VENIPUNCTURE: CPT

## 2025-05-14 PROCEDURE — 2580000003 HC RX 258: Performed by: NURSE PRACTITIONER

## 2025-05-14 RX ORDER — METRONIDAZOLE 500 MG/1
500 TABLET ORAL EVERY 8 HOURS SCHEDULED
Qty: 20 TABLET | Refills: 0 | Status: SHIPPED | OUTPATIENT
Start: 2025-05-14 | End: 2025-05-21

## 2025-05-14 RX ORDER — METRONIDAZOLE 250 MG/1
500 TABLET ORAL EVERY 8 HOURS SCHEDULED
Status: DISCONTINUED | OUTPATIENT
Start: 2025-05-14 | End: 2025-05-14 | Stop reason: HOSPADM

## 2025-05-14 RX ORDER — CIPROFLOXACIN 500 MG/1
500 TABLET, FILM COATED ORAL 2 TIMES DAILY
Qty: 13 TABLET | Refills: 0 | Status: SHIPPED | OUTPATIENT
Start: 2025-05-14 | End: 2025-05-21

## 2025-05-14 RX ORDER — CIPROFLOXACIN 500 MG/1
500 TABLET, FILM COATED ORAL 2 TIMES DAILY
Status: DISCONTINUED | OUTPATIENT
Start: 2025-05-14 | End: 2025-05-14 | Stop reason: HOSPADM

## 2025-05-14 RX ADMIN — PRIMIDONE 250 MG: 250 TABLET ORAL at 09:12

## 2025-05-14 RX ADMIN — METRONIDAZOLE 500 MG: 250 TABLET ORAL at 15:49

## 2025-05-14 RX ADMIN — TAMSULOSIN HYDROCHLORIDE 0.4 MG: 0.4 CAPSULE ORAL at 09:12

## 2025-05-14 RX ADMIN — LAMOTRIGINE 400 MG: 100 TABLET ORAL at 09:11

## 2025-05-14 RX ADMIN — ANTACID TABLETS 500 MG: 500 TABLET, CHEWABLE ORAL at 09:12

## 2025-05-14 RX ADMIN — PANTOPRAZOLE SODIUM 40 MG: 40 TABLET, DELAYED RELEASE ORAL at 09:14

## 2025-05-14 RX ADMIN — KETOTIFEN FUMARATE 1 DROP: 0.25 SOLUTION/ DROPS OPHTHALMIC at 10:40

## 2025-05-14 RX ADMIN — METRONIDAZOLE 500 MG: 250 TABLET ORAL at 10:39

## 2025-05-14 RX ADMIN — LACOSAMIDE 150 MG: 50 TABLET, FILM COATED ORAL at 09:10

## 2025-05-14 RX ADMIN — Medication 400 MG: at 09:11

## 2025-05-14 RX ADMIN — MECLIZINE HYDROCHLORIDE 25 MG: 25 TABLET ORAL at 09:10

## 2025-05-14 RX ADMIN — SODIUM CHLORIDE: 0.9 INJECTION, SOLUTION INTRAVENOUS at 00:31

## 2025-05-14 RX ADMIN — CYANOCOBALAMIN TAB 1000 MCG 1000 MCG: 1000 TAB at 09:11

## 2025-05-14 RX ADMIN — CIPROFLOXACIN 400 MG: 2 INJECTION, SOLUTION INTRAVENOUS at 02:13

## 2025-05-14 RX ADMIN — LEVETIRACETAM 1500 MG: 500 TABLET, FILM COATED ORAL at 09:11

## 2025-05-14 RX ADMIN — DOCUSATE SODIUM 50 MG AND SENNOSIDES 8.6 MG 2 TABLET: 8.6; 5 TABLET, FILM COATED ORAL at 15:49

## 2025-05-14 RX ADMIN — Medication 1000 UNITS: at 09:12

## 2025-05-14 RX ADMIN — METRONIDAZOLE 500 MG: 500 INJECTION, SOLUTION INTRAVENOUS at 02:14

## 2025-05-14 RX ADMIN — CIPROFLOXACIN HYDROCHLORIDE 500 MG: 500 TABLET, FILM COATED ORAL at 10:39

## 2025-05-14 NOTE — PROGRESS NOTES
Comprehensive Nutrition Assessment    Type and Reason for Visit:  Reassess    Nutrition Recommendations/Plan:   Continue current diet.   Consume >75% of estimated needs.      Malnutrition Assessment:  Malnutrition Status:  No malnutrition (05/13/25 0905)    Context:  Acute Illness     Findings of the 6 clinical characteristics of malnutrition:  Energy Intake:  Mild decrease in energy intake  Weight Loss:  No weight loss     Body Fat Loss:  No body fat loss     Muscle Mass Loss:  No muscle mass loss    Fluid Accumulation:  No fluid accumulation     Strength:  Not Performed    Nutrition Assessment:    GI function improving as NPO has been advanced to a regular dysphagia- pureed; mildly thick (nectar diet). Upon entry lunch was untouched with reports of meal intake being % consumed. Pt reported to have no GI upset, chewing difficulty, or diarrhea. Pt denied any questions at this time.      Nutrition Related Findings:    active bowel sounds and no edema Wound Type: None       Current Nutrition Intake & Therapies:    Average Meal Intake: %  Average Supplements Intake: None Ordered  ADULT DIET; Dysphagia - Pureed; Mildly Thick (Nectar)    Anthropometric Measures:  Height: 177.8 cm (5' 10\")  Ideal Body Weight (IBW): 166 lbs (75 kg)    Admission Body Weight: 95.1 kg (209 lb 10.5 oz)  Current Body Weight: 96.4 kg (212 lb 8.4 oz), 128 % IBW. Weight Source: Bed scale  Current BMI (kg/m2): 30.5  Usual Body Weight: 93.4 kg (206 lb)     % Weight Change (Calculated): 3.2  Weight Adjustment For: No Adjustment                 BMI Categories: Obese Class 1 (BMI 30.0-34.9)  Hematology:  Recent Labs     05/12/25  0736 05/13/25  0630 05/14/25  0615   WBC 16.9* 8.6 6.0   HGB 15.1 12.2* 11.1*   HCT 46.3 37.6* 34.7*     Chemistry:  Recent Labs     05/12/25  0736 05/13/25  0630 05/14/25  0615    139 141   K 4.6 4.4 4.1    109* 111*   CO2 25 16* 20   GLUCOSE 111* 94 82   BUN 29* 24* 11   CREATININE 1.3* 0.9 0.8

## 2025-05-14 NOTE — PROGRESS NOTES
[facility] to [facility] busy. Staff states they will get a hold of the nurse and have her call this RN. Care ongoing.

## 2025-05-14 NOTE — PLAN OF CARE
Problem: Safety - Adult  Goal: Free from fall injury  5/13/2025 2111 by Yeimi Dennison RN  Outcome: Progressing  Note: Bed in low position. Wheels locked. Bed alarm on. 2/4 side rails are up. Non-skid socks on. Fall band on. Call light within reach.     Problem: Genitourinary - Adult  Goal: Absence of urinary retention  Outcome: Progressing  Note: Pt is getting IV Cipro and Flagyl, will continue to monitor.

## 2025-05-14 NOTE — PLAN OF CARE
Problem: Discharge Planning  Goal: Discharge to home or other facility with appropriate resources  Outcome: Adequate for Discharge     Problem: Skin/Tissue Integrity  Goal: Skin integrity remains intact  Description: 1.  Monitor for areas of redness and/or skin breakdown2.  Assess vascular access sites hourly3.  Every 4-6 hours minimum:  Change oxygen saturation probe site4.  Every 4-6 hours:  If on nasal continuous positive airway pressure, respiratory therapy assess nares and determine need for appliance change or resting period  Outcome: Adequate for Discharge     Problem: ABCDS Injury Assessment  Goal: Absence of physical injury  Outcome: Adequate for Discharge     Problem: Safety - Adult  Goal: Free from fall injury  Outcome: Adequate for Discharge     Problem: Nutrition Deficit:  Goal: Optimize nutritional status  Outcome: Adequate for Discharge     Problem: Genitourinary - Adult  Goal: Absence of urinary retention  Outcome: Adequate for Discharge     Problem: Neurosensory - Adult  Goal: Achieves stable or improved neurological status  Outcome: Adequate for Discharge     Problem: Musculoskeletal - Adult  Goal: Return mobility to safest level of function  Outcome: Adequate for Discharge  Goal: Return ADL status to a safe level of function  Outcome: Adequate for Discharge     Problem: Coping  Goal: Pt/Family able to verbalize concerns and demonstrate effective coping strategies  Description: INTERVENTIONS:1. Assist patient/family to identify coping skills, available support systems and cultural and spiritual values2. Provide emotional support, including active listening and acknowledgement of concerns of patient and caregivers3. Reduce environmental stimuli, as able4. Instruct patient/family in relaxation techniques, as appropriate5. Assess for spiritual pain/suffering and initiate Spiritual Care, Psychosocial Clinical Specialist consults as needed  Outcome: Adequate for Discharge

## 2025-05-14 NOTE — PROGRESS NOTES
Staff from [facility] arrived to take patient back to facility. Patient off unit, oriented at baseline for this visit. Respirations easy and nonlabored, no distress noted. Patient off unit with all belongings via wheelchair.

## 2025-05-14 NOTE — DISCHARGE SUMMARY
Discharge Summary    Jeremy King  :  1955  MRN:  [id number]    Admit date:  2025      Discharge date: 2025     Admitting Physician:  Jj Arevalo MD    Discharge Diagnoses:    Principal Problem:    Complicated UTI (urinary tract infection)  Active Problems:    MR (mental retardation), severe    Ileitis  Resolved Problems:    Epileptic seizures (HCC)      Hospital Course:   Jeremy King is a 69 y.o. male admitted with UTI.  He   presented to the emergency room from [facility] in [place].  Staff they reported patient had decreased bowel sounds and diarrhea for the past few days.  He does have history of multiple admissions for multiple bowel obstructions.  Diarrhea was nonbloody.  When asking patient if he is having pain he states \"I'm fine\".  Patient does have history of MRDD, seizures, hearing loss and multiple bowel obstructions.  During patient's evaluation BUN was 29 and creatinine 1.3 slightly elevated.  LFTs normal.  Patient had leukocytosis of 16.9 with a left shift.  Patient did have diarrhea while in the emergency room and was negative for C. difficile.  Stool was positive for blood.  Urinalysis showed 20-50 WBCs, trace leukocyte and positive nitrites.  CT abdomen and pelvis showed dilated small bowel loops with air-fluid levels with gradual transition point seen at the anastomosis site in the left lower quadrant suggesting partial obstruction, segment areas of hyperenhancement and thickening of the distal ileum could relate to under distention although a form of ileitis is difficult to exclude.  Patient was initiated on IV Rocephin and Flagyl as well as IV fluids while in the emergency room.  During patient's hospitalization labs were monitored electrolytes replaced.  General surgery was consulted and patient was evaluated.  It was determined that patient had no obstruction and he is tolerating diet was slowly advance.  Patient has no pain.  He is afebrile.  He has no vomiting.  Urine

## 2025-05-14 NOTE — PROGRESS NOTES
Progress Note    SUBJECTIVE:    Patient seen for f/u of Complicated UTI (urinary tract infection).  He resting in bed no distress.  Stated \"I'm fine\".   No complaints. Stated \"I want to go back to sleep now\"    ROS:   Constitutional: negative  for fevers, and negative for chills.  Respiratory: negative for shortness of breath, negative for cough, and negative for wheezing  Cardiovascular: negative for chest pain, and negative for palpitations  Gastrointestinal: negative for abdominal pain, negative for nausea,negative for vomiting, negative for diarrhea, and negative for constipation     All other systems were reviewed with the patient and are negative unless otherwise stated in HPI      OBJECTIVE:      Vitals:   Vitals:    05/14/25 0216   BP: 117/65   Pulse: 72   Resp: 18   Temp: 98.1 °F (36.7 °C)   SpO2: 97%     Weight - Scale: 97.1 kg (214 lb)   Height: 177.8 cm (5' 10\")     Weight  Wt Readings from Last 3 Encounters:   05/14/25 97.1 kg (214 lb)   04/17/25 93.6 kg (206 lb 4.8 oz)   02/19/25 89.6 kg (197 lb 8 oz)     Body mass index is 30.71 kg/m².    24HR INTAKE/OUTPUT:      Intake/Output Summary (Last 24 hours) at 5/14/2025 0640  Last data filed at 5/14/2025 0444  Gross per 24 hour   Intake 5162.66 ml   Output 650 ml   Net 4512.66 ml     -----------------------------------------------------------------  Exam:    GEN:    Awake, alert and pleasant.   EYES:  EOMI, pupils equal   NECK: Supple. No lymphadenopathy.  No carotid bruit  CVS:    regular rate and rhythm, no audible murmur  PULM:  CTA, no wheezes, rales or rhonchi, no acute respiratory distress  ABD:    Bowels sounds normal.  Abdomen is soft.  No distention.  no tenderness to palpation.   EXT:   no edema bilaterally .  No calf tenderness.   NEURO: Moves all extremities.  Motor and sensory are grossly intact  SKIN:  No rashes.  No skin lesions.    -----------------------------------------------------------------    Diagnostic Data:      Complete Blood Count:  change to IV if needed  Continue Ativan    Nutrition status:   Well developed, well nourished with no malnutrition  Dietician consult initiated  I/O  Daily weight  Monitor Daily intake  Nutritional Supplements as tolerated    MALNUTRITION ASSESSMENT AND PLAN    The following was documented by the Dietitian:    Malnutrition Assessment  Context of Malnutrition: Acute Illness (05/13/25 0905)  Acute Illness - Energy Intake : Mild decrease in energy intake (05/13/25 0905)  Acute Illness - Weight Loss : No weight loss (05/13/25 0905)  Acute Illness - Body Fat Loss: No body fat loss (05/13/25 0905)  Acute Illness - Muscle Mass Loss: No muscle mass loss (05/13/25 0905)  Acute Illness - Fluid Accumulation : No fluid accumulation (05/13/25 0905)  Acute Illness -  Strength: Not Performed (05/13/25 0905)  Acute Illness - Malnutrition Score: 0 (05/13/25 0905)  Malnutrition Status: No malnutrition (05/13/25 0905)    I agree with the dietitian's malnutrition assessment.    Medical Nutrition Therapy: continue current nutrition therapy    Electronically signed by TIFFANIE Elizabeth CNP on 5/13/25 at 6:42 AM [facility] Prophylaxis:   DVT: none due to rectal bleeding    Stress Ulcer: PPI     Disposition:  Shared decision making: All test results, treatment options and disposition options were discussed with the patient today  Social determinants of health that may impact management: none  Code status: Full Code   Disposition: Discharge plan is home    MIPS Advanced Care Planning documentation:  [x] I have confirmed that the patient's Advance Care Plan is present, Code Status is documented, or surrogate decision maker is listed in the patient's medical record  [If \"yes\", STOP HERE]     [] The patient's Advance Care Plan is NOT present because:    []  I confirmed today that the patient does not wish or was not able to name a   surrogate decision maker or provide and advance care plan.    [] Hospice care is

## 2025-05-14 NOTE — PROGRESS NOTES
Attempted to call [facility] to update on patient being discharged and see if they are able to provide transportation. Line ringing busy at this time. Care ongoing.

## 2025-05-14 NOTE — PROGRESS NOTES
Report received from Yeimi BOLTON, checked in bedside with patient who is resting in bed with eyes closed. Easy nonlabored respirations noted. No acute distress noted. Care ongoing.

## 2025-05-14 NOTE — CARE COORDINATION
05/14/25 1130   IMM Letter   IMM Letter given to Patient/Family/Significant other/Guardian/POA/by: 2nd notice to Camelia,guardian via telephone/LeanneRN case manager   IMM Letter date given: 05/14/25   IMM Letter time given: 6537

## 2025-05-14 NOTE — PROGRESS NOTES
RN in to assess patient. Patient resting in bed awake, A/Ox2, oriented/disoriented at times at baseline. Hypervebal, impulsive, poor sensory awareness, Respirations Easy and Nonlabored. Lung sounds rhonchi throughout, baseline for this visit. Patient denies pain. Assessment and vital signs completed as charted. Call light within reach, declines needs or concerns at this time. Care ongoing.

## 2025-05-16 ENCOUNTER — HOSPITAL ENCOUNTER
Dept: HOSPITAL 101 - LAB | Age: 70
Discharge: HOME | End: 2025-05-16
Payer: MEDICARE

## 2025-05-16 DIAGNOSIS — N40.1: Primary | ICD-10-CM

## 2025-05-16 LAB — PROSTATE SPECIFIC ANTIGEN DX: 0.49 NG/ML (ref ?–4)

## 2025-05-16 PROCEDURE — 36415 COLL VENOUS BLD VENIPUNCTURE: CPT

## 2025-05-16 PROCEDURE — 84153 ASSAY OF PSA TOTAL: CPT

## 2025-05-16 NOTE — PROGRESS NOTES
Physician Progress Note      PATIENT:               ARAINNE ARIZMENDI  CSN #:                  [id number]  :                       1955  ADMIT DATE:       2025 7:08 AM  DISCH DATE:        2025 5:05 PM  RESPONDING  PROVIDER #:        ANNE Matt CNP          QUERY TEXT:    BPH is documented in the past medical history of H&P by Anne Quevedo APRN - CNP at 2025.  Please specify the associated urinary   conditions:    The clinical indicators include:  This is a 77 y.o. male who presents with Complicated UTI    -\"BPH\" is documented in the past medical history of H&P by Anne Quevedo APRN - CNP at 2025    -Age 69yrs ,hx BPH    -\"Genitourinary: negative for dysuria, negative for urinary urgency, negative   for urinary frequency, and negative for hematuria\"-H&P by Anne Quevedo APRN - CNP at 2025    -\"BPH (benign prostatic hyperplasia)-past medical history\"-Consults by   Anmol Cochran DO at 2025    -tamsulosin (FLOMAX) 0.4 MG capsule-EPIC MAR        Thank you,  CRISTY Nguyen CDS  Options provided:  -- BPH with partial/complete urinary obstruction  -- Other - I will add my own diagnosis  -- Disagree - Not applicable / Not valid  -- Disagree - Clinically unable to determine / Unknown  -- Refer to Clinical Documentation Reviewer    PROVIDER RESPONSE TEXT:    This patient has BPH with partial/complete urinary obstruction.    Query created by: Chuy Jameson on 2025 8:09 AM      Electronically signed by:  ANNE Matt CNP 2025 9:28 AM

## 2025-05-17 LAB
MICROORGANISM SPEC CULT: NORMAL
SERVICE CMNT-IMP: NORMAL
SPECIMEN DESCRIPTION: NORMAL

## 2025-06-16 ENCOUNTER — APPOINTMENT (OUTPATIENT)
Dept: CT IMAGING | Age: 70
DRG: 389 | End: 2025-06-16
Payer: MEDICARE

## 2025-06-16 ENCOUNTER — APPOINTMENT (OUTPATIENT)
Dept: GENERAL RADIOLOGY | Age: 70
DRG: 389 | End: 2025-06-16
Payer: MEDICARE

## 2025-06-16 ENCOUNTER — HOSPITAL ENCOUNTER (INPATIENT)
Age: 70
LOS: 3 days | Discharge: HOME | DRG: 389 | End: 2025-06-19
Attending: STUDENT IN AN ORGANIZED HEALTH CARE EDUCATION/TRAINING PROGRAM | Admitting: STUDENT IN AN ORGANIZED HEALTH CARE EDUCATION/TRAINING PROGRAM
Payer: MEDICARE

## 2025-06-16 DIAGNOSIS — G47.33 OSA (OBSTRUCTIVE SLEEP APNEA): ICD-10-CM

## 2025-06-16 DIAGNOSIS — G40.909 SEIZURE DISORDER (HCC): ICD-10-CM

## 2025-06-16 DIAGNOSIS — F79 INTELLECTUAL DISABILITY: ICD-10-CM

## 2025-06-16 DIAGNOSIS — N39.0 COMPLICATED UTI (URINARY TRACT INFECTION): ICD-10-CM

## 2025-06-16 DIAGNOSIS — R94.31 ABNORMAL ECG: ICD-10-CM

## 2025-06-16 DIAGNOSIS — Z96.89 S/P PLACEMENT OF VNS (VAGUS NERVE STIMULATION) DEVICE: ICD-10-CM

## 2025-06-16 DIAGNOSIS — F72 SEVERE INTELLECTUAL DISABILITY: Chronic | ICD-10-CM

## 2025-06-16 DIAGNOSIS — K52.9 ILEITIS: ICD-10-CM

## 2025-06-16 DIAGNOSIS — K63.89 PNEUMATOSIS INTESTINALIS: ICD-10-CM

## 2025-06-16 DIAGNOSIS — R00.0 SINUS TACHYCARDIA: ICD-10-CM

## 2025-06-16 DIAGNOSIS — K56.609 SBO (SMALL BOWEL OBSTRUCTION) (HCC): Primary | ICD-10-CM

## 2025-06-16 DIAGNOSIS — K56.609 SMALL BOWEL OBSTRUCTION (HCC): ICD-10-CM

## 2025-06-16 DIAGNOSIS — R13.10 DYSPHAGIA, UNSPECIFIED TYPE: ICD-10-CM

## 2025-06-16 LAB
ALBUMIN SERPL-MCNC: 4.3 G/DL (ref 3.5–5.2)
ALBUMIN/GLOB SERPL: 1.2 {RATIO} (ref 1–2.5)
ALP SERPL-CCNC: 201 U/L (ref 40–129)
ALT SERPL-CCNC: 16 U/L (ref 10–50)
ANION GAP SERPL CALCULATED.3IONS-SCNC: 12 MMOL/L (ref 9–16)
AST SERPL-CCNC: 17 U/L (ref 10–50)
BASOPHILS # BLD: 0.06 K/UL (ref 0–0.2)
BASOPHILS NFR BLD: 0 % (ref 0–2)
BILIRUB SERPL-MCNC: <0.2 MG/DL (ref 0–1.2)
BILIRUB UR QL STRIP: NEGATIVE
BUN SERPL-MCNC: 23 MG/DL (ref 8–23)
BUN/CREAT SERPL: 21 (ref 9–20)
CALCIUM SERPL-MCNC: 9.5 MG/DL (ref 8.6–10.4)
CHLORIDE SERPL-SCNC: 106 MMOL/L (ref 98–107)
CLARITY UR: CLEAR
CO2 SERPL-SCNC: 22 MMOL/L (ref 20–31)
COLOR UR: YELLOW
CREAT SERPL-MCNC: 1.1 MG/DL (ref 0.7–1.2)
EOSINOPHIL # BLD: 0.2 K/UL (ref 0–0.44)
EOSINOPHILS RELATIVE PERCENT: 1 % (ref 1–4)
ERYTHROCYTE [DISTWIDTH] IN BLOOD BY AUTOMATED COUNT: 12.9 % (ref 11.8–14.4)
GFR, ESTIMATED: 74 ML/MIN/1.73M2
GLUCOSE SERPL-MCNC: 90 MG/DL (ref 74–99)
GLUCOSE UR STRIP-MCNC: NEGATIVE MG/DL
HCT VFR BLD AUTO: 43.2 % (ref 40.7–50.3)
HGB BLD-MCNC: 14 G/DL (ref 13–17)
HGB UR QL STRIP.AUTO: NEGATIVE
IMM GRANULOCYTES # BLD AUTO: 0.06 K/UL (ref 0–0.3)
IMM GRANULOCYTES NFR BLD: 0 %
KETONES UR STRIP-MCNC: NEGATIVE MG/DL
LEUKOCYTE ESTERASE UR QL STRIP: NEGATIVE
LIPASE SERPL-CCNC: 51 U/L (ref 13–60)
LYMPHOCYTES NFR BLD: 1.93 K/UL (ref 1.1–3.7)
LYMPHOCYTES RELATIVE PERCENT: 13 % (ref 24–43)
MCH RBC QN AUTO: 32.6 PG (ref 25.2–33.5)
MCHC RBC AUTO-ENTMCNC: 32.4 G/DL (ref 28.4–34.8)
MCV RBC AUTO: 100.5 FL (ref 82.6–102.9)
MONOCYTES NFR BLD: 1.19 K/UL (ref 0.1–1.2)
MONOCYTES NFR BLD: 8 % (ref 3–12)
NEUTROPHILS NFR BLD: 78 % (ref 36–65)
NEUTS SEG NFR BLD: 11.34 K/UL (ref 1.5–8.1)
NITRITE UR QL STRIP: NEGATIVE
NRBC BLD-RTO: 0 PER 100 WBC
PH UR STRIP: 6 [PH] (ref 5–9)
PLATELET # BLD AUTO: 285 K/UL (ref 138–453)
PMV BLD AUTO: 8.8 FL (ref 8.1–13.5)
POTASSIUM SERPL-SCNC: 3.9 MMOL/L (ref 3.7–5.3)
PROT SERPL-MCNC: 8 G/DL (ref 6.6–8.7)
PROT UR STRIP-MCNC: NEGATIVE MG/DL
RBC # BLD AUTO: 4.3 M/UL (ref 4.21–5.77)
SODIUM SERPL-SCNC: 140 MMOL/L (ref 136–145)
SP GR UR STRIP: 1.02 (ref 1.01–1.02)
TROPONIN I SERPL HS-MCNC: 13 NG/L (ref 0–22)
UROBILINOGEN UR STRIP-ACNC: NORMAL EU/DL (ref 0–1)
WBC OTHER # BLD: 14.8 K/UL (ref 3.5–11.3)

## 2025-06-16 PROCEDURE — 80053 COMPREHEN METABOLIC PANEL: CPT

## 2025-06-16 PROCEDURE — 1200000000 HC SEMI PRIVATE

## 2025-06-16 PROCEDURE — 93005 ELECTROCARDIOGRAM TRACING: CPT | Performed by: STUDENT IN AN ORGANIZED HEALTH CARE EDUCATION/TRAINING PROGRAM

## 2025-06-16 PROCEDURE — 2580000003 HC RX 258: Performed by: STUDENT IN AN ORGANIZED HEALTH CARE EDUCATION/TRAINING PROGRAM

## 2025-06-16 PROCEDURE — 99285 EMERGENCY DEPT VISIT HI MDM: CPT

## 2025-06-16 PROCEDURE — 84484 ASSAY OF TROPONIN QUANT: CPT

## 2025-06-16 PROCEDURE — 94761 N-INVAS EAR/PLS OXIMETRY MLT: CPT

## 2025-06-16 PROCEDURE — 2500000003 HC RX 250 WO HCPCS: Performed by: STUDENT IN AN ORGANIZED HEALTH CARE EDUCATION/TRAINING PROGRAM

## 2025-06-16 PROCEDURE — 74177 CT ABD & PELVIS W/CONTRAST: CPT

## 2025-06-16 PROCEDURE — 6360000004 HC RX CONTRAST MEDICATION: Performed by: PHYSICIAN ASSISTANT

## 2025-06-16 PROCEDURE — 74018 RADEX ABDOMEN 1 VIEW: CPT

## 2025-06-16 PROCEDURE — 83690 ASSAY OF LIPASE: CPT

## 2025-06-16 PROCEDURE — 81003 URINALYSIS AUTO W/O SCOPE: CPT

## 2025-06-16 PROCEDURE — 6360000002 HC RX W HCPCS: Performed by: STUDENT IN AN ORGANIZED HEALTH CARE EDUCATION/TRAINING PROGRAM

## 2025-06-16 PROCEDURE — 85025 COMPLETE CBC W/AUTO DIFF WBC: CPT

## 2025-06-16 RX ORDER — ENOXAPARIN SODIUM 100 MG/ML
40 INJECTION SUBCUTANEOUS DAILY
Status: DISCONTINUED | OUTPATIENT
Start: 2025-06-17 | End: 2025-06-19 | Stop reason: HOSPADM

## 2025-06-16 RX ORDER — SODIUM CHLORIDE 9 MG/ML
INJECTION, SOLUTION INTRAVENOUS PRN
Status: DISCONTINUED | OUTPATIENT
Start: 2025-06-16 | End: 2025-06-19 | Stop reason: HOSPADM

## 2025-06-16 RX ORDER — POTASSIUM CHLORIDE 1500 MG/1
40 TABLET, EXTENDED RELEASE ORAL PRN
Status: DISCONTINUED | OUTPATIENT
Start: 2025-06-16 | End: 2025-06-19 | Stop reason: HOSPADM

## 2025-06-16 RX ORDER — MAGNESIUM SULFATE IN WATER 40 MG/ML
2000 INJECTION, SOLUTION INTRAVENOUS PRN
Status: DISCONTINUED | OUTPATIENT
Start: 2025-06-16 | End: 2025-06-19 | Stop reason: HOSPADM

## 2025-06-16 RX ORDER — POLYETHYLENE GLYCOL 3350 17 G/17G
17 POWDER, FOR SOLUTION ORAL DAILY PRN
Status: DISCONTINUED | OUTPATIENT
Start: 2025-06-16 | End: 2025-06-19 | Stop reason: HOSPADM

## 2025-06-16 RX ORDER — ACETAMINOPHEN 325 MG/1
650 TABLET ORAL EVERY 6 HOURS PRN
Status: DISCONTINUED | OUTPATIENT
Start: 2025-06-16 | End: 2025-06-19 | Stop reason: HOSPADM

## 2025-06-16 RX ORDER — ACETAMINOPHEN 650 MG/1
650 SUPPOSITORY RECTAL EVERY 6 HOURS PRN
Status: DISCONTINUED | OUTPATIENT
Start: 2025-06-16 | End: 2025-06-19 | Stop reason: HOSPADM

## 2025-06-16 RX ORDER — POTASSIUM CHLORIDE 7.45 MG/ML
10 INJECTION INTRAVENOUS PRN
Status: DISCONTINUED | OUTPATIENT
Start: 2025-06-16 | End: 2025-06-19 | Stop reason: HOSPADM

## 2025-06-16 RX ORDER — ONDANSETRON 4 MG/1
4 TABLET, ORALLY DISINTEGRATING ORAL EVERY 8 HOURS PRN
Status: DISCONTINUED | OUTPATIENT
Start: 2025-06-16 | End: 2025-06-19 | Stop reason: HOSPADM

## 2025-06-16 RX ORDER — IOPAMIDOL 755 MG/ML
75 INJECTION, SOLUTION INTRAVASCULAR
Status: COMPLETED | OUTPATIENT
Start: 2025-06-16 | End: 2025-06-16

## 2025-06-16 RX ORDER — ONDANSETRON 2 MG/ML
4 INJECTION INTRAMUSCULAR; INTRAVENOUS EVERY 6 HOURS PRN
Status: DISCONTINUED | OUTPATIENT
Start: 2025-06-16 | End: 2025-06-19 | Stop reason: HOSPADM

## 2025-06-16 RX ORDER — SODIUM CHLORIDE, SODIUM LACTATE, POTASSIUM CHLORIDE, CALCIUM CHLORIDE 600; 310; 30; 20 MG/100ML; MG/100ML; MG/100ML; MG/100ML
INJECTION, SOLUTION INTRAVENOUS CONTINUOUS
Status: DISCONTINUED | OUTPATIENT
Start: 2025-06-16 | End: 2025-06-19

## 2025-06-16 RX ORDER — SODIUM CHLORIDE 0.9 % (FLUSH) 0.9 %
10 SYRINGE (ML) INJECTION EVERY 12 HOURS SCHEDULED
Status: DISCONTINUED | OUTPATIENT
Start: 2025-06-16 | End: 2025-06-19 | Stop reason: HOSPADM

## 2025-06-16 RX ORDER — SODIUM CHLORIDE 0.9 % (FLUSH) 0.9 %
10 SYRINGE (ML) INJECTION PRN
Status: DISCONTINUED | OUTPATIENT
Start: 2025-06-16 | End: 2025-06-19 | Stop reason: HOSPADM

## 2025-06-16 RX ADMIN — IOPAMIDOL 75 ML: 755 INJECTION, SOLUTION INTRAVENOUS at 18:56

## 2025-06-16 RX ADMIN — FAMOTIDINE 20 MG: 10 INJECTION, SOLUTION INTRAVENOUS at 22:25

## 2025-06-16 RX ADMIN — SODIUM CHLORIDE, SODIUM LACTATE, POTASSIUM CHLORIDE, AND CALCIUM CHLORIDE: .6; .31; .03; .02 INJECTION, SOLUTION INTRAVENOUS at 23:08

## 2025-06-16 ASSESSMENT — PAIN - FUNCTIONAL ASSESSMENT: PAIN_FUNCTIONAL_ASSESSMENT: NONE - DENIES PAIN

## 2025-06-16 NOTE — ED PROVIDER NOTES
Emergency Department Encounter    Note to patient: The 21st Century Cures Act requires that medical notes like this one to be available to patients in the interest of transparency. Please be advised, this is a medical document. It is intended as [id number] communication. It is written in medical language and may contain abbreviations and verbiage that may be unfamiliar. It may appear to read blunt or direct. Medical documents are intended to carry relevant medical information, facts as evident and the clinical opinion of the practitioner.      Chief Complaint  Chief Complaint   Patient presents with    Vomiting     ECF reports abd pain and projectile vomiting. Patient denies abd pain.    Abdominal Pain        HPI  This is a 69-year-old male MRDD patient from a nursing home sent in for projectile vomiting.  He denies any abdominal pain but states he was vomiting.  He states he does not have abdominal pain and becomes upset as I questioned him about this insisting there is no pain but that he has been vomiting.  Looking at the patient's past medical history he has multiple small bowel obstructions including a recent admission to this hospital for the same.  Patient denies chest pain shortness of breath fevers or chills.           Past Medical History  Past Medical History:   Diagnosis Date    Arthritis     BPH (benign prostatic hyperplasia)     Dysphagia     GERD (gastroesophageal reflux disease)     Hearing loss     HLD (hyperlipidemia)     Insomnia     MR (mental retardation)     Periorbital cellulitis     SBO (small bowel obstruction) (HCC)     Seizures (HCC)     Epilepsy    Ventral hernia     Vitamin D deficiency         Surgical History  Past Surgical History:   Procedure Laterality Date    ABDOMEN SURGERY  2013, 2014    Bowel Obstruction X2    CAST APPLICATION Left     Lt. Ankle , X3    HYDROCELE EXCISION  08/2016    LAPAROSCOPY  8/17/14    with BERNARD    LAPAROTOMY  8/17/14    with partial cecectomy    VAGAL

## 2025-06-17 ENCOUNTER — APPOINTMENT (OUTPATIENT)
Dept: GENERAL RADIOLOGY | Age: 70
DRG: 389 | End: 2025-06-17
Payer: MEDICARE

## 2025-06-17 LAB
ANION GAP SERPL CALCULATED.3IONS-SCNC: 12 MMOL/L (ref 9–16)
BASOPHILS # BLD: 0.03 K/UL (ref 0–0.2)
BASOPHILS NFR BLD: 0 % (ref 0–2)
BUN SERPL-MCNC: 20 MG/DL (ref 8–23)
BUN/CREAT SERPL: 22 (ref 9–20)
CALCIUM SERPL-MCNC: 8.6 MG/DL (ref 8.6–10.4)
CHLORIDE SERPL-SCNC: 107 MMOL/L (ref 98–107)
CO2 SERPL-SCNC: 18 MMOL/L (ref 20–31)
CREAT SERPL-MCNC: 0.9 MG/DL (ref 0.7–1.2)
EKG ATRIAL RATE: 83 BPM
EKG ATRIAL RATE: 96 BPM
EKG P AXIS: 39 DEGREES
EKG P AXIS: 46 DEGREES
EKG P-R INTERVAL: 174 MS
EKG P-R INTERVAL: 176 MS
EKG Q-T INTERVAL: 378 MS
EKG Q-T INTERVAL: 388 MS
EKG QRS DURATION: 90 MS
EKG QRS DURATION: 92 MS
EKG QTC CALCULATION (BAZETT): 455 MS
EKG QTC CALCULATION (BAZETT): 477 MS
EKG R AXIS: -11 DEGREES
EKG R AXIS: 12 DEGREES
EKG T AXIS: 101 DEGREES
EKG T AXIS: 93 DEGREES
EKG VENTRICULAR RATE: 83 BPM
EKG VENTRICULAR RATE: 96 BPM
EOSINOPHIL # BLD: 0.16 K/UL (ref 0–0.44)
EOSINOPHILS RELATIVE PERCENT: 1 % (ref 1–4)
ERYTHROCYTE [DISTWIDTH] IN BLOOD BY AUTOMATED COUNT: 13 % (ref 11.8–14.4)
GFR, ESTIMATED: >90 ML/MIN/1.73M2
GLUCOSE SERPL-MCNC: 95 MG/DL (ref 74–99)
HCT VFR BLD AUTO: 40.3 % (ref 40.7–50.3)
HGB BLD-MCNC: 13 G/DL (ref 13–17)
IMM GRANULOCYTES # BLD AUTO: 0.03 K/UL (ref 0–0.3)
IMM GRANULOCYTES NFR BLD: 0 %
LYMPHOCYTES NFR BLD: 1.93 K/UL (ref 1.1–3.7)
LYMPHOCYTES RELATIVE PERCENT: 17 % (ref 24–43)
MCH RBC QN AUTO: 31.7 PG (ref 25.2–33.5)
MCHC RBC AUTO-ENTMCNC: 32.3 G/DL (ref 28.4–34.8)
MCV RBC AUTO: 98.3 FL (ref 82.6–102.9)
MONOCYTES NFR BLD: 0.87 K/UL (ref 0.1–1.2)
MONOCYTES NFR BLD: 8 % (ref 3–12)
NEUTROPHILS NFR BLD: 74 % (ref 36–65)
NEUTS SEG NFR BLD: 8.62 K/UL (ref 1.5–8.1)
NRBC BLD-RTO: 0 PER 100 WBC
PLATELET # BLD AUTO: 263 K/UL (ref 138–453)
PMV BLD AUTO: 8.9 FL (ref 8.1–13.5)
POTASSIUM SERPL-SCNC: 4.2 MMOL/L (ref 3.7–5.3)
RBC # BLD AUTO: 4.1 M/UL (ref 4.21–5.77)
SODIUM SERPL-SCNC: 137 MMOL/L (ref 136–145)
TROPONIN I SERPL HS-MCNC: 12 NG/L (ref 0–22)
WBC OTHER # BLD: 11.6 K/UL (ref 3.5–11.3)

## 2025-06-17 PROCEDURE — 05HB33Z INSERTION OF INFUSION DEVICE INTO RIGHT BASILIC VEIN, PERCUTANEOUS APPROACH: ICD-10-PCS | Performed by: STUDENT IN AN ORGANIZED HEALTH CARE EDUCATION/TRAINING PROGRAM

## 2025-06-17 PROCEDURE — 93005 ELECTROCARDIOGRAM TRACING: CPT

## 2025-06-17 PROCEDURE — C9254 INJECTION, LACOSAMIDE: HCPCS | Performed by: NURSE PRACTITIONER

## 2025-06-17 PROCEDURE — 6360000002 HC RX W HCPCS: Performed by: NURSE PRACTITIONER

## 2025-06-17 PROCEDURE — 97162 PT EVAL MOD COMPLEX 30 MIN: CPT

## 2025-06-17 PROCEDURE — 99222 1ST HOSP IP/OBS MODERATE 55: CPT | Performed by: INTERNAL MEDICINE

## 2025-06-17 PROCEDURE — 0D9670Z DRAINAGE OF STOMACH WITH DRAINAGE DEVICE, VIA NATURAL OR ARTIFICIAL OPENING: ICD-10-PCS | Performed by: STUDENT IN AN ORGANIZED HEALTH CARE EDUCATION/TRAINING PROGRAM

## 2025-06-17 PROCEDURE — 36415 COLL VENOUS BLD VENIPUNCTURE: CPT

## 2025-06-17 PROCEDURE — 1200000000 HC SEMI PRIVATE

## 2025-06-17 PROCEDURE — 6360000002 HC RX W HCPCS: Performed by: STUDENT IN AN ORGANIZED HEALTH CARE EDUCATION/TRAINING PROGRAM

## 2025-06-17 PROCEDURE — 94761 N-INVAS EAR/PLS OXIMETRY MLT: CPT

## 2025-06-17 PROCEDURE — 93010 ELECTROCARDIOGRAM REPORT: CPT | Performed by: INTERNAL MEDICINE

## 2025-06-17 PROCEDURE — 85025 COMPLETE CBC W/AUTO DIFF WBC: CPT

## 2025-06-17 PROCEDURE — 99231 SBSQ HOSP IP/OBS SF/LOW 25: CPT | Performed by: SURGERY

## 2025-06-17 PROCEDURE — 74018 RADEX ABDOMEN 1 VIEW: CPT

## 2025-06-17 PROCEDURE — 80048 BASIC METABOLIC PNL TOTAL CA: CPT

## 2025-06-17 PROCEDURE — 2580000003 HC RX 258: Performed by: STUDENT IN AN ORGANIZED HEALTH CARE EDUCATION/TRAINING PROGRAM

## 2025-06-17 PROCEDURE — 2500000003 HC RX 250 WO HCPCS: Performed by: STUDENT IN AN ORGANIZED HEALTH CARE EDUCATION/TRAINING PROGRAM

## 2025-06-17 PROCEDURE — 84484 ASSAY OF TROPONIN QUANT: CPT

## 2025-06-17 RX ORDER — LACOSAMIDE 10 MG/ML
150 INJECTION, SOLUTION INTRAVENOUS 2 TIMES DAILY
Status: DISCONTINUED | OUTPATIENT
Start: 2025-06-17 | End: 2025-06-19 | Stop reason: HOSPADM

## 2025-06-17 RX ORDER — LEVETIRACETAM 15 MG/ML
1500 INJECTION INTRAVASCULAR 2 TIMES DAILY
Status: DISCONTINUED | OUTPATIENT
Start: 2025-06-17 | End: 2025-06-19 | Stop reason: HOSPADM

## 2025-06-17 RX ORDER — SODIUM CHLORIDE 0.9 % (FLUSH) 0.9 %
5-40 SYRINGE (ML) INJECTION EVERY 12 HOURS SCHEDULED
Status: DISCONTINUED | OUTPATIENT
Start: 2025-06-17 | End: 2025-06-19 | Stop reason: HOSPADM

## 2025-06-17 RX ORDER — SODIUM CHLORIDE 9 MG/ML
INJECTION, SOLUTION INTRAVENOUS PRN
Status: DISCONTINUED | OUTPATIENT
Start: 2025-06-17 | End: 2025-06-19 | Stop reason: HOSPADM

## 2025-06-17 RX ORDER — SODIUM CHLORIDE 0.9 % (FLUSH) 0.9 %
5-40 SYRINGE (ML) INJECTION PRN
Status: DISCONTINUED | OUTPATIENT
Start: 2025-06-17 | End: 2025-06-19 | Stop reason: HOSPADM

## 2025-06-17 RX ORDER — LIDOCAINE HYDROCHLORIDE 10 MG/ML
50 INJECTION, SOLUTION INFILTRATION; PERINEURAL ONCE
Status: DISCONTINUED | OUTPATIENT
Start: 2025-06-17 | End: 2025-06-19 | Stop reason: HOSPADM

## 2025-06-17 RX ADMIN — LACOSAMIDE 150 MG: 10 INJECTION INTRAVENOUS at 20:36

## 2025-06-17 RX ADMIN — SODIUM CHLORIDE, PRESERVATIVE FREE 10 ML: 5 INJECTION INTRAVENOUS at 08:10

## 2025-06-17 RX ADMIN — LACOSAMIDE 150 MG: 10 INJECTION INTRAVENOUS at 09:59

## 2025-06-17 RX ADMIN — LEVETIRACETAM 1500 MG: 15 INJECTION, SOLUTION INTRAVENOUS at 20:40

## 2025-06-17 RX ADMIN — FAMOTIDINE 20 MG: 10 INJECTION, SOLUTION INTRAVENOUS at 20:38

## 2025-06-17 RX ADMIN — FAMOTIDINE 20 MG: 10 INJECTION, SOLUTION INTRAVENOUS at 08:10

## 2025-06-17 RX ADMIN — ENOXAPARIN SODIUM 40 MG: 100 INJECTION SUBCUTANEOUS at 08:09

## 2025-06-17 RX ADMIN — LEVETIRACETAM 1500 MG: 15 INJECTION, SOLUTION INTRAVENOUS at 10:07

## 2025-06-17 RX ADMIN — SODIUM CHLORIDE, SODIUM LACTATE, POTASSIUM CHLORIDE, AND CALCIUM CHLORIDE: .6; .31; .03; .02 INJECTION, SOLUTION INTRAVENOUS at 14:04

## 2025-06-17 ASSESSMENT — LIFESTYLE VARIABLES
HOW OFTEN DO YOU HAVE A DRINK CONTAINING ALCOHOL: NEVER
HOW MANY STANDARD DRINKS CONTAINING ALCOHOL DO YOU HAVE ON A TYPICAL DAY: PATIENT DOES NOT DRINK
HOW OFTEN DO YOU HAVE A DRINK CONTAINING ALCOHOL: NEVER
HOW MANY STANDARD DRINKS CONTAINING ALCOHOL DO YOU HAVE ON A TYPICAL DAY: PATIENT DOES NOT DRINK

## 2025-06-17 NOTE — PLAN OF CARE
Problem: Discharge Planning  Goal: Discharge to home or other facility with appropriate resources  Outcome: Progressing  Flowsheets (Taken 6/17/2025 1717)  Discharge to home or other facility with appropriate resources:   Identify barriers to discharge with patient and caregiver   Identify discharge learning needs (meds, wound care, etc)     Problem: Safety - Adult  Goal: Free from fall injury  6/17/2025 1717 by Jaida Ayers GN  Outcome: Progressing  Flowsheets (Taken 6/17/2025 1717)  Free From Fall Injury: Instruct family/caregiver on patient safety     Problem: Skin/Tissue Integrity  Goal: Skin integrity remains intact  Description: 1.  Monitor for areas of redness and/or skin breakdown  2.  Assess vascular access sites hourly  Outcome: Progressing  Flowsheets (Taken 6/17/2025 1717)  Skin Integrity Remains Intact:   Monitor for areas of redness and/or skin breakdown   Assess vascular access sites hourly   Turn and reposition as indicated   Monitor skin under medical devices   Check visual cues for pain

## 2025-06-17 NOTE — PROCEDURES
PROCEDURE NOTE  Date: 6/17/2025   Name: Jeremy King  YOB: 1955    Procedures      Midline insertion note:     Prescribed therapy: Limited access, IVF, ATBX  Product type: 4.5fr single lumen Antimicrobial/Antithrombogenic Arrow Midline  History/Labs/Allergies Reviewed  Placed By:   Kike - HOANG IV Team  Time out Performed using Two Identifiers  Insertion site: Right  basilic vein   Total length: 15cm (blue flex tip intact).   External catheter length: 1 cm  Extremity circumference at insertion site: 29 cm  Number of attempts: 1  Estimated blood loss: 1 ml  Placement verified by: positive blood return & flushes easily  Special equipment used: ultrasound & micro-introducer technique   Catheter secured with adhesive securement device  Catheter adhesive (SecureportIV) utilized at insertion site  Dressing applied: Tegaderm CHG  Lidocaine administered intradermally conc.1%: approx 1 mL    Please remove PIV in left foot. It appears infiltrated.    Midline education:     [ X] Post care line insertion was discussed with patient/Family or POA prior to procedure.  Risks, benefits, alternatives, and reason for procedure were discussed and teaching was reinforced. An educational handout on post insertion line care and maintenance was left at bedside with patient or in chart. Patient (Family or POA) acknowledged understanding of information relayed.

## 2025-06-17 NOTE — H&P
(Formerly [facility])  Active Problems:    MR (mental retardation), severe    S/P placement of VNS (vagus nerve stimulation) device    Abnormal ECG  Resolved Problems:    * No resolved hospital problems. *      Patient Active Problem List    Diagnosis Date Noted    Complicated UTI (urinary tract infection) 05/12/2025    Ileitis 05/12/2025    Abnormal ECG 04/14/2025    Sinus tachycardia 04/14/2025    Small bowel obstruction ([facility]) 02/15/2025    Pneumatosis intestinalis 02/27/2024    Seizure disorder (Formerly [facility]) 12/11/2023    S/P placement of VNS (vagus nerve stimulation) device 12/10/2023    Dysphagia 12/10/2023    Intellectual disability 12/09/2023    SBO (small bowel obstruction) (Formerly [facility]) 04/19/2023    LEELEE (obstructive sleep apnea) 08/09/2017    MR (mental retardation), severe 08/18/2014       Plan:     MEDICAL DECISION MAKING:    Primary Problem(s): SBO (small bowel obstruction) (Former[facility])  Differential diagnoses: Ileus  Condition is an undiagnosed new problem with uncertain prognosis  Condition is stable  Treatment plan:   N.p.o.  General Surgery consult  NG to DEEPAK following x-ray for placement  Imaging: Abdominal x-ray daily  Medications:   IV fluids  Pepcid  Medication Monitoring / High Risk Medications: none      EKG changes  Condition is stable  Treatment plan:   Telemetry monitoring  Trend EKGs and troponin  Cardiology consult  Imaging: no further imaging studies ordered today  Medications: Lovenox          Nutrition status:   at risk for malnutrition  Dietician consult initiated    Hospital Prophylaxis:   DVT: Lovenox   Stress Ulcer: H2 Blocker     MDM Data:   Test interpretation:  My independent EKG interpretation: normal sinus rhythm, ST elevation in inferior leads  My independent X-ray interpretation: CT abdomen pelvis showed dilation of the stomach, small bowel and transverse colon  Management and/or test interpretation discussed with ER MD at time of admission  Consults and Nursing notes were personally reviewed, all current labs

## 2025-06-17 NOTE — CONSULTS
Patient: Jeremy King  : 1955  Date of Admission: 2025  Primary Care Physician: Sukumar Larose  Today's Date: 2025    REASON FOR CONSULTATION: Vomiting (ECF reports abd pain and projectile vomiting. Patient denies abd pain.) and Abdominal Pain      HPI: Mr. King is a 69 y.o. male who presented to the hospital because of a recurrent small bowel obstruction.  Patient lives in a group home because of MRDD and cannot provide history.  Most of the history is obtained from the chart review.  Cardiology consulted because of abnormal ECG.    Patient does have history of recurrent small bowel obstruction, status post surgical resection and reanastomosis.  Presented with projectile vomiting and diagnosis partial small bowel obstruction.  Currently treated conservatively with bowel rest, IV fluid and NG tube in place.  Surgery on board.    Cardiology consulted because of abnormal ECG.  Admission ECG showed minimally elevated, nonspecific ST segment changes in the inferolateral leads.  Subsequent ECG showed nonspecific ST segment abnormalities without ST segment elevation.  Troponin is unremarkable.    No prior cardiac history or workup done in our hospital.  As per Care Everywhere, he had an echo back in  that was very much unremarkable.    CT of the abdomen and pelvis:  1. History of small-bowel obstruction with prior partial resection and anastomosis in the left lower quadrant.  2. Current pattern of dilation of the stomach, small bowel and transverse colon with no focal transition point. Ileus or enteritis may be considered.  A partial small bowel obstruction cannot be excluded given prior history.  3. Stable subcapsular hematoma of the left kidney.    Abdominal examination is relatively benign.  Soft with rare bowel movement.  NG tube in place.  Kidney function and electrolytes are normal.     Past Medical History:   Diagnosis Date    Arthritis     BPH (benign prostatic hyperplasia)

## 2025-06-17 NOTE — PLAN OF CARE
Problem: Safety - Adult  Goal: Free from fall injury  Outcome: Progressing  Note:     Fall precautions in place and patient adhering to safety precautions.

## 2025-06-17 NOTE — CONSULTS
Patient comes in with signs and symptoms of SBO.  This has been a relapsing problem for him, last admission about 1 month ago.  He is not a reliable historian.  He denies any pain currently.    Past Medical History:   Diagnosis Date    Arthritis     BPH (benign prostatic hyperplasia)     Dysphagia     GERD (gastroesophageal reflux disease)     Hearing loss     HLD (hyperlipidemia)     Insomnia     MR (mental retardation)     Periorbital cellulitis     SBO (small bowel obstruction) (HCC)     Seizures (HCC)     Epilepsy    Ventral hernia     Vitamin D deficiency      Past Surgical History:   Procedure Laterality Date    ABDOMEN SURGERY  2013, 2014    Bowel Obstruction X2    CAST APPLICATION Left     Lt. Ankle , X3    HYDROCELE EXCISION  08/2016    LAPAROSCOPY  8/17/14    with BERNARD    LAPAROTOMY  8/17/14    with partial cecectomy    VAGAL NERVE STIMULATION      placed, then replaced    VAGAL NERVE STIMULATION  10/11/2016    replaced generator battery     Current Facility-Administered Medications   Medication Dose Route Frequency Provider Last Rate Last Admin    lacosamide (VIMPAT) injection 150 mg  150 mg IntraVENous BID Anne Quevedo APRN - CNP   150 mg at 06/17/25 0959    levETIRAcetam (KEPPRA) 1500 mg/100 mL IVPB  1,500 mg IntraVENous BID Anne Quevedo APRN - CNP   Stopped at 06/17/25 1023    sodium chloride flush 0.9 % injection 5-40 mL  5-40 mL IntraVENous 2 times per day Jj Arevalo MD        sodium chloride flush 0.9 % injection 5-40 mL  5-40 mL IntraVENous PRN Jj Arevalo MD        0.9 % sodium chloride infusion   IntraVENous PRN Jj Arevalo MD        lidocaine 1 % injection 50 mg  50 mg IntraDERmal Once Jj Arevalo MD        sodium chloride flush 0.9 % injection 10 mL  10 mL IntraVENous 2 times per day Jj Arevalo MD   10 mL at 06/17/25 0810    sodium chloride flush 0.9 % injection 10 mL  10 mL IntraVENous PRN Jj Arevalo MD        0.9 % sodium chloride infusion

## 2025-06-18 ENCOUNTER — APPOINTMENT (OUTPATIENT)
Age: 70
DRG: 389 | End: 2025-06-18
Attending: INTERNAL MEDICINE
Payer: MEDICARE

## 2025-06-18 ENCOUNTER — APPOINTMENT (OUTPATIENT)
Dept: GENERAL RADIOLOGY | Age: 70
DRG: 389 | End: 2025-06-18
Payer: MEDICARE

## 2025-06-18 LAB
ANION GAP SERPL CALCULATED.3IONS-SCNC: 12 MMOL/L (ref 9–16)
BASOPHILS # BLD: 0.04 K/UL (ref 0–0.2)
BASOPHILS NFR BLD: 0 % (ref 0–2)
BNP SERPL-MCNC: 129 PG/ML (ref 0–125)
BUN SERPL-MCNC: 14 MG/DL (ref 8–23)
BUN/CREAT SERPL: 18 (ref 9–20)
CALCIUM SERPL-MCNC: 8.3 MG/DL (ref 8.6–10.4)
CHLORIDE SERPL-SCNC: 103 MMOL/L (ref 98–107)
CO2 SERPL-SCNC: 21 MMOL/L (ref 20–31)
CREAT SERPL-MCNC: 0.8 MG/DL (ref 0.7–1.2)
ECHO AO ROOT DIAM: 4 CM
ECHO AO ROOT INDEX: 1.84 CM/M2
ECHO AO SINUS VALSALVA DIAM: 3.9 CM
ECHO AO SINUS VALSALVA INDEX: 1.8 CM/M2
ECHO AV CUSP MM: 2.5 CM
ECHO AV MEAN GRADIENT: 3 MMHG
ECHO AV MEAN VELOCITY: 0.7 M/S
ECHO AV PEAK GRADIENT: 5 MMHG
ECHO AV PEAK VELOCITY: 1.1 M/S
ECHO AV VELOCITY RATIO: 0.91
ECHO AV VTI: 18.2 CM
ECHO BSA: 2.22 M2
ECHO EST RA PRESSURE: 3 MMHG
ECHO LA AREA 4C: 20.9 CM2
ECHO LA MAJOR AXIS: 6.5 CM
ECHO LA VOL MOD A4C: 54 ML (ref 18–58)
ECHO LA VOLUME INDEX MOD A4C: 25 ML/M2 (ref 16–34)
ECHO LV E' LATERAL VELOCITY: 9.57 CM/S
ECHO LV EDV A4C: 47 ML
ECHO LV EDV INDEX A4C: 22 ML/M2
ECHO LV EF PHYSICIAN: 55 %
ECHO LV EJECTION FRACTION A4C: 57 %
ECHO LV EJECTION FRACTION BIPLANE: 55 % (ref 55–100)
ECHO LV ESV A4C: 20 ML
ECHO LV ESV INDEX A4C: 9 ML/M2
ECHO LV FRACTIONAL SHORTENING: 29 % (ref 28–44)
ECHO LV INTERNAL DIMENSION DIASTOLE INDEX: 2.21 CM/M2
ECHO LV INTERNAL DIMENSION DIASTOLIC: 4.8 CM (ref 4.2–5.9)
ECHO LV INTERNAL DIMENSION SYSTOLIC INDEX: 1.57 CM/M2
ECHO LV INTERNAL DIMENSION SYSTOLIC: 3.4 CM
ECHO LV IVSD: 1.2 CM (ref 0.6–1)
ECHO LV MASS 2D: 206.4 G (ref 88–224)
ECHO LV MASS INDEX 2D: 95.1 G/M2 (ref 49–115)
ECHO LV POSTERIOR WALL DIASTOLIC: 1.1 CM (ref 0.6–1)
ECHO LV RELATIVE WALL THICKNESS RATIO: 0.46
ECHO LVOT AV VTI INDEX: 0.81
ECHO LVOT MEAN GRADIENT: 2 MMHG
ECHO LVOT PEAK GRADIENT: 4 MMHG
ECHO LVOT PEAK VELOCITY: 1 M/S
ECHO LVOT VTI: 14.7 CM
ECHO MV A VELOCITY: 0.72 M/S
ECHO MV E DECELERATION TIME (DT): 162 MS
ECHO MV E VELOCITY: 0.73 M/S
ECHO MV E/A RATIO: 1.01
ECHO MV E/E' LATERAL: 7.63
ECHO PV MAX VELOCITY: 1.1 M/S
ECHO PV PEAK GRADIENT: 5 MMHG
ECHO RV BASAL DIMENSION: 3.5 CM
ECHO RV TAPSE: 2.1 CM (ref 1.7–?)
EKG ATRIAL RATE: 99 BPM
EKG P AXIS: 64 DEGREES
EKG P-R INTERVAL: 176 MS
EKG Q-T INTERVAL: 346 MS
EKG QRS DURATION: 88 MS
EKG QTC CALCULATION (BAZETT): 444 MS
EKG R AXIS: -19 DEGREES
EKG T AXIS: 79 DEGREES
EKG VENTRICULAR RATE: 99 BPM
EOSINOPHIL # BLD: 0.07 K/UL (ref 0–0.44)
EOSINOPHILS RELATIVE PERCENT: 1 % (ref 1–4)
ERYTHROCYTE [DISTWIDTH] IN BLOOD BY AUTOMATED COUNT: 12.6 % (ref 11.8–14.4)
GFR, ESTIMATED: >90 ML/MIN/1.73M2
GLUCOSE SERPL-MCNC: 102 MG/DL (ref 74–99)
HCT VFR BLD AUTO: 36.7 % (ref 40.7–50.3)
HGB BLD-MCNC: 12.2 G/DL (ref 13–17)
IMM GRANULOCYTES # BLD AUTO: 0.06 K/UL (ref 0–0.3)
IMM GRANULOCYTES NFR BLD: 0 %
LYMPHOCYTES NFR BLD: 1.42 K/UL (ref 1.1–3.7)
LYMPHOCYTES RELATIVE PERCENT: 10 % (ref 24–43)
MCH RBC QN AUTO: 32.7 PG (ref 25.2–33.5)
MCHC RBC AUTO-ENTMCNC: 33.2 G/DL (ref 28.4–34.8)
MCV RBC AUTO: 98.4 FL (ref 82.6–102.9)
MONOCYTES NFR BLD: 1.02 K/UL (ref 0.1–1.2)
MONOCYTES NFR BLD: 7 % (ref 3–12)
NEUTROPHILS NFR BLD: 82 % (ref 36–65)
NEUTS SEG NFR BLD: 11.77 K/UL (ref 1.5–8.1)
NRBC BLD-RTO: 0 PER 100 WBC
PLATELET # BLD AUTO: 185 K/UL (ref 138–453)
PMV BLD AUTO: 8.8 FL (ref 8.1–13.5)
POTASSIUM SERPL-SCNC: 3.9 MMOL/L (ref 3.7–5.3)
RBC # BLD AUTO: 3.73 M/UL (ref 4.21–5.77)
SODIUM SERPL-SCNC: 136 MMOL/L (ref 136–145)
TROPONIN I SERPL HS-MCNC: 12 NG/L (ref 0–22)
WBC OTHER # BLD: 14.4 K/UL (ref 3.5–11.3)

## 2025-06-18 PROCEDURE — 93005 ELECTROCARDIOGRAM TRACING: CPT | Performed by: INTERNAL MEDICINE

## 2025-06-18 PROCEDURE — 83880 ASSAY OF NATRIURETIC PEPTIDE: CPT

## 2025-06-18 PROCEDURE — 93306 TTE W/DOPPLER COMPLETE: CPT

## 2025-06-18 PROCEDURE — 93010 ELECTROCARDIOGRAM REPORT: CPT | Performed by: INTERNAL MEDICINE

## 2025-06-18 PROCEDURE — 97166 OT EVAL MOD COMPLEX 45 MIN: CPT

## 2025-06-18 PROCEDURE — 2500000003 HC RX 250 WO HCPCS: Performed by: STUDENT IN AN ORGANIZED HEALTH CARE EDUCATION/TRAINING PROGRAM

## 2025-06-18 PROCEDURE — 80048 BASIC METABOLIC PNL TOTAL CA: CPT

## 2025-06-18 PROCEDURE — 1200000000 HC SEMI PRIVATE

## 2025-06-18 PROCEDURE — 93306 TTE W/DOPPLER COMPLETE: CPT | Performed by: FAMILY MEDICINE

## 2025-06-18 PROCEDURE — 84484 ASSAY OF TROPONIN QUANT: CPT

## 2025-06-18 PROCEDURE — 2580000003 HC RX 258: Performed by: STUDENT IN AN ORGANIZED HEALTH CARE EDUCATION/TRAINING PROGRAM

## 2025-06-18 PROCEDURE — 6360000002 HC RX W HCPCS: Performed by: NURSE PRACTITIONER

## 2025-06-18 PROCEDURE — 97110 THERAPEUTIC EXERCISES: CPT

## 2025-06-18 PROCEDURE — 94761 N-INVAS EAR/PLS OXIMETRY MLT: CPT

## 2025-06-18 PROCEDURE — C9254 INJECTION, LACOSAMIDE: HCPCS | Performed by: NURSE PRACTITIONER

## 2025-06-18 PROCEDURE — 6360000002 HC RX W HCPCS: Performed by: STUDENT IN AN ORGANIZED HEALTH CARE EDUCATION/TRAINING PROGRAM

## 2025-06-18 PROCEDURE — 74018 RADEX ABDOMEN 1 VIEW: CPT

## 2025-06-18 PROCEDURE — 71045 X-RAY EXAM CHEST 1 VIEW: CPT

## 2025-06-18 PROCEDURE — 85025 COMPLETE CBC W/AUTO DIFF WBC: CPT

## 2025-06-18 RX ADMIN — SODIUM CHLORIDE, SODIUM LACTATE, POTASSIUM CHLORIDE, AND CALCIUM CHLORIDE: .6; .31; .03; .02 INJECTION, SOLUTION INTRAVENOUS at 03:24

## 2025-06-18 RX ADMIN — FAMOTIDINE 20 MG: 10 INJECTION, SOLUTION INTRAVENOUS at 20:47

## 2025-06-18 RX ADMIN — FAMOTIDINE 20 MG: 10 INJECTION, SOLUTION INTRAVENOUS at 09:58

## 2025-06-18 RX ADMIN — SODIUM CHLORIDE, PRESERVATIVE FREE 10 ML: 5 INJECTION INTRAVENOUS at 20:50

## 2025-06-18 RX ADMIN — LEVETIRACETAM 1500 MG: 15 INJECTION, SOLUTION INTRAVENOUS at 10:05

## 2025-06-18 RX ADMIN — LEVETIRACETAM 1500 MG: 15 INJECTION, SOLUTION INTRAVENOUS at 20:48

## 2025-06-18 RX ADMIN — ENOXAPARIN SODIUM 40 MG: 100 INJECTION SUBCUTANEOUS at 09:58

## 2025-06-18 RX ADMIN — SODIUM CHLORIDE, SODIUM LACTATE, POTASSIUM CHLORIDE, AND CALCIUM CHLORIDE: .6; .31; .03; .02 INJECTION, SOLUTION INTRAVENOUS at 16:08

## 2025-06-18 RX ADMIN — LACOSAMIDE 150 MG: 10 INJECTION INTRAVENOUS at 09:58

## 2025-06-18 RX ADMIN — LACOSAMIDE 150 MG: 10 INJECTION INTRAVENOUS at 20:47

## 2025-06-18 NOTE — PLAN OF CARE
Problem: Discharge Planning  Goal: Discharge to home or other facility with appropriate resources  6/18/2025 1137 by Nabila Narayan RN  Outcome: Progressing     Problem: Safety - Adult  Goal: Free from fall injury  6/18/2025 1137 by Nabila Narayan RN  Outcome: Progressing     Problem: Skin/Tissue Integrity  Goal: Skin integrity remains intact  Description: 1.  Monitor for areas of redness and/or skin breakdown  2.  Assess vascular access sites hourly  3.  Every 4-6 hours minimum:  Change oxygen saturation probe site  4.  Every 4-6 hours:  If on nasal continuous positive airway pressure, respiratory therapy assess nares and determine need for appliance change or resting period  6/18/2025 1137 by Nabila Narayan RN  Outcome: Progressing     Problem: ABCDS Injury Assessment  Goal: Absence of physical injury  6/18/2025 1137 by Nabila Narayan RN  Outcome: Progressing     Problem: Nutrition Deficit:  Goal: Optimize nutritional status  6/18/2025 1137 by Nabila Narayan RN  Outcome: Progressing

## 2025-06-18 NOTE — PLAN OF CARE
Problem: Discharge Planning  Goal: Discharge to home or other facility with appropriate resources  6/17/2025 2147 by Charlene Mccullough, RN  Outcome: Progressing  Flowsheets (Taken 6/17/2025 2115)  Discharge to home or other facility with appropriate resources:   Identify barriers to discharge with patient and caregiver   Arrange for needed discharge resources and transportation as appropriate   Identify discharge learning needs (meds, wound care, etc)   Refer to discharge planning if patient needs post-hospital services based on physician order or complex needs related to functional status, cognitive ability or social support system     Problem: Safety - Adult  Goal: Free from fall injury  6/17/2025 2147 by Charlene Mccullough, RN  Outcome: Progressing  Flowsheets (Taken 6/17/2025 2147)  Free From Fall Injury: Instruct family/caregiver on patient safety     Problem: Skin/Tissue Integrity  Goal: Skin integrity remains intact  Description: 1.  Monitor for areas of redness and/or skin breakdown  2.  Assess vascular access sites hourly  3.  Every 4-6 hours minimum:  Change oxygen saturation probe site  4.  Every 4-6 hours:  If on nasal continuous positive airway pressure, respiratory therapy assess nares and determine need for appliance change or resting period  6/17/2025 2147 by Charlene Mccullough, RN  Outcome: Progressing  Flowsheets (Taken 6/17/2025 2115)  Skin Integrity Remains Intact: Monitor for areas of redness and/or skin breakdown     Problem: ABCDS Injury Assessment  Goal: Absence of physical injury  Outcome: Progressing  Flowsheets (Taken 6/17/2025 2147)  Absence of Physical Injury: Implement safety measures based on patient assessment     Problem: Nutrition Deficit:  Goal: Optimize nutritional status  Outcome: Progressing  Flowsheets (Taken 6/17/2025 2147)  Nutrient intake appropriate for improving, restoring, or maintaining nutritional needs: Monitor oral intake, labs, and treatment plans

## 2025-06-19 ENCOUNTER — APPOINTMENT (OUTPATIENT)
Dept: GENERAL RADIOLOGY | Age: 70
DRG: 389 | End: 2025-06-19
Payer: MEDICARE

## 2025-06-19 VITALS
SYSTOLIC BLOOD PRESSURE: 130 MMHG | OXYGEN SATURATION: 97 % | BODY MASS INDEX: 30.24 KG/M2 | DIASTOLIC BLOOD PRESSURE: 77 MMHG | HEART RATE: 77 BPM | HEIGHT: 70 IN | TEMPERATURE: 98.2 F | RESPIRATION RATE: 28 BRPM | WEIGHT: 211.2 LBS

## 2025-06-19 LAB
ANION GAP SERPL CALCULATED.3IONS-SCNC: 12 MMOL/L (ref 9–16)
BASOPHILS # BLD: 0.03 K/UL (ref 0–0.2)
BASOPHILS NFR BLD: 0 % (ref 0–2)
BUN SERPL-MCNC: 12 MG/DL (ref 8–23)
BUN/CREAT SERPL: 15 (ref 9–20)
CALCIUM SERPL-MCNC: 8.6 MG/DL (ref 8.6–10.4)
CHLORIDE SERPL-SCNC: 105 MMOL/L (ref 98–107)
CO2 SERPL-SCNC: 23 MMOL/L (ref 20–31)
CREAT SERPL-MCNC: 0.8 MG/DL (ref 0.7–1.2)
EOSINOPHIL # BLD: 0.17 K/UL (ref 0–0.44)
EOSINOPHILS RELATIVE PERCENT: 2 % (ref 1–4)
ERYTHROCYTE [DISTWIDTH] IN BLOOD BY AUTOMATED COUNT: 12.6 % (ref 11.8–14.4)
GFR, ESTIMATED: >90 ML/MIN/1.73M2
GLUCOSE SERPL-MCNC: 83 MG/DL (ref 74–99)
HCT VFR BLD AUTO: 33.4 % (ref 40.7–50.3)
HGB BLD-MCNC: 11 G/DL (ref 13–17)
IMM GRANULOCYTES # BLD AUTO: <0.03 K/UL (ref 0–0.3)
IMM GRANULOCYTES NFR BLD: 0 %
LYMPHOCYTES NFR BLD: 1.51 K/UL (ref 1.1–3.7)
LYMPHOCYTES RELATIVE PERCENT: 17 % (ref 24–43)
MCH RBC QN AUTO: 32.1 PG (ref 25.2–33.5)
MCHC RBC AUTO-ENTMCNC: 32.9 G/DL (ref 28.4–34.8)
MCV RBC AUTO: 97.4 FL (ref 82.6–102.9)
MONOCYTES NFR BLD: 0.69 K/UL (ref 0.1–1.2)
MONOCYTES NFR BLD: 8 % (ref 3–12)
NEUTROPHILS NFR BLD: 72 % (ref 36–65)
NEUTS SEG NFR BLD: 6.24 K/UL (ref 1.5–8.1)
NRBC BLD-RTO: 0 PER 100 WBC
PLATELET # BLD AUTO: 161 K/UL (ref 138–453)
PMV BLD AUTO: 9.5 FL (ref 8.1–13.5)
POTASSIUM SERPL-SCNC: 3.7 MMOL/L (ref 3.7–5.3)
RBC # BLD AUTO: 3.43 M/UL (ref 4.21–5.77)
SODIUM SERPL-SCNC: 140 MMOL/L (ref 136–145)
WBC OTHER # BLD: 8.7 K/UL (ref 3.5–11.3)

## 2025-06-19 PROCEDURE — 2500000003 HC RX 250 WO HCPCS: Performed by: STUDENT IN AN ORGANIZED HEALTH CARE EDUCATION/TRAINING PROGRAM

## 2025-06-19 PROCEDURE — 6360000002 HC RX W HCPCS: Performed by: STUDENT IN AN ORGANIZED HEALTH CARE EDUCATION/TRAINING PROGRAM

## 2025-06-19 PROCEDURE — 6360000002 HC RX W HCPCS: Performed by: NURSE PRACTITIONER

## 2025-06-19 PROCEDURE — 97535 SELF CARE MNGMENT TRAINING: CPT

## 2025-06-19 PROCEDURE — 80048 BASIC METABOLIC PNL TOTAL CA: CPT

## 2025-06-19 PROCEDURE — C9254 INJECTION, LACOSAMIDE: HCPCS | Performed by: NURSE PRACTITIONER

## 2025-06-19 PROCEDURE — 94761 N-INVAS EAR/PLS OXIMETRY MLT: CPT

## 2025-06-19 PROCEDURE — 74018 RADEX ABDOMEN 1 VIEW: CPT

## 2025-06-19 PROCEDURE — 85025 COMPLETE CBC W/AUTO DIFF WBC: CPT

## 2025-06-19 PROCEDURE — 2580000003 HC RX 258: Performed by: STUDENT IN AN ORGANIZED HEALTH CARE EDUCATION/TRAINING PROGRAM

## 2025-06-19 RX ADMIN — FAMOTIDINE 20 MG: 10 INJECTION, SOLUTION INTRAVENOUS at 08:56

## 2025-06-19 RX ADMIN — LACOSAMIDE 150 MG: 10 INJECTION INTRAVENOUS at 08:56

## 2025-06-19 RX ADMIN — LEVETIRACETAM 1500 MG: 15 INJECTION, SOLUTION INTRAVENOUS at 09:07

## 2025-06-19 RX ADMIN — ENOXAPARIN SODIUM 40 MG: 100 INJECTION SUBCUTANEOUS at 08:56

## 2025-06-19 RX ADMIN — SODIUM CHLORIDE, SODIUM LACTATE, POTASSIUM CHLORIDE, AND CALCIUM CHLORIDE: .6; .31; .03; .02 INJECTION, SOLUTION INTRAVENOUS at 05:08

## 2025-06-19 RX ADMIN — SODIUM CHLORIDE, PRESERVATIVE FREE 10 ML: 5 INJECTION INTRAVENOUS at 08:56

## 2025-06-19 NOTE — DISCHARGE INSTR - COC
Continuity of Care Form    Patient Name: Jeremy King   :  1955  MRN:  [id number]    Admit date:  2025  Discharge date:  2025    Code Status Order: Full Code   Advance Directives:     Admitting Physician:  Jj Arevalo MD  PCP: Sukumar Larose MD    Discharging Nurse: KOLBY  Discharging Hospital Unit/Room#: 0301/0301-01  Discharging Unit Phone Number: [phone]    Emergency Contact:   Extended Emergency Contact Information  Primary Emergency Contact: Esther Denny  Address: [address] [place] [address]  Home Phone: [phone]  Relation: Legal Guardian  Secondary Emergency Contact: Samir King  Home Phone: [phone]  Relation: Brother/Sister    Past Surgical History:  Past Surgical History:   Procedure Laterality Date    ABDOMEN SURGERY  ,     Bowel Obstruction X2    CAST APPLICATION Left     Lt. Ankle , X3    HYDROCELE EXCISION  2016    LAPAROSCOPY  15    with BERNARD    LAPAROTOMY  14    with partial cecectomy    VAGAL NERVE STIMULATION      placed, then replaced    VAGAL NERVE STIMULATION  10/11/2016    replaced generator battery       Immunization History:   Immunization History   Administered Date(s) Administered    COVID-19, PFIZER PURPLE top, DILUTE for use, (age 12 y+), 30mcg/0.3mL 2021, 2021, 2022    TDaP, ADACEL (age 10y-64y), BOOSTRIX (age 10y+), IM, 0.5mL 2013       Active Problems:  Patient Active Problem List   Diagnosis Code    MR (mental retardation), severe F72    SBO (small bowel obstruction) ([facility]) K56.609    LEELEE (obstructive sleep apnea) G47.33    Intellectual disability F79    S/P placement of VNS (vagus nerve stimulation) device Z96.89    Dysphagia R13.10    Seizure disorder ([facility]) G40.909    Pneumatosis intestinalis K63.89    Small bowel obstruction ([facility]) K56.609    Abnormal ECG R94.31    Sinus tachycardia R00.0    Complicated UTI (urinary tract infection) N39.0    Ileitis K52.9

## 2025-06-19 NOTE — PLAN OF CARE
Problem: Discharge Planning  Goal: Discharge to home or other facility with appropriate resources  6/19/2025 0035 by Yuniel Reagan RN  Flowsheets (Taken 6/17/2025 2115 by Charlene Mccullough, RN)  Discharge to home or other facility with appropriate resources:   Identify barriers to discharge with patient and caregiver   Arrange for needed discharge resources and transportation as appropriate   Identify discharge learning needs (meds, wound care, etc)   Refer to discharge planning if patient needs post-hospital services based on physician order or complex needs related to functional status, cognitive ability or social support system  6/18/2025 1137 by Nabila Narayan RN  Outcome: Progressing     Problem: Safety - Adult  Goal: Free from fall injury  6/19/2025 0035 by Yuniel Reaagn RN  Flowsheets (Taken 6/17/2025 2147 by Charlene Mccullough, RN)  Free From Fall Injury: Instruct family/caregiver on patient safety  6/18/2025 1137 by Nabila Narayan RN  Outcome: Progressing     Problem: Skin/Tissue Integrity  Goal: Skin integrity remains intact  Description: 1.  Monitor for areas of redness and/or skin breakdown  2.  Assess vascular access sites hourly  3.  Every 4-6 hours minimum:  Change oxygen saturation probe site  4.  Every 4-6 hours:  If on nasal continuous positive airway pressure, respiratory therapy assess nares and determine need for appliance change or resting period  6/19/2025 0035 by Yuniel Reagan RN  Flowsheets (Taken 6/17/2025 2115 by Charlene Mccullough, RN)  Skin Integrity Remains Intact: Monitor for areas of redness and/or skin breakdown  6/18/2025 1137 by Nabila Narayan RN  Outcome: Progressing     Problem: ABCDS Injury Assessment  Goal: Absence of physical injury  6/18/2025 1137 by Nabila Narayan RN  Outcome: Progressing     Problem: Nutrition Deficit:  Goal: Optimize nutritional status  6/18/2025 1137 by Nabila Narayan RN  Outcome: Progressing

## 2025-06-19 NOTE — DISCHARGE SUMMARY
Discharge Summary    Jeremy King  :  1955  MRN:  [id number]    Admit date:  2025      Discharge date: 2025     Admitting Physician:  Jj Arevalo MD    Discharge Diagnoses:    Principal Problem:    SBO (small bowel obstruction) ([facility])  Active Problems:    MR (mental retardation), severe    S/P placement of VNS (vagus nerve stimulation) device    Abnormal ECG  Resolved Problems:    * No resolved hospital problems. *      Hospital Course:   Jeremy King is a 69 y.o. male admitted with SBO.  He presented with vomiting from the group home.  Patient has MRDD and is unable to provide provide any pertinent history.  Patient has history of several admissions with bowel obstructions.  Most recent was 2025.  May he was admitted but determined he had no obstruction.  He was given antibiotics for UTI at that time.  He did have leukocytosis of 14.8 metabolic panel unremarkable.  CT abdomen and pelvis showed small bowel obstruction with prior partial resection and anastomosis in the left lower quadrant and a current pattern of dilation of the stomach and small bowel and transverse colon with no focal trans point transition consider ileus enteritis or partial obstruction.  EKG showed sinus rhythm with slight ST elevation in the inferior leads.  NG was placed and patient tolerated well.  X-rays were trended.  Cardiology was consulted to evaluate EKG changes and echocardiogram was unremarkable.  NG was removed diet advanced and tolerated well.  He is having bowel movement.  Overall he does feel better.  Plan will be to discharge today.    Consultants:  Dr. Snow, cardiology; Dr. Kelly, general surgery    Procedures: none    Complications: none    Discharge Condition: fair    Exam:  GEN:    Awake, alert and pleasant.   EYES:   EOMI, pupils equal   NECK: Supple. No lymphadenopathy.  No carotid bruit  CVS:     regular rate and rhythm, no audible murmur  PULM:  CTA, no wheezes, rales or rhonchi, no acute

## 2025-06-19 NOTE — PROGRESS NOTES
Spoke with Patient's sister and legal Guardian, Esther, regarding Patient's discharge planning this a.m. via telephone conversation.  Guardian relates that his plan will be to return to group home in [place] when deemed medically stable for discharge.  Patient is a 69 year old single, white male, admitted with a diagnosis of SBO.  History of Mental Retardation and Epilepsy also noted.  Spoke with group home nurse Leticia as well and [facility] to provide transportation for Patient when discharged.    Patient resides at group [facility] in [place].  Uses wheelchair for assistance with mobility.  Staff at group [facility] provides daily assistance with personal care, medications and transportation needs.  No unmet needs or discharge planning concerns identified at this time.    PCP is Dr. Larose.  Patient has [facility] Medicare and [place] Medicaid and his prescriptions are covered under these plans.    Discharge plan is to return to group home when stable.  Patient remains a \"Full Code' status and does have medical directives in place.      Brunilda Funez, PITER  6/17/2025    
22G IV placed into top of L foot at this time. Small amount of blood return noted. Bloodwork obtained and put into Ped tube for labwork to run troponin for rhythm changes on EKG. IV flushes well.   
Called and spoke with Nabila in lab. Stated to attempt to run troponin on Ped Vial that was sent down, if unable to run on ped vial, run troponin on green vial that was obtained in ER.   
Cardiology consult completed at this time. Cardiology consult called and faxed.     
Chest xray confirmation confirmed NG in correct placement. NG hooked up to wall suction, low intermittent, per ordered. Light brown yellowish suction contents noted in line and suction canister. NG suction working as should at this time.   
Comprehensive Nutrition Assessment    Type and Reason for Visit:  Initial    Nutrition Recommendations/Plan:   Monitor NPO duration     Malnutrition Assessment:  Malnutrition Status:  At risk for malnutrition (06/17/25 0712)    Context:  Acute Illness     Findings of the 6 clinical characteristics of malnutrition:  Energy Intake:  Mild decrease in energy intake (at least acutely)  Weight Loss:  No weight loss     Body Fat Loss:  No body fat loss     Muscle Mass Loss:  No muscle mass loss    Fluid Accumulation:  Mild     Strength:  Not Performed    Nutrition Assessment:    Inadequate nutrient intakes r/t altered GI status, AEB NPO for recurrent SBO. Known MRDD with weight gain trends noted. Resides at an [facility] without MAR or other information available to review. Patient asleep with NGT in (and 1100 ml out overnight). Pending surgical consult. Will monitor duration of NPO status and POC. History of low vitamin D, on supplement pta.    Nutrition Related Findings:    hypoactive b/s. +2 RUE and +1 BLE edema. Wound Type: None       Current Nutrition Intake & Therapies:    Average Meal Intake: NPO  Average Supplements Intake: NPO  Diet NPO    Anthropometric Measures:  Height: 177.8 cm (5' 10\")  Ideal Body Weight (IBW): 166 lbs (75 kg)    Admission Body Weight: 100 kg (220 lb 7.4 oz)  Current Body Weight: 100.1 kg (220 lb 9.6 oz), 132.9 % IBW. Weight Source: Bed scale  Current BMI (kg/m2): 31.7  Usual Body Weight: 97.1 kg (214 lb) (last month with weight trend upward overall)     % Weight Change (Calculated): 3.1  Weight Adjustment For: No Adjustment                 BMI Categories: Obese Class 1 (BMI 30.0-34.9)    Estimated Daily Nutrient Needs:  Energy Requirements Based On: Kcal/kg  Weight Used for Energy Requirements: Current  Energy (kcal/day): 1062-3190 (17-21)  Weight Used for Protein Requirements: Ideal  Protein (g/day): 83-98 (1.1-1.3)  Method Used for Fluid Requirements: 1 ml/kcal  Fluid (ml/day): 
Dr. Kelly spoke with ER physician regarding patient's care and possible admission.     Molina served Dr. Kelly at this time letting him know patient was admitted into room 301, has NG hooked up to low intermittent suctions and does not have any current nausea or vomiting.   
EKG completed at this time. Patient asked to clear his throat and take deep breaths. He told writer \"I'm breathing. Don't bother me.\" Writer asked why he was breathing so fast but he states again, \"Don't bother me, I'm fine. I don't have to pee.\" Oxygen is 94% but respirations are 40. Writer asked patient to slow his breathing for deep breaths, and while he attempted, he didn't make this happen.   Writer informed NP of respirations. XRay ordered for morning. Call light and bedside table remain within reach. Care ongoing.    
Echocardiogram/Doppler done at bedside.  Instructed on policies and procedures.    Patient tolerated the exam well.  
[facility]  Inpatient Rehabilitation    Date: 2025  Patient Name: Jeremy King       [x] Inpatient Acute/Observation       []  Outpatient  : 1955     [x] Pt refused/declined therapy at this time due to:  Patient evaluation attempted, patient refused secondary to cardiologist present in room and request to sleep.        Therapist/Assistant will attempt to see this patient, at our earliest opportunity.       RITA Jiang, OTR/L  Date: 2025   
NG reassessed. Suction continues to be at low intermittent. Wall suction working as should. Drainage continues to be suctioned in line and to canister. NG remains at 60cm in R nare.   
Occupational Therapy  Facility/Department: [facility] MED SURG  Daily Treatment Note  NAME: Jeremy King  : 1955  MRN: [id number]    Date of Service: 2025    Discharge Recommendations:  Subacute/Skilled Nursing Facility, Long Term Care without OT, Continue to assess pending progress         Patient Diagnosis(es): The primary encounter diagnosis was SBO (small bowel obstruction) ([facility]). Diagnoses of Ileitis, Complicated UTI (urinary tract infection), Sinus tachycardia, Abnormal ECG, Small bowel obstruction (HCC), Pneumatosis intestinalis, Seizure disorder (HCC), Dysphagia, unspecified type, S/P placement of VNS (vagus nerve stimulation) device, Intellectual disability, LEELEE (obstructive sleep apnea), and MR (mental retardation), severe were also pertinent to this visit.     Assessment   Activity Tolerance: Treatment limited secondary to decreased cognition     Plan  Occupational Therapy Plan  Times Per Day: Once a day  Days Per Week: 7 Days  Current Treatment Recommendations: Strengthening;ROM;Balance training;Functional mobility training;Endurance training;Safety education & training;Equipment evaluation, education, & procurement;Self-Care / ADL;Patient/Caregiver education & training    Restrictions   General Fall Risk    Subjective  Subjective  Subjective: Pt in recliner, agreeable to dressing ADL.  Pain: no indications  Orientation  Overall Orientation Status: Impaired  Orientation Level: Oriented to person  Cognition  Overall Cognitive Status: Exceptions  Arousal/Alertness: Appears intact  Following Commands: Follows one step commands with repetition  Attention Span: Impaired  Memory: Impaired  Safety Judgement: Impaired  Problem Solving: Impaired  Sequencing: Requires cues for some       Objective  Vitals     Balance  Sitting: Impaired  Sitting - Static: Poor (constant support) (Pt with a posterior lean throughout sitting tasks.)  Standing: Impaired  Standing - Static: Poor (Pt with a posterior lean 
Occupational Therapy  Facility/Department: [facility] MED SURG  Occupational Therapy Initial Assessment    Name: Jeremy King  : 1955  MRN: [id number]  Date of Service: 2025    Discharge Recommendations:  Subacute/Skilled Nursing Facility, Long Term Care without OT, Continue to assess pending progress          Patient Diagnosis(es): The primary encounter diagnosis was SBO (small bowel obstruction) (HCC). Diagnoses of Ileitis, Complicated UTI (urinary tract infection), Sinus tachycardia, Abnormal ECG, Small bowel obstruction (HCC), Pneumatosis intestinalis, Seizure disorder (HCC), Dysphagia, unspecified type, S/P placement of VNS (vagus nerve stimulation) device, Intellectual disability, LEELEE (obstructive sleep apnea), and MR (mental retardation), severe were also pertinent to this visit.  Past Medical History:  has a past medical history of Arthritis, BPH (benign prostatic hyperplasia), Dysphagia, GERD (gastroesophageal reflux disease), Hearing loss, HLD (hyperlipidemia), Insomnia, MR (mental retardation), Periorbital cellulitis, SBO (small bowel obstruction) (HCC), Seizures (HCC), Ventral hernia, and Vitamin D deficiency.  Past Surgical History:  has a past surgical history that includes Abdomen surgery (, ); laparoscopy (14); laparotomy (14); cast application (Left); Hydrocele surgery (2016); Vagus nerve stimulator insertion; and Vagus nerve stimulator insertion (10/11/2016).    Treatment Diagnosis: Weakness      Assessment  Performance deficits / Impairments: Decreased functional mobility ;Decreased safe awareness;Decreased balance;Decreased coordination;Decreased ADL status;Decreased cognition;Decreased ROM;Decreased endurance;Decreased high-level IADLs;Decreased strength  Assessment: 69 [facility] Patient presents with general weakness requiring supervision and increased time for ADLs. Patient denies dizziness or SOB with activity. Patient would 
Occupational Therapy  Facility/Department: [facility] MED SURG  Daily Treatment Note  NAME: Jeremy King  : 1955  MRN: [id number]    Date of Service: 2025    Discharge Recommendations:  Subacute/Skilled Nursing Facility, Long Term Care without OT, Continue to assess pending progress         Patient Diagnosis(es): The primary encounter diagnosis was SBO (small bowel obstruction) ([facility]). Diagnoses of Ileitis, Complicated UTI (urinary tract infection), Sinus tachycardia, Abnormal ECG, Small bowel obstruction (HCC), Pneumatosis intestinalis, Seizure disorder ([facility]), Dysphagia, unspecified type, S/P placement of VNS (vagus nerve stimulation) device, Intellectual disability, LEELEE (obstructive sleep apnea), and MR (mental retardation), severe were also pertinent to this visit.     Assessment   Activity Tolerance: Treatment limited secondary to decreased cognition  Discharge Recommendations: Subacute/Skilled Nursing Facility;Long Term Care without OT;Continue to assess pending progress     Plan  Occupational Therapy Plan  Times Per Day: Once a day  Days Per Week: 7 Days  Current Treatment Recommendations: Strengthening;ROM;Balance training;Functional mobility training;Endurance training;Safety education & training;Equipment evaluation, education, & procurement;Self-Care / ADL;Patient/Caregiver education & training    Restrictions  Restrictions/Precautions  Restrictions/Precautions: Fall Risk;NPO;General Precautions  Activity Level: Up with Assist    Subjective  Subjective  Subjective: Pt in recliner, agreed to BUE therex.  Pain: no indications  Cognition  Following Commands: Follows one step commands with repetition  Attention Span: Impaired  Memory: Impaired  Safety Judgement: Impaired  Problem Solving: Impaired  Insights: Impaired         Objective  Vitals        OT Exercises  Exercise Treatment: BUE therex to increase strength/endurance for ease of fxl tasks. Red digiflex x 10-15, yellow flex bar bend up x 10-15, 
Ordered EKG completed and reviewed by Porsche NAVA. Phlebotomy was able to obtain morning labwork.    Writer was at bedside. Patient denies needing to urinate at this time. Brief clean and dry.     NG still suctioning per normal. NG remains at 60cm in R nare. 1100ml brown yellow drainage. Writer completely changed out wall suction canister to a new canister at this time. Drainage amount recorded (see flowsheets).  
Patient arrived to [facility], room 301, at this time via cart. Patient was transferred/pulled over to the hospital bed via x4 nurses. Patient is alert and orientated to self only. Patient is MRDD and a resident at [facility]. Paperwork was sent over from [facility] of which writer will use when completing patient's paperwork.    Patient is on room air, and has NG in place in R nare, 18fr, and at 60cm. Chest xray was obtained in ER for confirmation, awaiting results at this time so patient's NG remains clamped.     
Patient is A&Ox 4. Vital signs and head to toe assessment completed, see flowsheet for specific details. Patient denies any pain at this time. Patient denies any further needs at this time and care is ongoing. Standard safety precautions in place.    
Patient left at this time with transportation from group home via wheelchair. All belongings and paperwork with patient.   
Patient used the urinal. NG tube remains 60 at the nares. He remains tachypneic and sounds congested. Writer continues to encourage patient to clear his throat and cough intermittently. Patient denies needs. He was repositioned at this time. Call light and bedside table remain within reach. Care ongoing.      
[facility]  Inpatient/Observation/Outpatient Rehabilitation    Date: 2025  Patient Name: Jeremy King       [x] Inpatient Acute/Observation       []  Outpatient  : 1955     [x] Pt refused/declined therapy at this time due to:      Pt. agreable to theapy and once attempting bed exercises pt. became aggitated and stated \" Leave me alone, I am try to sleep!\"      [] Pt cancelled due to:  [] No Reason Given   [] Sick/ill   [] Other:      [] Evaluation held by RN/Provider/Physical Therapist due to:    [] High Heart Rate   [] High Blood Pressure   [] Orthopedic Consult   [] Hgb < 7   [] Other:    [] Pt ordered brace per physician request:  [] Proper fit will be completed and education for wearing/skin checks    [] Pt does not require skilled services due to:      Therapist/Assistant will attempt to see this patient, at our earliest opportunity.       Porsche Steele, PTA Date: 2025    
[facility] team for PICC line insertion at this time. Due to nonurgent need; A vat staff member will call tomorrow (no earlier than 0900) morning to evaluate need and potential ETA.   
Physical Therapy  Facility/Department: [facility] MED SURG  Daily Treatment Note  NAME: Jeremy King  : 1955  MRN: [id number]    Date of Service: 2025    Discharge Recommendations:  Continue to assess pending progress, Long Term Care with PT     Patient Diagnosis(es): The primary encounter diagnosis was SBO (small bowel obstruction) ([facility]). Diagnoses of Ileitis, Complicated UTI (urinary tract infection), Sinus tachycardia, Abnormal ECG, Small bowel obstruction (HCC), Pneumatosis intestinalis, Seizure disorder (HCC), Dysphagia, unspecified type, S/P placement of VNS (vagus nerve stimulation) device, Intellectual disability, LEELEE (obstructive sleep apnea), and MR (mental retardation), severe were also pertinent to this visit.    Assessment  Assessment: Reclined and seated exercises  bLe x15 with AAROM and verbal cues for participation from pt. Pt. falling asleep during treatment but easy to wake.  Activity Tolerance: Treatment limited secondary to decreased cognition;Patient tolerated treatment well    Plan  Physical Therapy Plan  General Plan: 2 times a day 7 days a week  Specific Instructions for Next Treatment: 1x/ day on weekends  Current Treatment Recommendations: Strengthening;ROM;Balance training;Functional mobility training;Transfer training;ADL/Self-care training;IADL training;Manual;Neuromuscular re-education;Gait training;Stair training;Endurance training;Home exercise program;Safety education & training;Patient/Caregiver education & training;Therapeutic activities;Positioning    Restrictions  Restrictions/Precautions  Restrictions/Precautions: Fall Risk, NPO, General Precautions  Activity Level: Up with Assist  Required Braces or Orthoses?: No     Subjective   Subjective  Subjective: Pt seated in chair upon arrival and is agreeable to exercises at this time.  Pain: denies    Objective  Bed Mobility Training  Bed Mobility Training: No  Transfer Training  Transfer Training: No  Gait  Gait Training: 
Physical Therapy  Facility/Department: [facility] MED SURG  Daily Treatment Note  NAME: Jeremy King  : 1955  MRN: [id number]    Date of Service: 2025    Discharge Recommendations:  Continue to assess pending progress, Long Term Care with PT     Patient Diagnosis(es): The primary encounter diagnosis was SBO (small bowel obstruction) ([facility]). Diagnoses of Ileitis, Complicated UTI (urinary tract infection), Sinus tachycardia, Abnormal ECG, Small bowel obstruction (HCC), Pneumatosis intestinalis, Seizure disorder (HCC), Dysphagia, unspecified type, S/P placement of VNS (vagus nerve stimulation) device, Intellectual disability, LEELEE (obstructive sleep apnea), and MR (mental retardation), severe were also pertinent to this visit.    Assessment  Assessment: Pt was seated in bedside chair and was agreeable to exercises. Pt completed reclined and seated B LE exercises x15 reps in all available planes of motion with AAROM and frequent verbal and tactile cues for continued movement with patient frequently closing eyes and falling asleep and was able to wake up easily to continue.  Activity Tolerance: Treatment limited secondary to decreased cognition;Patient tolerated treatment well    Plan  Physical Therapy Plan  General Plan: 2 times a day 7 days a week  Specific Instructions for Next Treatment: 1x/ day on weekends  Current Treatment Recommendations: Strengthening;ROM;Balance training;Functional mobility training;Transfer training;ADL/Self-care training;IADL training;Manual;Neuromuscular re-education;Gait training;Stair training;Endurance training;Home exercise program;Safety education & training;Patient/Caregiver education & training;Therapeutic activities;Positioning    Restrictions  Restrictions/Precautions  Restrictions/Precautions: Fall Risk, NPO, General Precautions  Activity Level: Up with Assist  Required Braces or Orthoses?: No     Subjective   Subjective  Subjective: Pt seated in chair upon arrival and is 
Progress Note    SUBJECTIVE:    Patient seen for f/u of SBO (small bowel obstruction) ([facility]).  He sitting up in chair no distress. Feels good  Had BM.  No complaints    ROS:   Constitutional: negative  for fevers, and negative for chills.  Respiratory: negative for shortness of breath, negative for cough, and negative for wheezing  Cardiovascular: negative for chest pain, and negative for palpitations  Gastrointestinal: negative for abdominal pain, negative for nausea,negative for vomiting, negative for diarrhea, and negative for constipation     All other systems were reviewed with the patient and are negative unless otherwise stated in HPI      OBJECTIVE:      Vitals:   Vitals:    06/19/25 0639   BP: 130/77   Pulse: 77   Resp: 28   Temp: 98.2 °F (36.8 °C)   SpO2: 97%     Weight - Scale: 95.8 kg (211 lb 3.2 oz)   Height: 177.8 cm (5' 10\")     Weight  Wt Readings from Last 3 Encounters:   06/19/25 95.8 kg (211 lb 3.2 oz)   05/14/25 97.1 kg (214 lb)   04/17/25 93.6 kg (206 lb 4.8 oz)     Body mass index is 30.3 kg/m².    24HR INTAKE/OUTPUT:      Intake/Output Summary (Last 24 hours) at 6/19/2025 0744  Last data filed at 6/19/2025 0635  Gross per 24 hour   Intake 2269 ml   Output 400 ml   Net 1869 ml     -----------------------------------------------------------------  Exam:    GEN:    Awake, alert and pleasant.   EYES:  EOMI, pupils equal   NECK: Supple. No lymphadenopathy.  No carotid bruit  CVS:    regular rate and rhythm, no audible murmur  PULM:  CTA, no wheezes, rales or rhonchi, no acute respiratory distress  ABD:    Bowels sounds active.  Abdomen is soft.  No distention.  no tenderness to palpation.   EXT:   no edema bilaterally .  No calf tenderness.   NEURO: Moves all extremities.  Motor and sensory are grossly intact  SKIN:  No rashes.  No skin lesions.    -----------------------------------------------------------------    Diagnostic Data:      Complete Blood Count:   Recent Labs     06/17/25  0525 
Pt alert and oriented x4 resting comfortably in chair at this time. Vitals and assessment completed as charted. Pt denies any pain or current needs at this time. Call light is within reach, chair alarm on. Care ongoing.    
Pt sitting up in chair, denies nausea and abdominal pain, had a medium BM this morning, no complaints since NG tube removed. Has not wanted to drink any water, RN has tried twice. Lunch tray set up in front of patient, will encourage patient to eat.   
Pt tolerated clear liquid diet for lunch well. Denies complaints. Incontinent of urine, josh care performed and linens changed.   
Report given to Ivelisse at Group home. States she will have someone come get him at 1200.   
Updated Esther (legal guardian) that patient is going back to group home today.   
Vitals and assessment completed at this time as charted. Pt assisted up to chair x1, unsteady with walker. Chair alarm on and call light within reach. Pt denies pain and nausea. Ordered regular diet tray for breakfast. Active bowel sounds. No other concerns. Brief dry. Fall precautions in place. Care ongoing.   
Vitals and assessment completed at this time as charted. Pt resting in bed. Oriented to self and situation, knows he is in [place]. Denies pain. Noted tachypnea and rhonchi/snoring. Pt denies SOB. Spo2 stable. Attempt to have patient void in urinal but was unable to. NG tube in right nare at LIS, minimal output. Pt denies nausea. States \"I'm not getting up to the chair\". Bed alarm on and call light within reach. Will notify provider of clinical concerns.   
Vitals and assessment were completed at this time. Writer walked patient through the medications that would be administered tonight and encouraged patient to ask questions about the medications and therapies. Patient denies questions.   NG was retaped and remains at 60 at the nares. Patient was changed at this time.   Call light and bedside table remain within reach. Patient denies needs at this time. Care ongoing.    
Writer went to assess and flush patient's IV for the first time. LUE noted to be very taut and hard. IV was infiltrated. Arm +2 to +3 nonpitting edema to left arm. No fluids were infused by writer since arriving to floor.    IV removed at this time. LUE placed onto pillow and warm blanket placed over extremity to promote decrease of swelling caused by infiltration.   
weekends)  Current Treatment Recommendations: Strengthening, ROM, Balance training, Functional mobility training, Transfer training, ADL/Self-care training, IADL training, Manual, Neuromuscular re-education, Gait training, Stair training, Endurance training, Home exercise program, Safety education & training, Patient/Caregiver education & training, Therapeutic activities, Positioning  Safety Devices  Type of Devices: All fall risk precautions in place  Restraints  Restraints Initially in Place: No    Restrictions  Restrictions/Precautions  Restrictions/Precautions: Fall Risk, NPO, General Precautions  Activity Level: Up with Assist  Required Braces or Orthoses?: No     Subjective  General  Chart Reviewed: Yes  Patient assessed for rehabilitation services?: Yes  Family/Caregiver Present: No  Referring Practitioner: Jj Arevalo MD  Referral Date : 06/16/25  Diagnosis: SBO, K56.609  Follows Commands: Within Functional Limits  Subjective  Subjective: Patient denies pain at this time.         Social/Functional History  Social/Functional History  Additional Comments: The patient lives in a [facility].  He reports he does not use an AD to ambulate.  He does need help with bathing and dressing per patient.  Vision/Hearing  Vision  Vision: Within Functional Limits  Hearing  Hearing: Within functional limits    Cognition   Orientation  Orientation Level: Disoriented to place;Oriented to situation;Oriented to place  Cognition  Overall Cognitive Status: Exceptions  Arousal/Alertness: Appears intact  Following Commands: Follows multistep commands with increased time  Attention Span: Impaired  Memory: Impaired  Safety Judgement: Impaired  Problem Solving: Able to problem solve independently  Insights: Impaired  Initiation: Appears intact  Sequencing: Appears intact    Objective  Temp: 98.7 °F (37.1 °C)  Pulse: 85  Heart Rate Source: Monitor  Respirations: 22  SpO2: 97 %  O2 Device: None (Room air)  BP: 128/70  MAP (Calculated): 
  Final Result   No acute cardiopulmonary process.         XR ABDOMEN (KUB) (SINGLE AP VIEW)   Final Result   Overall improvement in small bowel and colonic dilation.  Enteric tube is no   longer visualized.         XR ABDOMEN (KUB) (SINGLE AP VIEW)   Final Result   Multiple dilated loops of small and large bowel, which may represent ileus or   partial small bowel obstruction.         XR ABDOMEN FOR NG/OG/NE TUBE PLACEMENT   Final Result   Endo gastric tube tip projects over the gastric body.         CT ABDOMEN PELVIS W IV CONTRAST Additional Contrast? None   Final Result   1. History of small-bowel obstruction with prior partial resection and   anastomosis in the left lower quadrant.   2. Current pattern of dilation of the stomach, small bowel and transverse   colon with no focal transition point. Ileus or enteritis may be considered.   A partial small bowel obstruction cannot be excluded given prior history.   3. Stable subcapsular hematoma of the left kidney.         XR ABDOMEN (KUB) (SINGLE AP VIEW)    (Results Pending)         ASSESSMENT / PLAN:    MEDICAL DECISION MAKING:    Primary Problem(s): SBO (small bowel obstruction) (HCC)  Differential diagnoses: Ileus  Condition is an undiagnosed new problem with uncertain prognosis  Condition is stable  Treatment plan:   Appreciate Gen Surgery  Will try CL diet today  Clamp NG  Up to chair  Imaging: Abdominal x-ray daily  Medications:   IV fluids  Pepcid  Medication Monitoring / High Risk Medications: none      EKG changes  Condition is stable  Treatment plan:   Telemetry monitoring  Trend EKGs and troponin  Cardiology consult  Imaging: ECHO  Medications: Lovenox    Nutrition status:   at risk for malnutrition  Dietician consult initiated  I/O  Daily weight  Monitor Daily intake  Nutritional Supplements as tolerated    MALNUTRITION ASSESSMENT AND PLAN    The following was documented by the Dietitian:    Malnutrition Assessment  Context of Malnutrition: Acute Illness 
resection and   anastomosis in the left lower quadrant.   2. Current pattern of dilation of the stomach, small bowel and transverse   colon with no focal transition point. Ileus or enteritis may be considered.   A partial small bowel obstruction cannot be excluded given prior history.   3. Stable subcapsular hematoma of the left kidney.         XR ABDOMEN (KUB) (SINGLE AP VIEW)    (Results Pending)   XR ABDOMEN (KUB) (SINGLE AP VIEW)    (Results Pending)         ASSESSMENT / PLAN:    MEDICAL DECISION MAKING:    Primary Problem(s): SBO (small bowel obstruction) (HCC)  Differential diagnoses: Ileus  Condition is an undiagnosed new problem with uncertain prognosis  Condition is stable  Treatment plan:   Appreciate Gen Surgery  N.p.o.  NG to LIWS following x-ray for placement  Up to chair  Imaging: Abdominal x-ray daily  Medications:   IV fluids  Pepcid  Medication Monitoring / High Risk Medications: none      EKG changes  Condition is stable  Treatment plan:   Telemetry monitoring  Trend EKGs and troponin  Cardiology consult  Imaging: no further imaging studies ordered today  Medications: Lovenox    Nutrition status:   at risk for malnutrition  Dietician consult initiated  I/O  Daily weight  Monitor Daily intake  Nutritional Supplements as tolerated    MALNUTRITION ASSESSMENT AND PLAN    The following was documented by the Dietitian:         I agree with the dietitian's malnutrition assessment.    Medical Nutrition Therapy: npo    Electronically signed by TIFFANIE Elizabeth CNP on 6/17/25 at 7:00 AM EDT     Hospital Prophylaxis:   DVT: Lovenox   Stress Ulcer: H2 Blocker     Disposition:  Shared decision making: All test results, treatment options and disposition options were discussed with the patient today  Social determinants of health that may impact management: resides in group home  Code status: Full Code   Disposition: Discharge plan is pending    [facility] Advanced Care Planning documentation:  [x] I

## 2025-06-19 NOTE — CARE COORDINATION
06/19/25 0956   IMM Letter   IMM Letter given to Patient/Family/Significant other/Guardian/POA/by: Esther Elena (Legal Guardian) by phone/ GUILLERMO DUMAS   IMM Letter date given: 06/19/25   IMM Letter time given: 0954     IMM letter provided to patient.  Patient offered four hours to make informed decision regarding appeal process; patient agreeable to discharge.      The guardian aware patient returning to the Group Home.  The group home said they will provide the transportation.    PITER Maria

## 2025-08-07 ENCOUNTER — HOSPITAL ENCOUNTER (EMERGENCY)
Age: 70
Discharge: HOME | End: 2025-08-07
Payer: MEDICARE

## 2025-08-07 ENCOUNTER — APPOINTMENT (OUTPATIENT)
Dept: CT IMAGING | Age: 70
End: 2025-08-07
Payer: MEDICARE

## 2025-08-07 VITALS
HEART RATE: 87 BPM | TEMPERATURE: 97.1 F | SYSTOLIC BLOOD PRESSURE: 140 MMHG | BODY MASS INDEX: 30.28 KG/M2 | RESPIRATION RATE: 28 BRPM | DIASTOLIC BLOOD PRESSURE: 82 MMHG | OXYGEN SATURATION: 94 % | WEIGHT: 211 LBS

## 2025-08-07 DIAGNOSIS — R19.7 DIARRHEA, UNSPECIFIED TYPE: Primary | ICD-10-CM

## 2025-08-07 LAB
ALBUMIN SERPL-MCNC: 4.1 G/DL (ref 3.5–5.2)
ALBUMIN/GLOB SERPL: 1.1 {RATIO} (ref 1–2.5)
ALP SERPL-CCNC: 180 U/L (ref 40–129)
ALT SERPL-CCNC: 12 U/L (ref 10–50)
ANION GAP SERPL CALCULATED.3IONS-SCNC: 14 MMOL/L (ref 9–16)
AST SERPL-CCNC: 18 U/L (ref 10–50)
BASOPHILS # BLD: 0.03 K/UL (ref 0–0.2)
BASOPHILS NFR BLD: 0 % (ref 0–2)
BILIRUB SERPL-MCNC: 0.3 MG/DL (ref 0–1.2)
BILIRUB UR QL STRIP: NEGATIVE
BUN SERPL-MCNC: 16 MG/DL (ref 8–23)
BUN/CREAT SERPL: 18 (ref 9–20)
C DIFF GDH + TOXINS A+B STL QL IA.RAPID: NEGATIVE
CALCIUM SERPL-MCNC: 9.5 MG/DL (ref 8.6–10.4)
CHLORIDE SERPL-SCNC: 104 MMOL/L (ref 98–107)
CLARITY UR: CLEAR
CO2 SERPL-SCNC: 21 MMOL/L (ref 20–31)
COLOR UR: YELLOW
CREAT SERPL-MCNC: 0.9 MG/DL (ref 0.7–1.2)
DATE, STOOL #1: NORMAL
EOSINOPHIL # BLD: 0.26 K/UL (ref 0–0.44)
EOSINOPHILS RELATIVE PERCENT: 3 % (ref 1–4)
EPI CELLS #/AREA URNS HPF: NORMAL /HPF (ref 0–5)
ERYTHROCYTE [DISTWIDTH] IN BLOOD BY AUTOMATED COUNT: 12.7 % (ref 11.8–14.4)
GFR, ESTIMATED: 90 ML/MIN/1.73M2
GLUCOSE SERPL-MCNC: 92 MG/DL (ref 74–99)
GLUCOSE UR STRIP-MCNC: NEGATIVE MG/DL
HCT VFR BLD AUTO: 41.1 % (ref 40.7–50.3)
HEMOCCULT SP1 STL QL: NEGATIVE
HGB BLD-MCNC: 13.6 G/DL (ref 13–17)
HGB UR QL STRIP.AUTO: ABNORMAL
IMM GRANULOCYTES # BLD AUTO: 0.04 K/UL (ref 0–0.3)
IMM GRANULOCYTES NFR BLD: 0 %
KETONES UR STRIP-MCNC: NEGATIVE MG/DL
LACTATE BLDV-SCNC: 0.9 MMOL/L (ref 0.5–2.2)
LEUKOCYTE ESTERASE UR QL STRIP: ABNORMAL
LIPASE SERPL-CCNC: 45 U/L (ref 13–60)
LYMPHOCYTES NFR BLD: 1.9 K/UL (ref 1.1–3.7)
LYMPHOCYTES RELATIVE PERCENT: 18 % (ref 24–43)
MCH RBC QN AUTO: 32.4 PG (ref 25.2–33.5)
MCHC RBC AUTO-ENTMCNC: 33.1 G/DL (ref 28.4–34.8)
MCV RBC AUTO: 97.9 FL (ref 82.6–102.9)
MONOCYTES NFR BLD: 0.81 K/UL (ref 0.1–1.2)
MONOCYTES NFR BLD: 8 % (ref 3–12)
NEUTROPHILS NFR BLD: 71 % (ref 36–65)
NEUTS SEG NFR BLD: 7.48 K/UL (ref 1.5–8.1)
NITRITE UR QL STRIP: NEGATIVE
NRBC BLD-RTO: 0 PER 100 WBC
PH UR STRIP: 6 [PH] (ref 5–9)
PLATELET # BLD AUTO: 243 K/UL (ref 138–453)
PMV BLD AUTO: 8.9 FL (ref 8.1–13.5)
POTASSIUM SERPL-SCNC: 4.7 MMOL/L (ref 3.7–5.3)
PROT SERPL-MCNC: 7.8 G/DL (ref 6.6–8.7)
PROT UR STRIP-MCNC: NEGATIVE MG/DL
RBC # BLD AUTO: 4.2 M/UL (ref 4.21–5.77)
RBC #/AREA URNS HPF: NORMAL /HPF (ref 0–2)
SODIUM SERPL-SCNC: 139 MMOL/L (ref 136–145)
SP GR UR STRIP: 1.02 (ref 1.01–1.02)
SPECIMEN DESCRIPTION: NORMAL
TIME, STOOL #1: 1303
UROBILINOGEN UR STRIP-ACNC: NORMAL EU/DL (ref 0–1)
WBC #/AREA URNS HPF: NORMAL /HPF (ref 0–5)
WBC OTHER # BLD: 10.5 K/UL (ref 3.5–11.3)

## 2025-08-07 PROCEDURE — 74176 CT ABD & PELVIS W/O CONTRAST: CPT

## 2025-08-07 PROCEDURE — 80053 COMPREHEN METABOLIC PANEL: CPT

## 2025-08-07 PROCEDURE — 6370000000 HC RX 637 (ALT 250 FOR IP)

## 2025-08-07 PROCEDURE — 82270 OCCULT BLOOD FECES: CPT

## 2025-08-07 PROCEDURE — 85025 COMPLETE CBC W/AUTO DIFF WBC: CPT

## 2025-08-07 PROCEDURE — 87449 NOS EACH ORGANISM AG IA: CPT

## 2025-08-07 PROCEDURE — 83690 ASSAY OF LIPASE: CPT

## 2025-08-07 PROCEDURE — 2580000003 HC RX 258

## 2025-08-07 PROCEDURE — 87324 CLOSTRIDIUM AG IA: CPT

## 2025-08-07 PROCEDURE — 83605 ASSAY OF LACTIC ACID: CPT

## 2025-08-07 PROCEDURE — 87506 IADNA-DNA/RNA PROBE TQ 6-11: CPT

## 2025-08-07 PROCEDURE — 99284 EMERGENCY DEPT VISIT MOD MDM: CPT

## 2025-08-07 PROCEDURE — 81001 URINALYSIS AUTO W/SCOPE: CPT

## 2025-08-07 RX ORDER — ONDANSETRON 4 MG/1
4 TABLET, ORALLY DISINTEGRATING ORAL ONCE
Status: COMPLETED | OUTPATIENT
Start: 2025-08-07 | End: 2025-08-07

## 2025-08-07 RX ORDER — 0.9 % SODIUM CHLORIDE 0.9 %
1000 INTRAVENOUS SOLUTION INTRAVENOUS ONCE
Status: COMPLETED | OUTPATIENT
Start: 2025-08-07 | End: 2025-08-07

## 2025-08-07 RX ORDER — SODIUM CHLORIDE 0.9 % (FLUSH) 0.9 %
3 SYRINGE (ML) INJECTION EVERY 8 HOURS
Status: DISCONTINUED | OUTPATIENT
Start: 2025-08-07 | End: 2025-08-07 | Stop reason: HOSPADM

## 2025-08-07 RX ADMIN — SODIUM CHLORIDE 1000 ML: 0.9 INJECTION, SOLUTION INTRAVENOUS at 12:20

## 2025-08-07 RX ADMIN — ONDANSETRON 4 MG: 4 TABLET, ORALLY DISINTEGRATING ORAL at 18:04

## 2025-08-07 ASSESSMENT — PAIN - FUNCTIONAL ASSESSMENT: PAIN_FUNCTIONAL_ASSESSMENT: NONE - DENIES PAIN

## 2025-08-08 LAB
CAMPYLOBACTER DNA SPEC NAA+PROBE: NORMAL
ETEC ELTA+ESTB GENES STL QL NAA+PROBE: NORMAL
P SHIGELLOIDES DNA STL QL NAA+PROBE: NORMAL
SALMONELLA DNA SPEC QL NAA+PROBE: NORMAL
SHIGA TOXIN STX GENE SPEC NAA+PROBE: NORMAL
SHIGELLA DNA SPEC QL NAA+PROBE: NORMAL
SPECIMEN DESCRIPTION: NORMAL
V CHOL+PARA RFBL+TRKH+TNAA STL QL NAA+PR: NORMAL
Y ENTERO RECN STL QL NAA+PROBE: NORMAL

## 2025-08-13 ENCOUNTER — HOSPITAL ENCOUNTER (OUTPATIENT)
Age: 70
Setting detail: SPECIMEN
Discharge: HOME | End: 2025-08-13

## 2025-08-14 ENCOUNTER — HOSPITAL ENCOUNTER (OUTPATIENT)
Age: 70
Setting detail: SPECIMEN
Discharge: HOME | End: 2025-08-14